# Patient Record
Sex: FEMALE | Race: WHITE | NOT HISPANIC OR LATINO | Employment: FULL TIME | URBAN - NONMETROPOLITAN AREA
[De-identification: names, ages, dates, MRNs, and addresses within clinical notes are randomized per-mention and may not be internally consistent; named-entity substitution may affect disease eponyms.]

---

## 2017-03-27 ENCOUNTER — TRANSCRIBE ORDERS (OUTPATIENT)
Dept: ULTRASOUND IMAGING | Facility: HOSPITAL | Age: 47
End: 2017-03-27

## 2017-03-27 DIAGNOSIS — R10.11 RIGHT UPPER QUADRANT PAIN: Primary | ICD-10-CM

## 2017-03-29 ENCOUNTER — HOSPITAL ENCOUNTER (EMERGENCY)
Facility: HOSPITAL | Age: 47
End: 2017-03-29

## 2017-03-30 ENCOUNTER — HOSPITAL ENCOUNTER (OUTPATIENT)
Dept: ULTRASOUND IMAGING | Facility: HOSPITAL | Age: 47
Discharge: HOME | End: 2017-03-30
Admitting: NURSE PRACTITIONER

## 2017-03-30 DIAGNOSIS — R10.11 RIGHT UPPER QUADRANT PAIN: ICD-10-CM

## 2017-03-30 PROCEDURE — 76700 US EXAM ABDOM COMPLETE: CPT

## 2017-04-03 ENCOUNTER — TRANSCRIBE ORDERS (OUTPATIENT)
Dept: NUCLEAR MEDICINE | Facility: HOSPITAL | Age: 47
End: 2017-04-03

## 2017-04-03 DIAGNOSIS — R10.11 RUQ PAIN: Primary | ICD-10-CM

## 2017-04-04 ENCOUNTER — APPOINTMENT (OUTPATIENT)
Dept: ULTRASOUND IMAGING | Facility: HOSPITAL | Age: 47
End: 2017-04-04

## 2017-04-06 ENCOUNTER — HOSPITAL ENCOUNTER (OUTPATIENT)
Dept: NUCLEAR MEDICINE | Facility: HOSPITAL | Age: 47
Discharge: HOME | End: 2017-04-06

## 2017-04-06 DIAGNOSIS — R10.11 RUQ PAIN: ICD-10-CM

## 2017-04-06 PROCEDURE — 0 TECHNETIUM TC 99M MEBROFENIN KIT: Performed by: NURSE PRACTITIONER

## 2017-04-06 PROCEDURE — A9537 TC99M MEBROFENIN: HCPCS | Performed by: NURSE PRACTITIONER

## 2017-04-06 PROCEDURE — 78227 HEPATOBIL SYST IMAGE W/DRUG: CPT

## 2017-04-06 RX ORDER — KIT FOR THE PREPARATION OF TECHNETIUM TC 99M MEBROFENIN 45 MG/10ML
1 INJECTION, POWDER, LYOPHILIZED, FOR SOLUTION INTRAVENOUS
Status: COMPLETED | OUTPATIENT
Start: 2017-04-06 | End: 2017-04-06

## 2017-04-06 RX ADMIN — MEBROFENIN 1 DOSE: 45 INJECTION, POWDER, LYOPHILIZED, FOR SOLUTION INTRAVENOUS at 08:25

## 2017-12-07 ENCOUNTER — TRANSCRIBE ORDERS (OUTPATIENT)
Dept: ADMINISTRATIVE | Facility: HOSPITAL | Age: 47
End: 2017-12-07

## 2017-12-07 DIAGNOSIS — Z12.39 SCREENING BREAST EXAMINATION: Primary | ICD-10-CM

## 2018-01-11 ENCOUNTER — HOSPITAL ENCOUNTER (OUTPATIENT)
Dept: MAMMOGRAPHY | Facility: HOSPITAL | Age: 48
Discharge: HOME | End: 2018-01-11
Admitting: NURSE PRACTITIONER

## 2018-01-11 DIAGNOSIS — Z12.39 SCREENING BREAST EXAMINATION: ICD-10-CM

## 2018-01-11 PROCEDURE — 77067 SCR MAMMO BI INCL CAD: CPT

## 2018-01-11 PROCEDURE — 77063 BREAST TOMOSYNTHESIS BI: CPT

## 2018-03-19 ENCOUNTER — TRANSCRIBE ORDERS (OUTPATIENT)
Dept: ADMINISTRATIVE | Facility: HOSPITAL | Age: 48
End: 2018-03-19

## 2018-03-19 DIAGNOSIS — J32.0 CHRONIC MAXILLARY SINUSITIS: Primary | ICD-10-CM

## 2018-03-26 ENCOUNTER — HOSPITAL ENCOUNTER (OUTPATIENT)
Dept: CT IMAGING | Facility: HOSPITAL | Age: 48
Discharge: HOME | End: 2018-03-26
Admitting: NURSE PRACTITIONER

## 2018-03-26 DIAGNOSIS — J32.0 CHRONIC MAXILLARY SINUSITIS: ICD-10-CM

## 2018-03-26 PROCEDURE — 70488 CT MAXILLOFACIAL W/O & W/DYE: CPT

## 2018-03-26 PROCEDURE — 25010000002 IOPAMIDOL 61 % SOLUTION: Performed by: NURSE PRACTITIONER

## 2018-03-26 RX ADMIN — IOPAMIDOL 85 ML: 612 INJECTION, SOLUTION INTRAVENOUS at 19:15

## 2020-06-09 ENCOUNTER — LAB (OUTPATIENT)
Dept: LAB | Facility: HOSPITAL | Age: 50
End: 2020-06-09

## 2020-06-09 ENCOUNTER — OFFICE VISIT (OUTPATIENT)
Dept: ENDOCRINOLOGY | Facility: CLINIC | Age: 50
End: 2020-06-09

## 2020-06-09 VITALS
HEART RATE: 72 BPM | BODY MASS INDEX: 28.69 KG/M2 | OXYGEN SATURATION: 98 % | SYSTOLIC BLOOD PRESSURE: 128 MMHG | WEIGHT: 178.5 LBS | HEIGHT: 66 IN | DIASTOLIC BLOOD PRESSURE: 78 MMHG

## 2020-06-09 DIAGNOSIS — E89.0 POSTABLATIVE HYPOTHYROIDISM: Primary | ICD-10-CM

## 2020-06-09 PROCEDURE — 84439 ASSAY OF FREE THYROXINE: CPT | Performed by: INTERNAL MEDICINE

## 2020-06-09 PROCEDURE — 84443 ASSAY THYROID STIM HORMONE: CPT | Performed by: INTERNAL MEDICINE

## 2020-06-09 PROCEDURE — 99244 OFF/OP CNSLTJ NEW/EST MOD 40: CPT | Performed by: INTERNAL MEDICINE

## 2020-06-09 RX ORDER — BUPROPION HYDROCHLORIDE 100 MG/1
1 TABLET, EXTENDED RELEASE ORAL DAILY
COMMUNITY
Start: 2020-05-14 | End: 2020-12-23

## 2020-06-09 RX ORDER — NALTREXONE HYDROCHLORIDE 50 MG/1
50 TABLET, FILM COATED ORAL DAILY
COMMUNITY
End: 2020-12-23

## 2020-06-09 RX ORDER — LEVOTHYROXINE SODIUM 137 UG/1
137 TABLET ORAL DAILY
COMMUNITY
End: 2020-06-10

## 2020-06-09 RX ORDER — PHENTERMINE HYDROCHLORIDE 37.5 MG/1
1 TABLET ORAL DAILY
COMMUNITY
Start: 2020-03-24 | End: 2020-06-09

## 2020-06-09 NOTE — PROGRESS NOTES
Chief Complaint   Patient presents with   • Establish Care   • Graves' Disease        New patient who is being seen in consultation regarding hypothyroidism following BARTON for graves disease at the request of Michelle Leary APRN HPI   Kelsey Ross is a 49 y.o. female who presents for evaluation of hypothyroidism following BARTON for graves disease.      Patient presents today for evaluation of hypothyroidism following BARTON for graves disease. Graves disease was diagnosed 2001 when patient presented with symptoms of tremor and rapid heart rate. Patient then underwent radioactive iodine in 2001. Patient was then started on thyroid hormone shortly after. She reports some variation in dose of thyroid hormone previously. Patient is currently taking 137 mcg daily for the past few years.     Patient reports palpitations and anxiety. Patient reports some chest discomfort with food intake. None at rest. Right sided. None current  Patient reports changes in bowel habits. Pateint reports lifelong constipation which is unchanged.  Patient reports cold intolerance over the past few years.   Patient reports changes in weight. Patient reports 40 pounds of weight gain over the past 2 years.   Patient reports hair loss, brittle nails.   Patient reports tremor.  Patient reports decreased energy level.    Patient reports history of previous head/neck radiation.  Patient reports history of recent iodine exposure.  Patient denies taking OTC supplements such as biotin.  Patient denies personal or family history of thyroid cancer.     Patient seen at [facility] in [place] by Dr. Elliott. She reports she is intermittently taking phentermine but that this has not been effective for weight loss.    Past Medical History:   Diagnosis Date   • Bilateral ovarian cysts    • Migraines    • Thyroid disease      Past Surgical History:   Procedure Laterality Date   • HYSTERECTOMY     • RETINAL DETACHMENT SURGERY     • RHINOPLASTY         Family History   Problem Relation Age of Onset   • Arthritis Mother    • Hypertension Mother    • Hyperlipidemia Mother    • Migraines Mother    • Stroke Mother    • Diabetes Father    • Cancer Maternal Aunt    • Arthritis Maternal Grandmother    • Hypertension Maternal Grandmother    • Hyperlipidemia Maternal Grandmother    • Stroke Maternal Grandmother    • Thyroid disease Maternal Grandmother    • Diabetes Paternal Grandmother       Social History     Socioeconomic History   • Marital status: Single     Spouse name: Not on file   • Number of children: Not on file   • Years of education: Not on file   • Highest education level: Not on file   Tobacco Use   • Smoking status: Former Smoker     Packs/day: 2.00     Years: 8.00     Pack years: 16.00   • Smokeless tobacco: Never Used   Substance and Sexual Activity   • Alcohol use: Not Currently   • Drug use: Never   • Sexual activity: Defer      Allergies   Allergen Reactions   • Allegra [Fexofenadine] Headache   • Penicillins Other (See Comments)     Allergist informed patient she was allergic to Penicillins      Current Outpatient Medications on File Prior to Visit   Medication Sig Dispense Refill   • levothyroxine (SYNTHROID, LEVOTHROID) 137 MCG tablet Take 137 mcg by mouth Daily.     • naltrexone (DEPADE) 50 MG tablet Take 50 mg by mouth Daily.     • Simethicone (GAS RELIEF 80 PO) Take 1 tablet by mouth As Needed.     • buPROPion SR (WELLBUTRIN SR) 100 MG 12 hr tablet Take 1 tablet by mouth Daily.     • [DISCONTINUED] phentermine (ADIPEX-P) 37.5 MG tablet Take 1 tablet by mouth Daily. Pt takes 1/2 tablet daily.       No current facility-administered medications on file prior to visit.         Review of Systems   Constitutional: Positive for fatigue and unexpected weight gain.   HENT: Negative for trouble swallowing and voice change.    Eyes: Negative for pain and visual disturbance.   Respiratory: Negative for cough and shortness of breath.    Cardiovascular:  "Positive for palpitations. Negative for chest pain (marked yes on ROS but denied during visit).   Gastrointestinal: Positive for constipation. Negative for diarrhea.   Endocrine: Positive for cold intolerance. Negative for heat intolerance.   Musculoskeletal: Negative for arthralgias and myalgias.   Skin: Negative for dry skin and rash.   Neurological: Positive for headache. Negative for tremors.   Psychiatric/Behavioral: Positive for sleep disturbance. The patient is nervous/anxious.           Vitals:    06/09/20 1422   BP: 128/78   Pulse: 72   SpO2: 98%   Weight: 81 kg (178 lb 8 oz)   Height: 167.6 cm (66\")   Body mass index is 28.81 kg/m².     Physical Exam   Constitutional: Vital signs are normal. She appears well-developed and well-nourished. She is cooperative.  Non-toxic appearance. No distress. She appears overweight.   HENT:   Head: Normocephalic and atraumatic.   Right Ear: Hearing normal.   Left Ear: Hearing normal.   Nose: Nose normal.   Eyes: Pupils are equal, round, and reactive to light. Conjunctivae and EOM are normal.   Neck: Trachea normal. No thyromegaly present.   Cardiovascular: Normal rate and regular rhythm.   No murmur heard.  Pulmonary/Chest: Effort normal and breath sounds normal. No respiratory distress.   Abdominal: Soft. Bowel sounds are normal. She exhibits no distension. There is no hepatosplenomegaly. There is no tenderness.   Lymphadenopathy:        Head (right side): No submandibular adenopathy present.        Head (left side): No submandibular adenopathy present.     She has no cervical adenopathy.   Neurological: She is alert. She has normal strength and normal reflexes.   Skin: Skin is warm, dry and intact. No rash noted.   Psychiatric: She has a normal mood and affect. Her behavior is normal.          Labs/Imaging  Labs dated 10/31/2019  TSH 1.46    Labs dated 3/16/2019  TSH1.73    Assessment and Plan    Kelsey was seen today for establish care and graves' disease.    Diagnoses " and all orders for this visit:    Postablative hypothyroidism  -Status post radioactive iodine for Graves' disease  -Currently taking levothyroxine 137 mcg daily, reviewed appropriate administration.  Patient reports symptoms of anxiety, palpitations, constipation, weight gain, hair loss-discussed that some of these symptoms are generally attributed to hyperthyroidism and others hypothyroidism, thus not all of her symptoms can be attributed to thyroid hormone dysregulation.  -Discussed that phentermine can commonly cause anxiety and palpitations.  -Patient's thyroid function testing has been normal in the past year.  Discussed that in the setting, it is difficult to attribute her symptoms to hypothyroidism and would recommend evaluation of alternative etiologies.  -Discussed options for management of hypothyroidism including synthetic T4, synthetic T3, desiccated thyroid hormone.  Discussed that I would not recommend use of any T3-containing therapy concurrently with phentermine. Patient reports she is willing to stop phentermine, if needed. She is taking this intermittently and does not feel it is working. Reviewed potential risks of T3 containing therapy including anxiety, palpitations. Patient denies any history of arrhythmia or cardiac disease.   - will reevaluate thyroid hormone today, if TSH allows, consider stopping phentermine and trial of cytomel  -     TSH  -     T4, Free     Weight gain  - discussed potential endocrine etiologies, suspicion for cortisol excess is low, will defer testing for now    Return in about 4 months (around 10/9/2020). The patient was instructed to contact the clinic with any interval questions or concerns.    Neda Mullen MD     Please note that portions of this document were completed using a voice recognition program. Efforts were made to edit the dictations, but occasionally words are mis-transcribed.

## 2020-06-10 ENCOUNTER — TELEPHONE (OUTPATIENT)
Dept: ENDOCRINOLOGY | Facility: CLINIC | Age: 50
End: 2020-06-10

## 2020-06-10 LAB
T4 FREE SERPL-MCNC: 1.63 NG/DL (ref 0.93–1.7)
TSH SERPL DL<=0.05 MIU/L-ACNC: 0.19 UIU/ML (ref 0.27–4.2)

## 2020-06-10 RX ORDER — LEVOTHYROXINE SODIUM 0.12 MG/1
137 TABLET ORAL DAILY
Qty: 30 TABLET | Refills: 5 | Status: SHIPPED | OUTPATIENT
Start: 2020-06-10 | End: 2020-10-23

## 2020-06-10 NOTE — TELEPHONE ENCOUNTER
Spoke with pt and I did see the result note in her chart so I did read that to her as well. Pt stated she was interested in the T3 supplement but we did see your note regarding that is the TSH level. Pt is also wanting to know what Dr Mullen's thought are on brand name vs generic. Pt has been on brand name since 2001 because she was told with Graves Disease she should take brand name. Pt is interested in [facility] pharmacy if you do want her on brand because she has been paying completely out of pocket for the brand name. Pt is also wanting to know if going down in the dose will cause weight gain? Please advise. Thanks!

## 2020-06-10 NOTE — TELEPHONE ENCOUNTER
Pt got a message from her pharmacy that her Synthroid dosage had changed. She normally takes 137 mcg, but the RX is for 125mcg.      She also has some questions about a t3 supplement.    Please advise.    Pt contact [phone]

## 2020-06-10 NOTE — TELEPHONE ENCOUNTER
Regarding brand name versus generic-- generally being on brand name medication does not have any significant impact. However, if she prefers to stay on brand name, it is okay to send to her requested pharmacy.    Regarding T3- I would be happy to consider this in the future once TSH has returned to normal. However, TSH already indicates too much thyroid hormone, thus adding T3 would not be a good idea at this time as it could result in worsening hyperthyroidism.    Regarding weight gain, the slight dose reduction should not cause significant change. However, hyperthyroidism does speed up metabolism, which could result in weight change once corrected.

## 2020-06-10 NOTE — TELEPHONE ENCOUNTER
LVM to get more info on the T3 supplement question.     Can you please advise on the dose change? Thanks!

## 2020-08-03 ENCOUNTER — TELEPHONE (OUTPATIENT)
Dept: ENDOCRINOLOGY | Facility: CLINIC | Age: 50
End: 2020-08-03

## 2020-08-03 DIAGNOSIS — E89.0 POSTABLATIVE HYPOTHYROIDISM: Primary | ICD-10-CM

## 2020-08-03 RX ORDER — LIOTHYRONINE SODIUM 5 UG/1
5 TABLET ORAL DAILY
Qty: 30 TABLET | Refills: 1 | Status: SHIPPED | OUTPATIENT
Start: 2020-08-03 | End: 2020-09-16 | Stop reason: SDUPTHER

## 2020-08-03 NOTE — TELEPHONE ENCOUNTER
----- Message from Neda Mullen MD sent at 8/3/2020 10:52 AM EDT -----  Please contact patient. Her TSH and free T4 are now normal. Patient was previously interested in starting T3 in addition to her current thyroid hormone. If she desires, we could do a trial of cytomel 5 mcg daily in addition to her current levothyroxine. Her TSH, however, is in the lower part of the normal range thus she would need to monitor for symptoms of hyperthyroidism (palpiltations, diarrhea, weight loss, heat intolerance) and contact us if she has any concerning symptoms. If this is added, we would need to repeat labs at follow up.    Please let me know if patient desires trial of cytomel and I can send this to the pharmacy.

## 2020-08-03 NOTE — TELEPHONE ENCOUNTER
Informed patient of lab results, pt voiced understanding.  Patient decided to do the trial and take Cytomel 5 mg.

## 2020-09-16 DIAGNOSIS — E89.0 POSTABLATIVE HYPOTHYROIDISM: ICD-10-CM

## 2020-09-16 NOTE — TELEPHONE ENCOUNTER
PT NEEDS A REFILL ON HER CYTOMEL.    TO [facility]    PT WAS A PT OF DR. HOGAN'S BUT SWITCH TO [facility]. SHE WILL SEE TREVIN FOR THE FIRST TIME 10/22, BUT WILL NEED A REFILL BEFORE THEN.    PLEASE ADVISE.

## 2020-09-17 RX ORDER — LIOTHYRONINE SODIUM 5 UG/1
5 TABLET ORAL DAILY
Qty: 30 TABLET | Refills: 1 | Status: SHIPPED | OUTPATIENT
Start: 2020-09-17 | End: 2020-10-23

## 2020-10-22 ENCOUNTER — LAB (OUTPATIENT)
Dept: LAB | Facility: HOSPITAL | Age: 50
End: 2020-10-22

## 2020-10-22 ENCOUNTER — OFFICE VISIT (OUTPATIENT)
Dept: ENDOCRINOLOGY | Facility: CLINIC | Age: 50
End: 2020-10-22

## 2020-10-22 VITALS
BODY MASS INDEX: 29.96 KG/M2 | HEART RATE: 75 BPM | DIASTOLIC BLOOD PRESSURE: 60 MMHG | SYSTOLIC BLOOD PRESSURE: 108 MMHG | HEIGHT: 66 IN | OXYGEN SATURATION: 98 % | WEIGHT: 186.4 LBS

## 2020-10-22 DIAGNOSIS — R53.82 CHRONIC FATIGUE: ICD-10-CM

## 2020-10-22 DIAGNOSIS — E03.9 ACQUIRED HYPOTHYROIDISM: Primary | ICD-10-CM

## 2020-10-22 DIAGNOSIS — R63.5 WEIGHT GAIN: ICD-10-CM

## 2020-10-22 LAB
T4 FREE SERPL-MCNC: 1.57 NG/DL (ref 0.93–1.7)
TSH SERPL DL<=0.05 MIU/L-ACNC: 0.19 UIU/ML (ref 0.27–4.2)

## 2020-10-22 PROCEDURE — 86800 THYROGLOBULIN ANTIBODY: CPT | Performed by: INTERNAL MEDICINE

## 2020-10-22 PROCEDURE — 86376 MICROSOMAL ANTIBODY EACH: CPT | Performed by: INTERNAL MEDICINE

## 2020-10-22 PROCEDURE — 84443 ASSAY THYROID STIM HORMONE: CPT | Performed by: INTERNAL MEDICINE

## 2020-10-22 PROCEDURE — 84439 ASSAY OF FREE THYROXINE: CPT | Performed by: INTERNAL MEDICINE

## 2020-10-22 PROCEDURE — 99214 OFFICE O/P EST MOD 30 MIN: CPT | Performed by: INTERNAL MEDICINE

## 2020-10-22 NOTE — ASSESSMENT & PLAN NOTE
On synthroid 125 mcg + 5 mcg cytomel  Check tft  Go to 10 mcg cytomel and adjust T4 to fit this based on tsh

## 2020-10-22 NOTE — PROGRESS NOTES
Kelsey Cody 50 y.o.  [facility]:Follow-up and Hypothyroidism      [facility]:Follow-up and Hypothyroidism    C/o fatigue  Needs more energy   She is trying to walk for exercise  Weight gain   bp is good  Is on cytomel - that has helped a little  Having some hair loss   She stopped phentermine - has discontinued this   Is on bupropion but has discontinued this as well  Am headaches and fatigue  Consider sleep study   Last tsh 0.248     Allergies   Allergen Reactions   • Allegra [Fexofenadine] Headache   • Penicillins Other (See Comments)     Allergist informed patient she was allergic to Penicillins       Current Outpatient Medications:   •  buPROPion SR (WELLBUTRIN SR) 100 MG 12 hr tablet, Take 1 tablet by mouth Daily., Disp: , Rfl:   •  levothyroxine (SYNTHROID, LEVOTHROID) 125 MCG tablet, Take 1 tablet by mouth Daily., Disp: 30 tablet, Rfl: 5  •  liothyronine (CYTOMEL) 5 MCG tablet, Take 1 tablet by mouth Daily., Disp: 30 tablet, Rfl: 1  •  naltrexone (DEPADE) 50 MG tablet, Take 50 mg by mouth Daily., Disp: , Rfl:   •  Simethicone (GAS RELIEF 80 PO), Take 1 tablet by mouth As Needed., Disp: , Rfl:   There are no active problems to display for this patient.    Review of Systems   Constitutional: Positive for activity change, appetite change, fatigue and unexpected weight change. Negative for chills, diaphoresis and fever.   HENT: Negative for congestion, dental problem, drooling, ear discharge, ear pain, facial swelling, hearing loss, mouth sores, nosebleeds, postnasal drip, rhinorrhea, sinus pressure, sneezing, sore throat, tinnitus, trouble swallowing and voice change.    Eyes: Negative for photophobia, pain, discharge, redness, itching and visual disturbance.   Respiratory: Negative for apnea, cough, choking, chest tightness, shortness of breath, wheezing and stridor.    Cardiovascular: Negative for chest pain, palpitations and leg swelling.   Gastrointestinal: Negative for abdominal distention, abdominal pain, anal  bleeding, blood in stool, constipation, diarrhea, nausea, rectal pain and vomiting.   Endocrine: Negative for cold intolerance, heat intolerance, polydipsia, polyphagia and polyuria.   Genitourinary: Negative for decreased urine volume, difficulty urinating, dysuria, enuresis, flank pain, frequency, genital sores, hematuria and urgency.   Musculoskeletal: Negative for arthralgias, back pain, gait problem, joint swelling, myalgias, neck pain and neck stiffness.   Skin: Negative for color change, pallor, rash and wound.        Hair changes   Frontal thinning    Allergic/Immunologic: Negative for environmental allergies, food allergies and immunocompromised state.   Neurological: Positive for headaches. Negative for dizziness, tremors, seizures, syncope, facial asymmetry, speech difficulty, weakness, light-headedness and numbness.   Hematological: Negative for adenopathy. Does not bruise/bleed easily.   Psychiatric/Behavioral: Negative for agitation, behavioral problems, confusion, decreased concentration, dysphoric mood, hallucinations, self-injury, sleep disturbance and suicidal ideas. The patient is not nervous/anxious and is not hyperactive.      Social History     Socioeconomic History   • Marital status: Single     Spouse name: Not on file   • Number of children: Not on file   • Years of education: Not on file   • Highest education level: Not on file   Tobacco Use   • Smoking status: Former Smoker     Packs/day: 2.00     Years: 8.00     Pack years: 16.00   • Smokeless tobacco: Never Used   Substance and Sexual Activity   • Alcohol use: Not Currently   • Drug use: Never   • Sexual activity: Defer     Family History   Problem Relation Age of Onset   • Arthritis Mother    • Hypertension Mother    • Hyperlipidemia Mother    • Migraines Mother    • Stroke Mother    • Diabetes Father    • Cancer Maternal Aunt    • Arthritis Maternal Grandmother    • Hypertension Maternal Grandmother    • Hyperlipidemia Maternal  "Grandmother    • Stroke Maternal Grandmother    • Thyroid disease Maternal Grandmother    • Diabetes Paternal Grandmother      /60   Pulse 75   Ht 167.6 cm (66\")   Wt 84.6 kg (186 lb 6.4 oz)   SpO2 98%   BMI 30.09 kg/m²   Physical Exam  Vitals signs and nursing note reviewed.   Constitutional:       Appearance: Normal appearance. She is well-developed.   HENT:      Head: Normocephalic and atraumatic.   Eyes:      General: Lids are normal.      Extraocular Movements: Extraocular movements intact.      Conjunctiva/sclera: Conjunctivae normal.      Pupils: Pupils are equal, round, and reactive to light.   Neck:      Musculoskeletal: Normal range of motion and neck supple.      Thyroid: No thyroid mass or thyromegaly.      Vascular: No carotid bruit.      Trachea: Trachea normal. No tracheal deviation.   Cardiovascular:      Rate and Rhythm: Normal rate and regular rhythm.      Heart sounds: Normal heart sounds. No murmur. No friction rub. No gallop.    Pulmonary:      Effort: Pulmonary effort is normal. No respiratory distress.      Breath sounds: Normal breath sounds. No wheezing.   Musculoskeletal: Normal range of motion.         General: No deformity.   Lymphadenopathy:      Cervical: No cervical adenopathy.   Skin:     General: Skin is warm and dry.      Findings: No erythema or rash.      Nails: There is no clubbing.     Neurological:      Mental Status: She is alert and oriented to person, place, and time.      Cranial Nerves: No cranial nerve deficit.      Deep Tendon Reflexes: Reflexes are normal and symmetric. Reflexes normal.   Psychiatric:         Speech: Speech normal.         Behavior: Behavior normal.         Thought Content: Thought content normal.         Judgment: Judgment normal.       Results for orders placed or performed in visit on 06/09/20   TSH    Specimen: Blood   Result Value Ref Range    TSH 0.187 (L) 0.270 - 4.200 uIU/mL   T4, Free    Specimen: Blood   Result Value Ref Range    " Free T4 1.63 0.93 - 1.70 ng/dL     Problems Addressed this Visit        Endocrine    Acquired hypothyroidism - Primary     On synthroid 125 mcg + 5 mcg cytomel  Check tft  Go to 10 mcg cytomel and adjust T4 to fit this based on tsh            Relevant Orders    T4, Free    TSH    Thyroid Antibodies       Other    Weight gain     With frontal hair thinning - likely hormonal assoc with menopause  Diet discussed          Chronic fatigue     Consider sleep study            Diagnoses       Codes Comments    Acquired hypothyroidism    -  Primary ICD-10-CM: E03.9  ICD-9-CM: 244.9     Chronic fatigue     ICD-10-CM: R53.82  ICD-9-CM: 780.79     Weight gain     ICD-10-CM: R63.5  ICD-9-CM: 783.1         Return in about 8 weeks (around 12/17/2020) for Recheck.  Ok to return to phentermine / bupropion once tfts normalized  Discussed restrictions on phentermine use and she has taken for years   Cristel Garces MA  Signed Taylor Holliday MD

## 2020-10-23 DIAGNOSIS — E89.0 POSTABLATIVE HYPOTHYROIDISM: ICD-10-CM

## 2020-10-23 RX ORDER — LIOTHYRONINE SODIUM 5 UG/1
10 TABLET ORAL DAILY
Qty: 60 TABLET | Refills: 5 | Status: SHIPPED | OUTPATIENT
Start: 2020-10-23 | End: 2021-04-26 | Stop reason: SDUPTHER

## 2020-10-23 RX ORDER — LEVOTHYROXINE SODIUM 0.1 MG/1
100 TABLET ORAL DAILY
Qty: 30 TABLET | Refills: 5 | Status: SHIPPED | OUTPATIENT
Start: 2020-10-23 | End: 2021-06-18 | Stop reason: SDUPTHER

## 2020-10-26 LAB
THYROGLOB AB SERPL-ACNC: <1 IU/ML (ref 0–0.9)
THYROPEROXIDASE AB SERPL-ACNC: <9 IU/ML (ref 0–34)

## 2020-11-30 ENCOUNTER — TELEPHONE (OUTPATIENT)
Dept: INTERNAL MEDICINE | Facility: CLINIC | Age: 50
End: 2020-11-30

## 2020-11-30 NOTE — TELEPHONE ENCOUNTER
PT CALLED TO SCHEDULE  ESTABLISHED CARE. WAS ABLE TO SCHEDULE IN FEBRUARY BUT WOULD LIKE TO SEE IF DR. DUNLAP COULD TRY TO GET HER IN SOONER THAN THAT.

## 2020-12-23 ENCOUNTER — OFFICE VISIT (OUTPATIENT)
Dept: ENDOCRINOLOGY | Facility: CLINIC | Age: 50
End: 2020-12-23

## 2020-12-23 ENCOUNTER — LAB (OUTPATIENT)
Dept: LAB | Facility: HOSPITAL | Age: 50
End: 2020-12-23

## 2020-12-23 VITALS
WEIGHT: 187.4 LBS | OXYGEN SATURATION: 98 % | TEMPERATURE: 97.5 F | DIASTOLIC BLOOD PRESSURE: 84 MMHG | HEART RATE: 68 BPM | SYSTOLIC BLOOD PRESSURE: 122 MMHG | BODY MASS INDEX: 30.25 KG/M2

## 2020-12-23 DIAGNOSIS — R53.83 FATIGUE, UNSPECIFIED TYPE: ICD-10-CM

## 2020-12-23 DIAGNOSIS — E03.9 ACQUIRED HYPOTHYROIDISM: Primary | ICD-10-CM

## 2020-12-23 LAB
25(OH)D3 SERPL-MCNC: 21.3 NG/ML (ref 30–100)
BASOPHILS # BLD AUTO: 0.04 10*3/MM3 (ref 0–0.2)
BASOPHILS NFR BLD AUTO: 0.9 % (ref 0–1.5)
DEPRECATED RDW RBC AUTO: 38.5 FL (ref 37–54)
EOSINOPHIL # BLD AUTO: 0.42 10*3/MM3 (ref 0–0.4)
EOSINOPHIL NFR BLD AUTO: 9.6 % (ref 0.3–6.2)
ERYTHROCYTE [DISTWIDTH] IN BLOOD BY AUTOMATED COUNT: 12.4 % (ref 12.3–15.4)
HCT VFR BLD AUTO: 40.6 % (ref 34–46.6)
HGB BLD-MCNC: 13.5 G/DL (ref 12–15.9)
IMM GRANULOCYTES # BLD AUTO: 0.01 10*3/MM3 (ref 0–0.05)
IMM GRANULOCYTES NFR BLD AUTO: 0.2 % (ref 0–0.5)
IRON 24H UR-MRATE: 71 MCG/DL (ref 37–145)
LYMPHOCYTES # BLD AUTO: 1.57 10*3/MM3 (ref 0.7–3.1)
LYMPHOCYTES NFR BLD AUTO: 35.9 % (ref 19.6–45.3)
MCH RBC QN AUTO: 28.4 PG (ref 26.6–33)
MCHC RBC AUTO-ENTMCNC: 33.3 G/DL (ref 31.5–35.7)
MCV RBC AUTO: 85.5 FL (ref 79–97)
MONOCYTES # BLD AUTO: 0.52 10*3/MM3 (ref 0.1–0.9)
MONOCYTES NFR BLD AUTO: 11.9 % (ref 5–12)
NEUTROPHILS NFR BLD AUTO: 1.81 10*3/MM3 (ref 1.7–7)
NEUTROPHILS NFR BLD AUTO: 41.5 % (ref 42.7–76)
NRBC BLD AUTO-RTO: 0 /100 WBC (ref 0–0.2)
PLATELET # BLD AUTO: 217 10*3/MM3 (ref 140–450)
PMV BLD AUTO: 10.2 FL (ref 6–12)
RBC # BLD AUTO: 4.75 10*6/MM3 (ref 3.77–5.28)
T3FREE SERPL-MCNC: 4.35 PG/ML (ref 2–4.4)
T4 FREE SERPL-MCNC: 1.07 NG/DL (ref 0.93–1.7)
TSH SERPL DL<=0.05 MIU/L-ACNC: 0.37 UIU/ML (ref 0.27–4.2)
VIT B12 BLD-MCNC: 1330 PG/ML (ref 211–946)
WBC # BLD AUTO: 4.37 10*3/MM3 (ref 3.4–10.8)

## 2020-12-23 PROCEDURE — 84443 ASSAY THYROID STIM HORMONE: CPT | Performed by: INTERNAL MEDICINE

## 2020-12-23 PROCEDURE — 82306 VITAMIN D 25 HYDROXY: CPT | Performed by: INTERNAL MEDICINE

## 2020-12-23 PROCEDURE — 86800 THYROGLOBULIN ANTIBODY: CPT | Performed by: INTERNAL MEDICINE

## 2020-12-23 PROCEDURE — 84439 ASSAY OF FREE THYROXINE: CPT | Performed by: INTERNAL MEDICINE

## 2020-12-23 PROCEDURE — 99213 OFFICE O/P EST LOW 20 MIN: CPT | Performed by: INTERNAL MEDICINE

## 2020-12-23 PROCEDURE — 85025 COMPLETE CBC W/AUTO DIFF WBC: CPT | Performed by: INTERNAL MEDICINE

## 2020-12-23 PROCEDURE — 84481 FREE ASSAY (FT-3): CPT | Performed by: INTERNAL MEDICINE

## 2020-12-23 PROCEDURE — 86376 MICROSOMAL ANTIBODY EACH: CPT | Performed by: INTERNAL MEDICINE

## 2020-12-23 PROCEDURE — 82607 VITAMIN B-12: CPT | Performed by: INTERNAL MEDICINE

## 2020-12-23 PROCEDURE — 83540 ASSAY OF IRON: CPT | Performed by: INTERNAL MEDICINE

## 2020-12-23 NOTE — PROGRESS NOTES
Kelsey Ross 50 y.o.  CC: Hypothyroidism (follow up)      [place]: Hypothyroidism (follow up)    Dry skin, draggy and hair loss  Also cough and feeling more run down   Neck itchy  bp is good  [facility]- [facility]  Neck itchy again- no rash  Problems with focus     Allergies   Allergen Reactions   • Allegra [Fexofenadine] Headache   • Penicillins Other (See Comments)     Allergist informed patient she was allergic to Penicillins       Current Outpatient Medications:   •  levothyroxine (SYNTHROID, LEVOTHROID) 100 MCG tablet, Take 1 tablet by mouth Daily., Disp: 30 tablet, Rfl: 5  •  liothyronine (CYTOMEL) 5 MCG tablet, Take 2 tablets by mouth Daily., Disp: 60 tablet, Rfl: 5  •  buPROPion SR (WELLBUTRIN SR) 100 MG 12 hr tablet, Take 1 tablet by mouth Daily., Disp: , Rfl:   •  naltrexone (DEPADE) 50 MG tablet, Take 50 mg by mouth Daily., Disp: , Rfl:   •  Simethicone (GAS RELIEF 80 PO), Take 1 tablet by mouth As Needed., Disp: , Rfl:   Patient Active Problem List    Diagnosis   • Acquired hypothyroidism [E03.9]   • Chronic fatigue [R53.82]   • Weight gain [R63.5]     Review of Systems   Constitutional: Positive for activity change and fatigue. Negative for appetite change, chills, diaphoresis, fever and unexpected weight change.   HENT: Negative for congestion, dental problem, drooling, ear discharge, ear pain, facial swelling, hearing loss, mouth sores, nosebleeds, postnasal drip, rhinorrhea, sinus pressure, sneezing, sore throat, tinnitus, trouble swallowing and voice change.    Eyes: Negative for photophobia, pain, discharge, redness, itching and visual disturbance.   Respiratory: Negative for apnea, cough, choking, chest tightness, shortness of breath, wheezing and stridor.    Cardiovascular: Negative for chest pain, palpitations and leg swelling.   Gastrointestinal: Negative for abdominal distention, abdominal pain, anal bleeding, blood in stool, constipation, diarrhea, nausea, rectal pain and  vomiting.   Endocrine: Negative for cold intolerance, heat intolerance, polydipsia, polyphagia and polyuria.   Genitourinary: Negative for decreased urine volume, difficulty urinating, dysuria, enuresis, flank pain, frequency, genital sores, hematuria and urgency.   Musculoskeletal: Negative for arthralgias, back pain, gait problem, joint swelling, myalgias, neck pain and neck stiffness.   Skin: Negative for color change, pallor, rash and wound.        Itching in neck area    Allergic/Immunologic: Negative for environmental allergies, food allergies and immunocompromised state.   Neurological: Negative for dizziness, tremors, seizures, syncope, facial asymmetry, speech difficulty, weakness, light-headedness, numbness and headaches.   Hematological: Negative for adenopathy. Does not bruise/bleed easily.   Psychiatric/Behavioral: Negative for agitation, behavioral problems, confusion, decreased concentration, dysphoric mood, hallucinations, self-injury, sleep disturbance and suicidal ideas. The patient is not nervous/anxious and is not hyperactive.      Social History     Socioeconomic History   • Marital status: Single     Spouse name: Not on file   • Number of children: Not on file   • Years of education: Not on file   • Highest education level: Not on file   Tobacco Use   • Smoking status: Former Smoker     Packs/day: 2.00     Years: 8.00     Pack years: 16.00   • Smokeless tobacco: Never Used   Substance and Sexual Activity   • Alcohol use: Not Currently   • Drug use: Never   • Sexual activity: Defer     Family History   Problem Relation Age of Onset   • Arthritis Mother    • Hypertension Mother    • Hyperlipidemia Mother    • Migraines Mother    • Stroke Mother    • Diabetes Father    • Cancer Maternal Aunt    • Arthritis Maternal Grandmother    • Hypertension Maternal Grandmother    • Hyperlipidemia Maternal Grandmother    • Stroke Maternal Grandmother    • Thyroid disease Maternal Grandmother    • Diabetes  Paternal Grandmother      /84   Pulse 68   Temp 97.5 °F (36.4 °C)   Wt 85 kg (187 lb 6.4 oz)   SpO2 98%   BMI 30.25 kg/m²   Physical Exam  Vitals signs and nursing note reviewed.   Constitutional:       Appearance: Normal appearance. She is well-developed.   HENT:      Head: Normocephalic and atraumatic.      Mouth/Throat:      Comments: Itching in neck area  Eyes:      General: Lids are normal.      Extraocular Movements: Extraocular movements intact.      Conjunctiva/sclera: Conjunctivae normal.      Pupils: Pupils are equal, round, and reactive to light.   Neck:      Musculoskeletal: Normal range of motion and neck supple.      Thyroid: No thyroid mass or thyromegaly.      Vascular: No carotid bruit.      Trachea: Trachea normal. No tracheal deviation.   Cardiovascular:      Rate and Rhythm: Normal rate and regular rhythm.      Heart sounds: Normal heart sounds. No murmur. No friction rub. No gallop.    Pulmonary:      Effort: Pulmonary effort is normal. No respiratory distress.      Breath sounds: Normal breath sounds. No wheezing.   Musculoskeletal: Normal range of motion.         General: No deformity.   Lymphadenopathy:      Cervical: No cervical adenopathy.   Skin:     General: Skin is warm and dry.      Findings: No erythema or rash.      Nails: There is no clubbing.     Neurological:      Mental Status: She is alert and oriented to person, place, and time.      Cranial Nerves: No cranial nerve deficit.      Deep Tendon Reflexes: Reflexes are normal and symmetric. Reflexes normal.   Psychiatric:         Speech: Speech normal.         Behavior: Behavior normal.         Thought Content: Thought content normal.         Judgment: Judgment normal.       Results for orders placed or performed in visit on 10/22/20   T4, Free    Specimen: Blood   Result Value Ref Range    Free T4 1.57 0.93 - 1.70 ng/dL   TSH    Specimen: Blood   Result Value Ref Range    TSH 0.194 (L) 0.270 - 4.200 uIU/mL   Thyroid  Antibodies    Specimen: Blood   Result Value Ref Range    Thyroid Peroxidase Antibody <9 0 - 34 IU/mL    Thyroglobulin Ab <1.0 0.0 - 0.9 IU/mL     Problems Addressed this Visit        Endocrine    Acquired hypothyroidism - Primary     Update tfts- on synthroid 100 mcg daily + cytomel 10 mcg daily   Some itching in neck area- trial 50 mcg synthroid (take 2 daily) due being dye free   She will notify us if itching resolves         Relevant Orders    T4, Free    TSH    T3, Free    Thyroid Antibodies       Other    Fatigue     Check cbc, D and B12   Consider sleep study if neg and tfts in good range          Relevant Orders    CBC Auto Differential    Iron    Vitamin B12    Vitamin D 25 Hydroxy      Diagnoses       Codes Comments    Acquired hypothyroidism    -  Primary ICD-10-CM: E03.9  ICD-9-CM: 244.9     Fatigue, unspecified type     ICD-10-CM: R53.83  ICD-9-CM: 780.79         Return in about 4 months (around 4/23/2021) for Recheck.    Leeanne Harrell MA  Signed Taylor Holliday MD

## 2020-12-24 NOTE — ASSESSMENT & PLAN NOTE
Update tfts- on synthroid 100 mcg daily + cytomel 10 mcg daily   Some itching in neck area- trial 50 mcg synthroid (take 2 daily) due being dye free   She will notify us if itching resolves

## 2020-12-28 LAB
THYROGLOB AB SERPL-ACNC: <1 IU/ML (ref 0–0.9)
THYROPEROXIDASE AB SERPL-ACNC: <9 IU/ML (ref 0–34)

## 2021-01-14 ENCOUNTER — OFFICE VISIT (OUTPATIENT)
Dept: INTERNAL MEDICINE | Facility: CLINIC | Age: 51
End: 2021-01-14

## 2021-01-14 VITALS
SYSTOLIC BLOOD PRESSURE: 115 MMHG | DIASTOLIC BLOOD PRESSURE: 68 MMHG | BODY MASS INDEX: 29.65 KG/M2 | HEART RATE: 87 BPM | OXYGEN SATURATION: 99 % | WEIGHT: 184.5 LBS | HEIGHT: 66 IN | TEMPERATURE: 97.8 F

## 2021-01-14 DIAGNOSIS — R05.8 COUGH PRESENT FOR GREATER THAN 3 WEEKS: Primary | ICD-10-CM

## 2021-01-14 DIAGNOSIS — R49.0 HOARSENESS, PERSISTENT: ICD-10-CM

## 2021-01-14 DIAGNOSIS — E66.3 OVERWEIGHT: ICD-10-CM

## 2021-01-14 PROCEDURE — 99214 OFFICE O/P EST MOD 30 MIN: CPT | Performed by: FAMILY MEDICINE

## 2021-01-14 RX ORDER — BENZONATATE 200 MG/1
200 CAPSULE ORAL 3 TIMES DAILY PRN
Qty: 30 CAPSULE | Refills: 0 | OUTPATIENT
Start: 2021-01-14 | End: 2021-02-20

## 2021-01-14 NOTE — PROGRESS NOTES
Kelsey Ross is a 50 y.o. female.    Chief Complaint   Patient presents with   • Cough   • Nasal Congestion   • Sinus Problem       HPI   Patient presents today to establish care.  She reports persistent cough since October.  Cough is dry and rarely has production.  She is hoarse from coughing so much.   Coughing is causing horrible headaches.   She has had 2 negative COVID tests. CXR was normal.   She was given advair for potential asthma recently at the urgent care.   She was given a round of antibiotics and steroids about a month ago with little relief.  Advair helps minimally.  She was referred to pulmonology, but cannot get in for quite some time.  Urgent care provider recommended that she have a pulmonary function test to rule out asthma.  She has no previous history of asthma, COPD, or chronic bronchitis.  She was encouraged to take Pepcid, but has yet to do so as she does not feel that she has any reflux or heartburn.    Patient reports she is trying to lose weight.  She is currently on a vegan diet.  She is considering going back to vegan diet.  Today she has only had pistachios today.  Has tried low carb. She did lose down to 139 lbs.  She was on adipex for years consistently.  Recently, she has been trying Slim fast shakes.  She reports that these shakes tend to exacerbate her coughing and she will stop them.    The following portions of the patient's history were reviewed and updated as appropriate: allergies, current medications, past family history, past medical history, past social history, past surgical history and problem list.     Past Medical History:   Diagnosis Date   • Allergic    • Bilateral ovarian cysts    • Migraines    • Thyroid disease        Past Surgical History:   Procedure Laterality Date   • CATARACT EXTRACTION, BILATERAL     • HYSTERECTOMY      complete   • RETINAL DETACHMENT SURGERY     • RHINOPLASTY         Family History   Problem Relation Age of Onset   • Arthritis Mother    •  Hypertension Mother    • Hyperlipidemia Mother    • Migraines Mother    • Stroke Mother    • Diabetes Father    • Pulmonary embolism Father    • Lung cancer Maternal Aunt    • Arthritis Maternal Grandmother    • Hypertension Maternal Grandmother    • Hyperlipidemia Maternal Grandmother    • Stroke Maternal Grandmother    • Thyroid disease Maternal Grandmother    • Diabetes Paternal Grandmother    • Heart attack Maternal Grandfather        Social History     Socioeconomic History   • Marital status: Single     Spouse name: Not on file   • Number of children: Not on file   • Years of education: Not on file   • Highest education level: Not on file   Tobacco Use   • Smoking status: Former Smoker     Packs/day: 2.00     Years: 8.00     Pack years: 16.00   • Smokeless tobacco: Never Used   Substance and Sexual Activity   • Alcohol use: Not Currently   • Drug use: Never   • Sexual activity: Not Currently       Allergies   Allergen Reactions   • Allegra [Fexofenadine] Headache     Only allegra D   • Penicillins Other (See Comments)     Allergist informed patient she was allergic to Penicillins         Current Outpatient Medications:   •  albuterol sulfate  (90 Base) MCG/ACT inhaler, Inhale 2 puffs Every 4 (Four) Hours As Needed for Wheezing., Disp: 6.7 g, Rfl: 0  •  fluticasone-salmeterol (Advair Diskus) 250-50 MCG/DOSE DISKUS, Inhale 1 puff 2 (Two) Times a Day., Disp: 60 each, Rfl: 0  •  levothyroxine (SYNTHROID, LEVOTHROID) 100 MCG tablet, Take 1 tablet by mouth Daily., Disp: 30 tablet, Rfl: 5  •  liothyronine (CYTOMEL) 5 MCG tablet, Take 2 tablets by mouth Daily., Disp: 60 tablet, Rfl: 5  •  loratadine (CLARITIN) 10 MG tablet, Take 1 tablet by mouth Daily for 30 days., Disp: 30 tablet, Rfl: 0  •  Simethicone (GAS RELIEF 80 PO), Take 1 tablet by mouth As Needed., Disp: , Rfl:   •  benzonatate (TESSALON) 200 MG capsule, Take 1 capsule by mouth 3 (Three) Times a Day As Needed for Cough., Disp: 30 capsule, Rfl: 0  •   "famotidine (PEPCID) 20 MG tablet, Take 1 tablet by mouth 2 (Two) Times a Day., Disp: 60 tablet, Rfl: 0    ROS    Review of Systems   Constitutional: Positive for fatigue and unexpected weight gain. Negative for chills and fever.   HENT: Positive for ear pain, hearing loss and voice change. Negative for congestion, postnasal drip, sore throat and trouble swallowing.    Eyes: Positive for visual disturbance.   Respiratory: Positive for cough and shortness of breath. Negative for wheezing.    Cardiovascular: Negative for chest pain and leg swelling.   Gastrointestinal: Positive for constipation. Negative for abdominal pain, diarrhea, nausea and vomiting.   Endocrine: Positive for cold intolerance, heat intolerance and polydipsia.   Genitourinary: Negative for dysuria and frequency.   Musculoskeletal: Negative for arthralgias and back pain.   Skin: Negative for color change and rash.   Allergic/Immunologic: Negative for environmental allergies.   Neurological: Positive for headache. Negative for weakness and numbness.   Hematological: Does not bruise/bleed easily.   Psychiatric/Behavioral: Negative for depressed mood. The patient is not nervous/anxious.        Vitals:    01/14/21 1501   BP: 115/68   BP Location: Left arm   Patient Position: Sitting   Cuff Size: Adult   Pulse: 87   Temp: 97.8 °F (36.6 °C)   TempSrc: Temporal   SpO2: 99%   Weight: 83.7 kg (184 lb 8 oz)   Height: 167.6 cm (66\")     Body mass index is 29.78 kg/m².    Physical Exam     Physical Exam  Constitutional:       General: She is not in acute distress.     Appearance: She is well-developed.   HENT:      Head: Normocephalic and atraumatic.      Right Ear: Tympanic membrane and external ear normal.      Left Ear: Tympanic membrane and external ear normal.   Eyes:      Extraocular Movements: Extraocular movements intact.      Conjunctiva/sclera: Conjunctivae normal.      Pupils: Pupils are equal, round, and reactive to light.   Neck:      " Musculoskeletal: Normal range of motion and neck supple.   Cardiovascular:      Rate and Rhythm: Normal rate and regular rhythm.      Heart sounds: No murmur.   Pulmonary:      Effort: Pulmonary effort is normal. No respiratory distress.      Breath sounds: Normal breath sounds. No wheezing.   Abdominal:      General: Bowel sounds are normal. There is no distension.      Palpations: Abdomen is soft.      Tenderness: There is no abdominal tenderness.   Musculoskeletal:      Right lower leg: No edema.      Left lower leg: No edema.   Lymphadenopathy:      Cervical: No cervical adenopathy.   Skin:     General: Skin is warm and dry.   Neurological:      Mental Status: She is alert and oriented to person, place, and time.      Cranial Nerves: No cranial nerve deficit.      Deep Tendon Reflexes: Reflexes normal.   Psychiatric:         Mood and Affect: Mood normal.         Behavior: Behavior normal.         Assessment/Plan    Problems Addressed this Visit     None      Visit Diagnoses     Cough present for greater than 3 weeks    -  Primary    Relevant Orders    Full Pulmonary Function Test With Bronchodilator    Ambulatory Referral to ENT (Otolaryngology) (Completed)    Hoarseness, persistent        Relevant Orders    Ambulatory Referral to ENT (Otolaryngology) (Completed)    Overweight            Discussed with patient I will check into her referral to pulmonology.  We will go ahead and obtain a pulmonary function test to be done at the hospital in the meantime.  In addition, I do feel that the patient would benefit from a referral to ENT due to persistent cough and hoarseness.  She has been encouraged to try Pepcid despite not having any additional reflux symptoms.  Advised that her cough and hoarseness can often be the result of unnoticed reflux.  She may continue Advair if it does seem to help a little bit.  She has been given Tessalon Perles to take as needed for cough as well.  In regards to weight loss, BMI is not  above 30 and should not be on Adipex long-term.  Discussed that Adipex is not meant to be a long-term fix.  We did discuss several diet programs including weight watchers, optivia, and NOOM.  She has been given the number to [facility] weight loss clinic in [place].  Advised that this is a cash pay clinic.    New Medications Ordered This Visit   Medications   • benzonatate (TESSALON) 200 MG capsule     Sig: Take 1 capsule by mouth 3 (Three) Times a Day As Needed for Cough.     Dispense:  30 capsule     Refill:  0       No orders of the defined types were placed in this encounter.      Return in about 1 month (around 2/14/2021) for persistent cough.      Shabana Ferrer, DO

## 2021-01-19 ENCOUNTER — TELEPHONE (OUTPATIENT)
Dept: INTERNAL MEDICINE | Facility: CLINIC | Age: 51
End: 2021-01-19

## 2021-01-19 NOTE — TELEPHONE ENCOUNTER
Patient called to check status of referrals.  # [phone]    Informed patient that ENT referral was being submitted via [facility] today.

## 2021-01-22 ENCOUNTER — TELEPHONE (OUTPATIENT)
Dept: ENDOCRINOLOGY | Facility: CLINIC | Age: 51
End: 2021-01-22

## 2021-01-22 DIAGNOSIS — R53.83 FATIGUE, UNSPECIFIED TYPE: Primary | ICD-10-CM

## 2021-01-25 ENCOUNTER — HOSPITAL ENCOUNTER (OUTPATIENT)
Dept: PULMONOLOGY | Facility: HOSPITAL | Age: 51
Discharge: HOME | End: 2021-01-25
Admitting: FAMILY MEDICINE

## 2021-01-25 PROCEDURE — 94726 PLETHYSMOGRAPHY LUNG VOLUMES: CPT

## 2021-01-25 PROCEDURE — 94060 EVALUATION OF WHEEZING: CPT

## 2021-01-25 PROCEDURE — 94729 DIFFUSING CAPACITY: CPT

## 2021-01-25 PROCEDURE — 94640 AIRWAY INHALATION TREATMENT: CPT

## 2021-01-25 PROCEDURE — 94729 DIFFUSING CAPACITY: CPT | Performed by: INTERNAL MEDICINE

## 2021-01-25 PROCEDURE — 94726 PLETHYSMOGRAPHY LUNG VOLUMES: CPT | Performed by: INTERNAL MEDICINE

## 2021-01-25 PROCEDURE — 94060 EVALUATION OF WHEEZING: CPT | Performed by: INTERNAL MEDICINE

## 2021-01-25 RX ORDER — ALBUTEROL SULFATE 90 UG/1
2 AEROSOL, METERED RESPIRATORY (INHALATION) ONCE
Status: COMPLETED | OUTPATIENT
Start: 2021-01-25 | End: 2021-01-25

## 2021-01-25 RX ADMIN — ALBUTEROL SULFATE 2 PUFF: 90 AEROSOL, METERED RESPIRATORY (INHALATION) at 08:25

## 2021-02-20 ENCOUNTER — HOSPITAL ENCOUNTER (EMERGENCY)
Facility: HOSPITAL | Age: 51
Discharge: HOME | End: 2021-02-20
Attending: EMERGENCY MEDICINE | Admitting: EMERGENCY MEDICINE

## 2021-02-20 ENCOUNTER — APPOINTMENT (OUTPATIENT)
Dept: GENERAL RADIOLOGY | Facility: HOSPITAL | Age: 51
End: 2021-02-20

## 2021-02-20 VITALS
RESPIRATION RATE: 18 BRPM | BODY MASS INDEX: 30.34 KG/M2 | WEIGHT: 188.8 LBS | TEMPERATURE: 98.4 F | OXYGEN SATURATION: 99 % | HEIGHT: 66 IN | HEART RATE: 70 BPM | DIASTOLIC BLOOD PRESSURE: 78 MMHG | SYSTOLIC BLOOD PRESSURE: 140 MMHG

## 2021-02-20 DIAGNOSIS — J40 BRONCHITIS: Primary | ICD-10-CM

## 2021-02-20 LAB — SARS-COV-2 RNA PNL SPEC NAA+PROBE: NOT DETECTED

## 2021-02-20 PROCEDURE — 63710000001 DEXAMETHASONE PER 0.25 MG: Performed by: PHYSICIAN ASSISTANT

## 2021-02-20 PROCEDURE — 71045 X-RAY EXAM CHEST 1 VIEW: CPT

## 2021-02-20 PROCEDURE — 87635 SARS-COV-2 COVID-19 AMP PRB: CPT | Performed by: PHYSICIAN ASSISTANT

## 2021-02-20 PROCEDURE — 99283 EMERGENCY DEPT VISIT LOW MDM: CPT

## 2021-02-20 RX ORDER — CETIRIZINE HYDROCHLORIDE 10 MG/1
10 TABLET ORAL ONCE
Status: COMPLETED | OUTPATIENT
Start: 2021-02-20 | End: 2021-02-20

## 2021-02-20 RX ORDER — PREDNISONE 20 MG/1
20 TABLET ORAL 2 TIMES DAILY
Qty: 8 TABLET | Refills: 0 | Status: SHIPPED | OUTPATIENT
Start: 2021-02-20 | End: 2021-05-13 | Stop reason: DRUGHIGH

## 2021-02-20 RX ORDER — AZITHROMYCIN 250 MG/1
TABLET, FILM COATED ORAL
Qty: 6 TABLET | Refills: 0 | Status: SHIPPED | OUTPATIENT
Start: 2021-02-20 | End: 2021-05-13

## 2021-02-20 RX ORDER — DEXAMETHASONE 4 MG/1
2 TABLET ORAL ONCE
Status: COMPLETED | OUTPATIENT
Start: 2021-02-20 | End: 2021-02-20

## 2021-02-20 RX ADMIN — DEXAMETHASONE 2 MG: 4 TABLET ORAL at 11:21

## 2021-02-20 RX ADMIN — CETIRIZINE HYDROCHLORIDE 10 MG: 10 TABLET, FILM COATED ORAL at 11:21

## 2021-02-20 NOTE — ED PROVIDER NOTES
Subjective   Patient is here with complaint of some cough congestion intermittent since October of last year patient has seen her ENT physician recently had CT scan of her sinuses follows up this coming Tuesday, currently not on any antibiotics, patient here requesting Covid test and chest x-ray, no fevers chills reported no hemoptysis reported patient describes cough mostly in the nighttime, no other systemic complaints      History provided by:  Patient      Review of Systems   Constitutional: Negative.    HENT: Positive for congestion, rhinorrhea and sinus pressure.    Eyes: Negative.    Respiratory: Positive for cough.    Cardiovascular: Negative.    Gastrointestinal: Negative.    Musculoskeletal: Negative.    Skin: Negative.    Neurological: Negative.    Psychiatric/Behavioral: Negative.    All other systems reviewed and are negative.      Past Medical History:   Diagnosis Date   • Allergic    • Bilateral ovarian cysts    • Migraines    • Thyroid disease        Allergies   Allergen Reactions   • Allegra [Fexofenadine] Headache     Only allegra D   • Penicillins Other (See Comments)     Allergist informed patient she was allergic to Penicillins       Past Surgical History:   Procedure Laterality Date   • CATARACT EXTRACTION, BILATERAL     • HYSTERECTOMY      complete   • RETINAL DETACHMENT SURGERY     • RHINOPLASTY         Family History   Problem Relation Age of Onset   • Arthritis Mother    • Hypertension Mother    • Hyperlipidemia Mother    • Migraines Mother    • Stroke Mother    • Diabetes Father    • Pulmonary embolism Father    • Lung cancer Maternal Aunt    • Arthritis Maternal Grandmother    • Hypertension Maternal Grandmother    • Hyperlipidemia Maternal Grandmother    • Stroke Maternal Grandmother    • Thyroid disease Maternal Grandmother    • Diabetes Paternal Grandmother    • Heart attack Maternal Grandfather        Social History     Socioeconomic History   • Marital status: Single     Spouse name:  Not on file   • Number of children: Not on file   • Years of education: Not on file   • Highest education level: Not on file   Tobacco Use   • Smoking status: Former Smoker     Packs/day: 2.00     Years: 8.00     Pack years: 16.00   • Smokeless tobacco: Never Used   Substance and Sexual Activity   • Alcohol use: Not Currently   • Drug use: Never   • Sexual activity: Not Currently           Objective   Physical Exam  Vitals signs and nursing note reviewed.   Constitutional:       Appearance: Normal appearance. She is normal weight.      Comments: Afebrile nontoxic no acute distress cheerful smiling ambulatory here   HENT:      Head: Normocephalic.      Nose: Congestion and rhinorrhea present.      Mouth/Throat:      Mouth: Mucous membranes are moist.      Pharynx: No oropharyngeal exudate or posterior oropharyngeal erythema.   Eyes:      Extraocular Movements: Extraocular movements intact.      Conjunctiva/sclera: Conjunctivae normal.      Pupils: Pupils are equal, round, and reactive to light.   Neck:      Musculoskeletal: Normal range of motion.   Cardiovascular:      Rate and Rhythm: Normal rate.      Pulses: Normal pulses.   Pulmonary:      Effort: Pulmonary effort is normal.      Breath sounds: Normal breath sounds. No stridor. No rhonchi.   Musculoskeletal: Normal range of motion.   Skin:     General: Skin is warm.      Capillary Refill: Capillary refill takes less than 2 seconds.   Neurological:      General: No focal deficit present.      Mental Status: She is alert and oriented to person, place, and time.   Psychiatric:         Mood and Affect: Mood normal.         Procedures           ED Course  ED Course as of Feb 20 1220   Sat Feb 20, 2021   1137 Patient case and management reviewed with Dr. Mcmullen    [SC]   1137 Patient resting comfortably no distress pending chest x-ray report and Covid test    [SC]   1218 Patient is resting comfortably no acute distress, after discussing with patient will start her on  Zithromax steroid medication and short course of Tussionex as needed, have had extensive discussion with patient regarding follow-up she does tell me she has a pulmonology appointment coming up in March have asked her to maybe check on this this week to see if she can get in anytime sooner but at least it is pending and certainly will recommend that she keep her appointment with ENT as scheduled this Tuesday will provide her work excuse through that date, to return here for any sudden changes worsening symptoms concerns patient is agreeable with plan of care    [SC]      ED Course User Index  [SC] Wesley Britton PA-C                                           MDM  Number of Diagnoses or Management Options     Amount and/or Complexity of Data Reviewed  Review and summarize past medical records: yes  Discuss the patient with other providers: yes    Risk of Complications, Morbidity, and/or Mortality  Presenting problems: moderate  Diagnostic procedures: low  Management options: low        Final diagnoses:   Bronchitis            Wesley Britton PA-C  02/20/21 7251

## 2021-03-16 ENCOUNTER — LAB (OUTPATIENT)
Dept: LAB | Facility: HOSPITAL | Age: 51
End: 2021-03-16

## 2021-03-16 ENCOUNTER — TRANSCRIBE ORDERS (OUTPATIENT)
Dept: LAB | Facility: HOSPITAL | Age: 51
End: 2021-03-16

## 2021-03-16 DIAGNOSIS — Z01.812 PRE-OPERATIVE LABORATORY EXAMINATION: Primary | ICD-10-CM

## 2021-03-16 DIAGNOSIS — Z01.812 PRE-OPERATIVE LABORATORY EXAMINATION: ICD-10-CM

## 2021-03-16 LAB
HBA1C MFR BLD: 4.96 % (ref 4.8–5.6)
HCT VFR BLD AUTO: 40.8 % (ref 34–46.6)
POTASSIUM SERPL-SCNC: 4.4 MMOL/L (ref 3.5–5.2)

## 2021-03-16 PROCEDURE — 36415 COLL VENOUS BLD VENIPUNCTURE: CPT

## 2021-03-16 PROCEDURE — 83036 HEMOGLOBIN GLYCOSYLATED A1C: CPT

## 2021-03-16 PROCEDURE — 84132 ASSAY OF SERUM POTASSIUM: CPT

## 2021-03-16 PROCEDURE — 85014 HEMATOCRIT: CPT

## 2021-03-22 ENCOUNTER — APPOINTMENT (OUTPATIENT)
Dept: PREADMISSION TESTING | Facility: HOSPITAL | Age: 51
End: 2021-03-22

## 2021-03-23 ENCOUNTER — TRANSCRIBE ORDERS (OUTPATIENT)
Dept: LAB | Facility: HOSPITAL | Age: 51
End: 2021-03-23

## 2021-03-23 ENCOUNTER — APPOINTMENT (OUTPATIENT)
Dept: LAB | Facility: HOSPITAL | Age: 51
End: 2021-03-23

## 2021-03-23 ENCOUNTER — LAB (OUTPATIENT)
Dept: LAB | Facility: HOSPITAL | Age: 51
End: 2021-03-23

## 2021-03-23 ENCOUNTER — TRANSCRIBE ORDERS (OUTPATIENT)
Dept: ADMINISTRATIVE | Facility: HOSPITAL | Age: 51
End: 2021-03-23

## 2021-03-23 DIAGNOSIS — Z01.818 PRE-OP EXAMINATION: ICD-10-CM

## 2021-03-23 DIAGNOSIS — Z01.818 PRE-OP TESTING: Primary | ICD-10-CM

## 2021-03-23 DIAGNOSIS — Z01.89 LABORATORY PROCEDURE: ICD-10-CM

## 2021-03-23 DIAGNOSIS — Z01.89 LABORATORY PROCEDURE: Primary | ICD-10-CM

## 2021-03-23 DIAGNOSIS — Z01.818 PRE-OP EXAMINATION: Primary | ICD-10-CM

## 2021-03-23 LAB — HGB BLD-MCNC: 13.8 G/DL (ref 12–15.9)

## 2021-03-23 PROCEDURE — U0004 COV-19 TEST NON-CDC HGH THRU: HCPCS

## 2021-03-23 PROCEDURE — C9803 HOPD COVID-19 SPEC COLLECT: HCPCS

## 2021-03-23 PROCEDURE — 36415 COLL VENOUS BLD VENIPUNCTURE: CPT

## 2021-03-23 PROCEDURE — 85018 HEMOGLOBIN: CPT

## 2021-03-24 LAB — SARS-COV-2 RNA NOSE QL NAA+PROBE: NOT DETECTED

## 2021-04-26 DIAGNOSIS — E89.0 POSTABLATIVE HYPOTHYROIDISM: ICD-10-CM

## 2021-04-26 RX ORDER — LIOTHYRONINE SODIUM 5 UG/1
10 TABLET ORAL DAILY
Qty: 60 TABLET | Refills: 5 | Status: SHIPPED | OUTPATIENT
Start: 2021-04-26 | End: 2021-06-18 | Stop reason: SDUPTHER

## 2021-04-27 ENCOUNTER — HOSPITAL ENCOUNTER (EMERGENCY)
Facility: HOSPITAL | Age: 51
Discharge: HOME | End: 2021-04-27
Attending: EMERGENCY MEDICINE | Admitting: EMERGENCY MEDICINE

## 2021-04-27 ENCOUNTER — APPOINTMENT (OUTPATIENT)
Dept: GENERAL RADIOLOGY | Facility: HOSPITAL | Age: 51
End: 2021-04-27

## 2021-04-27 VITALS
SYSTOLIC BLOOD PRESSURE: 145 MMHG | WEIGHT: 188.4 LBS | OXYGEN SATURATION: 97 % | HEIGHT: 66 IN | TEMPERATURE: 98.7 F | RESPIRATION RATE: 16 BRPM | HEART RATE: 88 BPM | DIASTOLIC BLOOD PRESSURE: 84 MMHG | BODY MASS INDEX: 30.28 KG/M2

## 2021-04-27 DIAGNOSIS — R05.3 PERSISTENT COUGH FOR 3 WEEKS OR LONGER: ICD-10-CM

## 2021-04-27 DIAGNOSIS — J42 CHRONIC BRONCHITIS, UNSPECIFIED CHRONIC BRONCHITIS TYPE (HCC): Primary | ICD-10-CM

## 2021-04-27 DIAGNOSIS — R21 RASH: ICD-10-CM

## 2021-04-27 LAB — SARS-COV-2 RNA PNL SPEC NAA+PROBE: NOT DETECTED

## 2021-04-27 PROCEDURE — 87635 SARS-COV-2 COVID-19 AMP PRB: CPT | Performed by: EMERGENCY MEDICINE

## 2021-04-27 PROCEDURE — 71045 X-RAY EXAM CHEST 1 VIEW: CPT

## 2021-04-27 PROCEDURE — 99283 EMERGENCY DEPT VISIT LOW MDM: CPT

## 2021-04-27 RX ORDER — TRIAMCINOLONE ACETONIDE 5 MG/G
CREAM TOPICAL 2 TIMES DAILY
Qty: 15 G | Refills: 0 | Status: SHIPPED | OUTPATIENT
Start: 2021-04-27 | End: 2021-08-10

## 2021-04-27 RX ORDER — BENZONATATE 200 MG/1
200 CAPSULE ORAL 3 TIMES DAILY PRN
Qty: 30 CAPSULE | Refills: 0 | Status: SHIPPED | OUTPATIENT
Start: 2021-04-27 | End: 2022-05-10

## 2021-04-27 RX ORDER — ALBUTEROL SULFATE 90 UG/1
2 AEROSOL, METERED RESPIRATORY (INHALATION) EVERY 4 HOURS PRN
Qty: 6.7 G | Refills: 0 | Status: SHIPPED | OUTPATIENT
Start: 2021-04-27 | End: 2021-08-10 | Stop reason: SDUPTHER

## 2021-04-30 ENCOUNTER — OFFICE VISIT (OUTPATIENT)
Dept: PULMONOLOGY | Facility: CLINIC | Age: 51
End: 2021-04-30

## 2021-04-30 VITALS
OXYGEN SATURATION: 97 % | WEIGHT: 191 LBS | RESPIRATION RATE: 16 BRPM | HEIGHT: 66 IN | DIASTOLIC BLOOD PRESSURE: 82 MMHG | HEART RATE: 93 BPM | BODY MASS INDEX: 30.7 KG/M2 | SYSTOLIC BLOOD PRESSURE: 144 MMHG

## 2021-04-30 DIAGNOSIS — R05.9 COUGH: Primary | ICD-10-CM

## 2021-04-30 DIAGNOSIS — D72.10 EOSINOPHILIA, UNSPECIFIED TYPE: ICD-10-CM

## 2021-04-30 DIAGNOSIS — J33.9 NASAL POLYPS: ICD-10-CM

## 2021-04-30 DIAGNOSIS — K21.9 GERD WITHOUT ESOPHAGITIS: ICD-10-CM

## 2021-04-30 DIAGNOSIS — J30.89 NON-SEASONAL ALLERGIC RHINITIS, UNSPECIFIED TRIGGER: ICD-10-CM

## 2021-04-30 DIAGNOSIS — R21 RASH: ICD-10-CM

## 2021-04-30 PROCEDURE — 99204 OFFICE O/P NEW MOD 45 MIN: CPT | Performed by: INTERNAL MEDICINE

## 2021-04-30 RX ORDER — PREDNISONE 10 MG/1
TABLET ORAL
Qty: 31 TABLET | Refills: 0 | Status: SHIPPED | OUTPATIENT
Start: 2021-04-30 | End: 2021-08-10

## 2021-04-30 RX ORDER — DEXLANSOPRAZOLE 30 MG/1
30 CAPSULE, DELAYED RELEASE ORAL DAILY
Qty: 30 CAPSULE | Refills: 0 | Status: SHIPPED | OUTPATIENT
Start: 2021-04-30 | End: 2021-05-30

## 2021-04-30 RX ORDER — PANTOPRAZOLE SODIUM 40 MG/1
40 TABLET, DELAYED RELEASE ORAL DAILY
COMMUNITY
End: 2021-04-30

## 2021-04-30 NOTE — PROGRESS NOTES
New Pulmonary Patient Office Visit      Patient Name: Kelsey Ross    Referring Physician: Siri Velázquez APRN    Chief Complaint:    Chief Complaint   Patient presents with   • Consult   • Cough       History of Present Illness: Kelsey Ross is a 50 y.o. female who is here today to establish care with Pulmonary.      Duration: cough started in Oct 2020, denies illness at the time of the cough starting  Severity: Moderate to severe chronic cough  Associated Symptoms: shortness of breath with cough, hoarse voice, GERD, nasal polyps s/p removal by ENT, urinary incontinence with some coughing fits  Timing: daily  Exacerbating Factors: She is unsure relieving Factors: Did not feel like inhalers helped    Saw ENT, Dr. Saldana, for cough as well and had sinus surgery in March 2021, but did not seem to help her cough.  States that she was told she had nasal polyps that required removal after the surgery.  Had recent follow-up and was put on budesonide and gentamicin nasal rinse.    She was started on Protonix April 14 and then started developing a rash on April 20 that started on her hands and has noticed significant itching.  Has also noticed swelling of lymph nodes under her jaw over the last few days, but does not feel like her hoarse voice has changed or shortness of breath has resulted because of the lymph nodes. Initially had swelling around her eyes which has all resolved on its own. She feels as her lymph nodes around upper neck/chin have mildly increased in size over time.         Subjective      Review of Systems:   Review of Systems   Constitutional: Positive for fatigue. Negative for chills, fever, unexpected weight gain and unexpected weight loss.   HENT: Positive for postnasal drip, rhinorrhea, swollen glands and voice change. Negative for sinus pressure and trouble swallowing.    Eyes: Negative for discharge and itching.   Respiratory: Positive for cough and shortness of breath. Negative for  wheezing.    Cardiovascular: Positive for chest pain. Negative for palpitations and leg swelling.   Gastrointestinal: Positive for GERD. Negative for abdominal distention, constipation and diarrhea.   Endocrine: Negative for cold intolerance and heat intolerance.   Genitourinary: Negative for dysuria and hematuria.   Musculoskeletal: Positive for arthralgias. Negative for myalgias.   Skin: Positive for rash. Negative for color change.   Allergic/Immunologic: Negative for environmental allergies and food allergies.   Neurological: Negative for dizziness, speech difficulty, light-headedness and memory problem.   Hematological: Negative for adenopathy. Does not bruise/bleed easily.   Psychiatric/Behavioral: Negative for sleep disturbance and depressed mood. The patient is not nervous/anxious.        Past Medical History:   Past Medical History:   Diagnosis Date   • Allergic    • Bilateral ovarian cysts    • Migraines    • Thyroid disease        Past Surgical History:   Past Surgical History:   Procedure Laterality Date   • CATARACT EXTRACTION, BILATERAL     • HYSTERECTOMY      complete   • NOSE SURGERY     • RETINAL DETACHMENT SURGERY     • RHINOPLASTY         Family History:   Family History   Problem Relation Age of Onset   • Arthritis Mother    • Hypertension Mother    • Hyperlipidemia Mother    • Migraines Mother    • Stroke Mother    • Diabetes Father    • Pulmonary embolism Father    • Lung cancer Maternal Aunt    • Arthritis Maternal Grandmother    • Hypertension Maternal Grandmother    • Hyperlipidemia Maternal Grandmother    • Stroke Maternal Grandmother    • Thyroid disease Maternal Grandmother    • Diabetes Paternal Grandmother    • Heart attack Maternal Grandfather        Social History:   Social History     Socioeconomic History   • Marital status: Single     Spouse name: Not on file   • Number of children: Not on file   • Years of education: Not on file   • Highest education level: Not on file   Tobacco  Use   • Smoking status: Former Smoker     Packs/day: 2.00     Years: 8.00     Pack years: 16.00   • Smokeless tobacco: Never Used   Vaping Use   • Vaping Use: Never used   Substance and Sexual Activity   • Alcohol use: Not Currently   • Drug use: Never   • Sexual activity: Not Currently       Medications:     Current Outpatient Medications:   •  albuterol sulfate  (90 Base) MCG/ACT inhaler, Inhale 2 puffs Every 4 (Four) Hours As Needed for Wheezing., Disp: 6.7 g, Rfl: 0  •  HYDROcod Polst-CPM Polst ER (Tussionex Pennkinetic ER) 10-8 MG/5ML ER suspension, Take 5 mL by mouth Every 12 (Twelve) Hours As Needed for Cough., Disp: 60 mL, Rfl: 0  •  levothyroxine (SYNTHROID, LEVOTHROID) 100 MCG tablet, Take 1 tablet by mouth Daily., Disp: 30 tablet, Rfl: 5  •  liothyronine (CYTOMEL) 5 MCG tablet, Take 2 tablets by mouth Daily., Disp: 60 tablet, Rfl: 5  •  NON FORMULARY, Compound nasal spray that includes steroids and antibiotics, Disp: , Rfl:   •  azithromycin (ZITHROMAX) 250 MG tablet, Take 2 tablets the first day, then 1 tablet daily for 4 days., Disp: 6 tablet, Rfl: 0  •  benzonatate (TESSALON) 200 MG capsule, Take 1 capsule by mouth 3 (Three) Times a Day As Needed for Cough., Disp: 30 capsule, Rfl: 0  •  dexlansoprazole (DEXILANT) 30 MG capsule, Take 1 capsule by mouth Daily for 30 days., Disp: 30 capsule, Rfl: 0  •  famotidine (PEPCID) 20 MG tablet, Take 1 tablet by mouth 2 (Two) Times a Day., Disp: 60 tablet, Rfl: 0  •  fluticasone-salmeterol (Advair Diskus) 250-50 MCG/DOSE DISKUS, Inhale 1 puff 2 (Two) Times a Day., Disp: 60 each, Rfl: 0  •  HYDROcod Polst-CPM Polst ER (Tussionex Pennkinetic ER) 10-8 MG/5ML ER suspension, Take 5 mL by mouth Every 12 (Twelve) Hours As Needed for Cough., Disp: 100 mL, Rfl: 0  •  loratadine (CLARITIN) 10 MG tablet, Take 1 tablet by mouth Daily for 30 days., Disp: 30 tablet, Rfl: 0  •  predniSONE (DELTASONE) 10 MG tablet, Take 4 tabs daily x 3 days, then take 3 tabs daily x 3  "days, then take 2 tabs daily x 3 days, then take 1 tab daily x 3 days, Disp: 31 tablet, Rfl: 0  •  predniSONE (DELTASONE) 20 MG tablet, Take 1 tablet by mouth 2 (Two) Times a Day., Disp: 8 tablet, Rfl: 0  •  Simethicone (GAS RELIEF 80 PO), Take 1 tablet by mouth As Needed., Disp: , Rfl:   •  triamcinolone (KENALOG) 0.5 % cream, Apply  topically to the appropriate area as directed 2 (Two) Times a Day., Disp: 15 g, Rfl: 0    Allergies:   Allergies   Allergen Reactions   • Allegra [Fexofenadine] Headache     Only allegra D   • Penicillins Other (See Comments)     Allergist informed patient she was allergic to Penicillins       Objective     Physical Exam:  Vital Signs:   Vitals:    04/30/21 1257   BP: 144/82   Pulse: 93   Resp: 16   SpO2: 97%   Weight: 86.6 kg (191 lb)   Height: 167.6 cm (66\")       Physical Exam  Vitals and nursing note reviewed.   Constitutional:       Appearance: She is well-developed.   HENT:      Head: Normocephalic and atraumatic.      Right Ear: External ear normal.      Left Ear: External ear normal.      Nose: Rhinorrhea present.      Mouth/Throat:      Mouth: Mucous membranes are moist.      Pharynx: Oropharynx is clear. No oropharyngeal exudate.   Eyes:      General: No scleral icterus.     Extraocular Movements: Extraocular movements intact.      Conjunctiva/sclera: Conjunctivae normal.   Neck:      Trachea: No tracheal deviation.      Comments: Palpable bilateral lymphadenopathy in submandibular region.  No stridor noted.  Cardiovascular:      Rate and Rhythm: Normal rate and regular rhythm.      Heart sounds: No murmur heard.     Pulmonary:      Effort: No respiratory distress.      Breath sounds: No wheezing.   Abdominal:      General: There is no distension.      Palpations: Abdomen is soft.   Musculoskeletal:         General: No tenderness.      Cervical back: Normal range of motion.      Right lower leg: No edema.      Left lower leg: No edema.   Skin:     General: Skin is warm and " dry.      Findings: Rash present.      Comments: Maculopapular rash noticeable on bilateral hands with palm sparing   Neurological:      Mental Status: She is alert and oriented to person, place, and time.      Motor: No weakness.      Gait: Gait normal.   Psychiatric:         Mood and Affect: Mood normal.         Thought Content: Thought content normal.         Judgment: Judgment normal.       Mallampati Score: II (hard and soft palate, upper portion of tonsils anduvula visible)    Results Review:   Labs: Reviewed.  Lab Results   Component Value Date    WBC 4.37 12/23/2020    HGB 13.8 03/23/2021    HCT 40.8 03/16/2021    MCV 85.5 12/23/2020     12/23/2020     Lab Results   Component Value Date    K 4.4 03/16/2021     WBC   Date Value Ref Range Status   12/23/2020 4.37 3.40 - 10.80 10*3/mm3 Final     RBC   Date Value Ref Range Status   12/23/2020 4.75 3.77 - 5.28 10*6/mm3 Final     Hemoglobin   Date Value Ref Range Status   03/23/2021 13.8 12.0 - 15.9 g/dL Final     Hematocrit   Date Value Ref Range Status   03/16/2021 40.8 34.0 - 46.6 % Final     MCV   Date Value Ref Range Status   12/23/2020 85.5 79.0 - 97.0 fL Final     MCH   Date Value Ref Range Status   12/23/2020 28.4 26.6 - 33.0 pg Final     MCHC   Date Value Ref Range Status   12/23/2020 33.3 31.5 - 35.7 g/dL Final     RDW   Date Value Ref Range Status   12/23/2020 12.4 12.3 - 15.4 % Final     RDW-SD   Date Value Ref Range Status   12/23/2020 38.5 37.0 - 54.0 fl Final     MPV   Date Value Ref Range Status   12/23/2020 10.2 6.0 - 12.0 fL Final     Platelets   Date Value Ref Range Status   12/23/2020 217 140 - 450 10*3/mm3 Final     Neutrophil %   Date Value Ref Range Status   12/23/2020 41.5 (L) 42.7 - 76.0 % Final     Lymphocyte %   Date Value Ref Range Status   12/23/2020 35.9 19.6 - 45.3 % Final     Monocyte %   Date Value Ref Range Status   12/23/2020 11.9 5.0 - 12.0 % Final     Eosinophil %   Date Value Ref Range Status   12/23/2020 9.6 (H) 0.3 -  6.2 % Final     Basophil %   Date Value Ref Range Status   12/23/2020 0.9 0.0 - 1.5 % Final     Immature Grans %   Date Value Ref Range Status   12/23/2020 0.2 0.0 - 0.5 % Final     Neutrophils, Absolute   Date Value Ref Range Status   12/23/2020 1.81 1.70 - 7.00 10*3/mm3 Final     Lymphocytes, Absolute   Date Value Ref Range Status   12/23/2020 1.57 0.70 - 3.10 10*3/mm3 Final     Monocytes, Absolute   Date Value Ref Range Status   12/23/2020 0.52 0.10 - 0.90 10*3/mm3 Final     Eosinophils, Absolute   Date Value Ref Range Status   12/23/2020 0.42 (H) 0.00 - 0.40 10*3/mm3 Final     Basophils, Absolute   Date Value Ref Range Status   12/23/2020 0.04 0.00 - 0.20 10*3/mm3 Final     Immature Grans, Absolute   Date Value Ref Range Status   12/23/2020 0.01 0.00 - 0.05 10*3/mm3 Final     nRBC   Date Value Ref Range Status   12/23/2020 0.0 0.0 - 0.2 /100 WBC Final         Micro: As of April 30, 2021   No results found for: RESPCX  No results found for: BLOODCX  No results found for: URINECX  No results found for: MRSACX  No results found for: MRSAPCR  No results found for: URCX  No components found for: LOWRESPCF  No results found for: THROATCX  No results found for: CULTURES  No components found for: STREPBCX  No results found for: STREPPNEUAG  No results found for: LEGIONELLA  No results found for: MYCOPLASCX  No results found for: GCCX  No results found for: WOUNDCX  No results found for: BODYFLDCX    ABG: No results found for: PHART, VFR2BDL, PO2ART, HGBBG, G2XSVZAE, CFIO2, FCOHB, CARBOXYHGB, FMETHB    Echo:     Radiology Scans:    Images reviewed personally.     XR Chest 1 View  Narrative: PROCEDURE: XR CHEST 1 VW-     HISTORY: cough     COMPARISON: 2/20/2021     FINDINGS: The lungs are hyperlucent hyperexpanded with flattening of the  hemidiaphragms, consistent with chronic obstructive pulmonary disease.  Lungs are otherwise clear.       There is no evidence of effusion or other pleural disease.  The  mediastinum has a  normal appearance.      The cardiac silhouette is unremarkable.     Impression: No acute findings.Underlying emphysema.           This report was finalized on 4/27/2021 12:30 PM by Karel Hernandez MD.      PFT IMPRESSION:    January 2021 PFT showed FEV1 97%, FEV1/FVC ratio 81.  No significant bronchodilator responsiveness.  Total lung capacity 101%.  No air trapping.  Normal diffusing capacity.    Assessment / Plan      Assessment/Plan:    1. Cough  Patient with chronic cough which appears to be multifactorial from upper airway cough syndrome and sinus disease.  I called and personally discussed the case with Dr. Arik Saldana.  Patient had negative allergy testing in 2016.  She also had normal PFTs in January while this issue was ongoing.  He states that she had several things going on from an ENT perspective including the nasal polyps and deviated septum and is still healing from her sinus surgery.  He felt as though her cough was explained by this.  I had initially planned to refer her to allergy, but he felt like that was unnecessary since they had done significant allergy testing previously.  He did not feel like we needed to add oral antibiotics given the lymph node swelling, but did want to see her more closely in follow-up.  He asked that she call the office to adjust follow-up timeline and I relayed that to patient and her  at the end of the clinic visit.  He did feel like she had significant GERD and agreed with prednisone, but wanted to switch to Dexilant so that she had PPI coverage given the significant GERD.    I will go ahead and check CT scan of the chest to ensure that there is no lung infiltrates secondary to the eosinophilia or other causes of her cough.  Will give refill of Tussionex because patient complains that she cannot sleep secondary to the cough.    If all of this is unrevealing, could consider neurogenic cough as a cause which amitriptyline often helps.  However, currently I  believe she has multiple possible etiologies of the cough other than neurogenic cough.  Does not appear to be postinfectious cough as she was not ill at the start of her cough.    - CT Chest Without Contrast; Future  - HYDROcod Polst-CPM Polst ER (Tussionex Pennkinetic ER) 10-8 MG/5ML ER suspension; Take 5 mL by mouth Every 12 (Twelve) Hours As Needed for Cough.  Dispense: 100 mL; Refill: 0    2. Rash  Unclear if this is related to Protonix.  Will stop Protonix and switch to Dexilant.  Will give a round of steroids to help calm down this rash.  We will also check autoimmune labs at patient's request.  However, if rash does not improve will need further work-up.  - predniSONE (DELTASONE) 10 MG tablet; Take 4 tabs daily x 3 days, then take 3 tabs daily x 3 days, then take 2 tabs daily x 3 days, then take 1 tab daily x 3 days  Dispense: 31 tablet; Refill: 0  - Nuclear Antigen Antibody, IFA; Future  - ANCA Panel; Future    3. Eosinophilia, unspecified type  Repeat labs and check chest CT scan  - CBC Auto Differential; Future  - CT Chest Without Contrast; Future    4. Non-seasonal allergic rhinitis, unspecified trigger  Labs were initially ordered prior to knowing that allergy testing had already been done by ENT  - Regional Allergen Zone 8 / With IgE; Future    5. Nasal polyps  Defer to ENT    6. GERD without esophagitis  Switch to Dexilant  - dexlansoprazole (DEXILANT) 30 MG capsule; Take 1 capsule by mouth Daily for 30 days.  Dispense: 30 capsule; Refill: 0    I spent greater than 15 minutes discussing the plan of care with patient and her family member after having a discussion with Dr. Saldana.  They appreciated the time.  I know she was frustrated as she was expecting to have an answer to the cause of the cough, but I explained that we are continuing to do work-up, but so far I could not explain her cough based on lung disease and her PFTs.       Follow Up:   Return in about 3 months (around 7/30/2021).    Meaghan  MD Eli  Pulmonary/Critical Care Physician   Eddie      Please note that portions of this note may have been completed with a voice recognition program. Efforts were made to edit the dictations, but occasionally words are mistranscribed.

## 2021-05-05 ENCOUNTER — LAB (OUTPATIENT)
Dept: LAB | Facility: HOSPITAL | Age: 51
End: 2021-05-05

## 2021-05-05 ENCOUNTER — HOSPITAL ENCOUNTER (OUTPATIENT)
Dept: CT IMAGING | Facility: HOSPITAL | Age: 51
Discharge: HOME | End: 2021-05-05

## 2021-05-05 ENCOUNTER — OFFICE VISIT (OUTPATIENT)
Dept: ENDOCRINOLOGY | Facility: CLINIC | Age: 51
End: 2021-05-05

## 2021-05-05 VITALS
WEIGHT: 196 LBS | BODY MASS INDEX: 31.5 KG/M2 | DIASTOLIC BLOOD PRESSURE: 118 MMHG | HEIGHT: 66 IN | TEMPERATURE: 97.1 F | HEART RATE: 64 BPM | SYSTOLIC BLOOD PRESSURE: 190 MMHG | OXYGEN SATURATION: 98 %

## 2021-05-05 DIAGNOSIS — R21 RASH: ICD-10-CM

## 2021-05-05 DIAGNOSIS — R05.9 COUGH: ICD-10-CM

## 2021-05-05 DIAGNOSIS — E03.9 ACQUIRED HYPOTHYROIDISM: Primary | ICD-10-CM

## 2021-05-05 DIAGNOSIS — D72.10 EOSINOPHILIA, UNSPECIFIED TYPE: ICD-10-CM

## 2021-05-05 DIAGNOSIS — I10 ACCELERATED HYPERTENSION: ICD-10-CM

## 2021-05-05 DIAGNOSIS — J30.89 NON-SEASONAL ALLERGIC RHINITIS, UNSPECIFIED TRIGGER: ICD-10-CM

## 2021-05-05 LAB
BASOPHILS # BLD AUTO: 0.16 10*3/MM3 (ref 0–0.2)
BASOPHILS NFR BLD AUTO: 2.2 % (ref 0–1.5)
DEPRECATED RDW RBC AUTO: 40.5 FL (ref 37–54)
EOSINOPHIL # BLD AUTO: 1.18 10*3/MM3 (ref 0–0.4)
EOSINOPHIL NFR BLD AUTO: 16.2 % (ref 0.3–6.2)
ERYTHROCYTE [DISTWIDTH] IN BLOOD BY AUTOMATED COUNT: 12.8 % (ref 12.3–15.4)
HCT VFR BLD AUTO: 39.6 % (ref 34–46.6)
HGB BLD-MCNC: 12.9 G/DL (ref 12–15.9)
IMM GRANULOCYTES # BLD AUTO: 0.18 10*3/MM3 (ref 0–0.05)
IMM GRANULOCYTES NFR BLD AUTO: 2.5 % (ref 0–0.5)
LYMPHOCYTES # BLD AUTO: 1.98 10*3/MM3 (ref 0.7–3.1)
LYMPHOCYTES NFR BLD AUTO: 27.2 % (ref 19.6–45.3)
MCH RBC QN AUTO: 28.2 PG (ref 26.6–33)
MCHC RBC AUTO-ENTMCNC: 32.6 G/DL (ref 31.5–35.7)
MCV RBC AUTO: 86.7 FL (ref 79–97)
MONOCYTES # BLD AUTO: 0.72 10*3/MM3 (ref 0.1–0.9)
MONOCYTES NFR BLD AUTO: 9.9 % (ref 5–12)
NEUTROPHILS NFR BLD AUTO: 3.06 10*3/MM3 (ref 1.7–7)
NEUTROPHILS NFR BLD AUTO: 42 % (ref 42.7–76)
NRBC BLD AUTO-RTO: 0 /100 WBC (ref 0–0.2)
PLATELET # BLD AUTO: 281 10*3/MM3 (ref 140–450)
PMV BLD AUTO: 10.8 FL (ref 6–12)
RBC # BLD AUTO: 4.57 10*6/MM3 (ref 3.77–5.28)
T4 FREE SERPL-MCNC: 0.89 NG/DL (ref 0.93–1.7)
TSH SERPL DL<=0.05 MIU/L-ACNC: 2.12 UIU/ML (ref 0.27–4.2)
WBC # BLD AUTO: 7.28 10*3/MM3 (ref 3.4–10.8)

## 2021-05-05 PROCEDURE — 86003 ALLG SPEC IGE CRUDE XTRC EA: CPT

## 2021-05-05 PROCEDURE — 80048 BASIC METABOLIC PNL TOTAL CA: CPT

## 2021-05-05 PROCEDURE — 71250 CT THORAX DX C-: CPT

## 2021-05-05 PROCEDURE — 83520 IMMUNOASSAY QUANT NOS NONAB: CPT

## 2021-05-05 PROCEDURE — 84443 ASSAY THYROID STIM HORMONE: CPT | Performed by: INTERNAL MEDICINE

## 2021-05-05 PROCEDURE — 99214 OFFICE O/P EST MOD 30 MIN: CPT | Performed by: INTERNAL MEDICINE

## 2021-05-05 PROCEDURE — 36415 COLL VENOUS BLD VENIPUNCTURE: CPT

## 2021-05-05 PROCEDURE — 86256 FLUORESCENT ANTIBODY TITER: CPT

## 2021-05-05 PROCEDURE — 82785 ASSAY OF IGE: CPT

## 2021-05-05 PROCEDURE — 84439 ASSAY OF FREE THYROXINE: CPT | Performed by: INTERNAL MEDICINE

## 2021-05-05 PROCEDURE — 86038 ANTINUCLEAR ANTIBODIES: CPT

## 2021-05-05 PROCEDURE — 85025 COMPLETE CBC W/AUTO DIFF WBC: CPT

## 2021-05-05 RX ORDER — POLYETHYLENE GLYCOL 3350 17 G/17G
17 POWDER, FOR SOLUTION ORAL 2 TIMES DAILY
Qty: 20 PACKET | Refills: 1 | Status: SHIPPED | OUTPATIENT
Start: 2021-05-05 | End: 2021-06-18 | Stop reason: SDUPTHER

## 2021-05-05 RX ORDER — AMLODIPINE BESYLATE 5 MG/1
5 TABLET ORAL DAILY
Qty: 30 TABLET | Refills: 2 | Status: SHIPPED | OUTPATIENT
Start: 2021-05-05 | End: 2022-02-11 | Stop reason: SDUPTHER

## 2021-05-05 NOTE — PROGRESS NOTES
Kelsey Ross 50 y.o.  [facility]: Follow-up and Thyroid Problem      [place]: Follow-up and Thyroid Problem    No missed doses of thyroid medication   Severe constipation x 5 days   She is using ducolax but not having any results  Recheck bp 178/104  No prior high bp   On steroids due to tongue swelling   Has anca pending from pul today   She is not sure if bp was high at pul office  Denies headache, chest pain , mild blurry vision   Feels really tired     Allergies   Allergen Reactions   • Allegra [Fexofenadine] Headache     Only allegra D   • Penicillins Other (See Comments)     Allergist informed patient she was allergic to Penicillins       Current Outpatient Medications:   •  albuterol sulfate  (90 Base) MCG/ACT inhaler, Inhale 2 puffs Every 4 (Four) Hours As Needed for Wheezing., Disp: 6.7 g, Rfl: 0  •  azithromycin (ZITHROMAX) 250 MG tablet, Take 2 tablets the first day, then 1 tablet daily for 4 days., Disp: 6 tablet, Rfl: 0  •  benzonatate (TESSALON) 200 MG capsule, Take 1 capsule by mouth 3 (Three) Times a Day As Needed for Cough., Disp: 30 capsule, Rfl: 0  •  dexlansoprazole (DEXILANT) 30 MG capsule, Take 1 capsule by mouth Daily for 30 days., Disp: 30 capsule, Rfl: 0  •  famotidine (PEPCID) 20 MG tablet, Take 1 tablet by mouth 2 (Two) Times a Day., Disp: 60 tablet, Rfl: 0  •  fluticasone-salmeterol (Advair Diskus) 250-50 MCG/DOSE DISKUS, Inhale 1 puff 2 (Two) Times a Day., Disp: 60 each, Rfl: 0  •  HYDROcod Polst-CPM Polst ER (Tussionex Pennkinetic ER) 10-8 MG/5ML ER suspension, Take 5 mL by mouth Every 12 (Twelve) Hours As Needed for Cough., Disp: 60 mL, Rfl: 0  •  HYDROcod Polst-CPM Polst ER (Tussionex Pennkinetic ER) 10-8 MG/5ML ER suspension, Take 5 mL by mouth Every 12 (Twelve) Hours As Needed for Cough., Disp: 100 mL, Rfl: 0  •  hydrOXYzine (ATARAX) 25 MG tablet, 1-2 po tid prn itch, Disp: 30 tablet, Rfl: 0  •  ibuprofen (ADVIL,MOTRIN) 600 MG tablet, Take 1 tablet by mouth Every 6 (Six) Hours As  Needed for Mild Pain ., Disp: 30 tablet, Rfl: 0  •  levothyroxine (SYNTHROID, LEVOTHROID) 100 MCG tablet, Take 1 tablet by mouth Daily., Disp: 30 tablet, Rfl: 5  •  liothyronine (CYTOMEL) 5 MCG tablet, Take 2 tablets by mouth Daily., Disp: 60 tablet, Rfl: 5  •  loratadine (CLARITIN) 10 MG tablet, Take 1 tablet by mouth Daily for 30 days., Disp: 30 tablet, Rfl: 0  •  NON FORMULARY, Compound nasal spray that includes steroids and antibiotics, Disp: , Rfl:   •  predniSONE (DELTASONE) 10 MG tablet, Take 4 tabs daily x 3 days, then take 3 tabs daily x 3 days, then take 2 tabs daily x 3 days, then take 1 tab daily x 3 days, Disp: 31 tablet, Rfl: 0  •  predniSONE (DELTASONE) 20 MG tablet, Take 1 tablet by mouth 2 (Two) Times a Day., Disp: 8 tablet, Rfl: 0  •  Simethicone (GAS RELIEF 80 PO), Take 1 tablet by mouth As Needed., Disp: , Rfl:   •  triamcinolone (KENALOG) 0.5 % cream, Apply  topically to the appropriate area as directed 2 (Two) Times a Day., Disp: 15 g, Rfl: 0  Patient Active Problem List    Diagnosis    • Acquired hypothyroidism [E03.9]    • Fatigue [R53.83]    • Weight gain [R63.5]      Review of Systems   Constitutional: Positive for activity change, fatigue and unexpected weight change.   HENT: Positive for congestion and rhinorrhea.    Eyes: Positive for visual disturbance.   Respiratory: Positive for cough and wheezing.    Cardiovascular: Positive for leg swelling.   Gastrointestinal: Positive for constipation.   Musculoskeletal: Positive for arthralgias and joint swelling.   Neurological: Positive for dizziness. Negative for light-headedness and numbness.   Psychiatric/Behavioral: Negative for dysphoric mood. The patient is not nervous/anxious.      Social History     Socioeconomic History   • Marital status: Single     Spouse name: Not on file   • Number of children: Not on file   • Years of education: Not on file   • Highest education level: Not on file   Tobacco Use   • Smoking status: Former Smoker      Packs/day: 2.00     Years: 8.00     Pack years: 16.00   • Smokeless tobacco: Never Used   Vaping Use   • Vaping Use: Never used   Substance and Sexual Activity   • Alcohol use: Not Currently   • Drug use: Never   • Sexual activity: Not Currently     Family History   Problem Relation Age of Onset   • Arthritis Mother    • Hypertension Mother    • Hyperlipidemia Mother    • Migraines Mother    • Stroke Mother    • Diabetes Father    • Pulmonary embolism Father    • Lung cancer Maternal Aunt    • Arthritis Maternal Grandmother    • Hypertension Maternal Grandmother    • Hyperlipidemia Maternal Grandmother    • Stroke Maternal Grandmother    • Thyroid disease Maternal Grandmother    • Diabetes Paternal Grandmother    • Heart attack Maternal Grandfather      There were no vitals taken for this visit.  Physical Exam  Constitutional:       Appearance: Normal appearance.   Eyes:      Extraocular Movements: Extraocular movements intact.      Pupils: Pupils are equal, round, and reactive to light.   Cardiovascular:      Rate and Rhythm: Normal rate and regular rhythm.      Pulses: Normal pulses.      Heart sounds: No murmur heard.     Pulmonary:      Effort: No respiratory distress.      Breath sounds: Wheezing and rhonchi present.   Musculoskeletal:         General: No swelling or tenderness. Normal range of motion.      Cervical back: Normal range of motion and neck supple. No rigidity.   Skin:     General: Skin is warm and dry.   Neurological:      General: No focal deficit present.      Mental Status: She is alert and oriented to person, place, and time.   Psychiatric:         Mood and Affect: Mood normal.         Behavior: Behavior normal.       Results for orders placed or performed during the hospital encounter of 04/30/21   POCT Rapid Strep A    Specimen: Swab   Result Value Ref Range    Rapid Strep A Screen Negative Negative, VALID, INVALID, Not Performed    Internal Control Passed Passed    Lot Number [id number]      Expiration Date 3/31/22      Problems Addressed this Visit        Other    Accelerated hypertension     Assoc with weight gain and steroids  Add amlodipine   Check blood pressure daily            Relevant Medications    amLODIPine (NORVASC) 5 MG tablet    Acquired hypothyroidism - Primary     Update tfts   On supplement - no missed doses or problems with dosing          Relevant Orders    T4, Free    TSH      Diagnoses       Codes Comments    Acquired hypothyroidism    -  Primary ICD-10-CM: E03.9  ICD-9-CM: 244.9     Accelerated hypertension     ICD-10-CM: I10  ICD-9-CM: 401.0         Return in about 6 months (around 11/5/2021).    Micaela Felix MA  Signed Taylor Holliday MD

## 2021-05-07 ENCOUNTER — TELEPHONE (OUTPATIENT)
Dept: PULMONOLOGY | Facility: CLINIC | Age: 51
End: 2021-05-07

## 2021-05-07 ENCOUNTER — LAB (OUTPATIENT)
Dept: LAB | Facility: HOSPITAL | Age: 51
End: 2021-05-07

## 2021-05-07 DIAGNOSIS — D72.10 EOSINOPHILIA, UNSPECIFIED TYPE: ICD-10-CM

## 2021-05-07 DIAGNOSIS — R76.8 ANA POSITIVE: Primary | ICD-10-CM

## 2021-05-07 DIAGNOSIS — D72.110 IDIOPATHIC HYPEREOSINOPHILIC SYNDROME: Primary | ICD-10-CM

## 2021-05-07 DIAGNOSIS — D72.110 IDIOPATHIC HYPEREOSINOPHILIC SYNDROME: ICD-10-CM

## 2021-05-07 LAB
ANA HOMOGEN TITR SER: ABNORMAL {TITER}
ANA TITR SER IF: POSITIVE {TITER}
BASOPHILS # BLD MANUAL: 0.14 10*3/MM3 (ref 0–0.2)
BASOPHILS NFR BLD AUTO: 2 % (ref 0–1.5)
C-ANCA TITR SER IF: ABNORMAL TITER
DEPRECATED RDW RBC AUTO: 40.2 FL (ref 37–54)
EOSINOPHIL # BLD MANUAL: 0.27 10*3/MM3 (ref 0–0.4)
EOSINOPHIL NFR BLD MANUAL: 4 % (ref 0.3–6.2)
ERYTHROCYTE [DISTWIDTH] IN BLOOD BY AUTOMATED COUNT: 12.9 % (ref 12.3–15.4)
HCT VFR BLD AUTO: 40.9 % (ref 34–46.6)
HGB BLD-MCNC: 13.4 G/DL (ref 12–15.9)
LYMPHOCYTES # BLD MANUAL: 1.57 10*3/MM3 (ref 0.7–3.1)
LYMPHOCYTES NFR BLD MANUAL: 23.2 % (ref 19.6–45.3)
LYMPHOCYTES NFR BLD MANUAL: 7.1 % (ref 5–12)
Lab: ABNORMAL
MCH RBC QN AUTO: 28.3 PG (ref 26.6–33)
MCHC RBC AUTO-ENTMCNC: 32.8 G/DL (ref 31.5–35.7)
MCV RBC AUTO: 86.3 FL (ref 79–97)
MONOCYTES # BLD AUTO: 0.48 10*3/MM3 (ref 0.1–0.9)
MYELOPEROXIDASE AB SER IA-ACNC: <9 U/ML (ref 0–9)
NEUTROPHILS # BLD AUTO: 4.3 10*3/MM3 (ref 1.7–7)
NEUTROPHILS NFR BLD MANUAL: 63.6 % (ref 42.7–76)
NRBC BLD AUTO-RTO: 0 /100 WBC (ref 0–0.2)
P-ANCA ATYPICAL TITR SER IF: ABNORMAL TITER
P-ANCA TITR SER IF: ABNORMAL TITER
PLAT MORPH BLD: NORMAL
PLATELET # BLD AUTO: 354 10*3/MM3 (ref 140–450)
PMV BLD AUTO: 10.5 FL (ref 6–12)
PROTEINASE3 AB SER IA-ACNC: <3.5 U/ML (ref 0–3.5)
RBC # BLD AUTO: 4.74 10*6/MM3 (ref 3.77–5.28)
RBC MORPH BLD: NORMAL
WBC # BLD AUTO: 6.76 10*3/MM3 (ref 3.4–10.8)
WBC MORPH BLD: NORMAL

## 2021-05-07 PROCEDURE — 86682 HELMINTH ANTIBODY: CPT

## 2021-05-07 PROCEDURE — 85007 BL SMEAR W/DIFF WBC COUNT: CPT

## 2021-05-07 PROCEDURE — 85025 COMPLETE CBC W/AUTO DIFF WBC: CPT

## 2021-05-07 PROCEDURE — 36415 COLL VENOUS BLD VENIPUNCTURE: CPT

## 2021-05-07 NOTE — TELEPHONE ENCOUNTER
DIMAS + and homogenous pattern which is concerning for SLE or drug induced SLE.     No family history of autoimmune disease that she is aware of.  Does have an aunt with severe arthritis, but she is not sure if that is actually rheumatoid versus osteoarthritis.    Now that she is feeling better, she recalls more information and that she actually did have a sinus infection/bronchitis when she was first sent back in October.  I have asked her to go ahead and restart Advair twice daily as this should help if it was a postinfectious cough.  However, given all the other information I am more leaning towards an autoimmune versus hypereosinophilic syndrome as the cause.    Will refer to rheumatology for further work-up and/or opinion about the DIMAS positivity and chronic symptoms.    She was appreciative of the call.

## 2021-05-07 NOTE — TELEPHONE ENCOUNTER
I called and discussed Ct scan with patient which was unremarkable, but eosinophils elevated at 16%.  She started steroids on Saturday, but had labs done on Wednesday and was still 16%.  She has noticed significant improvement in rash and lymph node swelling as well as cough with the steroids.      At this point, given the significant eosinophilia without any other manifestations other than rash I am worried about hypereosinophilic syndrome.  Discussed this diagnosis with her and will refer to hematology for further work-up.    At this point, I will repeat CBC to ensure her eosinophilia is decreasing with steroids.  I let patient know that if her symptoms continued or worsened after she completed her steroid taper to let me know and we would call in more steroids until we have a definitive diagnosis.

## 2021-05-10 ENCOUNTER — TELEPHONE (OUTPATIENT)
Dept: PULMONOLOGY | Facility: CLINIC | Age: 51
End: 2021-05-10

## 2021-05-10 DIAGNOSIS — R21 RASH: ICD-10-CM

## 2021-05-10 DIAGNOSIS — R05.9 COUGH: Primary | ICD-10-CM

## 2021-05-10 LAB
A ALTERNATA IGE QN: <0.1 KU/L
A FUMIGATUS IGE QN: <0.1 KU/L
AMER ROACH IGE QN: <0.1 KU/L
BAHIA GRASS IGE QN: <0.1 KU/L
BERMUDA GRASS IGE QN: <0.1 KU/L
BOXELDER IGE QN: <0.1 KU/L
C HERBARUM IGE QN: <0.1 KU/L
CAT DANDER IGE QN: <0.1 KU/L
CMN PIGWEED IGE QN: <0.1 KU/L
COMMON RAGWEED IGE QN: <0.1 KU/L
CONV CLASS DESCRIPTION: ABNORMAL
D FARINAE IGE QN: <0.1 KU/L
D PTERONYSS IGE QN: <0.1 KU/L
DOG DANDER IGE QN: <0.1 KU/L
ENGL PLANTAIN IGE QN: <0.1 KU/L
HAZELNUT POLN IGE QN: <0.1 KU/L
IGE SERPL-ACNC: 1023 IU/ML (ref 6–495)
JOHNSON GRASS IGE QN: <0.1 KU/L
KENT BLUE GRASS IGE QN: <0.1 KU/L
LONDON PLANE IGE QN: <0.1 KU/L
M RACEMOSUS IGE QN: <0.1 KU/L
MT JUNIPER IGE QN: <0.1 KU/L
MUGWORT IGE QN: <0.1 KU/L
NETTLE IGE QN: <0.1 KU/L
P NOTATUM IGE QN: <0.1 KU/L
S BOTRYOSUM IGE QN: <0.1 KU/L
SHEEP SORREL IGE QN: <0.1 KU/L
SPECIMEN STATUS: NORMAL
SWEET GUM IGE QN: <0.1 KU/L
WHITE ELM IGE QN: <0.1 KU/L
WHITE HICKORY IGE QN: <0.1 KU/L
WHITE MULBERRY IGE QN: <0.1 KU/L
WHITE OAK IGE QN: <0.1 KU/L

## 2021-05-10 NOTE — TELEPHONE ENCOUNTER
I called and left message concerning labs.  Her p-ANCA is now positive which is concerning for possible vasculitis or Wegener's.  I called lab and had them add on basic metabolic panel as I do not see that her renal function has been evaluated recently.    I called and discussed patient with Dr. Lane in rheumatology.  Patient now with + DIMAS and + P-ANCA as well as elevated IgE level and eosinophils.  Discussed Churg-David vs Wegener's vs other autoimmune disease.  He states that he will get her worked into the clinic asa.     I was able to get a hold of patient and get her up-to-date on most recent lab results.  She has appointment with hematology with Dr. Justice on Thursday.  She is to be called by rheumatology to schedule appointment.  She was appreciative of the information.    She finishes prednisone in a few days, but noted to have elevated blood pressures at endocrinology appointment last week.  At this point, we will hold off on reordering prednisone and she is going to continue to monitor blood pressure.  However, if symptoms start to recur she will let me know.  If so, would then restart steroids at a low steady dose and not another taper.

## 2021-05-11 ENCOUNTER — TELEPHONE (OUTPATIENT)
Dept: PULMONOLOGY | Facility: CLINIC | Age: 51
End: 2021-05-11

## 2021-05-11 LAB
BUN SERPL-MCNC: 10 MG/DL (ref 6–24)
BUN/CREAT SERPL: 13 (ref 9–23)
CHLORIDE SERPL-SCNC: 101 MMOL/L (ref 96–106)
CO2 SERPL-SCNC: 23 MMOL/L (ref 20–29)
CREAT SERPL-MCNC: 0.77 MG/DL (ref 0.57–1)
GLUCOSE SERPL-MCNC: 82 MG/DL (ref 65–99)
POTASSIUM SERPL-SCNC: 3.9 MMOL/L (ref 3.5–5.2)
SODIUM SERPL-SCNC: 139 MMOL/L (ref 134–144)
STRONGYLOIDES IGG SER QL IA: NEGATIVE

## 2021-05-11 NOTE — TELEPHONE ENCOUNTER
----- Message from Soraya Chamberlain sent at 5/10/2021  4:44 PM EDT -----  Regarding: PATIENT RETURNED CALL  Patient returned your call.  She is available if you need to call her back.    I told her to expect call from Dr. Lane office for appointment and if she did not hear from them by 2:00 pm tomorrow to call me and I would check for her.    Thank you

## 2021-05-13 ENCOUNTER — LAB (OUTPATIENT)
Dept: LAB | Facility: HOSPITAL | Age: 51
End: 2021-05-13

## 2021-05-13 ENCOUNTER — CONSULT (OUTPATIENT)
Dept: ONCOLOGY | Facility: CLINIC | Age: 51
End: 2021-05-13

## 2021-05-13 VITALS
SYSTOLIC BLOOD PRESSURE: 155 MMHG | OXYGEN SATURATION: 100 % | HEIGHT: 66 IN | BODY MASS INDEX: 30.05 KG/M2 | DIASTOLIC BLOOD PRESSURE: 80 MMHG | WEIGHT: 187 LBS | RESPIRATION RATE: 12 BRPM | TEMPERATURE: 97.1 F | HEART RATE: 67 BPM

## 2021-05-13 DIAGNOSIS — D72.19 OTHER EOSINOPHILIA: Primary | ICD-10-CM

## 2021-05-13 DIAGNOSIS — D72.824 BASOPHILIA: ICD-10-CM

## 2021-05-13 DIAGNOSIS — R21 RASH: ICD-10-CM

## 2021-05-13 DIAGNOSIS — R05.9 COUGH: ICD-10-CM

## 2021-05-13 DIAGNOSIS — D72.19 OTHER EOSINOPHILIA: ICD-10-CM

## 2021-05-13 PROBLEM — D72.10 EOSINOPHILIA: Status: ACTIVE | Noted: 2021-05-13

## 2021-05-13 LAB
ALBUMIN SERPL-MCNC: 3.8 G/DL (ref 3.5–5.2)
ALBUMIN/GLOB SERPL: 1.3 G/DL
ALP SERPL-CCNC: 119 U/L (ref 39–117)
ALT SERPL W P-5'-P-CCNC: 7 U/L (ref 1–33)
ANION GAP SERPL CALCULATED.3IONS-SCNC: 8.3 MMOL/L (ref 5–15)
AST SERPL-CCNC: 10 U/L (ref 1–32)
BASOPHILS # BLD MANUAL: 0.22 10*3/MM3 (ref 0–0.2)
BASOPHILS NFR BLD AUTO: 3 % (ref 0–1.5)
BILIRUB SERPL-MCNC: 0.2 MG/DL (ref 0–1.2)
BUN SERPL-MCNC: 11 MG/DL (ref 6–20)
BUN/CREAT SERPL: 14.7 (ref 7–25)
CALCIUM SPEC-SCNC: 8.7 MG/DL (ref 8.6–10.5)
CHLORIDE SERPL-SCNC: 102 MMOL/L (ref 98–107)
CO2 SERPL-SCNC: 26.7 MMOL/L (ref 22–29)
CREAT SERPL-MCNC: 0.75 MG/DL (ref 0.57–1)
DEPRECATED RDW RBC AUTO: 41.2 FL (ref 37–54)
EOSINOPHIL # BLD MANUAL: 0.44 10*3/MM3 (ref 0–0.4)
EOSINOPHIL NFR BLD MANUAL: 6 % (ref 0.3–6.2)
ERYTHROCYTE [DISTWIDTH] IN BLOOD BY AUTOMATED COUNT: 13.2 % (ref 12.3–15.4)
GFR SERPL CREATININE-BSD FRML MDRD: 81 ML/MIN/1.73
GLOBULIN UR ELPH-MCNC: 3 GM/DL
GLUCOSE SERPL-MCNC: 82 MG/DL (ref 65–99)
HCT VFR BLD AUTO: 40.8 % (ref 34–46.6)
HGB BLD-MCNC: 13.3 G/DL (ref 12–15.9)
LYMPHOCYTES # BLD MANUAL: 2.86 10*3/MM3 (ref 0.7–3.1)
LYMPHOCYTES NFR BLD MANUAL: 39 % (ref 19.6–45.3)
LYMPHOCYTES NFR BLD MANUAL: 4 % (ref 5–12)
MCH RBC QN AUTO: 27.8 PG (ref 26.6–33)
MCHC RBC AUTO-ENTMCNC: 32.6 G/DL (ref 31.5–35.7)
MCV RBC AUTO: 85.4 FL (ref 79–97)
MONOCYTES # BLD AUTO: 0.29 10*3/MM3 (ref 0.1–0.9)
NEUTROPHILS # BLD AUTO: 3.52 10*3/MM3 (ref 1.7–7)
NEUTROPHILS NFR BLD MANUAL: 48 % (ref 42.7–76)
NRBC BLD AUTO-RTO: 0 /100 WBC (ref 0–0.2)
PATHOLOGY REVIEW: YES
PLAT MORPH BLD: NORMAL
PLATELET # BLD AUTO: 351 10*3/MM3 (ref 140–450)
PMV BLD AUTO: 9.9 FL (ref 6–12)
POTASSIUM SERPL-SCNC: 4.4 MMOL/L (ref 3.5–5.2)
PROT SERPL-MCNC: 6.8 G/DL (ref 6–8.5)
RBC # BLD AUTO: 4.78 10*6/MM3 (ref 3.77–5.28)
RBC MORPH BLD: NORMAL
SODIUM SERPL-SCNC: 137 MMOL/L (ref 136–145)
VIT B12 BLD-MCNC: 778 PG/ML (ref 211–946)
WBC # BLD AUTO: 7.34 10*3/MM3 (ref 3.4–10.8)
WBC MORPH BLD: NORMAL

## 2021-05-13 PROCEDURE — 82607 VITAMIN B-12: CPT

## 2021-05-13 PROCEDURE — 88185 FLOWCYTOMETRY/TC ADD-ON: CPT

## 2021-05-13 PROCEDURE — 81338 MPL GENE COMMON VARIANTS: CPT

## 2021-05-13 PROCEDURE — 81207 BCR/ABL1 GENE MINOR BP: CPT

## 2021-05-13 PROCEDURE — 88184 FLOWCYTOMETRY/ TC 1 MARKER: CPT

## 2021-05-13 PROCEDURE — 81219 CALR GENE COM VARIANTS: CPT

## 2021-05-13 PROCEDURE — 99204 OFFICE O/P NEW MOD 45 MIN: CPT | Performed by: INTERNAL MEDICINE

## 2021-05-13 PROCEDURE — 85025 COMPLETE CBC W/AUTO DIFF WBC: CPT

## 2021-05-13 PROCEDURE — 83520 IMMUNOASSAY QUANT NOS NONAB: CPT

## 2021-05-13 PROCEDURE — 81270 JAK2 GENE: CPT

## 2021-05-13 PROCEDURE — 81206 BCR/ABL1 GENE MAJOR BP: CPT

## 2021-05-13 PROCEDURE — 82785 ASSAY OF IGE: CPT

## 2021-05-13 PROCEDURE — 81279 JAK2 GENE TRGT SEQUENCE ALYS: CPT

## 2021-05-13 PROCEDURE — 80053 COMPREHEN METABOLIC PANEL: CPT

## 2021-05-13 PROCEDURE — 82784 ASSAY IGA/IGD/IGG/IGM EACH: CPT

## 2021-05-13 PROCEDURE — 85007 BL SMEAR W/DIFF WBC COUNT: CPT

## 2021-05-13 PROCEDURE — 36415 COLL VENOUS BLD VENIPUNCTURE: CPT

## 2021-05-13 NOTE — PROGRESS NOTES
New Patient Office Visit      Date: 2021     Patient Name: Kelsey Ross  MRN: [id number]  : 1970  Referring Physician: Meaghan Benitez    Chief Complaint: Establish care for eosinophilia    History of Present Illness: Kelsey Ross is a pleasant 50 y.o. female with a past medical history of Graves' disease status post iodine ablation, and hypertension who presents today for evaluation of eosinophilia. The patient states that her symptoms started this past October when she presented with a dry irritating cough.  This caused her significant fatigue as it affected her sleep.  She was seen by multiple urgent care and ED physicians with a diagnosis of bronchitis and pharyngitis.  Had tried H1/H2 blockers as well as inhalers and cough syrup without any significant improvement of her symptoms.  She eventually started to develop hives and rashes on her skin as well as constipation.  Was eventually started on steroids with improvement of her symptoms however when she was tapered off the symptoms immediately came back.  She is currently on 10 mg of prednisone.  She was seen by pulmonary who performed work-up which was notable for eosinophilia with an elevated IgE to 1000.  CT chest was within normal limits.  Of note DIMAS was positive as well as a p-ANCA.  States she was seen by rheumatology yesterday who is ordered more labs to rule in/out other autoimmune diagnoses.  Today she feels well and has no major complaints.    Oncology History:    Oncology/Hematology History    No history exists.       Subjective      Review of Systems:     Constitutional: Positive for fatigue.  Negative for fevers, chills, or weight loss  Eyes: Negative for blurred vision or discharge         Ear/Nose/Throat: Negative for difficulty swallowing, sore throat, LAD                                                       Respiratory: Positive for cough.  Negative for SOA, wheezing                                                                                         Cardiovascular: Negative for chest pain or palpitations                                                                  Gastrointestinal: Negative for nausea, vomiting or diarrhea                                                                     Genitourinary: Negative for dysuria or hematuria                                                                                           Musculoskeletal: Negative for any joint pains or muscle aches                                                                        Neurologic: Negative for any weakness, headaches, dizziness                                                                         Hematologic: Negative for any easy bleeding or bruising                                                                                   Psychiatric: Negative for anxiety or depression                             Past Medical History:   Past Medical History:   Diagnosis Date   • Allergic    • Bilateral ovarian cysts    • Migraines    • Thyroid disease        Past Surgical History:   Past Surgical History:   Procedure Laterality Date   • CATARACT EXTRACTION, BILATERAL     • HYSTERECTOMY      complete   • NOSE SURGERY     • RETINAL DETACHMENT SURGERY     • RHINOPLASTY     • SINUS SURGERY  03/26/2021       Family History:   Family History   Problem Relation Age of Onset   • Arthritis Mother    • Hypertension Mother    • Hyperlipidemia Mother    • Migraines Mother    • Stroke Mother    • Diabetes Father    • Pulmonary embolism Father    • Lung cancer Maternal Aunt    • Arthritis Maternal Grandmother    • Hypertension Maternal Grandmother    • Hyperlipidemia Maternal Grandmother    • Stroke Maternal Grandmother    • Thyroid disease Maternal Grandmother    • Diabetes Paternal Grandmother    • Heart attack Maternal Grandfather        Social History:   Social History     Socioeconomic History   • Marital status: Single     Spouse name: Not on file   •  Number of children: Not on file   • Years of education: Not on file   • Highest education level: Not on file   Tobacco Use   • Smoking status: Former Smoker     Packs/day: 2.00     Years: 8.00     Pack years: 16.00     Types: Cigarettes     Quit date: 1995     Years since quittin.0   • Smokeless tobacco: Never Used   Vaping Use   • Vaping Use: Never used   Substance and Sexual Activity   • Alcohol use: Not Currently   • Drug use: Never   • Sexual activity: Not Currently       Medications:     Current Outpatient Medications:   •  albuterol sulfate  (90 Base) MCG/ACT inhaler, Inhale 2 puffs Every 4 (Four) Hours As Needed for Wheezing., Disp: 6.7 g, Rfl: 0  •  benzonatate (TESSALON) 200 MG capsule, Take 1 capsule by mouth 3 (Three) Times a Day As Needed for Cough., Disp: 30 capsule, Rfl: 0  •  dexlansoprazole (DEXILANT) 30 MG capsule, Take 1 capsule by mouth Daily for 30 days., Disp: 30 capsule, Rfl: 0  •  fluticasone-salmeterol (Advair Diskus) 250-50 MCG/DOSE DISKUS, Inhale 1 puff 2 (Two) Times a Day., Disp: 60 each, Rfl: 0  •  HYDROcod Polst-CPM Polst ER (Tussionex Pennkinetic ER) 10-8 MG/5ML ER suspension, Take 5 mL by mouth Every 12 (Twelve) Hours As Needed for Cough., Disp: 60 mL, Rfl: 0  •  ibuprofen (ADVIL,MOTRIN) 600 MG tablet, Take 1 tablet by mouth Every 6 (Six) Hours As Needed for Mild Pain ., Disp: 30 tablet, Rfl: 0  •  levothyroxine (SYNTHROID, LEVOTHROID) 100 MCG tablet, Take 1 tablet by mouth Daily., Disp: 30 tablet, Rfl: 5  •  liothyronine (CYTOMEL) 5 MCG tablet, Take 2 tablets by mouth Daily., Disp: 60 tablet, Rfl: 5  •  NON FORMULARY, Compound nasal spray that includes steroids and antibiotics, Disp: , Rfl:   •  polyethylene glycol (MIRALAX) 17 g packet, Take 17 g by mouth 2 (Two) Times a Day., Disp: 20 packet, Rfl: 1  •  predniSONE (DELTASONE) 10 MG tablet, Take 4 tabs daily x 3 days, then take 3 tabs daily x 3 days, then take 2 tabs daily x 3 days, then take 1 tab daily x 3 days,  "Disp: 31 tablet, Rfl: 0  •  Simethicone (GAS RELIEF 80 PO), Take 1 tablet by mouth As Needed., Disp: , Rfl:   •  triamcinolone (KENALOG) 0.5 % cream, Apply  topically to the appropriate area as directed 2 (Two) Times a Day., Disp: 15 g, Rfl: 0  •  amLODIPine (NORVASC) 5 MG tablet, Take 1 tablet by mouth Daily., Disp: 30 tablet, Rfl: 2    Allergies:   Allergies   Allergen Reactions   • Penicillins Unknown - High Severity     Allergist informed patient she was allergic to Penicillins   • Allegra [Fexofenadine] Headache     Only allegra D       Objective     Physical Exam:  Vital Signs:   Vitals:    05/13/21 0814   BP: 155/80   Pulse: 67   Resp: 12   Temp: 97.1 °F (36.2 °C)   TempSrc: Temporal   SpO2: 100%   Weight: 84.8 kg (187 lb)   Height: 167.6 cm (66\")   PainSc: 0-No pain     Pain Score    05/13/21 0814   PainSc: 0-No pain     ECOG Performance Status: 1 - Symptomatic but completely ambulatory    Constitutional: NAD, ECOG 1  Eyes: PERRLA, scleral anicteric  ENT: No LAD, no thyromegaly  Respiratory: CTAB, no wheezing, rales, rhonchi  Cardiovascular: RRR, no murmurs, pulses 2+ bilaterally  Abdomen: soft, NT/ND, no HSM  Musculoskeletal: strength 5/5 bilaterally, no c/c/e  Neurologic: A&O x 3, CN II-XII intact grossly  Psych: mood and affect congruent, no SI or HI    Results Review:   Lab on 05/07/2021   Component Date Value Ref Range Status   • Strongyloides Ab IgG 05/07/2021 Negative  Negative Final   • WBC 05/07/2021 6.76  3.40 - 10.80 10*3/mm3 Final   • RBC 05/07/2021 4.74  3.77 - 5.28 10*6/mm3 Final   • Hemoglobin 05/07/2021 13.4  12.0 - 15.9 g/dL Final   • Hematocrit 05/07/2021 40.9  34.0 - 46.6 % Final   • MCV 05/07/2021 86.3  79.0 - 97.0 fL Final   • MCH 05/07/2021 28.3  26.6 - 33.0 pg Final   • MCHC 05/07/2021 32.8  31.5 - 35.7 g/dL Final   • RDW 05/07/2021 12.9  12.3 - 15.4 % Final   • RDW-SD 05/07/2021 40.2  37.0 - 54.0 fl Final   • MPV 05/07/2021 10.5  6.0 - 12.0 fL Final   • Platelets 05/07/2021 354  140 - " 450 10*3/mm3 Final   • nRBC 05/07/2021 0.0  0.0 - 0.2 /100 WBC Final   • Neutrophil % 05/07/2021 63.6  42.7 - 76.0 % Final   • Lymphocyte % 05/07/2021 23.2  19.6 - 45.3 % Final   • Monocyte % 05/07/2021 7.1  5.0 - 12.0 % Final   • Eosinophil % 05/07/2021 4.0  0.3 - 6.2 % Final   • Basophil % 05/07/2021 2.0* 0.0 - 1.5 % Final   • Neutrophils Absolute 05/07/2021 4.30  1.70 - 7.00 10*3/mm3 Final   • Lymphocytes Absolute 05/07/2021 1.57  0.70 - 3.10 10*3/mm3 Final   • Monocytes Absolute 05/07/2021 0.48  0.10 - 0.90 10*3/mm3 Final   • Eosinophils Absolute 05/07/2021 0.27  0.00 - 0.40 10*3/mm3 Final   • Basophils Absolute 05/07/2021 0.14  0.00 - 0.20 10*3/mm3 Final   • RBC Morphology 05/07/2021 Normal  Normal Final   • WBC Morphology 05/07/2021 Normal  Normal Final   • Platelet Morphology 05/07/2021 Normal  Normal Final   Office Visit on 05/05/2021   Component Date Value Ref Range Status   • Free T4 05/05/2021 0.89* 0.93 - 1.70 ng/dL Final   • TSH 05/05/2021 2.120  0.270 - 4.200 uIU/mL Final   Lab on 05/05/2021   Component Date Value Ref Range Status   • WBC 05/05/2021 7.28  3.40 - 10.80 10*3/mm3 Final   • RBC 05/05/2021 4.57  3.77 - 5.28 10*6/mm3 Final   • Hemoglobin 05/05/2021 12.9  12.0 - 15.9 g/dL Final   • Hematocrit 05/05/2021 39.6  34.0 - 46.6 % Final   • MCV 05/05/2021 86.7  79.0 - 97.0 fL Final   • MCH 05/05/2021 28.2  26.6 - 33.0 pg Final   • MCHC 05/05/2021 32.6  31.5 - 35.7 g/dL Final   • RDW 05/05/2021 12.8  12.3 - 15.4 % Final   • RDW-SD 05/05/2021 40.5  37.0 - 54.0 fl Final   • MPV 05/05/2021 10.8  6.0 - 12.0 fL Final   • Platelets 05/05/2021 281  140 - 450 10*3/mm3 Final   • Neutrophil % 05/05/2021 42.0* 42.7 - 76.0 % Final   • Lymphocyte % 05/05/2021 27.2  19.6 - 45.3 % Final   • Monocyte % 05/05/2021 9.9  5.0 - 12.0 % Final   • Eosinophil % 05/05/2021 16.2* 0.3 - 6.2 % Final   • Basophil % 05/05/2021 2.2* 0.0 - 1.5 % Final   • Immature Grans % 05/05/2021 2.5* 0.0 - 0.5 % Final   • Neutrophils, Absolute  05/05/2021 3.06  1.70 - 7.00 10*3/mm3 Final   • Lymphocytes, Absolute 05/05/2021 1.98  0.70 - 3.10 10*3/mm3 Final   • Monocytes, Absolute 05/05/2021 0.72  0.10 - 0.90 10*3/mm3 Final   • Eosinophils, Absolute 05/05/2021 1.18* 0.00 - 0.40 10*3/mm3 Final   • Basophils, Absolute 05/05/2021 0.16  0.00 - 0.20 10*3/mm3 Final   • Immature Grans, Absolute 05/05/2021 0.18* 0.00 - 0.05 10*3/mm3 Final   • nRBC 05/05/2021 0.0  0.0 - 0.2 /100 WBC Final   • Class Description 05/05/2021 Comment   Final        Levels of Specific IgE       Class  Description of Class      ---------------------------  -----  --------------------                     < 0.10         0         Negative             0.10 -    0.31         0/I       Equivocal/Low             0.32 -    0.55         I         Low             0.56 -    1.40         II        Moderate             1.41 -    3.90         III       High             3.91 -   19.00         IV        Very High            19.01 -  100.00         V         Very High                    >100.00         VI        Very High   • IgE 05/05/2021 1023* 6 - 495 IU/mL Final   • D. pteronyssinus (dust mite) 05/05/2021 <0.10  Class 0 kU/L Final   • D. farinae (dust mite) 05/05/2021 <0.10  Class 0 kU/L Final   • Cat Dander 05/05/2021 <0.10  Class 0 kU/L Final   • Dog Dander, IgE 05/05/2021 <0.10  Class 0 kU/L Final   • [place] Grass 05/05/2021 <0.10  Class 0 kU/L Final   • [place] IgE 05/05/2021 <0.10  Class 0 kU/L Final   • Frank Grass 05/05/2021 <0.10  Class 0 kU/L Final   • [place] Grass 05/05/2021 <0.10  Class 0 kU/L Final   • Cockroach, American 05/05/2021 <0.10  Class 0 kU/L Final   • Penicillium chrysogen 05/05/2021 <0.10  Class 0 kU/L Final   • Cladosporium herbarum 05/05/2021 <0.10  Class 0 kU/L Final   • Aspergillus fumigatus 05/05/2021 <0.10  Class 0 kU/L Final   • Mucor racemosus 05/05/2021 <0.10  Class 0 kU/L Final   • Alternaria alternata 05/05/2021 <0.10  Class 0 kU/L Final   • Stemphylium  herbarum 05/05/2021 <0.10  Class 0 kU/L Final   • [place], White 05/05/2021 <0.10  Class 0 kU/L Final   • Elm, American 05/05/2021 <0.10  Class 0 kU/L Final   • Maple/Box Elder 05/05/2021 <0.10  Class 0 kU/L Final   • Hazelnut Tree 05/05/2021 <0.10  Class 0 kU/L Final   • [place], White 05/05/2021 <0.10  Class 0 kU/L Final   • Maple [place] 05/05/2021 <0.10  Class 0 kU/L Final   • White [place] 05/05/2021 <0.10  Class 0 kU/L Final   • [place], Mountain 05/05/2021 <0.10  Class 0 kU/L Final   • Sweet Gum 05/05/2021 <0.10  Class 0 kU/L Final   • Ragweed, Common/Short 05/05/2021 <0.10  Class 0 kU/L Final   • Mugwort 05/05/2021 <0.10  Class 0 kU/L Final   • English Plantain 05/05/2021 <0.10  Class 0 kU/L Final   • Pigweed, Rough/Common 05/05/2021 <0.10  Class 0 kU/L Final   • Sheep Sorrel 05/05/2021 <0.10  Class 0 kU/L Final   • Nettle 05/05/2021 <0.10  Class 0 kU/L Final   • DIMAS 05/05/2021 Positive*  Final                                         Negative   <1:80                                       Borderline  1:80                                       Positive   >1:80   • Myeloperoxidase Ab 05/05/2021 <9.0  0.0 - 9.0 U/mL Final   • Antiproteinase 3 (MA-3) 05/05/2021 <3.5  0.0 - 3.5 U/mL Final   • C-ANCA 05/05/2021 <1:20  Neg:<1:20 titer Final   • P-ANCA 05/05/2021 1:160* Neg:<1:20 titer Final    The presence of positive fluorescence exhibiting P-ANCA or C-ANCA  patterns alone is not specific for the diagnosis of Wegener's  Granulomatosis (WG) or microscopic polyangiitis. Decisions about  treatment should not be based solely on ANCA IFA results.  The  International ANCA Group Consensus recommends follow up testing of  positive sera with both MA-3 and MPO-ANCA enzyme immunoassays. As  many as 5% serum samples are positive only by EIA.  Ref. AM J Clin Pathol 1999;111:507-513.   • Atypical pANCA 05/05/2021 <1:20  Neg:<1:20 titer Final    The atypical pANCA pattern has been observed in a significant  percentage of  patients with ulcerative colitis, primary sclerosing  cholangitis and autoimmune hepatitis.   • Homogeneous Pattern 05/05/2021 1:640*  Final   • Note: (Reference) 05/05/2021 Comment   Final    Comment: A positive DIMAS result may occur in healthy individuals (low  titer) or be associated with a variety of diseases.  See  interpretation chart which is not all inclusive:  Pattern      Antigen Detected  Suggested Disease Association  -----------  ----------------  -----------------------------  Homogeneous  DNA(ds,ss),       SLE - High titers               Nucleosomes,               Histones          Drug-induced SLE  -----------  ----------------  -----------------------------  Speckled     Sm, RNP, SCL-70,  SLE,MCTD,PSS (diffuse form),               SS-A/SS-B         Sjogrens  -----------  ----------------  -----------------------------  Nucleolar    SCL-70, PM-1/SCL  High titers Scleroderma,                                 PM/DM  -----------  ----------------  -----------------------------  Centromere   Centromere        PSS (limited form) w/Crest                                 syndrome variable  -----------  ----------------  -----------------------------  Nuclear Dot                             [id number]  Primary Biliary Cirrhosis  -----------  ----------------  -----------------------------  Nuclear      ,            Primary Biliary Cirrhosis  Membrane     maddy A,B,C  -----------  ----------------  -----------------------------   • Specimen Status 05/05/2021 Comment   Final    Written Authorization  Written Authorization  Written Authorization Received.  Authorization received from JUAN RICHARDS 05-  Logged by Kristine Mack   • Glucose 05/05/2021 82  65 - 99 mg/dL Final   • BUN 05/05/2021 10  6 - 24 mg/dL Final   • Creatinine 05/05/2021 0.77  0.57 - 1.00 mg/dL Final   • eGFR Non African Am 05/05/2021 90  >59 mL/min/1.73 Final   • eGFR African Am 05/05/2021 103  >59 mL/min/1.73 Final     "**[facility] currently reports eGFR in compliance with the current**    recommendations of the National Kidney Foundation. [facility] will    update reporting as new guidelines are published from the NKF-ASN    Task force.   • BUN/Creatinine Ratio 05/05/2021 13  9 - 23 Final   • Sodium 05/05/2021 139  134 - 144 mmol/L Final   • Potassium 05/05/2021 3.9  3.5 - 5.2 mmol/L Final   • Chloride 05/05/2021 101  96 - 106 mmol/L Final   • Total CO2 05/05/2021 23  20 - 29 mmol/L Final   Admission on 04/30/2021, Discharged on 04/30/2021   Component Date Value Ref Range Status   • Rapid Strep A Screen 04/30/2021 Negative  Negative, VALID, INVALID, Not Performed Final   • Internal Control 04/30/2021 Passed  Passed Final   • Lot Number 04/30/2021 [id number]   Final   • Expiration Date 04/30/2021 3/31/22   Final       XR Chest 2 View    Result Date: 5/1/2021  Narrative: FINAL REPORT CLINICAL HISTORY: heard a \"pop\" on the right side of rib now with pain COMPARISON: 1/7/21 FINDINGS: Two views of the chest were obtained.  Heart and mediastinum are within normal limits.  Lungs are clear.  There is no pneumothorax.  Osseous structures are unremarkable.     Impression: No active disease.  No significant change. Authenticated by Iesha Cohen MD on 05/01/2021 02:18:02 PM    CT Chest Without Contrast Diagnostic    Result Date: 5/5/2021  Narrative: FINAL REPORT TECHNIQUE: Axial CT images were performed through the chest without contrast.  This study was performed with techniques to keep radiation doses as low as reasonably achievable (ALARA). Individualized dose reduction techniques using automated exposure control or adjustment of mA and/or kV according to the patient's size were employed. CLINICAL HISTORY: cough, persitent,  eosinophilia, unspecified type FINDINGS: CHEST:  Lungs/pleura: No consolidation or suspicious nodule identified.  No pneumothorax.  There is a trace right pleural effusion.  There is mild lower lobe atelectasis.  Heart, " vessels and mediastinum: The heart is normal in size.  No pericardial effusion.  The aorta and pulmonary arteries are normal in caliber.  No significant coronary artery calcifications.  Lymph nodes: No pathologically enlarged thoracic lymph nodes within the limits of a noncontrast enhanced examination.  Chest wall: No acute findings.  Bones: No acute fracture.  Upper abdomen: No acute findings in the upper abdomen.     Impression: No acute findings in the chest to account for patient's symptoms. Authenticated by Stan Gonzalez MD on 05/05/2021 07:03:59 PM    XR Chest 1 View    Result Date: 4/27/2021  Narrative: PROCEDURE: XR CHEST 1 VW-  HISTORY: cough  COMPARISON: 2/20/2021  FINDINGS: The lungs are hyperlucent hyperexpanded with flattening of the hemidiaphragms, consistent with chronic obstructive pulmonary disease. Lungs are otherwise clear.   There is no evidence of effusion or other pleural disease.  The mediastinum has a normal appearance.  The cardiac silhouette is unremarkable.      Impression: No acute findings.Underlying emphysema.    This report was finalized on 4/27/2021 12:30 PM by Karel Hernandez MD.      Assessment / Plan      Assessment/Plan:   1. Other eosinophilia (Primary)  -Unclear etiology at this time.  Differential including autoimmune disease versus possible HES, systemic mastocytosis, myeloproliferative disorder  -Patient following with rheumatology for positive DIMAS and p-ANCA  -Currently on 10 mg prednisone with improvement of her symptoms  -Currently taking H1/H2 blocker along with Advair  -We will check labs as below to rule in/out other secondary causes  -     CBC & Differential; Future  -     Comprehensive Metabolic Panel; Future  -     Vitamin B12; Future  -     Tryptase; Future  -     JAK2 V617F, Rfx CALR/E12-15/MPL; Future  -     BCR / ABL Qual By RT-PCR; Future  -     Peripheral Blood Smear; Future  -     Flow Cytometry, Blood; Future  -     Immunoglobulins A/E/G/M Serum;  Future      Follow Up:   Follow-up in 3-4 weeks     Quang Justice MD  Hematology and Oncology     Please note that portions of this note may have been completed with a voice recognition program. Efforts were made to edit the dictations, but occasionally words are mistranscribed.

## 2021-05-15 LAB — TRYPTASE SERPL-MCNC: 4.4 UG/L (ref 2.2–13.2)

## 2021-05-17 ENCOUNTER — TELEPHONE (OUTPATIENT)
Dept: ONCOLOGY | Facility: CLINIC | Age: 51
End: 2021-05-17

## 2021-05-17 LAB
LAB AP CASE REPORT: NORMAL
LAB AP CLINICAL INFORMATION: NORMAL
PATH REPORT.FINAL DX SPEC: NORMAL
PATH REPORT.GROSS SPEC: NORMAL

## 2021-05-17 NOTE — TELEPHONE ENCOUNTER
Advised patient we have not received all lab results as of today. Patient verbalized understanding.

## 2021-05-17 NOTE — TELEPHONE ENCOUNTER
Caller: PT    Relationship: SELF    Best call back number: [phone]    What is the best time to reach you: ANY    Who are you requesting to speak with (clinical staff, provider,  specific staff member): CLINICAL    Do you know the name of the person who called:     What was the call regarding: WANTS TO TALK ABOUT BLOODWORK RESULTS     Do you require a callback: YES AND ITS OK TO LEAVE A DETAILED MSG.

## 2021-05-18 LAB
INTERPRETATION: NEGATIVE
LAB DIRECTOR NAME PROVIDER: NORMAL
LABORATORY COMMENT REPORT: NORMAL
REF LAB TEST METHOD: NORMAL
T(ABL1,BCR)B2A2/CONTROL BLD/T: NORMAL %
T(ABL1,BCR)B3A2/CONTROL BLD/T: NORMAL %
T(ABL1,BCR)E1A2/CONTROL BLD/T: NORMAL %

## 2021-05-19 LAB
IGA SERPL-MCNC: 283 MG/DL (ref 87–352)
IGE SERPL-ACNC: 135 IU/ML (ref 6–495)
IGG SERPL-MCNC: 1552 MG/DL (ref 586–1602)
IGM SERPL-MCNC: 104 MG/DL (ref 26–217)

## 2021-05-21 LAB
ANNOTATION COMMENT IMP: NORMAL
ASSESSMENT OF LEUKOCYTES: NORMAL
CLINICAL INFO: NORMAL
GATING STRATEGY: NORMAL
IMMUNOPHENOTYPING STUDY: NORMAL
LABORATORY COMMENT REPORT: NORMAL
PATH INTERP SPEC-IMP: NORMAL
PATHOLOGIST NAME: NORMAL
SPECIMEN SOURCE: NORMAL
VIABLE CELLS NFR SPEC: NORMAL %

## 2021-05-25 LAB
CALR EXON 9 MUT ANL BLD/T: NORMAL
CITATION REF LAB TEST: NORMAL
JAK2 GENE MUT ANL BLD/T: NORMAL
JAK2 P.V617F BLD/T QL: NORMAL
LAB DIRECTOR NAME PROVIDER: NORMAL
LABORATORY COMMENT REPORT: NORMAL
LABORATORY COMMENT REPORT: NORMAL
Lab: NORMAL
MPL GENE MUT TESTED BLD/T: NORMAL
MPL P.W515L+W515K+S505N BLD/T QL: NORMAL
REF LAB TEST METHOD: NORMAL
REF LAB TEST METHOD: NORMAL
REFLEX: NORMAL
SERVICE CMNT-IMP: NORMAL
SPECIMEN PREPARATION: NORMAL
SPECIMEN PREPARATION: NORMAL

## 2021-06-10 ENCOUNTER — OFFICE VISIT (OUTPATIENT)
Dept: ONCOLOGY | Facility: CLINIC | Age: 51
End: 2021-06-10

## 2021-06-10 VITALS
BODY MASS INDEX: 31.18 KG/M2 | RESPIRATION RATE: 16 BRPM | OXYGEN SATURATION: 99 % | TEMPERATURE: 97.3 F | DIASTOLIC BLOOD PRESSURE: 82 MMHG | WEIGHT: 194 LBS | SYSTOLIC BLOOD PRESSURE: 174 MMHG | HEIGHT: 66 IN | HEART RATE: 71 BPM

## 2021-06-10 DIAGNOSIS — D72.824 BASOPHILIA: ICD-10-CM

## 2021-06-10 DIAGNOSIS — D72.19 OTHER EOSINOPHILIA: Primary | ICD-10-CM

## 2021-06-10 PROCEDURE — 99214 OFFICE O/P EST MOD 30 MIN: CPT | Performed by: INTERNAL MEDICINE

## 2021-06-10 NOTE — PROGRESS NOTES
Follow Up Office Visit      Date: 06/10/2021     Patient Name: Kelsey Ross  MRN: [id number]  : 1970  Referring Physician: Meaghan Benitez     Chief Complaint:  Follow-up for eosinophilia and basophilia     History of Present Illness: Kelsey Ross is a pleasant 50 y.o. female with a past medical history of Graves' disease status post iodine ablation, and hypertension who presents today for evaluation of eosinophilia. The patient states that her symptoms started this past October when she presented with a dry irritating cough.  This caused her significant fatigue as it affected her sleep.  She was seen by multiple urgent care and ED physicians with a diagnosis of bronchitis and pharyngitis.  Had tried H1/H2 blockers as well as inhalers and cough syrup without any significant improvement of her symptoms.  She eventually started to develop hives and rashes on her skin as well as constipation.  Was eventually started on steroids with improvement of her symptoms however when she was tapered off the symptoms immediately came back.  She is currently on 10 mg of prednisone.  She was seen by pulmonary who performed work-up which was notable for eosinophilia with an elevated IgE to 1000.  CT chest was within normal limits.  Of note DIMAS was positive as well as a p-ANCA.  States she was seen by rheumatology yesterday who is ordered more labs to rule in/out other autoimmune diagnoses.  Today she feels well and has no major complaints.    Interval History:  Presents clinic for follow-up.  Has been placed on 10 mg prednisone daily.  This has improved her shortness of breath and wheezing.  Only having intermittent episodes.  States that her hives and rashes have significantly improved as well.  Continues to have some fatigue as well as cough.  States that she is scheduled to see rheumatology again until July    Oncology History:    Oncology/Hematology History    No history exists.       Subjective      Review of  Systems:   Constitutional: Negative for fevers, chills, or weight loss  Eyes: Negative for blurred vision or discharge         Ear/Nose/Throat: Negative for difficulty swallowing, sore throat, LAD                                                       Respiratory: Negative for cough, SOA, wheezing                                                                                        Cardiovascular: Negative for chest pain or palpitations                                                                  Gastrointestinal: Negative for nausea, vomiting or diarrhea                                                                     Genitourinary: Negative for dysuria or hematuria                                                                                           Musculoskeletal: Negative for any joint pains or muscle aches                                                                        Neurologic: Negative for any weakness, headaches, dizziness                                                                         Hematologic: Negative for any easy bleeding or bruising                                                                                   Psychiatric: Negative for anxiety or depression                          Past Medical History/Past Surgical History/ Family History/ Social History: Reviewed by me and unchanged from my previous documentation done on May 2021.     Medications:     Current Outpatient Medications:   •  albuterol sulfate  (90 Base) MCG/ACT inhaler, Inhale 2 puffs Every 4 (Four) Hours As Needed for Wheezing., Disp: 6.7 g, Rfl: 0  •  amLODIPine (NORVASC) 5 MG tablet, Take 1 tablet by mouth Daily., Disp: 30 tablet, Rfl: 2  •  benzonatate (TESSALON) 200 MG capsule, Take 1 capsule by mouth 3 (Three) Times a Day As Needed for Cough., Disp: 30 capsule, Rfl: 0  •  Dexilant 30 MG capsule, , Disp: , Rfl:   •  fluticasone-salmeterol (Advair Diskus) 250-50 MCG/DOSE DISKUS, Inhale 1 puff  "2 (Two) Times a Day., Disp: 60 each, Rfl: 0  •  HYDROcod Polst-CPM Polst ER (Tussionex Pennkinetic ER) 10-8 MG/5ML ER suspension, Take 5 mL by mouth Every 12 (Twelve) Hours As Needed for Cough., Disp: 60 mL, Rfl: 0  •  ibuprofen (ADVIL,MOTRIN) 600 MG tablet, Take 1 tablet by mouth Every 6 (Six) Hours As Needed for Mild Pain ., Disp: 30 tablet, Rfl: 0  •  levothyroxine (SYNTHROID, LEVOTHROID) 100 MCG tablet, Take 1 tablet by mouth Daily., Disp: 30 tablet, Rfl: 5  •  liothyronine (CYTOMEL) 5 MCG tablet, Take 2 tablets by mouth Daily., Disp: 60 tablet, Rfl: 5  •  NON FORMULARY, Compound nasal spray that includes steroids and antibiotics, Disp: , Rfl:   •  polyethylene glycol (MIRALAX) 17 g packet, Take 17 g by mouth 2 (Two) Times a Day., Disp: 20 packet, Rfl: 1  •  predniSONE (DELTASONE) 10 MG tablet, Take 4 tabs daily x 3 days, then take 3 tabs daily x 3 days, then take 2 tabs daily x 3 days, then take 1 tab daily x 3 days, Disp: 31 tablet, Rfl: 0  •  Simethicone (GAS RELIEF 80 PO), Take 1 tablet by mouth As Needed., Disp: , Rfl:   •  triamcinolone (KENALOG) 0.5 % cream, Apply  topically to the appropriate area as directed 2 (Two) Times a Day., Disp: 15 g, Rfl: 0    Allergies:   Allergies   Allergen Reactions   • Penicillins Unknown - High Severity     Allergist informed patient she was allergic to Penicillins   • Allegra [Fexofenadine] Headache     Only allegra D       Objective     Physical Exam:  Vital Signs:   Vitals:    06/10/21 1527   BP: 174/82   Pulse: 71   Resp: 16   Temp: 97.3 °F (36.3 °C)   TempSrc: Temporal   SpO2: 99%   Weight: 88 kg (194 lb)   Height: 167.6 cm (66\")   PainSc: 0-No pain     Pain Score    06/10/21 1527   PainSc: 0-No pain     ECOG Performance Status: 1 - Symptomatic but completely ambulatory    Constitutional: NAD, ECOG 1  Eyes: PERRLA, scleral anicteric  ENT: No LAD, no thyromegaly  Respiratory: CTAB, no wheezing, rales, rhonchi  Cardiovascular: RRR, no murmurs, pulses 2+ " bilaterally  Abdomen: soft, NT/ND, no HSM  Musculoskeletal: strength 5/5 bilaterally, no c/c/e  Neurologic: A&O x 3, CN II-XII intact grossly    Results Review:   No visits with results within 2 Week(s) from this visit.   Latest known visit with results is:   Lab on 05/13/2021   Component Date Value Ref Range Status   • IgG 05/13/2021 1552  586 - 1602 mg/dL Final   • IgA 05/13/2021 283  87 - 352 mg/dL Final   • IgM 05/13/2021 104  26 - 217 mg/dL Final   • IgE 05/13/2021 135  6 - 495 IU/mL Final   • Flow Interpretation 05/13/2021 Comment   Final    1)  Granulocytes show slightly left-shifted maturation  2)  Absolute basophilia  (See Comment).   • Flow Interpretation Comment 05/13/2021 Comment   Final    While not diagnostic, these findings may be seen in association with an  underlying myeloid neoplastic process. Recommend clinical correlation and  follow up as appropriate.   • Clinical Information 05/13/2021 Comment   Final    Eosinophilia  A recent CBC was not available for review at the time this report was  prepared.   • Specimen Type 05/13/2021 Comment   Final    Peripheral blood   • Assessment of Leukocytes 05/13/2021 Comment   Final    No monoclonal B cell population is detected. kappa:lambda ratio 1.4  There is no loss of, or aberrant expression of, the pan T cell antigens to  suggest a neoplastic T cell process.  CD4:CD8 ratio 3.7  No circulating blasts are detected.  Granulocytes show slightly left-shifted maturation.  Basophils are absolutely increased and account for approximately 7% of  leukocytes.  Analysis of the lymphocyte population shows: B cells 7%, T cells 90%, NK  cells 3%.   • Viability 05/13/2021 Comment   Final    83%   • Gating Strategy 05/13/2021 Comment   Final    8 color analysis with CD45/SSC gating   • Phenotype Chart 05/13/2021 Comment   Final    CD2       Normal         CD3       Normal  CD4       Normal         CD5       Normal  CD7       Normal         CD8       Normal  CD10       Normal         CD11b     Normal  CD13      Normal         CD14      Normal  CD16      Normal         CD19      Normal  CD20      Normal         CD33      Normal  CD34      Normal         CD38      Normal  CD45      Normal         CD56      Normal  CD57      Normal              Normal  HLA-DR    Normal         KAPPA     Normal  LAMBDA    Normal         CD64      Normal   • Resulting Path Name 05/13/2021 Comment   Final    Bisi Arango M.D.   • Comment 05/13/2021 Comment   Final    Each antibody in this assay was utilized to assess for potential  abnormalities of studied cell populations or to characterize  identified abnormalities.  This test was developed and its performance characteristics determined  by [facility].  It has not been cleared or approved by the U.S. Food and  Drug Administration.  The FDA has determined that such clearance or approval is not  necessary. This test is used for clinical purposes.  It should not be  regarded as investigational or for research.   • Glucose 05/13/2021 82  65 - 99 mg/dL Final   • BUN 05/13/2021 11  6 - 20 mg/dL Final   • Creatinine 05/13/2021 0.75  0.57 - 1.00 mg/dL Final   • Sodium 05/13/2021 137  136 - 145 mmol/L Final   • Potassium 05/13/2021 4.4  3.5 - 5.2 mmol/L Final   • Chloride 05/13/2021 102  98 - 107 mmol/L Final   • CO2 05/13/2021 26.7  22.0 - 29.0 mmol/L Final   • Calcium 05/13/2021 8.7  8.6 - 10.5 mg/dL Final   • Total Protein 05/13/2021 6.8  6.0 - 8.5 g/dL Final   • Albumin 05/13/2021 3.80  3.50 - 5.20 g/dL Final   • ALT (SGPT) 05/13/2021 7  1 - 33 U/L Final   • AST (SGOT) 05/13/2021 10  1 - 32 U/L Final   • Alkaline Phosphatase 05/13/2021 119* 39 - 117 U/L Final   • Total Bilirubin 05/13/2021 0.2  0.0 - 1.2 mg/dL Final   • eGFR Non African Amer 05/13/2021 81  >60 mL/min/1.73 Final   • Globulin 05/13/2021 3.0  gm/dL Final   • A/G Ratio 05/13/2021 1.3  g/dL Final   • BUN/Creatinine Ratio 05/13/2021 14.7  7.0 - 25.0 Final   • Anion Gap 05/13/2021 8.3  5.0 -  15.0 mmol/L Final   • Vitamin B-12 05/13/2021 778  211 - 946 pg/mL Final   • Tryptase 05/13/2021 4.4  2.2 - 13.2 ug/L Final   • JAK2 V617F Mutation 05/13/2021 Comment   Final    Result: NEGATIVE for the JAK2 V617F mutation.  Interpretation:  The G to T nucleotide change encoding the V617F  mutation was not detected.  This result does not rule out the presence  of the JAK2 mutation at a level below the sensitivity of detection of  this assay, or the presence of other mutations within JAK2 not  detected by this assay.  This result does not rule out a diagnosis of  polycythemia vera, essential thrombocythemia or idiopathic  myelofibrosis as the V617F mutation is not detected in all patients  with these disorders.   • Background: 05/13/2021 Comment   Final    Comment: JAK2 is a cytoplasmic tyrosine kinase with a key role in signal  transduction from multiple hematopoietic growth factor receptors. A  point mutation within exon 14 of the JAK2 gene (T9630S) encoding a  valine to phenylalanine substitution at position 617 of the JAK2  protein (V617F) has been identified in most patients with polycythemia  vera, and in about half of those with either essential thrombocythemia  or idiopathic myelofibrosis. The V617F has also been detected,  although infrequently, in other myeloid disorders such as chronic  myelomonocytic leukemia and chronic neutrophilic luekemia. V617F is  an acquired mutation that alters a highly conserved valine present in  the negative regulatory JH2 domain of the JAK2 protein and is  predicted to dysregulate kinase activity.  Methodology:  Total genomic DNA was extracted and subjected to TaqMan real-time PCR  amplification/detection. Two amplification products per sample were  monitored by real-time PCR using primers/probes specific                            to JAK2 wild  type (WT) and JAK2 mutant V617F. The [employer] Absolute Quantitation  software will compare the patient specimen valuse to the  standard  curves and generate percent values for wild type and mutant type.  In vitro studies have indicated that this assay has an analytical  sensitivity of 1%.  References:  Lasha EJ, Wesley LM, See PJ, et al. Acquired mutation of the  tyrosine kinase JAK2 in human myeloproliferative disorders. Lancet.  2005 Mar 19-25; 365(3076):9104-0106.  Michele LARSEN, Maikel V, Charmaine Garcia HOLLIE. A unique clonal JAK2 mutation leading  to constitutive signaling causes polycythaemia vera. Nature. 2005 Apr  28; 717(0148):0359-0272.  Cecelia R, Chris F, Caitlin AS, et al. A gain-of-function  mutation of JAK2 in myeloproliferative disorders. N Engl J Med. 2005  Apr 28; 352(03):6544-0064.   • Director Review 05/13/2021 Comment   Final    Sharon Lubin, PhD, [facility]                 Director, Molecular Genetics                 [facility]                 [place], [place]                 [phone]  This test was developed and its performance characteristics  determined by [facility]. It has not been cleared or approved  by the Food and Drug Administration.   • Reflex 05/13/2021 Comment   Final    Reflex to CALR Mutation Analysis, JAK2 Exon 12-15 Mutation Analysis,  and MPL Mutation Analysis is indicated.   • Extraction 05/13/2021 Completed   Final   • Pathology Review 05/13/2021 Yes   Final   • WBC 05/13/2021 7.34  3.40 - 10.80 10*3/mm3 Final   • RBC 05/13/2021 4.78  3.77 - 5.28 10*6/mm3 Final   • Hemoglobin 05/13/2021 13.3  12.0 - 15.9 g/dL Final   • Hematocrit 05/13/2021 40.8  34.0 - 46.6 % Final   • MCV 05/13/2021 85.4  79.0 - 97.0 fL Final   • MCH 05/13/2021 27.8  26.6 - 33.0 pg Final   • MCHC 05/13/2021 32.6  31.5 - 35.7 g/dL Final   • RDW 05/13/2021 13.2  12.3 - 15.4 % Final   • RDW-SD 05/13/2021 41.2  37.0 - 54.0 fl Final   • MPV 05/13/2021 9.9  6.0 - 12.0 fL Final   • Platelets 05/13/2021 351  140 - 450 10*3/mm3 Final   • nRBC 05/13/2021 0.0  0.0 - 0.2 /100 WBC Final   •  e13a2 (b2a2) Transcript 05/13/2021 Comment  % Final               <0.0032 %   • e14a2 (b3a2) Transcript 05/13/2021 Comment  % Final               <0.0032 %   • E1A2 transcript 05/13/2021 Comment  % Final               <0.0032 %   • Interpretation 05/13/2021 Negative   Final    NEGATIVE for the BCR-ABL1 e1a2 (p190), e13a2 (b2a2, p210) and e14a2  (b3a2, p210) fusion transcripts. These results do not rule out the  presence of rare BCR-ABL1 transcripts not detected by this assay.   • Director Review 05/13/2021 Comment   Final    Sharon Lubin, PhD, [facility]                 Director, Molecular Genetics                 [facility]                 Biology and Pathology                 [place], [place]                 [phone]   • Background: 05/13/2021 Comment   Final    This assay can detect three different types of BCR-ABL1 fusion  transcripts associated with CML, ALL, and AML: e13a2 (previously  b2a2) and e14a2 (previously b3a2) (major breakpoint, p210), as  well as e1a2 (minor breakpoint, p190). The e13a2 and e14a2 transcript  values are titrated to the current International Scale (IS). The  standardized baseline is 100% BCR-ABL1 (IS) and major molecular  response (MMR) is equivalent to 0.1% BCR-ABL1 (IS) corresponding  to a 3-log reduction. Results should be correlated with appropriate  clinical and laboratory information as indicated.   • Methodology: 05/13/2021 Comment   Final    Total RNA is isolated from the sample and subject to a real-time,  reverse transcriptase polymerase chain reaction (RT-PCR). The PCR  primers and probes are specific for BCR-ABL1 e13a2, e14a2 and e1a2  fusion transcripts. The ABL1 transcript is amplified as the control  for cDNA quantity and quality. Serial dilutions of a validated  positive control RNA with known t(9;22) BCR-ABL1 are used as  reference for quantification of BCR-ABL1 relative to ABL1. The  numeric BCR-ABL1 level is reported as % BCR-ABL1/ABL1 and  the  detection sensitivity is 4.5 log below the standard baseline  (<0.0032%).  This test was developed and its performance characteristics determined  by [facility]. It has not been cleared or approved by the Food and Drug  Administration.   • Final Diagnosis 05/13/2021    Final                    Value:This result contains rich text formatting which cannot be displayed here.   • Clinical Information 05/13/2021    Final                    Value:This result contains rich text formatting which cannot be displayed here.   • Gross Description 05/13/2021    Final                    Value:This result contains rich text formatting which cannot be displayed here.   • Case Report 05/13/2021    Final                    Value:Surgical Pathology Report                         Case: [id number]                                  Authorizing Provider:  Quang Justice MD      Collected:           05/13/2021 09:00 AM          Ordering Location:     [facility]    Received:            05/13/2021 06:52 PM                                 OUTPAT LAB                                                                   Pathologist:           Елена Pizarro MD                                                          Specimen:                                                                                              • Neutrophil % 05/13/2021 48.0  42.7 - 76.0 % Final   • Lymphocyte % 05/13/2021 39.0  19.6 - 45.3 % Final   • Monocyte % 05/13/2021 4.0* 5.0 - 12.0 % Final   • Eosinophil % 05/13/2021 6.0  0.3 - 6.2 % Final   • Basophil % 05/13/2021 3.0* 0.0 - 1.5 % Final   • Neutrophils Absolute 05/13/2021 3.52  1.70 - 7.00 10*3/mm3 Final   • Lymphocytes Absolute 05/13/2021 2.86  0.70 - 3.10 10*3/mm3 Final   • Monocytes Absolute 05/13/2021 0.29  0.10 - 0.90 10*3/mm3 Final   • Eosinophils Absolute 05/13/2021 0.44* 0.00 - 0.40 10*3/mm3 Final   • Basophils Absolute 05/13/2021 0.22* 0.00 - 0.20 10*3/mm3 Final   • RBC Morphology  05/13/2021 Normal  Normal Final   • WBC Morphology 05/13/2021 Normal  Normal Final   • Platelet Morphology 05/13/2021 Normal  Normal Final   • CALR Mutation Detection Result 05/13/2021 Comment   Final    NEGATIVE  No insertions or deletions were detected within the analyzed region  of the calreticulin (CALR) gene.  A negative result does not entirely exclude the possibility of a  clonal population carrying CALR gene mutations that are not covered  by this assay. Results should be interpreted in conjunction with  clinical and laboratory findings for the most accurate interpretation.   • Background: 05/13/2021 Comment   Final    Comment: The calcium-binding endoplasmic reticulin chaperone protein,  calreticulin (CALR), is somatically mutated in approximately 70% of  patients with JAK2-negative essential thrombocythemia (ET) and 60-88%  of patients with JAK2-negative primary myelofibrosis(PMF). Only a  minority of patients (approximately 8%) with myelodysplasia have  mutations in  CALR gene. CALR mutations are rarely detected in  patients with de darien acute myeloid leukemia, chronic myelogenous  leukemia, lymphoid leukemia, or solid tumors. CALR mutations are not  detected in polycythemia and generally appear to be mutually exclusive  with JAK2 mutations and MPL mutations.  The majority of mutational changes involve a variety of insertion or  deletion mutations in exon 9 of the calreticulin gene: approximately  53% of all CALR mutations are a 52 bp deletion (type-1) while the  second most prevalent mutation (approximately 32%) contains a 5 bp  insertion (type-2). Other mutations (non-type 1 or type 2) are seen  in a                            small minority of cases. CALR mutations in PMF tend to be  associated with a favorable prognosis compared to JAK2 V617F  mutations, whereas primary myelofibrosis negative for CALR, JAK2  V617F and MPL mutations (so-called triple negative) is associated  with a poor prognosis and  shorter survival.  The detection of a CALR gene mutation aids in the specific diagnosis  of a myeloproliferative neoplasm, and help distinguish this clonal  disease from a benign reactive process.   • Methodology 05/13/2021 Comment   Final    Genomic DNA was isolated from the provided specimen. Polymerase chain  reaction (PCR) of exon 9 of the CALR gene was performed with specific  fluorescent-labeled primers, and the PCR product was analyzed by  capillary gel electrophoresis to determine the size of the PCR  products. This PCR assay is capable of detecting a mutant cell  population with a sensitivity of 5 mutant cells per 100 normal cells.  A negative result does not exclude the presence of a  myeloproliferative disorder or other neoplastic process.  This test was developed and its performance characteristics determined  by [facility]. It has not been cleared or approved by the Food and Drug  Administration. The FDA has determined that such clearance or approval  is not necessary.   • Reference: 05/13/2021 Comment   Final    1. TUSHAR Day et al. (2013) Somatic mutations of calreticulin in     myeloproliferative neoplasms. [place] Engl. J. Med. 369:4255-4112.  2. JOSH Dorado et al. (2013) Somatic CALR mutations in     myeloproliferative neoplasms with nonmutated JAK2. [place] Engl. J.     Med. 369:1165-2456.   • Director Review 05/13/2021 Comment   Final    Fernando Gonzales, PhD, [facility]      Director, Molecular Oncology      [facility] [place] [address]      [phone]   • JAK2 Exons 12-15 Mut Det PCR 05/13/2021 Comment   Final    NEGATIVE  JAK2 mutations were not detected in exons 12, 13, 14 and 15. This  result does not rule out the presence of JAK2 mutation at a level  below the detection sensitivity of this assay, the presence of  other mutations outside the analyzed region of the JAK2 gene, or  the presence of a myeloproliferative or other neoplasm. Result must  be  correlated with other clinical data for the most accurate  diagnosis.   • Background: 05/13/2021 Comment   Final    JAK2 V617F mutation is detected in patients with polycythemia vera  (95%), essential thrombocythemia (50%) and primary myelofibrosis  (50%). A small percentage of JAK2 mutation positive patients (3.3%)  contain other non-V617F mutations within exons 12 to 15. The detection  of a JAK2 gene mutation aids in the specific diagnosis of a  myeloproliferative neoplasm, and help distinguish this clonal disease  from a benign reactive process.   • Method 05/13/2021 Comment   Final    Total RNA was purified from the provided specimen. The JAK2 gene  region covering exons 12 to 15 was subjected to reverse-transcription  coupled PCR amplification, and bi-directional sequencing to identify  sequence variations. This assay has a sensitivity to detect  approximately 15% population of cells containing the JAK2 mutations  in a background of non-mutant cells.  This test was developed and its performance characteristics determined  by [facility]. It has not been cleared or approved by the Food and Drug  Administration.   • References: 05/13/2021 Comment   Final    SHEKHAR Grimes. et al. Detection of mutations in JAK2 exons 12-15 by  Negro sequencing. Int J Lab Hemato. 2015, 38:34-41.  Barney OGDEN. et al. Mutation profile of JAK2 transcripts in patients with  chronic myeloproliferative neoplasias. J Mol Diagn. 2009, 11:49-53.   • Director Review 05/13/2021 Comment   Final    Verito Burleson, PhD, [facility]     Genomics Associate , Clinical Cytogenetics     [facility] / Pathology     Laboratory [facility]) [facility]     [address] [address]     [phone]   • MPL Mutation Analysis Result: 05/13/2021 Comment   Final    No MPL mutation was identified in the provided specimen of this  individual. Results should be interpreted in  conjunction with  clinical and other laboratory findings for the most accurate  interpretation.   • Background 05/13/2021 Comment   Final    MPL (myeloproliferative leukemia virus oncogene homology) belongs to  the hematopoietin superfamily and enables its ligand thrombopoietin  to facilitate both global hematopoiesis and megakaryocyte growth and  differentiation. MPL W515 mutations are present in patients with  primary myelofibrosis (PMF) and essential thrombocythemia (ET) at a  frequency of approximately 5% and 1% respectively. The S505 mutation  is detected in patients with hereditary thrombocythemia.   • Method: 05/13/2021 Comment   Final    Genomic DNA was purified from the provided specimen. MPL gene region  covering the S505N and W515L/K mutations were subjected to PCR  amplification and bi-directional sequencing in duplicate to identify  sequence variations. This assay has a sensitivity to detect  approximately 20-25% population of cells containing the MPL mutations  in a background of non-mutant cells. This assay will not detect the  mutation below the sensitivity of this assay. Molecular-based testing  is highly accurate, but as in any laboratory test, rare diagnostic  errors may occur.   • Reference 05/13/2021 Comment   Final    1. Gloria MAYNARD et al. (2006). WHV180 mutations in     myeloproliferative and other myeloid disorders: a study     of 1182 patients. Blood 108:0920-1871.  2. Domingo O and Nati RL. (2008). JAK2 and MPL     mutations in myeloproliferative neoplasms: discovery and     science. Leukemia 22:9704-3714.  3. Edouard PATHAK et al. (2009). Evidence for a  effect     of the MPL-S505N mutation in eight [place] pedigrees with     hereditary thrombocythemia. Haematologica 94(10):1368-     1374.   • Director Review: 05/13/2021 Comment   Final    Sharon Lubin, PhD, [facility]                 Director, Molecular Genetics                 [facility]                 Biology and  Pathology                 [place], [place]                 [phone]  This test was developed and its performance characteristics  determined by [employer]. It has not been cleared or approved  by the Food and Drug Administration.   • Extraction 05/13/2021 Comment   Final    This sample has been received and DNA extraction has been performed.       No results found.    Assessment / Plan      Assessment/Plan:   1. Other eosinophilia (Primary)/2 basophilia  -Unclear etiology at this time.  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 Efforts were made to edit the dictations, but occasionally words are mistranscribed.

## 2021-06-11 ENCOUNTER — TELEPHONE (OUTPATIENT)
Dept: PULMONOLOGY | Facility: CLINIC | Age: 51
End: 2021-06-11

## 2021-06-11 DIAGNOSIS — J40 BRONCHITIS: ICD-10-CM

## 2021-06-18 DIAGNOSIS — E89.0 POSTABLATIVE HYPOTHYROIDISM: ICD-10-CM

## 2021-06-18 RX ORDER — LEVOTHYROXINE SODIUM 0.1 MG/1
100 TABLET ORAL DAILY
Qty: 30 TABLET | Refills: 5 | Status: SHIPPED | OUTPATIENT
Start: 2021-06-18 | End: 2021-09-19 | Stop reason: SDUPTHER

## 2021-06-18 RX ORDER — LIOTHYRONINE SODIUM 5 UG/1
10 TABLET ORAL DAILY
Qty: 60 TABLET | Refills: 5 | Status: SHIPPED | OUTPATIENT
Start: 2021-06-18 | End: 2021-10-28 | Stop reason: SDUPTHER

## 2021-06-18 RX ORDER — POLYETHYLENE GLYCOL 3350 17 G/17G
17 POWDER, FOR SOLUTION ORAL 2 TIMES DAILY
Qty: 20 PACKET | Refills: 1 | Status: SHIPPED | OUTPATIENT
Start: 2021-06-18 | End: 2021-09-07 | Stop reason: SDUPTHER

## 2021-06-18 NOTE — TELEPHONE ENCOUNTER
Mg pt     Pt requesting refill. Last seen 05/05/2021. Next appt sched 09/07/2021. Med pended for review.

## 2021-06-24 DIAGNOSIS — D72.824 BASOPHILIA: Primary | ICD-10-CM

## 2021-07-12 DIAGNOSIS — J40 BRONCHITIS: ICD-10-CM

## 2021-07-12 DIAGNOSIS — R05.9 COUGH: Primary | ICD-10-CM

## 2021-07-13 ENCOUNTER — TELEPHONE (OUTPATIENT)
Dept: ONCOLOGY | Facility: CLINIC | Age: 51
End: 2021-07-13

## 2021-07-13 NOTE — TELEPHONE ENCOUNTER
Caller: SHRUTI    Relationship: PATIENT    Best call back number:[phone]     What is the best time to reach you: ANYTIME    Who are you requesting to speak with (clinical staff, provider,  specific staff member): REFERRAL COORDINATOR    What was the call regarding: A REFERRAL WAS PUT IN TO RHEUMATOLOGIST, DR TRUJILLO, FROM DR GRIFFITH. SHRUTI STATES SHE HAS NOT HEARD FROM THEIR OFFICE YET ABOUT SCHEDULING, AND SHE IS UNABLE TO GET IN CONTACT WITH THEIR OFFICE. SHE'S ASKING IF SOMEONE CAN GET AN UPDATE FROM THEM FOR HER.    Do you require a callback: YES

## 2021-07-19 ENCOUNTER — TELEPHONE (OUTPATIENT)
Dept: PULMONOLOGY | Facility: CLINIC | Age: 51
End: 2021-07-19

## 2021-07-19 DIAGNOSIS — R05.9 COUGH: Primary | ICD-10-CM

## 2021-07-19 DIAGNOSIS — R05.3 PERSISTENT COUGH FOR 3 WEEKS OR LONGER: ICD-10-CM

## 2021-07-19 DIAGNOSIS — D72.10 EOSINOPHILIA, UNSPECIFIED TYPE: ICD-10-CM

## 2021-07-19 DIAGNOSIS — R76.8 ANA POSITIVE: ICD-10-CM

## 2021-07-19 RX ORDER — ALBUTEROL SULFATE 90 UG/1
2 AEROSOL, METERED RESPIRATORY (INHALATION) EVERY 4 HOURS PRN
Qty: 6.7 G | Refills: 5 | Status: SHIPPED | OUTPATIENT
Start: 2021-07-19 | End: 2021-11-20 | Stop reason: SDUPTHER

## 2021-07-19 NOTE — TELEPHONE ENCOUNTER
----- Message from Olamide Gil MA sent at 7/13/2021  3:25 PM EDT -----  Regarding: FW: Prescription Question  Contact: [phone]    ----- Message -----  From: Kelsey Ross  Sent: 7/13/2021   2:00 PM EDT  To: Timmy Sigala [facility] Clinical Pool  Subject: RE: Prescription Question                        Thank you.   Could you maybe let him know because last visit I had with Dr Lane, he said I need your help with this cough.  This cough is very aggravating at this point.  Thank you for any help you can give me.    I went ahead and called the patient and discussed her issues.  Dr. Lane had called me late last week to discuss her case.  He was concerned because repeat testing was not conclusive for any kind of autoimmune disease.  However, he felt that she most likely has an autoimmune disease and/or vasculitis, but has not been helped with methotrexate.  He needs some kind of lab work consistent with a specific diagnosis to get approval for other medications.  He noticed in clinic that her submandibular lymph nodes were swollen and got her a same-day appointment with ENT.  She states she went to ENT and they believe that she has Sjogren's syndrome and she had lab work drawn there.  Has follow-up this week with them on that lab work.    I explained that the cough I cannot relate back to any pulmonary disease given her normal PFTs and normal CT scan of the chest.  I let her know that I could not do any further refills of Tussionex due to the concerns of chronic use of narcotic cough syrup.  I explained that I am more than happy to give her albuterol as long-term use of this is not problematic like Tussionex.  Refill given.    I also explained to her that if no diagnosis is able to be reached by ENT, I think the next best step would be to consider multidisciplinary cough clinic such as at [facility].  I explained that I will be happy to place the referral, but she wants to hold off for now.  She will  contact me via [employer] if she wants to go ahead and start that referral process prior to next clinic visit in August

## 2021-08-04 ENCOUNTER — TELEPHONE (OUTPATIENT)
Dept: INTERNAL MEDICINE | Facility: CLINIC | Age: 51
End: 2021-08-04

## 2021-08-04 NOTE — TELEPHONE ENCOUNTER
Patient called and states she dropped off fmla paperwork last week and wants to know if this is ready to be picked up. She is at work so states its ok to leave message with update.

## 2021-08-06 ENCOUNTER — TELEPHONE (OUTPATIENT)
Dept: INTERNAL MEDICINE | Facility: CLINIC | Age: 51
End: 2021-08-06

## 2021-08-06 NOTE — TELEPHONE ENCOUNTER
PATIENT CALLED AND STATED THAT SHE HAD DROPPED OFF PAPERWORK ON 7/28/21 PT STATED THAT THE PAPERWORK IS DUE Monday AND ITS NOT IN ANY FOLDER/ MEDIA TAB/ AREA UP FRONT PT ASKED FOR A CALL BACK AS SOON AS POSSIBLE TO GET HER PAPERWORK

## 2021-08-09 NOTE — TELEPHONE ENCOUNTER
I found the paperwork in my basket, but it would have been placed there at the end of last week, certainly not on 7/28 as indicated in message.  What is this FMLA for?  I saw her for a persistent cough once in January.  She cancelled her follow up.  She has seen several specialists since then.  I'm not sure if this is just for doctor's appointments, continuous leave, intermittent leave.  There is no information on the paperwork.  If her condition has worsened since I saw her this may be better suited for once of her specialists to fill out.

## 2021-08-09 NOTE — TELEPHONE ENCOUNTER
EXPLAINED TO PATIENT THAT SHE HAD ONLY BEEN SEEN HERE ONCE AND THAT WE HAD ONLY REFERRED HER TO ENT. THAT WE DID NOT HAVE ENOUGH INFORMATION TO COMPLETE FMLA ACCURATELY AND ADEQUATELY. I ADVISE PATIENT SHE NEEDED TO BE SEEN FOR THIS OR, WOULD NEED TO SEEK ASSISTANCE FROM ANOTHER PROVIDER SHE SEES.    PATIENT WAS UPSET AT THE FACT THAT SHE CALLED THREE TIMES (TWICE DOCUMENTED) AND THAT NO ONE HAD NOTIFIED HER OF THIS UNTIL AFTER THE PAPERWORK WAS DUE.    PATIENT IS SCHEDULED TO COME IN AND HAVE PAPERWORK COMPLETED IF SHE CAN GET EXTENSION FROM EMPLOYER.

## 2021-08-10 ENCOUNTER — OFFICE VISIT (OUTPATIENT)
Dept: INTERNAL MEDICINE | Facility: CLINIC | Age: 51
End: 2021-08-10

## 2021-08-10 VITALS
HEART RATE: 91 BPM | HEIGHT: 66 IN | RESPIRATION RATE: 16 BRPM | TEMPERATURE: 98.4 F | SYSTOLIC BLOOD PRESSURE: 126 MMHG | DIASTOLIC BLOOD PRESSURE: 82 MMHG | WEIGHT: 196.2 LBS | BODY MASS INDEX: 31.53 KG/M2 | OXYGEN SATURATION: 98 %

## 2021-08-10 DIAGNOSIS — R07.81 PAINFUL RIB: ICD-10-CM

## 2021-08-10 DIAGNOSIS — D72.19 OTHER EOSINOPHILIA: ICD-10-CM

## 2021-08-10 DIAGNOSIS — R05.9 COUGH: Primary | ICD-10-CM

## 2021-08-10 PROCEDURE — 99213 OFFICE O/P EST LOW 20 MIN: CPT | Performed by: FAMILY MEDICINE

## 2021-08-10 RX ORDER — FOLIC ACID 1 MG/1
1 TABLET ORAL DAILY
COMMUNITY
Start: 2021-06-23 | End: 2022-02-23

## 2021-08-10 RX ORDER — POLYETHYLENE GLYCOL 3350 17 G/17G
1 POWDER, FOR SOLUTION ORAL DAILY PRN
COMMUNITY
Start: 2021-06-18 | End: 2021-08-10

## 2021-08-10 RX ORDER — METHOTREXATE 2.5 MG/1
6 TABLET ORAL WEEKLY
COMMUNITY
Start: 2021-06-23 | End: 2022-02-23

## 2021-08-10 RX ORDER — IBUPROFEN 600 MG/1
600 TABLET ORAL EVERY 6 HOURS PRN
Qty: 90 TABLET | Refills: 1 | Status: SHIPPED | OUTPATIENT
Start: 2021-08-10 | End: 2022-05-10 | Stop reason: SINTOL

## 2021-08-10 RX ORDER — DEXTROMETHORPHAN HYDROBROMIDE AND PROMETHAZINE HYDROCHLORIDE 15; 6.25 MG/5ML; MG/5ML
5 SYRUP ORAL 4 TIMES DAILY PRN
Qty: 473 ML | Refills: 2 | Status: SHIPPED | OUTPATIENT
Start: 2021-08-10 | End: 2021-11-20

## 2021-08-10 NOTE — PROGRESS NOTES
Kelsey Ross is a 50 y.o. female.    Chief Complaint   Patient presents with   • FMLA     pt is needing FMLA forms filled out for her employer, she has had a cough since Oct and also has been seeing multiple specialists and has had to take off a lot of work to have these visits.        HPI   Patient presents today with persistent cough.  She has had this cough for nearly 10 months.  She has seen multiple specialist.  She has had to miss work due to flares as well as doctors appointments.  She has seen pulmonology, ENT, hematology/oncology, rheumatology.  In addition, she does see endocrinology regularly.  She does require intermittent FMLA for her workplace due to missing so many days over the last several months.  She does report improvement when taking steroids, but states after steroids have been completed, the cough returns back shortly thereafter.  She does have wheezing.  She also admits to pain in her ribs due to coughing.    The following portions of the patient's history were reviewed and updated as appropriate: allergies, current medications, past family history, past medical history, past social history, past surgical history and problem list.     Allergies   Allergen Reactions   • Penicillins Unknown - High Severity     Allergist informed patient she was allergic to Penicillins   • Allegra [Fexofenadine] Headache     Only allegra D         Current Outpatient Medications:   •  albuterol sulfate  (90 Base) MCG/ACT inhaler, Inhale 2 puffs Every 4 (Four) Hours As Needed for Wheezing., Disp: 6.7 g, Rfl: 5  •  benzonatate (TESSALON) 200 MG capsule, Take 1 capsule by mouth 3 (Three) Times a Day As Needed for Cough., Disp: 30 capsule, Rfl: 0  •  Dexilant 30 MG capsule, Take 30 mg by mouth Daily., Disp: , Rfl:   •  folic acid (FOLVITE) 1 MG tablet, Take 1 tablet by mouth Daily., Disp: , Rfl:   •  HYDROcod Polst-CPM Polst ER (Tussionex Pennkinetic ER) 10-8 MG/5ML ER suspension, Take 5 mL by mouth Every 12  "(Twelve) Hours As Needed for Cough., Disp: 100 mL, Rfl: 0  •  ibuprofen (ADVIL,MOTRIN) 600 MG tablet, Take 1 tablet by mouth Every 6 (Six) Hours As Needed for Mild Pain ., Disp: 90 tablet, Rfl: 1  •  levothyroxine (SYNTHROID, LEVOTHROID) 100 MCG tablet, Take 1 tablet by mouth Daily., Disp: 30 tablet, Rfl: 5  •  liothyronine (CYTOMEL) 5 MCG tablet, Take 2 tablets by mouth Daily., Disp: 60 tablet, Rfl: 5  •  methotrexate 2.5 MG tablet, Take 6 tablets by mouth 1 (One) Time Per Week., Disp: , Rfl:   •  NON FORMULARY, Compound nasal spray that includes steroids and antibiotics, Disp: , Rfl:   •  polyethylene glycol (MIRALAX) 17 g packet, Take 17 g by mouth 2 (Two) Times a Day., Disp: 20 packet, Rfl: 1  •  amLODIPine (NORVASC) 5 MG tablet, Take 1 tablet by mouth Daily., Disp: 30 tablet, Rfl: 2  •  fluticasone-salmeterol (Advair Diskus) 250-50 MCG/DOSE DISKUS, Inhale 1 puff 2 (Two) Times a Day., Disp: 60 each, Rfl: 0  •  promethazine-dextromethorphan (PROMETHAZINE-DM) 6.25-15 MG/5ML syrup, Take 5 mL by mouth 4 (Four) Times a Day As Needed for Cough., Disp: 473 mL, Rfl: 2    ROS    Review of Systems   Constitutional: Positive for fatigue. Negative for chills and fever.   Respiratory: Positive for cough, shortness of breath and wheezing.    Cardiovascular: Positive for chest pain (rib).   Neurological: Positive for weakness.       Vitals:    08/10/21 1635   BP: 126/82   BP Location: Left arm   Patient Position: Sitting   Cuff Size: Adult   Pulse: 91   Resp: 16   Temp: 98.4 °F (36.9 °C)   TempSrc: Temporal   SpO2: 98%   Weight: 89 kg (196 lb 3.2 oz)   Height: 167.6 cm (66\")     Body mass index is 31.67 kg/m².    Physical Exam     Physical Exam  Constitutional:       General: She is not in acute distress.     Appearance: Normal appearance. She is well-developed.   HENT:      Head: Normocephalic and atraumatic.      Right Ear: External ear normal.      Left Ear: External ear normal.      Mouth/Throat:      Pharynx: Posterior " oropharyngeal erythema (trace) present.   Eyes:      Extraocular Movements: Extraocular movements intact.      Conjunctiva/sclera: Conjunctivae normal.   Cardiovascular:      Rate and Rhythm: Normal rate and regular rhythm.      Heart sounds: No murmur heard.     Pulmonary:      Effort: Pulmonary effort is normal. No respiratory distress.      Breath sounds: Normal breath sounds. No wheezing.   Abdominal:      General: Bowel sounds are normal. There is no distension.      Palpations: Abdomen is soft.      Tenderness: There is no abdominal tenderness.   Neurological:      Mental Status: She is alert and oriented to person, place, and time.      Cranial Nerves: No cranial nerve deficit.   Psychiatric:         Mood and Affect: Mood normal.         Behavior: Behavior normal.         Assessment/Plan    Problems Addressed this Visit        Hematology and Neoplasia    Eosinophilia      Other Visit Diagnoses     Cough    -  Primary    Painful rib        Relevant Medications    ibuprofen (ADVIL,MOTRIN) 600 MG tablet     Physical exam findings are unremarkable today.  However, patient does report just coming off of steroids.  [facility] paperwork has been filled out today to account for flares as well as doctors appointments.  She is seeing at least 5 different specialties.  She is also being referred to [facility] by another specialty office.  Patient will have to miss several days of work for appointments alone.  Unable to determine how many days she will require for flares at this time.    New Medications Ordered This Visit   Medications   • ibuprofen (ADVIL,MOTRIN) 600 MG tablet     Sig: Take 1 tablet by mouth Every 6 (Six) Hours As Needed for Mild Pain .     Dispense:  90 tablet     Refill:  1   • promethazine-dextromethorphan (PROMETHAZINE-DM) 6.25-15 MG/5ML syrup     Sig: Take 5 mL by mouth 4 (Four) Times a Day As Needed for Cough.     Dispense:  473 mL     Refill:  2       No orders of the defined types were placed in  this encounter.      Return in about 6 months (around 2/10/2022) for Annual.    Shabana Ferrer, DO

## 2021-08-11 ENCOUNTER — TELEPHONE (OUTPATIENT)
Dept: INTERNAL MEDICINE | Facility: CLINIC | Age: 51
End: 2021-08-11

## 2021-08-11 NOTE — TELEPHONE ENCOUNTER
Updated, initialed by Dr Ferrer, and faxed successfully to [phone]. I called and notified pt, did inform her that I would hold on to this for now and if they didn't receive the fax to call me back or send a Logan msg and I will resend it.

## 2021-08-11 NOTE — TELEPHONE ENCOUNTER
Caller: Kelsey Ross    Relationship: Self    Best call back number: [phone]    What form or medical record are you requesting: FMLA    Who is requesting this form or medical record from you: HER JOB    How would you like to receive the form or medical records (pick-up, mail, fax):   If fax, what is the fax number: [phone]    Timeframe paperwork needed: TODAY    Additional notes: PATIENT CALLED IN TO SAY THE DATE ON THE SECOND PAGE WHERE IT SAYS THE ONSET OF THE MEDICAL CONDITION FABY PUT 10/30/2021 BUT IT SHOULD BE 10/30/2020.    SHE NEEDS THIS FAXED OVER TO THEM TODAY! IT IS URGENT OR SHE COULD LOSE HER JOB! PLEASE CALL TO CONFIRM WHEN THIS IS FAXED OVER PLEASE. YOU CAN LEAVE HER A VOICEMAIL IF YOU CAN'T REACH HER.

## 2021-09-02 DIAGNOSIS — R76.8 ANA POSITIVE: Primary | ICD-10-CM

## 2021-09-07 ENCOUNTER — OFFICE VISIT (OUTPATIENT)
Dept: ENDOCRINOLOGY | Facility: CLINIC | Age: 51
End: 2021-09-07

## 2021-09-07 ENCOUNTER — TELEPHONE (OUTPATIENT)
Dept: ONCOLOGY | Facility: CLINIC | Age: 51
End: 2021-09-07

## 2021-09-07 ENCOUNTER — LAB (OUTPATIENT)
Dept: LAB | Facility: HOSPITAL | Age: 51
End: 2021-09-07

## 2021-09-07 VITALS
SYSTOLIC BLOOD PRESSURE: 132 MMHG | HEIGHT: 65 IN | DIASTOLIC BLOOD PRESSURE: 70 MMHG | WEIGHT: 196.2 LBS | OXYGEN SATURATION: 96 % | HEART RATE: 68 BPM | BODY MASS INDEX: 32.69 KG/M2

## 2021-09-07 DIAGNOSIS — E03.9 ACQUIRED HYPOTHYROIDISM: Primary | ICD-10-CM

## 2021-09-07 DIAGNOSIS — R63.5 WEIGHT GAIN: ICD-10-CM

## 2021-09-07 DIAGNOSIS — I10 ESSENTIAL HYPERTENSION: ICD-10-CM

## 2021-09-07 PROBLEM — D72.18 EOSINOPHILIC GRANULOMATOSIS WITH POLYANGIITIS (EGPA): Status: ACTIVE | Noted: 2021-09-07

## 2021-09-07 PROBLEM — M30.1 EOSINOPHILIC GRANULOMATOSIS WITH POLYANGIITIS (EGPA): Status: ACTIVE | Noted: 2021-09-07

## 2021-09-07 PROBLEM — R79.89 ABNORMAL C-REACTIVE PROTEIN: Status: ACTIVE | Noted: 2021-09-07

## 2021-09-07 LAB — HBA1C MFR BLD: 5.07 % (ref 4.8–5.6)

## 2021-09-07 PROCEDURE — 83036 HEMOGLOBIN GLYCOSYLATED A1C: CPT | Performed by: INTERNAL MEDICINE

## 2021-09-07 PROCEDURE — 84443 ASSAY THYROID STIM HORMONE: CPT | Performed by: INTERNAL MEDICINE

## 2021-09-07 PROCEDURE — 80053 COMPREHEN METABOLIC PANEL: CPT | Performed by: INTERNAL MEDICINE

## 2021-09-07 PROCEDURE — 99214 OFFICE O/P EST MOD 30 MIN: CPT | Performed by: INTERNAL MEDICINE

## 2021-09-07 PROCEDURE — 80061 LIPID PANEL: CPT | Performed by: INTERNAL MEDICINE

## 2021-09-07 RX ORDER — POLYETHYLENE GLYCOL 3350 17 G/17G
17 POWDER, FOR SOLUTION ORAL 2 TIMES DAILY
Qty: 60 PACKET | Refills: 5 | Status: SHIPPED | OUTPATIENT
Start: 2021-09-07 | End: 2022-05-10 | Stop reason: SDUPTHER

## 2021-09-07 RX ORDER — CYCLOBENZAPRINE HCL 10 MG
10 TABLET ORAL EVERY 8 HOURS PRN
Qty: 30 TABLET | Refills: 1 | Status: SHIPPED | OUTPATIENT
Start: 2021-09-07 | End: 2022-09-07

## 2021-09-07 NOTE — PROGRESS NOTES
Kelsey Ross 50 y.o.  CC:Follow-up, Hypothyroidism, and Hypertension      [place]: Follow-up, Hypothyroidism, and Hypertension    Is on synthroid 100 mcg and cytomel 10 mcg daily   Also takes methotrexate   bp is good  Still a lot of fatigue  Having awful headaches- saw ENT this morning   1-2 months ago - radiates down neck   Can hardly turn head  Weight gain, hair loss   She takes excedrin with headache- at times helps and at times it doesn't   All day headaches  Has prodrome with headaches  Has seen pcp, rheum and heme, pulm in past for ongoing medical issues  pulm recommends [facility] for etiology of cough   Using prednisone for flairs- weight gain   Recent fmla paperwork     Allergies   Allergen Reactions   • Penicillins Unknown - High Severity     Allergist informed patient she was allergic to Penicillins   • Allegra [Fexofenadine] Headache     Only allegra D       Current Outpatient Medications:   •  albuterol sulfate  (90 Base) MCG/ACT inhaler, Inhale 2 puffs Every 4 (Four) Hours As Needed for Wheezing., Disp: 6.7 g, Rfl: 5  •  amLODIPine (NORVASC) 5 MG tablet, Take 1 tablet by mouth Daily., Disp: 30 tablet, Rfl: 2  •  benzonatate (TESSALON) 200 MG capsule, Take 1 capsule by mouth 3 (Three) Times a Day As Needed for Cough., Disp: 30 capsule, Rfl: 0  •  cyclobenzaprine (FLEXERIL) 10 MG tablet, Take 1 tablet by mouth Every 8 (Eight) Hours As Needed for Muscle Spasms., Disp: 30 tablet, Rfl: 1  •  Dexilant 30 MG capsule, Take 30 mg by mouth Daily., Disp: , Rfl:   •  fluticasone-salmeterol (Advair Diskus) 250-50 MCG/DOSE DISKUS, Inhale 1 puff 2 (Two) Times a Day., Disp: 60 each, Rfl: 0  •  folic acid (FOLVITE) 1 MG tablet, Take 1 tablet by mouth Daily., Disp: , Rfl:   •  HYDROcod Polst-CPM Polst ER (Tussionex Pennkinetic ER) 10-8 MG/5ML ER suspension, Take 5 mL by mouth Every 12 (Twelve) Hours As Needed for Cough., Disp: 100 mL, Rfl: 0  •  ibuprofen (ADVIL,MOTRIN) 600 MG tablet, Take 1 tablet by mouth  Every 6 (Six) Hours As Needed for Mild Pain ., Disp: 90 tablet, Rfl: 1  •  levothyroxine (SYNTHROID, LEVOTHROID) 100 MCG tablet, Take 1 tablet by mouth Daily., Disp: 30 tablet, Rfl: 5  •  liothyronine (CYTOMEL) 5 MCG tablet, Take 2 tablets by mouth Daily., Disp: 60 tablet, Rfl: 5  •  methotrexate 2.5 MG tablet, Take 6 tablets by mouth 1 (One) Time Per Week., Disp: , Rfl:   •  NON FORMULARY, Compound nasal spray that includes steroids and antibiotics, Disp: , Rfl:   •  polyethylene glycol (MIRALAX) 17 g packet, Take 17 g by mouth 2 (Two) Times a Day., Disp: 60 packet, Rfl: 5  •  promethazine-dextromethorphan (PROMETHAZINE-DM) 6.25-15 MG/5ML syrup, Take 5 mL by mouth 4 (Four) Times a Day As Needed for Cough., Disp: 473 mL, Rfl: 2  Patient Active Problem List    Diagnosis    • Abnormal C-reactive protein [R79.89]    • Eosinophilic granulomatosis with polyangiitis (EGPA) (CMS/[facility]) [M30.1, D72.18]    • Eosinophilia [D72.10]    • Basophilia [D72.824]    • Essential hypertension [I10]    • Acquired hypothyroidism [E03.9]    • Fatigue [R53.83]    • Weight gain [R63.5]    • Vasomotor rhinitis [J30.0]    • Allergic rhinitis [J30.9]    • Deviated nasal septum [J34.2]    • Hypertrophy of nasal turbinates [J34.3]      Review of Systems   Constitutional: Positive for activity change, fatigue and unexpected weight change. Negative for appetite change.   HENT: Negative for congestion and rhinorrhea.    Eyes: Negative for visual disturbance.   Respiratory: Negative for cough and shortness of breath.    Cardiovascular: Negative for palpitations and leg swelling.   Gastrointestinal: Negative for constipation, diarrhea and nausea.   Genitourinary: Negative for hematuria.   Musculoskeletal: Positive for arthralgias, joint swelling, myalgias and neck pain. Negative for back pain and gait problem.   Skin: Negative for color change, rash and wound.   Allergic/Immunologic: Negative for environmental allergies, food allergies and  "immunocompromised state.   Neurological: Negative for dizziness, weakness and light-headedness.   Psychiatric/Behavioral: Negative for confusion, decreased concentration, dysphoric mood and sleep disturbance. The patient is not nervous/anxious.      Social History     Socioeconomic History   • Marital status: Single     Spouse name: Not on file   • Number of children: Not on file   • Years of education: Not on file   • Highest education level: Not on file   Tobacco Use   • Smoking status: Former Smoker     Packs/day: 2.00     Years: 8.00     Pack years: 16.00     Types: Cigarettes     Quit date: 1995     Years since quittin.3   • Smokeless tobacco: Never Used   Vaping Use   • Vaping Use: Never used   Substance and Sexual Activity   • Alcohol use: Not Currently   • Drug use: Never   • Sexual activity: Not Currently     Family History   Problem Relation Age of Onset   • Arthritis Mother    • Hypertension Mother    • Hyperlipidemia Mother    • Migraines Mother    • Stroke Mother    • Diabetes Father    • Pulmonary embolism Father    • Lung cancer Maternal Aunt    • Arthritis Maternal Grandmother    • Hypertension Maternal Grandmother    • Hyperlipidemia Maternal Grandmother    • Stroke Maternal Grandmother    • Thyroid disease Maternal Grandmother    • Diabetes Paternal Grandmother    • Heart attack Maternal Grandfather      /70   Pulse 68   Ht 165.1 cm (65\")   Wt 89 kg (196 lb 3.2 oz)   SpO2 96%   BMI 32.65 kg/m²   Physical Exam  Vitals and nursing note reviewed.   Constitutional:       Appearance: Normal appearance. She is well-developed.   HENT:      Head: Normocephalic and atraumatic.   Eyes:      General: Lids are normal.      Extraocular Movements: Extraocular movements intact.      Conjunctiva/sclera: Conjunctivae normal.      Pupils: Pupils are equal, round, and reactive to light.   Neck:      Thyroid: No thyroid mass or thyromegaly.      Vascular: No carotid bruit.      Trachea: Trachea " normal. No tracheal deviation.   Cardiovascular:      Rate and Rhythm: Normal rate and regular rhythm.      Pulses: Normal pulses.      Heart sounds: Normal heart sounds. No murmur heard.   No friction rub. No gallop.    Pulmonary:      Effort: Pulmonary effort is normal. No respiratory distress.      Breath sounds: Normal breath sounds. No wheezing.   Musculoskeletal:         General: Tenderness present. No deformity. Normal range of motion.      Cervical back: Normal range of motion and neck supple.   Lymphadenopathy:      Cervical: No cervical adenopathy.   Skin:     General: Skin is warm and dry.      Findings: No erythema or rash.      Nails: There is no clubbing.   Neurological:      General: No focal deficit present.      Mental Status: She is alert and oriented to person, place, and time.      Cranial Nerves: No cranial nerve deficit.      Deep Tendon Reflexes: Reflexes are normal and symmetric. Reflexes normal.   Psychiatric:         Mood and Affect: Mood normal.         Speech: Speech normal.         Behavior: Behavior normal.         Thought Content: Thought content normal.         Judgment: Judgment normal.       Results for orders placed or performed in visit on 05/13/21   Immunoglobulins A/E/G/M Serum    Specimen: Blood   Result Value Ref Range    IgG 1552 586 - 1602 mg/dL    IgA 283 87 - 352 mg/dL    IgM 104 26 - 217 mg/dL    IgE 135 6 - 495 IU/mL   Leukemia / Lymphoma Immunophenotyping Profile    Specimen: Blood   Result Value Ref Range    Flow Interpretation Comment     Flow Interpretation Comment Comment     Clinical Information Comment     Specimen Type Comment     Assessment of Leukocytes Comment     Viability Comment     Gating Strategy Comment     Phenotype Chart Comment     Resulting Path Name Comment     Comment Comment    Comprehensive Metabolic Panel    Specimen: Blood   Result Value Ref Range    Glucose 82 65 - 99 mg/dL    BUN 11 6 - 20 mg/dL    Creatinine 0.75 0.57 - 1.00 mg/dL    Sodium  137 136 - 145 mmol/L    Potassium 4.4 3.5 - 5.2 mmol/L    Chloride 102 98 - 107 mmol/L    CO2 26.7 22.0 - 29.0 mmol/L    Calcium 8.7 8.6 - 10.5 mg/dL    Total Protein 6.8 6.0 - 8.5 g/dL    Albumin 3.80 3.50 - 5.20 g/dL    ALT (SGPT) 7 1 - 33 U/L    AST (SGOT) 10 1 - 32 U/L    Alkaline Phosphatase 119 (H) 39 - 117 U/L    Total Bilirubin 0.2 0.0 - 1.2 mg/dL    eGFR Non African Amer 81 >60 mL/min/1.73    Globulin 3.0 gm/dL    A/G Ratio 1.3 g/dL    BUN/Creatinine Ratio 14.7 7.0 - 25.0    Anion Gap 8.3 5.0 - 15.0 mmol/L   Vitamin B12    Specimen: Blood   Result Value Ref Range    Vitamin B-12 778 211 - 946 pg/mL   Tryptase    Specimen: Blood   Result Value Ref Range    Tryptase 4.4 2.2 - 13.2 ug/L   JAK2 V617F, Rfx CALR/E12-15/MPL    Specimen: Blood   Result Value Ref Range    JAK2 V617F Mutation Comment     Background: Comment     Director Review Comment     Reflex Comment     Extraction Completed    CBC Auto Differential    Specimen: Blood   Result Value Ref Range    WBC 7.34 3.40 - 10.80 10*3/mm3    RBC 4.78 3.77 - 5.28 10*6/mm3    Hemoglobin 13.3 12.0 - 15.9 g/dL    Hematocrit 40.8 34.0 - 46.6 %    MCV 85.4 79.0 - 97.0 fL    MCH 27.8 26.6 - 33.0 pg    MCHC 32.6 31.5 - 35.7 g/dL    RDW 13.2 12.3 - 15.4 %    RDW-SD 41.2 37.0 - 54.0 fl    MPV 9.9 6.0 - 12.0 fL    Platelets 351 140 - 450 10*3/mm3    nRBC 0.0 0.0 - 0.2 /100 WBC   BCR-ABL1, CML / ALL, PCR, Quant    Specimen: Blood   Result Value Ref Range    e13a2 (b2a2) Transcript Comment %    e14a2 (b3a2) Transcript Comment %    E1A2 transcript Comment %    Interpretation Negative     Director Review Comment     Background: Comment     Methodology: Comment    Manual Differential    Specimen: Blood   Result Value Ref Range    Neutrophil % 48.0 42.7 - 76.0 %    Lymphocyte % 39.0 19.6 - 45.3 %    Monocyte % 4.0 (L) 5.0 - 12.0 %    Eosinophil % 6.0 0.3 - 6.2 %    Basophil % 3.0 (H) 0.0 - 1.5 %    Neutrophils Absolute 3.52 1.70 - 7.00 10*3/mm3    Lymphocytes Absolute 2.86  0.70 - 3.10 10*3/mm3    Monocytes Absolute 0.29 0.10 - 0.90 10*3/mm3    Eosinophils Absolute 0.44 (H) 0.00 - 0.40 10*3/mm3    Basophils Absolute 0.22 (H) 0.00 - 0.20 10*3/mm3    RBC Morphology Normal Normal    WBC Morphology Normal Normal    Platelet Morphology Normal Normal   CALR+JAK2 E12-15+MPL    Specimen: Blood   Result Value Ref Range    CALR Mutation Detection Result Comment     Background: Comment     Methodology Comment     Reference: Comment     Director Review Comment     JAK2 Exons 12-15 Mut Det PCR Comment     Background: Comment     Method Comment     References: Comment     Director Review Comment     MPL Mutation Analysis Result: Comment     Background Comment     Method: Comment     Reference Comment     Director Review: Comment     Extraction Comment    Peripheral Blood Smear    Specimen: Blood   Result Value Ref Range    Pathology Review Yes    Pathology Consultation    Specimen: Blood   Result Value Ref Range    Final Diagnosis       1. Peripheral Blood, Smear (2 Preparations):     A. Basophilia.   B. Erythrocyte morphology within normal limits.   C. Platelet morphology within normal limits.   D. No definitive blasts are seen.     COMMENT:      The peripheral blood smear is largely unremarkable with scattered  basophils seen.  There are rare smudge cells, however, this is largely attributed to preparation artifact.  These histologic findings are not entirely specific but the differential diagnosis includes, but is not limited to allergies, hypersensitivity reactions, and inflammatory reactions.  Basophilia can also be seen in the setting of lymphoproliferative processes and correlation with pending flow cytometric analysis, JAK2, and BCR-ABL studies is recommended.      b/[facility]      Clinical Information       The patient is a 50-year-old female with the following CBC data results:  WBC=7.34, RBC=4.78, HGB=13.3, HCT=40.8, MCV=85.4, MCHC=32.6, RDW=13.2, MPV=9.9, Platelets=351.       b/kds       "Gross Description       1. Received are two glass slides labeled \"[id number]\".   Jab/kds      Case Report       Surgical Pathology Report                         Case: [id number]                                  Authorizing Provider:  Quang Justice MD      Collected:           05/13/2021 09:00 AM          Ordering Location:     [facility]    Received:            05/13/2021 06:52 PM                                 OUTPAT LAB                                                                   Pathologist:           Елена Pizarro MD                                                          Specimen:                                                                                                Diagnoses and all orders for this visit:    1. Acquired hypothyroidism (Primary)  Assessment & Plan:  On synthroid + cytomel  Is not feeling any better on combination therapy   Check tfts  No missed doses or problems with dosing     Orders:  -     Comprehensive Metabolic Panel  -     Lipid Panel  -     TSH    2. 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 Essential hypertension  Assessment & Plan:  bp is well controlled   Continue monitoring and norvasc       Other orders  -     polyethylene glycol (MIRALAX) 17 g packet; Take 17 g by mouth 2 (Two) Times a Day.  Dispense: 60 packet; Refill: 5  -     cyclobenzaprine (FLEXERIL) 10 MG tablet; Take 1 tablet by mouth Every 8 (Eight) Hours As Needed for Muscle Spasms.  Dispense: 30 tablet; Refill: 1  Return in about 6 months (around 3/7/2022) for Recheck.    Taylor Holliday MD  Signed Taylor Holliday MD      "

## 2021-09-07 NOTE — ASSESSMENT & PLAN NOTE
On synthroid + cytomel  Is not feeling any better on combination therapy   Check tfts  No missed doses or problems with dosing

## 2021-09-07 NOTE — TELEPHONE ENCOUNTER
THIS IS AN FYI.   JUST LETTING YOU KNOW THAT I INDEXED THE 09-07-21 OFFICE NOTE BY ANA MARIA WARD (ENT) INTO PATIENTS CHART.   THANK YOU

## 2021-09-07 NOTE — ASSESSMENT & PLAN NOTE
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

## 2021-09-08 ENCOUNTER — TELEPHONE (OUTPATIENT)
Dept: ONCOLOGY | Facility: CLINIC | Age: 51
End: 2021-09-08

## 2021-09-08 LAB
ALBUMIN SERPL-MCNC: 4.4 G/DL (ref 3.5–5.2)
ALBUMIN/GLOB SERPL: 1.6 G/DL
ALP SERPL-CCNC: 105 U/L (ref 39–117)
ALT SERPL W P-5'-P-CCNC: 12 U/L (ref 1–33)
ANION GAP SERPL CALCULATED.3IONS-SCNC: 9.6 MMOL/L (ref 5–15)
AST SERPL-CCNC: 18 U/L (ref 1–32)
BILIRUB SERPL-MCNC: 0.4 MG/DL (ref 0–1.2)
BUN SERPL-MCNC: 8 MG/DL (ref 6–20)
BUN/CREAT SERPL: 10.1 (ref 7–25)
CALCIUM SPEC-SCNC: 9.3 MG/DL (ref 8.6–10.5)
CHLORIDE SERPL-SCNC: 104 MMOL/L (ref 98–107)
CHOLEST SERPL-MCNC: 211 MG/DL (ref 0–200)
CO2 SERPL-SCNC: 27.4 MMOL/L (ref 22–29)
CREAT SERPL-MCNC: 0.79 MG/DL (ref 0.57–1)
GFR SERPL CREATININE-BSD FRML MDRD: 77 ML/MIN/1.73
GLOBULIN UR ELPH-MCNC: 2.8 GM/DL
GLUCOSE SERPL-MCNC: 74 MG/DL (ref 65–99)
HDLC SERPL-MCNC: 52 MG/DL (ref 40–60)
LDLC SERPL CALC-MCNC: 139 MG/DL (ref 0–100)
LDLC/HDLC SERPL: 2.63 {RATIO}
POTASSIUM SERPL-SCNC: 4.1 MMOL/L (ref 3.5–5.2)
PROT SERPL-MCNC: 7.2 G/DL (ref 6–8.5)
SODIUM SERPL-SCNC: 141 MMOL/L (ref 136–145)
TRIGL SERPL-MCNC: 111 MG/DL (ref 0–150)
TSH SERPL DL<=0.05 MIU/L-ACNC: 0.2 UIU/ML (ref 0.27–4.2)
VLDLC SERPL-MCNC: 20 MG/DL (ref 5–40)

## 2021-09-08 NOTE — TELEPHONE ENCOUNTER
Caller: DAKOTA    Relationship: DR DESTINY KOLB OFFICE    Best call back number: [phone]        Do you know the name of the person who called: DAKOTA    What was the call regarding:     NEEDING A POSITIVE DIMAS  LAB AND VISIT OFFICE NOTES FAXED OVER FOR REFERRAL TO FAX : [phone]      REF NUMBER :[id number]    ALSO NOTED IN REFERRAL NOTES     Do you require a callback: NO

## 2021-09-19 DIAGNOSIS — E89.0 POSTABLATIVE HYPOTHYROIDISM: ICD-10-CM

## 2021-09-19 RX ORDER — LEVOTHYROXINE SODIUM 88 UG/1
88 TABLET ORAL DAILY
Qty: 90 TABLET | Refills: 1 | Status: SHIPPED | OUTPATIENT
Start: 2021-09-19 | End: 2021-10-01 | Stop reason: SDUPTHER

## 2021-10-01 DIAGNOSIS — E89.0 POSTABLATIVE HYPOTHYROIDISM: ICD-10-CM

## 2021-10-01 RX ORDER — LEVOTHYROXINE SODIUM 88 MCG
88 TABLET ORAL DAILY
Qty: 30 TABLET | Refills: 5 | Status: SHIPPED | OUTPATIENT
Start: 2021-10-01 | End: 2022-01-31 | Stop reason: SDUPTHER

## 2021-10-01 NOTE — TELEPHONE ENCOUNTER
Pt requests rx for new dose of synthroid be sent to a different pharmacy  [facility] pharmacy in [place]

## 2021-10-06 ENCOUNTER — TELEPHONE (OUTPATIENT)
Dept: INTERNAL MEDICINE | Facility: CLINIC | Age: 51
End: 2021-10-06

## 2021-10-06 RX ORDER — METHYLPREDNISOLONE 4 MG/1
TABLET ORAL
Qty: 21 EACH | Refills: 0 | Status: SHIPPED | OUTPATIENT
Start: 2021-10-06 | End: 2021-11-20

## 2021-10-06 NOTE — TELEPHONE ENCOUNTER
anuradha     Caller: Kelsey Ross    Relationship to patient: Self    Best call back number: [phone]    Date of exposure: UNKNOWN    Date of positive COVID19 test: October 2, 2021 - WAS INFORMED ON Monday 10/4/2021 OF POSITIVE FOR COVID    Date if possible COVID19 exposure: UNKNOWN    COVID19 symptoms:   RASH, HIVES, VERY ITCHY - ALL OVER PATIENT'S BODY    SWOLLEN LIPS - OFF AND ON; ALTERNATE BETWEEN TOP LIP AND BOTTOM LIPS SWELLING;    HEADACHES--PAIN IN FRONT OF HEAD, LEFT SIDE OF HEAD OR RIGHT SIDE OF HEAD DOWN TOWARD NECK;     COUGH -     LOSS OF TASTE/SMELL - ALSO BEEN HAVING THOSE SINCE OCT 2020    PATIENT SAYS SHE HAS BEEN HAVING HEALTH ISSUES AND BEING EVALUATED FOR POSSIBLE AUTO-IMMUNE SINCE OCT 2020; PATIENT UNSURE OF WHAT IS COVID SYMPTOMS OR NOT BECAUSE THEY ARE RELATED; PATIENT HAS HAD 7 COVID TEST SINCE NOV 2020 AND ALL HAD BEEN NEGATIVE UNTIL NOW.       Date of initial quarantine: 10/02/2020    Additional information or concerns: PATIENT WAS TOLD QUARANTINE UP ON 10/13-TO GO BACK TO WORK. PLEASE ADVISE PATIENT OF ANY ADDITIONAL INFORMATION.     What is the patients preferred pharmacy:   [place], [place] - [address] [place] [address] - [phone] [facility] [phone]

## 2021-10-06 NOTE — TELEPHONE ENCOUNTER
I'm not sure if the rash is secondary to COVID or not.  I know she has been on several rounds of steroids.  I will send in a course of steroids to see if that will help with the rash.  However, supportive care only for other symptoms associated with COVID.

## 2021-10-28 DIAGNOSIS — E89.0 POSTABLATIVE HYPOTHYROIDISM: ICD-10-CM

## 2021-10-28 RX ORDER — LIOTHYRONINE SODIUM 5 UG/1
10 TABLET ORAL DAILY
Qty: 60 TABLET | Refills: 5 | Status: SHIPPED | OUTPATIENT
Start: 2021-10-28 | End: 2022-05-10 | Stop reason: SDUPTHER

## 2021-11-20 PROCEDURE — U0004 COV-19 TEST NON-CDC HGH THRU: HCPCS | Performed by: PERSONAL EMERGENCY RESPONSE ATTENDANT

## 2021-11-22 ENCOUNTER — HOSPITAL ENCOUNTER (OUTPATIENT)
Dept: GENERAL RADIOLOGY | Facility: HOSPITAL | Age: 51
Discharge: HOME | End: 2021-11-22
Admitting: PERSONAL EMERGENCY RESPONSE ATTENDANT

## 2021-11-22 DIAGNOSIS — R05.3 PERSISTENT COUGH FOR 3 WEEKS OR LONGER: ICD-10-CM

## 2021-11-22 PROCEDURE — 71046 X-RAY EXAM CHEST 2 VIEWS: CPT

## 2021-11-23 ENCOUNTER — TELEPHONE (OUTPATIENT)
Dept: ONCOLOGY | Facility: CLINIC | Age: 51
End: 2021-11-23

## 2021-11-23 NOTE — TELEPHONE ENCOUNTER
Caller: CARTER    Relationship: DR. KOLB'S OFFICE     Best call back number: [phone]    What is the best time to reach you: 8:00-5:00    Who are you requesting to speak with (clinical staff, provider,  specific staff member): NURSE    What was the call regarding: DR. GRIFFITH REFERRED PATIENT TO DR. KOLB.  PT. HAS APPT. IN January.  ALL RECORDS HAVE BEEN REC'D EXCEPT FOR A POSITIVE A&A AND CARTER IS REQUESTING THAT THIS BE FAXED TO HER @ [phone]    Do you require a callback: ONLY FOR QUESTIONS OR CONCERNS.

## 2021-11-30 ENCOUNTER — TELEPHONE (OUTPATIENT)
Dept: ONCOLOGY | Facility: CLINIC | Age: 51
End: 2021-11-30

## 2021-11-30 NOTE — TELEPHONE ENCOUNTER
Provider: DR PETERSON    Caller: SHRUTI    Relationship to Patient: SELF    Reason for Call: CARTER FROM RHEUMATOLOGY, DR KOLB, NEEDS TO GET A POSITIVE DIMAS FAXED OVER TO HER.    FAX # [phone]

## 2021-11-30 NOTE — TELEPHONE ENCOUNTER
Spoke with Dr. Morales's office, advised the fax number given prior was incorrect. Resent to [phone].

## 2021-11-30 NOTE — TELEPHONE ENCOUNTER
Caller: CARTER    Relationship: RHEUMATOLOGY DR. KOLB    Best call back number: [phone]    What was the call regarding: CARTER SAYS SHE WAS RETURNING A CALL TO SOMEONE. SHE IS NOT SURE WHO IT WAS.    Do you require a callback: YES

## 2021-12-02 ENCOUNTER — TRANSCRIBE ORDERS (OUTPATIENT)
Dept: ADMINISTRATIVE | Facility: HOSPITAL | Age: 51
End: 2021-12-02

## 2021-12-02 DIAGNOSIS — R06.02 SHORTNESS OF BREATH: Primary | ICD-10-CM

## 2021-12-03 ENCOUNTER — TRANSCRIBE ORDERS (OUTPATIENT)
Dept: ADMINISTRATIVE | Facility: HOSPITAL | Age: 51
End: 2021-12-03

## 2021-12-03 DIAGNOSIS — R06.02 SHORTNESS OF BREATH: Primary | ICD-10-CM

## 2021-12-09 ENCOUNTER — HOSPITAL ENCOUNTER (OUTPATIENT)
Dept: CT IMAGING | Facility: HOSPITAL | Age: 51
Discharge: HOME | End: 2021-12-09
Admitting: INTERNAL MEDICINE

## 2021-12-09 DIAGNOSIS — R06.02 SHORTNESS OF BREATH: ICD-10-CM

## 2021-12-09 PROCEDURE — 71250 CT THORAX DX C-: CPT

## 2021-12-21 ENCOUNTER — OFFICE VISIT (OUTPATIENT)
Dept: PULMONOLOGY | Facility: CLINIC | Age: 51
End: 2021-12-21

## 2021-12-21 VITALS
RESPIRATION RATE: 18 BRPM | HEART RATE: 87 BPM | DIASTOLIC BLOOD PRESSURE: 90 MMHG | SYSTOLIC BLOOD PRESSURE: 140 MMHG | WEIGHT: 193 LBS | BODY MASS INDEX: 32.15 KG/M2 | HEIGHT: 65 IN | OXYGEN SATURATION: 99 %

## 2021-12-21 DIAGNOSIS — R05.9 COUGH: ICD-10-CM

## 2021-12-21 DIAGNOSIS — J30.89 NON-SEASONAL ALLERGIC RHINITIS DUE TO OTHER ALLERGIC TRIGGER: ICD-10-CM

## 2021-12-21 DIAGNOSIS — R05.3 PERSISTENT COUGH FOR 3 WEEKS OR LONGER: ICD-10-CM

## 2021-12-21 DIAGNOSIS — J45.30 MILD PERSISTENT ASTHMA WITHOUT COMPLICATION: Primary | ICD-10-CM

## 2021-12-21 PROCEDURE — 99214 OFFICE O/P EST MOD 30 MIN: CPT | Performed by: INTERNAL MEDICINE

## 2021-12-21 RX ORDER — ALBUTEROL SULFATE 90 UG/1
2 AEROSOL, METERED RESPIRATORY (INHALATION) EVERY 4 HOURS PRN
Qty: 6.7 G | Refills: 0 | Status: SHIPPED | OUTPATIENT
Start: 2021-12-21 | End: 2022-01-20

## 2021-12-21 RX ORDER — BUDESONIDE 0.5 MG/2ML
INHALANT ORAL
COMMUNITY
Start: 2021-12-06 | End: 2021-12-21

## 2021-12-21 RX ORDER — LIOTHYRONINE SODIUM 5 UG/1
1 TABLET ORAL EVERY 12 HOURS
COMMUNITY
End: 2022-05-10 | Stop reason: SDUPTHER

## 2021-12-21 RX ORDER — MAGNESIUM HYDROXIDE 1200 MG/15ML
LIQUID ORAL
COMMUNITY
Start: 2021-12-06 | End: 2022-05-10

## 2021-12-21 RX ORDER — PREDNISONE 10 MG/1
TABLET ORAL
COMMUNITY
End: 2021-12-21

## 2021-12-21 RX ORDER — LEVOTHYROXINE SODIUM 0.1 MG/1
1 TABLET ORAL DAILY
COMMUNITY
End: 2022-01-31

## 2021-12-21 RX ORDER — PREDNISONE 10 MG/1
TABLET ORAL
COMMUNITY
Start: 2021-11-22 | End: 2022-02-23

## 2021-12-21 NOTE — PROGRESS NOTES
Pulmonary follow-up office Visit      Chief Complaint:    Chief Complaint   Patient presents with   • Follow-up   • Cough       Subjective: Kelsey Ross is a 51 y.o. female who is here today for follow-up.    Patient had chronic cough that started in October 2020 initially was seen in my clinic in April 2021.  At that point, her CT scan and PFTs were found to be normal other than some small airway disease and she was started on Advair and as needed albuterol.  However, her eosinophils were 16%.  She had a very complicated set of symptoms that include rash, chronic cough and lymphadenopathy.  In lab work-up her DIMAS and p-ANCA were both found to be positive.  She was referred to hematology and rheumatology.  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  Dr. Lane had started her on methotrexate for presumed GPA without tissue biopsy, but she had side effects from this and had to stop.  She has also been on chronic p.o. steroids of prednisone 10 mg daily and serum allergy testing was negative.  Due to her complicated course she was referred to [facility] and she saw them in November 2021.  [facility] repeated PFTs which showed FEV1 102%, FEV1/FVC ratio 98.  No significant bronchial hyperresponsiveness except for FEF 50% with 49% improvement.  Her [facility] pulmonologist, Dr. Ca, felt she does have mild persistent asthma and encouraged her to take it twice daily as prescribed as she had admitted to some noncompliance with this.  She felt like patient was an atopic individual and needed skin testing.  This has been scheduled by ENT.  She did feel like the symptoms and laboratory testing was suspicious for eosinophilic granulomatosis with polyangiitis but needed a tissue biopsy of an involved organ.  She had recommended to the patient to establish care with rheumatologist that specializes in vasculitis.  Patient has had  difficulty finding a rheumatologist locally.  She did not want to go back to see Dr. Lane.  [facility], but felt he was rude and did not help her.  She then scheduled an appointment with Dr. Morales in [place] but the office canceled the appointment as the clinician was leaving the area.  She currently has an appointment in early January with Dr. Mejia [facility].  She also has an appointment as a backup with rheumatology at [facility].    She continues to have chronic cough which is mostly dry and worsens with wet and cold weather.  She does feel like the albuterol helps when she does this.  She denies any fevers, chills, hemoptysis or night sweats.    Continues to see ENT, Dr. Saldana, for cough as well and had sinus surgery in March 2021, but did not seem to help her cough.  States that she was told she had nasal polyps that required removal after the surgery.  Pathology on this did not show significant eosinophilia according to [facility] notes.      Subjective      Review of Systems:   Review of Systems   Constitutional: Positive for fatigue. Negative for chills, fever, unexpected weight gain and unexpected weight loss.   HENT: Negative for postnasal drip, rhinorrhea, sinus pressure, swollen glands, trouble swallowing and voice change.    Eyes: Negative for discharge and itching.   Respiratory: Positive for cough. Negative for shortness of breath and wheezing.    Cardiovascular: Negative for chest pain, palpitations and leg swelling.   Gastrointestinal: Negative for abdominal distention, constipation, diarrhea and GERD.   Endocrine: Negative for cold intolerance and heat intolerance.   Genitourinary: Negative for dysuria and hematuria.   Musculoskeletal: Negative for arthralgias and myalgias.   Skin: Positive for rash. Negative for color change.   Allergic/Immunologic: Negative for environmental allergies and food allergies.   Neurological: Negative  for dizziness, speech difficulty, light-headedness and memory problem.   Hematological: Negative for adenopathy. Does not bruise/bleed easily.   Psychiatric/Behavioral: Negative for sleep disturbance and depressed mood. The patient is not nervous/anxious.        Past Medical History:   Past Medical History:   Diagnosis Date   • Allergic    • Asthma, extrinsic    • Bilateral ovarian cysts    • Migraines    • Thyroid disease        Past Surgical History:   Past Surgical History:   Procedure Laterality Date   • CATARACT EXTRACTION, BILATERAL     • HYSTERECTOMY      complete   • NOSE SURGERY     • RETINAL DETACHMENT SURGERY     • RHINOPLASTY     • SINUS SURGERY  2021       Family History:   Family History   Problem Relation Age of Onset   • Arthritis Mother    • Hypertension Mother    • Hyperlipidemia Mother    • Migraines Mother    • Stroke Mother    • Diabetes Father    • Pulmonary embolism Father    • Lung cancer Maternal Aunt    • Arthritis Maternal Grandmother    • Hypertension Maternal Grandmother    • Hyperlipidemia Maternal Grandmother    • Stroke Maternal Grandmother    • Thyroid disease Maternal Grandmother    • Diabetes Paternal Grandmother    • Heart attack Maternal Grandfather        Social History:   Social History     Socioeconomic History   • Marital status: Single   Tobacco Use   • Smoking status: Former Smoker     Packs/day: 2.00     Years: 8.00     Pack years: 16.00     Types: Cigarettes     Quit date: 1995     Years since quittin.6   • Smokeless tobacco: Never Used   Vaping Use   • Vaping Use: Never used   Substance and Sexual Activity   • Alcohol use: Not Currently   • Drug use: Never   • Sexual activity: Not Currently       Medications:     Current Outpatient Medications:   •  amLODIPine (NORVASC) 5 MG tablet, Take 1 tablet by mouth Daily., Disp: 30 tablet, Rfl: 2  •  budesonide (PULMICORT) 0.5 MG/2ML nebulizer solution, USE ONE VIAL VIA NEBULIZER TWICE DAILY AS NEEDED FOR WHEEZING,  Disp: , Rfl:   •  fluticasone-salmeterol (Advair Diskus) 250-50 MCG/DOSE DISKUS, Inhale 1 puff 2 (Two) Times a Day., Disp: 60 each, Rfl: 0  •  fluticasone-salmeterol (Advair Diskus) 250-50 MCG/DOSE DISKUS, Every 12 (Twelve) Hours., Disp: , Rfl:   •  folic acid (FOLVITE) 1 MG tablet, Take 1 tablet by mouth Daily., Disp: , Rfl:   •  ibuprofen (ADVIL,MOTRIN) 600 MG tablet, Take 1 tablet by mouth Every 6 (Six) Hours As Needed for Mild Pain ., Disp: 90 tablet, Rfl: 1  •  levothyroxine (Synthroid) 100 MCG tablet, Take 1 tablet by mouth Daily., Disp: , Rfl:   •  liothyronine (CYTOMEL) 5 MCG tablet, Take 2 tablets by mouth Daily for 180 days., Disp: 60 tablet, Rfl: 5  •  liothyronine (CYTOMEL) 5 MCG tablet, Take 1 tablet by mouth Every 12 (Twelve) Hours., Disp: , Rfl:   •  NON FORMULARY, Compound nasal spray that includes steroids and antibiotics, Disp: , Rfl:   •  polyethylene glycol (MIRALAX) 17 g packet, Take 17 g by mouth 2 (Two) Times a Day., Disp: 60 packet, Rfl: 5  •  sodium chloride (NS) 0.9 % irrigation, , Disp: , Rfl:   •  Synthroid 88 MCG tablet, Take 1 tablet by mouth Daily., Disp: 30 tablet, Rfl: 5  •  benzonatate (TESSALON) 200 MG capsule, Take 1 capsule by mouth 3 (Three) Times a Day As Needed for Cough., Disp: 30 capsule, Rfl: 0  •  cyclobenzaprine (FLEXERIL) 10 MG tablet, Take 1 tablet by mouth Every 8 (Eight) Hours As Needed for Muscle Spasms., Disp: 30 tablet, Rfl: 1  •  Dexilant 30 MG capsule, Take 30 mg by mouth Daily., Disp: , Rfl:   •  HYDROcod Polst-CPM Polst ER (Tussionex Pennkinetic ER) 10-8 MG/5ML ER suspension, Take 5 mL by mouth Every 12 (Twelve) Hours As Needed for Cough., Disp: 100 mL, Rfl: 0  •  methotrexate 2.5 MG tablet, Take 6 tablets by mouth 1 (One) Time Per Week., Disp: , Rfl:   •  predniSONE (DELTASONE) 10 MG (21) dose pack, Daily taper- 6/5/4/3/2/1, Disp: 21 tablet, Rfl: 0  •  predniSONE (DELTASONE) 10 MG tablet, , Disp: , Rfl:   •  predniSONE (DELTASONE) 10 MG tablet, prednisone 10  "mg tablet  Take by oral route., Disp: , Rfl:     Allergies:   Allergies   Allergen Reactions   • Penicillins Unknown - High Severity     Allergist informed patient she was allergic to Penicillins   • Methotrexate GI Intolerance   • Penicillin G Unknown - High Severity   • Pseudoephedrine Hcl Headache   • Fexofenadine Headache     Only allegra D       Objective     Physical Exam:  Vital Signs:   Vitals:    12/21/21 1335   BP: 140/90   Pulse: 87   Resp: 18   SpO2: 99%   Weight: 87.5 kg (193 lb)   Height: 165.1 cm (65\")       Physical Exam  Vitals and nursing note reviewed.   Constitutional:       General: She is not in acute distress.     Appearance: She is well-developed. She is not toxic-appearing.   HENT:      Head: Normocephalic and atraumatic.      Right Ear: Tympanic membrane and external ear normal.      Left Ear: Tympanic membrane and external ear normal.      Nose: Congestion present. No rhinorrhea.      Mouth/Throat:      Mouth: Mucous membranes are moist.      Pharynx: Oropharynx is clear. No oropharyngeal exudate or posterior oropharyngeal erythema.   Eyes:      General: No scleral icterus.     Extraocular Movements: Extraocular movements intact.      Conjunctiva/sclera: Conjunctivae normal.   Neck:      Trachea: No tracheal deviation.   Cardiovascular:      Rate and Rhythm: Normal rate and regular rhythm.      Heart sounds: No murmur heard.      Pulmonary:      Effort: No respiratory distress.      Breath sounds: No wheezing.   Abdominal:      General: There is no distension.      Palpations: Abdomen is soft.   Musculoskeletal:         General: No tenderness.      Cervical back: Normal range of motion.      Right lower leg: No edema.      Left lower leg: No edema.   Skin:     General: Skin is warm and dry.      Findings: No rash.   Neurological:      Mental Status: She is alert and oriented to person, place, and time.      Motor: No weakness.      Gait: Gait normal.   Psychiatric:         Mood and Affect: " Mood normal.         Thought Content: Thought content normal.         Judgment: Judgment normal.       Mallampati Score: II (hard and soft palate, upper portion of tonsils anduvula visible)    Results Review:   Labs: Reviewed.  Lab Results   Component Value Date    WBC 7.34 05/13/2021    HGB 13.3 05/13/2021    HCT 40.8 05/13/2021    MCV 85.4 05/13/2021     05/13/2021     Eosinophils Absolute   0.00 - 0.40 10*3/mm3 0.44 High   0.27  1.18 High   0.42 High      Eosinophil %   0.3 - 6.2 % 6.0  4.0  16.2 High   9.6 High      Myeloperoxidase Ab   0.0 - 9.0 U/mL <9.0    Antiproteinase 3 (IL-3)   0.0 - 3.5 U/mL <3.5    C-ANCA   Neg:<1:20 titer <1:20    P-ANCA   Neg:<1:20 titer 1:160 High       DIMAS Positive Abnormal     Comment:                                      Negative   <1:80                                        Borderline  1:80                                        Positive   >1:80           Micro: As of December 21, 2021   No results found for: RESPCX  No results found for: BLOODCX  No results found for: URINECX  No results found for: MRSACX  No results found for: MRSAPCR  No results found for: URCX  No components found for: LOWRESPCF  No results found for: THROATCX  No results found for: CULTURES  No components found for: STREPBCX  No results found for: STREPPNEUAG  No results found for: LEGIONELLA  No results found for: MYCOPLASCX  No results found for: GCCX  No results found for: WOUNDCX  No results found for: BODYFLDCX    ABG: No results found for: PHART, ILE3VBF, PO2ART, HGBBG, P3OBNPTT, CFIO2, FCOHB, CARBOXYHGB, FMETHB    Echo:     Radiology Scans:    Images reviewed personally.     CT Chest Without Contrast Diagnostic  Narrative: PROCEDURE: CT CHEST WO CONTRAST DIAGNOSTIC-     HISTORY: SHORTNESS OF BREATH; R06.02-Shortness of breath     COMPARISON: 05/05/2021.     PROCEDURE:  Multiple axial CT images were obtained from the thoracic  inlet through the upper abdomen without the use of contrast.       FINDINGS:   Soft tissue windows reveal no axillary, hilar or mediastinal adenopathy.  Heart size is normal. The aorta is normal in caliber. There are no  pleural or pericardial effusions. There are three small nodules in the  right upper lobe. A nodule in image #27 measures 2 mm. Two nodules on  image #30 measures 4 mm and 2 mm. These lesions are stable from the  prior exam. There is no new pulmonary lesion. The visualized upper  abdomen is unremarkable.      Impression: 1. Stable tiny pulmonary nodules are likely granulomas.  2. No acute lung disease.     621.34 mGy.cm        This study was performed with techniques to keep radiation doses as low  as reasonably achievable (ALARA). Individualized dose reduction  techniques using automated exposure control or adjustment of mA and/or  kV according to the patient size were employed.               Images were reviewed, interpreted, and dictated by Dr. Karel Hernandez MD  Transcribed by Kristine Palm PA-C.     This report was finalized on 12/9/2021 10:24 AM by Karel Hernandez MD.      PFT IMPRESSION:    January 2021 PFT showed FEV1 97%, FEV1/FVC ratio 81.  No significant bronchodilator responsiveness.  Total lung capacity 101%.  No air trapping.  Normal diffusing capacity.    November 2021 PFT showed FEV1 102%, FEV1/FVC ratio 98.    Assessment / Plan      Assessment/Plan:    1. Mild persistent asthma without complication  PFTs unremarkable and have only ever shown some possible bronchial hyperresponsiveness.  We'll continue with Advair and as needed albuterol for now.  However, suspect once her underlying autoimmune disease is truly identified and treated that her Advair will be able to be stopped.    Reviewed PFTs from [facility] with patient    2. Cough  Chronic and most likely related to her underlying and yet to be diagnosed autoimmune disease.  Extensively reviewed [facility] records with patient.  Encouraged her to follow-up with rheumatology and she  has a current appointment.  Also discussed if she continues to have issues she may want to consider rheumatology at [facility] as it is an academic institution and may be more familiar with her confusion work-up.    See above for extensive work-up.    3. Non-seasonal allergic rhinitis due to other allergic trigger  Advised her to continue with antihistamines.  ENT has scheduled skin testing          Follow Up:   Return in about 6 months (around 6/21/2022).    Meaghan Benitez MD  Pulmonary/Critical Care Physician   [place]      Please note that portions of this note may have been completed with a voice recognition program. Efforts were made to edit the dictations, but occasionally words are mistranscribed.

## 2022-01-17 ENCOUNTER — TRANSCRIBE ORDERS (OUTPATIENT)
Dept: LAB | Facility: HOSPITAL | Age: 52
End: 2022-01-17

## 2022-01-17 ENCOUNTER — LAB (OUTPATIENT)
Dept: LAB | Facility: HOSPITAL | Age: 52
End: 2022-01-17

## 2022-01-17 DIAGNOSIS — N28.9 KIDNEY DISEASE: ICD-10-CM

## 2022-01-17 DIAGNOSIS — N28.9 KIDNEY DISEASE: Primary | ICD-10-CM

## 2022-01-17 LAB
ALBUMIN SERPL-MCNC: 4 G/DL (ref 3.5–5.2)
ANION GAP SERPL CALCULATED.3IONS-SCNC: 8.3 MMOL/L (ref 5–15)
BACTERIA UR QL AUTO: ABNORMAL /HPF
BASOPHILS # BLD AUTO: 0.09 10*3/MM3 (ref 0–0.2)
BASOPHILS NFR BLD AUTO: 1.7 % (ref 0–1.5)
BILIRUB UR QL STRIP: NEGATIVE
BUN SERPL-MCNC: 9 MG/DL (ref 6–20)
BUN/CREAT SERPL: 11.4 (ref 7–25)
CALCIUM SPEC-SCNC: 9.6 MG/DL (ref 8.6–10.5)
CHLORIDE SERPL-SCNC: 103 MMOL/L (ref 98–107)
CLARITY UR: CLEAR
CO2 SERPL-SCNC: 25.7 MMOL/L (ref 22–29)
COLOR UR: YELLOW
CREAT SERPL-MCNC: 0.79 MG/DL (ref 0.57–1)
CREAT UR-MCNC: 150.8 MG/DL
DEPRECATED RDW RBC AUTO: 39.4 FL (ref 37–54)
EOSINOPHIL # BLD AUTO: 0.29 10*3/MM3 (ref 0–0.4)
EOSINOPHIL NFR BLD AUTO: 5.4 % (ref 0.3–6.2)
ERYTHROCYTE [DISTWIDTH] IN BLOOD BY AUTOMATED COUNT: 12.9 % (ref 12.3–15.4)
GFR SERPL CREATININE-BSD FRML MDRD: 76 ML/MIN/1.73
GLUCOSE SERPL-MCNC: 88 MG/DL (ref 65–99)
GLUCOSE UR STRIP-MCNC: NEGATIVE MG/DL
HCT VFR BLD AUTO: 45.7 % (ref 34–46.6)
HGB BLD-MCNC: 15.1 G/DL (ref 12–15.9)
HGB UR QL STRIP.AUTO: ABNORMAL
HYALINE CASTS UR QL AUTO: ABNORMAL /LPF
IMM GRANULOCYTES # BLD AUTO: 0.01 10*3/MM3 (ref 0–0.05)
IMM GRANULOCYTES NFR BLD AUTO: 0.2 % (ref 0–0.5)
KETONES UR QL STRIP: ABNORMAL
LEUKOCYTE ESTERASE UR QL STRIP.AUTO: ABNORMAL
LYMPHOCYTES # BLD AUTO: 1.7 10*3/MM3 (ref 0.7–3.1)
LYMPHOCYTES NFR BLD AUTO: 31.4 % (ref 19.6–45.3)
MCH RBC QN AUTO: 28 PG (ref 26.6–33)
MCHC RBC AUTO-ENTMCNC: 33 G/DL (ref 31.5–35.7)
MCV RBC AUTO: 84.6 FL (ref 79–97)
MONOCYTES # BLD AUTO: 0.47 10*3/MM3 (ref 0.1–0.9)
MONOCYTES NFR BLD AUTO: 8.7 % (ref 5–12)
NEUTROPHILS NFR BLD AUTO: 2.86 10*3/MM3 (ref 1.7–7)
NEUTROPHILS NFR BLD AUTO: 52.6 % (ref 42.7–76)
NITRITE UR QL STRIP: NEGATIVE
NRBC BLD AUTO-RTO: 0 /100 WBC (ref 0–0.2)
PH UR STRIP.AUTO: 5.5 [PH] (ref 5–8)
PHOSPHATE SERPL-MCNC: 3.3 MG/DL (ref 2.5–4.5)
PLATELET # BLD AUTO: 207 10*3/MM3 (ref 140–450)
PMV BLD AUTO: 11.5 FL (ref 6–12)
POTASSIUM SERPL-SCNC: 4 MMOL/L (ref 3.5–5.2)
PROT UR QL STRIP: ABNORMAL
RBC # BLD AUTO: 5.4 10*6/MM3 (ref 3.77–5.28)
RBC # UR STRIP: ABNORMAL /HPF
REF LAB TEST METHOD: ABNORMAL
SODIUM SERPL-SCNC: 137 MMOL/L (ref 136–145)
SP GR UR STRIP: 1.02 (ref 1–1.03)
SQUAMOUS #/AREA URNS HPF: ABNORMAL /HPF
UROBILINOGEN UR QL STRIP: ABNORMAL
WBC # UR STRIP: ABNORMAL /HPF
WBC NRBC COR # BLD: 5.42 10*3/MM3 (ref 3.4–10.8)

## 2022-01-17 PROCEDURE — 81001 URINALYSIS AUTO W/SCOPE: CPT

## 2022-01-17 PROCEDURE — 82570 ASSAY OF URINE CREATININE: CPT

## 2022-01-17 PROCEDURE — 85025 COMPLETE CBC W/AUTO DIFF WBC: CPT

## 2022-01-17 PROCEDURE — 80069 RENAL FUNCTION PANEL: CPT

## 2022-01-17 PROCEDURE — 36415 COLL VENOUS BLD VENIPUNCTURE: CPT

## 2022-01-17 PROCEDURE — 84156 ASSAY OF PROTEIN URINE: CPT

## 2022-01-18 LAB — PROT ?TM UR-MCNC: 960 MG/DL

## 2022-01-24 ENCOUNTER — TRANSCRIBE ORDERS (OUTPATIENT)
Dept: ADMINISTRATIVE | Facility: HOSPITAL | Age: 52
End: 2022-01-24

## 2022-01-24 DIAGNOSIS — R80.9 PROTEINURIA, UNSPECIFIED TYPE: Primary | ICD-10-CM

## 2022-01-31 RX ORDER — LEVOTHYROXINE SODIUM 100 MCG
TABLET ORAL
Qty: 30 TABLET | Refills: 5 | OUTPATIENT
Start: 2022-01-31

## 2022-01-31 RX ORDER — LEVOTHYROXINE SODIUM 88 MCG
88 TABLET ORAL DAILY
Qty: 30 TABLET | Refills: 5 | Status: SHIPPED | OUTPATIENT
Start: 2022-01-31 | End: 2022-03-17

## 2022-02-02 ENCOUNTER — HOSPITAL ENCOUNTER (OUTPATIENT)
Dept: ULTRASOUND IMAGING | Facility: HOSPITAL | Age: 52
Discharge: HOME | End: 2022-02-02

## 2022-02-11 RX ORDER — AMLODIPINE BESYLATE 5 MG/1
5 TABLET ORAL DAILY
Qty: 30 TABLET | Refills: 5 | Status: SHIPPED | OUTPATIENT
Start: 2022-02-11 | End: 2022-05-10

## 2022-02-22 ENCOUNTER — TRANSCRIBE ORDERS (OUTPATIENT)
Dept: LAB | Facility: HOSPITAL | Age: 52
End: 2022-02-22

## 2022-02-22 ENCOUNTER — LAB (OUTPATIENT)
Dept: LAB | Facility: HOSPITAL | Age: 52
End: 2022-02-22

## 2022-02-22 DIAGNOSIS — N28.9 KIDNEY DISEASE: ICD-10-CM

## 2022-02-22 DIAGNOSIS — R80.9 PROTEINURIA, UNSPECIFIED TYPE: ICD-10-CM

## 2022-02-22 DIAGNOSIS — R80.9 PROTEINURIA, UNSPECIFIED TYPE: Primary | ICD-10-CM

## 2022-02-22 LAB
BACTERIA UR QL AUTO: ABNORMAL /HPF
BILIRUB UR QL STRIP: NEGATIVE
CLARITY UR: CLEAR
COLOR UR: YELLOW
CREAT UR-MCNC: 40.8 MG/DL
GLUCOSE UR STRIP-MCNC: NEGATIVE MG/DL
HGB UR QL STRIP.AUTO: ABNORMAL
HYALINE CASTS UR QL AUTO: ABNORMAL /LPF
KETONES UR QL STRIP: NEGATIVE
LEUKOCYTE ESTERASE UR QL STRIP.AUTO: NEGATIVE
NITRITE UR QL STRIP: NEGATIVE
PH UR STRIP.AUTO: 8.5 [PH] (ref 5–8)
PROT ?TM UR-MCNC: 261.4 MG/DL
PROT UR QL STRIP: ABNORMAL
RBC # UR STRIP: ABNORMAL /HPF
REF LAB TEST METHOD: ABNORMAL
SP GR UR STRIP: 1.01 (ref 1–1.03)
SQUAMOUS #/AREA URNS HPF: ABNORMAL /HPF
UROBILINOGEN UR QL STRIP: ABNORMAL
WBC # UR STRIP: ABNORMAL /HPF

## 2022-02-22 PROCEDURE — 36415 COLL VENOUS BLD VENIPUNCTURE: CPT

## 2022-02-22 PROCEDURE — 80069 RENAL FUNCTION PANEL: CPT

## 2022-02-22 PROCEDURE — 81001 URINALYSIS AUTO W/SCOPE: CPT

## 2022-02-22 PROCEDURE — 82570 ASSAY OF URINE CREATININE: CPT

## 2022-02-22 PROCEDURE — 84156 ASSAY OF PROTEIN URINE: CPT

## 2022-02-23 ENCOUNTER — HOSPITAL ENCOUNTER (OUTPATIENT)
Dept: CT IMAGING | Facility: HOSPITAL | Age: 52
Discharge: HOME | End: 2022-02-23
Admitting: RADIOLOGY

## 2022-02-23 VITALS
TEMPERATURE: 96.7 F | RESPIRATION RATE: 16 BRPM | HEART RATE: 81 BPM | BODY MASS INDEX: 32.42 KG/M2 | HEIGHT: 65 IN | DIASTOLIC BLOOD PRESSURE: 82 MMHG | OXYGEN SATURATION: 96 % | SYSTOLIC BLOOD PRESSURE: 113 MMHG | WEIGHT: 194.6 LBS

## 2022-02-23 DIAGNOSIS — R80.9 PROTEINURIA, UNSPECIFIED TYPE: ICD-10-CM

## 2022-02-23 LAB
ALBUMIN SERPL-MCNC: 3.8 G/DL (ref 3.5–5.2)
ANION GAP SERPL CALCULATED.3IONS-SCNC: 9 MMOL/L (ref 5–15)
ANION GAP SERPL CALCULATED.3IONS-SCNC: 9 MMOL/L (ref 5–15)
BASOPHILS # BLD AUTO: 0.09 10*3/MM3 (ref 0–0.2)
BASOPHILS NFR BLD AUTO: 1.8 % (ref 0–1.5)
BUN SERPL-MCNC: 6 MG/DL (ref 6–20)
BUN SERPL-MCNC: 7 MG/DL (ref 6–20)
BUN/CREAT SERPL: 10.2 (ref 7–25)
BUN/CREAT SERPL: 9.9 (ref 7–25)
CALCIUM SPEC-SCNC: 9 MG/DL (ref 8.6–10.5)
CALCIUM SPEC-SCNC: 9.1 MG/DL (ref 8.6–10.5)
CHLORIDE SERPL-SCNC: 104 MMOL/L (ref 98–107)
CHLORIDE SERPL-SCNC: 106 MMOL/L (ref 98–107)
CO2 SERPL-SCNC: 25 MMOL/L (ref 22–29)
CO2 SERPL-SCNC: 27 MMOL/L (ref 22–29)
CREAT SERPL-MCNC: 0.59 MG/DL (ref 0.57–1)
CREAT SERPL-MCNC: 0.71 MG/DL (ref 0.57–1)
DEPRECATED RDW RBC AUTO: 37.9 FL (ref 37–54)
EOSINOPHIL # BLD AUTO: 0.25 10*3/MM3 (ref 0–0.4)
EOSINOPHIL NFR BLD AUTO: 5 % (ref 0.3–6.2)
ERYTHROCYTE [DISTWIDTH] IN BLOOD BY AUTOMATED COUNT: 12.9 % (ref 12.3–15.4)
GFR SERPL CREATININE-BSD FRML MDRD: 107 ML/MIN/1.73
GFR SERPL CREATININE-BSD FRML MDRD: 86 ML/MIN/1.73
GLUCOSE SERPL-MCNC: 93 MG/DL (ref 65–99)
GLUCOSE SERPL-MCNC: 99 MG/DL (ref 65–99)
HCT VFR BLD AUTO: 40.8 % (ref 34–46.6)
HGB BLD-MCNC: 14 G/DL (ref 12–15.9)
IMM GRANULOCYTES # BLD AUTO: 0.01 10*3/MM3 (ref 0–0.05)
IMM GRANULOCYTES NFR BLD AUTO: 0.2 % (ref 0–0.5)
INR PPP: 0.92 (ref 0.85–1.16)
LYMPHOCYTES # BLD AUTO: 1.57 10*3/MM3 (ref 0.7–3.1)
LYMPHOCYTES NFR BLD AUTO: 31.2 % (ref 19.6–45.3)
MCH RBC QN AUTO: 28.3 PG (ref 26.6–33)
MCHC RBC AUTO-ENTMCNC: 34.3 G/DL (ref 31.5–35.7)
MCV RBC AUTO: 82.4 FL (ref 79–97)
MONOCYTES # BLD AUTO: 0.4 10*3/MM3 (ref 0.1–0.9)
MONOCYTES NFR BLD AUTO: 7.9 % (ref 5–12)
NEUTROPHILS NFR BLD AUTO: 2.72 10*3/MM3 (ref 1.7–7)
NEUTROPHILS NFR BLD AUTO: 53.9 % (ref 42.7–76)
NRBC BLD AUTO-RTO: 0 /100 WBC (ref 0–0.2)
PHOSPHATE SERPL-MCNC: 3.7 MG/DL (ref 2.5–4.5)
PLATELET # BLD AUTO: 219 10*3/MM3 (ref 140–450)
PMV BLD AUTO: 10.4 FL (ref 6–12)
POTASSIUM SERPL-SCNC: 4.1 MMOL/L (ref 3.5–5.2)
POTASSIUM SERPL-SCNC: 4.5 MMOL/L (ref 3.5–5.2)
PROTHROMBIN TIME: 12.1 SECONDS (ref 11.4–14.4)
RBC # BLD AUTO: 4.95 10*6/MM3 (ref 3.77–5.28)
SODIUM SERPL-SCNC: 140 MMOL/L (ref 136–145)
SODIUM SERPL-SCNC: 140 MMOL/L (ref 136–145)
WBC NRBC COR # BLD: 5.04 10*3/MM3 (ref 3.4–10.8)

## 2022-02-23 PROCEDURE — 85025 COMPLETE CBC W/AUTO DIFF WBC: CPT | Performed by: RADIOLOGY

## 2022-02-23 PROCEDURE — 99152 MOD SED SAME PHYS/QHP 5/>YRS: CPT

## 2022-02-23 PROCEDURE — 77012 CT SCAN FOR NEEDLE BIOPSY: CPT

## 2022-02-23 PROCEDURE — 88348 ELECTRON MICROSCOPY DX: CPT | Performed by: NURSE PRACTITIONER

## 2022-02-23 PROCEDURE — 25010000002 FENTANYL CITRATE (PF) 50 MCG/ML SOLUTION: Performed by: RADIOLOGY

## 2022-02-23 PROCEDURE — 0 LIDOCAINE 1 % SOLUTION: Performed by: RADIOLOGY

## 2022-02-23 PROCEDURE — 88305 TISSUE EXAM BY PATHOLOGIST: CPT | Performed by: NURSE PRACTITIONER

## 2022-02-23 PROCEDURE — 88346 IMFLUOR 1ST 1ANTB STAIN PX: CPT | Performed by: NURSE PRACTITIONER

## 2022-02-23 PROCEDURE — 88313 SPECIAL STAINS GROUP 2: CPT | Performed by: NURSE PRACTITIONER

## 2022-02-23 PROCEDURE — 88350 IMFLUOR EA ADDL 1ANTB STN PX: CPT | Performed by: NURSE PRACTITIONER

## 2022-02-23 PROCEDURE — 85610 PROTHROMBIN TIME: CPT | Performed by: RADIOLOGY

## 2022-02-23 PROCEDURE — 25010000002 MIDAZOLAM PER 1 MG: Performed by: RADIOLOGY

## 2022-02-23 PROCEDURE — 99153 MOD SED SAME PHYS/QHP EA: CPT

## 2022-02-23 PROCEDURE — 80048 BASIC METABOLIC PNL TOTAL CA: CPT | Performed by: RADIOLOGY

## 2022-02-23 RX ORDER — MIDAZOLAM HYDROCHLORIDE 1 MG/ML
INJECTION INTRAMUSCULAR; INTRAVENOUS
Status: COMPLETED | OUTPATIENT
Start: 2022-02-23 | End: 2022-02-23

## 2022-02-23 RX ORDER — FENTANYL CITRATE 50 UG/ML
INJECTION, SOLUTION INTRAMUSCULAR; INTRAVENOUS
Status: DISPENSED
Start: 2022-02-23 | End: 2022-02-23

## 2022-02-23 RX ORDER — SODIUM CHLORIDE 0.9 % (FLUSH) 0.9 %
10 SYRINGE (ML) INJECTION AS NEEDED
Status: DISCONTINUED | OUTPATIENT
Start: 2022-02-23 | End: 2022-02-24 | Stop reason: HOSPADM

## 2022-02-23 RX ORDER — HYDROCODONE BITARTRATE AND ACETAMINOPHEN 5; 325 MG/1; MG/1
1 TABLET ORAL EVERY 4 HOURS PRN
Status: DISCONTINUED | OUTPATIENT
Start: 2022-02-23 | End: 2022-02-23 | Stop reason: SDUPTHER

## 2022-02-23 RX ORDER — SODIUM CHLORIDE 0.9 % (FLUSH) 0.9 %
3 SYRINGE (ML) INJECTION EVERY 12 HOURS SCHEDULED
Status: DISCONTINUED | OUTPATIENT
Start: 2022-02-23 | End: 2022-02-24 | Stop reason: HOSPADM

## 2022-02-23 RX ORDER — LIDOCAINE HYDROCHLORIDE 10 MG/ML
8 INJECTION, SOLUTION EPIDURAL; INFILTRATION; INTRACAUDAL; PERINEURAL ONCE
Status: DISCONTINUED | OUTPATIENT
Start: 2022-02-23 | End: 2022-02-24 | Stop reason: HOSPADM

## 2022-02-23 RX ORDER — HYDROCODONE BITARTRATE AND ACETAMINOPHEN 5; 325 MG/1; MG/1
1 TABLET ORAL EVERY 4 HOURS PRN
Status: DISCONTINUED | OUTPATIENT
Start: 2022-02-23 | End: 2022-02-24 | Stop reason: HOSPADM

## 2022-02-23 RX ORDER — SODIUM BICARBONATE 42 MG/ML
2.5 INJECTION, SOLUTION INTRAVENOUS ONCE
Status: COMPLETED | OUTPATIENT
Start: 2022-02-23 | End: 2022-02-23

## 2022-02-23 RX ORDER — SODIUM BICARBONATE 42 MG/ML
INJECTION, SOLUTION INTRAVENOUS
Status: DISCONTINUED
Start: 2022-02-23 | End: 2022-02-24 | Stop reason: HOSPADM

## 2022-02-23 RX ORDER — MIDAZOLAM HYDROCHLORIDE 1 MG/ML
INJECTION INTRAMUSCULAR; INTRAVENOUS
Status: DISPENSED
Start: 2022-02-23 | End: 2022-02-23

## 2022-02-23 RX ORDER — ALBUTEROL SULFATE 90 UG/1
2 AEROSOL, METERED RESPIRATORY (INHALATION) EVERY 4 HOURS PRN
COMMUNITY
End: 2022-06-24 | Stop reason: SDUPTHER

## 2022-02-23 RX ORDER — FENTANYL CITRATE 50 UG/ML
INJECTION, SOLUTION INTRAMUSCULAR; INTRAVENOUS
Status: COMPLETED | OUTPATIENT
Start: 2022-02-23 | End: 2022-02-23

## 2022-02-23 RX ORDER — LIDOCAINE HYDROCHLORIDE 10 MG/ML
8 INJECTION, SOLUTION INFILTRATION; PERINEURAL ONCE
Status: COMPLETED | OUTPATIENT
Start: 2022-02-23 | End: 2022-02-23

## 2022-02-23 RX ADMIN — FENTANYL CITRATE 50 MCG: 50 INJECTION, SOLUTION INTRAMUSCULAR; INTRAVENOUS at 09:33

## 2022-02-23 RX ADMIN — MIDAZOLAM HYDROCHLORIDE 1 MG: 1 INJECTION, SOLUTION INTRAMUSCULAR; INTRAVENOUS at 09:33

## 2022-02-23 RX ADMIN — SODIUM BICARBONATE 2.5 MEQ: 42 SOLUTION INTRAVENOUS at 09:35

## 2022-02-23 RX ADMIN — HYDROCODONE BITARTRATE AND ACETAMINOPHEN 1 TABLET: 5; 325 TABLET ORAL at 13:43

## 2022-02-23 RX ADMIN — FENTANYL CITRATE 50 MCG: 50 INJECTION, SOLUTION INTRAMUSCULAR; INTRAVENOUS at 09:15

## 2022-02-23 RX ADMIN — MIDAZOLAM HYDROCHLORIDE 1 MG: 1 INJECTION, SOLUTION INTRAMUSCULAR; INTRAVENOUS at 09:16

## 2022-02-23 RX ADMIN — LIDOCAINE HYDROCHLORIDE 8 ML: 10 INJECTION, SOLUTION INFILTRATION; PERINEURAL at 09:39

## 2022-02-23 NOTE — NURSING NOTE
CT guided left renal biopsy performed by Dr Dexter. Three core samples obtained and sent to the lab for testing. Gel Foam embo placed.  Patient was sedated for 30 minutes, and given 2 mg Versed and 100 mcg Fentanyl. Patient tolerated well. Patient positioned on left side and placed on bedrest for 6 hours. Report called to nurse.

## 2022-02-23 NOTE — H&P
[facility]   Vascular Interventional Radiology  History & Physicial    Patient Name:Kelsey Ross    : 1970    MRN: [id number]    Primary Care Physician: To aHllman MD    Referring Physician: EFRA Ruiz     Date of admission: 2022    Subjective     Reason for Consult: Kidney biopsy    History of Present Illness     Kelsey Ross is a 51 y.o. female referred to IR as noted above.      Active Hospital Problems:  There are no active hospital problems to display for this patient.      Personal History     Past Medical History:   Diagnosis Date    Allergic     Allergic rhinitis     Asthma, extrinsic     Bilateral ovarian cysts     Chronic kidney disease     proteinuria and trying to diagnose egpa    Constipation     COVID      and    has received vaccines    Deaf, left     high fever as a child    Migraines     Pain     chronic pain all over body - on no pain meds    Thyroid disease     Vasculitis (HCC)     Visual acuity reduced     right eye r/t detached retina       Past Surgical History:   Procedure Laterality Date    CATARACT EXTRACTION, BILATERAL      rught eye only     SECTION      HYSTERECTOMY      complete    NOSE SURGERY      RETINAL DETACHMENT SURGERY      RHINOPLASTY      SINUS SURGERY  2021       Family History: Her family history includes Arthritis in her maternal grandmother and mother; Diabetes in her father and paternal grandmother; Heart attack in her maternal grandfather; Hyperlipidemia in her maternal grandmother and mother; Hypertension in her maternal grandmother and mother; Lung cancer in her maternal aunt; Migraines in her mother; Pulmonary embolism in her father; Stroke in her maternal grandmother and mother; Thyroid disease in her maternal grandmother.     Social History: She  reports that she quit smoking about 26 years ago. Her smoking use included cigarettes. She has a 16.00 pack-year smoking history. She has never used smokeless tobacco. She  "reports previous alcohol use. She reports that she does not use drugs.    Home Medications:  HYDROcod Polst-CPM Polst ER, NON FORMULARY, albuterol sulfate HFA, amLODIPine, benzonatate, cyclobenzaprine, fluticasone-salmeterol, ibuprofen, levothyroxine, liothyronine, polyethylene glycol, and sodium chloride    Current Medications:    HYDROcodone-acetaminophen    lidocaine PF 1%    sodium chloride    sodium chloride     Allergies:  She is allergic to penicillins, methotrexate, penicillin g, pseudoephedrine hcl, and fexofenadine.    Review of Systems    Objective     Visit Vitals  /85   Pulse 67   Temp 96.7 °F (35.9 °C)   Resp 16   Ht 165.1 cm (65\")   Wt 88.3 kg (194 lb 9.6 oz)   SpO2 98%   BMI 32.38 kg/m²        Physical Exam    A&Ox3. Able to communicate  No Apparent Distress  Average physique      Result Review      I have personally reviewed the results from the time of this admission to 2/23/2022 12:50 EST and agree with these findings.  [x]  Laboratory  []  Microbiology  [x]  Radiology  []  EKG/Telemetry   []  Cardiology/Vascular   []  Pathology  []  Old records  []  Other:    Most notable findings include: As noted:    Results from last 7 days   Lab Units 02/23/22  0747   INR  0.92   HEMOGLOBIN g/dL 14.0   HEMATOCRIT % 40.8   PLATELETS 10*3/mm3 219       Estimated Creatinine Clearance: 101.7 mL/min (by C-G formula based on SCr of 0.71 mg/dL).   Creatinine   Date Value Ref Range Status   02/23/2022 0.71 0.57 - 1.00 mg/dL Final   02/22/2022 0.59 0.57 - 1.00 mg/dL Final       SARS-CoV-2 JIMMIE   Date Value Ref Range Status   11/20/2021 Not Detected Not Detected Final          Assessment / Plan     Kelsey Ross is a 51 y.o. female referred to the IR service with above problem.    Plan:   As above.    Khurram Dexter MD   Vascular Interventional Radiology  02/23/22   5:47 AM EST     "

## 2022-02-24 ENCOUNTER — TELEPHONE (OUTPATIENT)
Dept: INFUSION THERAPY | Facility: HOSPITAL | Age: 52
End: 2022-02-24

## 2022-03-15 ENCOUNTER — LAB (OUTPATIENT)
Dept: LAB | Facility: HOSPITAL | Age: 52
End: 2022-03-15

## 2022-03-15 ENCOUNTER — TRANSCRIBE ORDERS (OUTPATIENT)
Dept: LAB | Facility: HOSPITAL | Age: 52
End: 2022-03-15

## 2022-03-15 DIAGNOSIS — R80.9 PROTEINURIA, UNSPECIFIED TYPE: ICD-10-CM

## 2022-03-15 DIAGNOSIS — N05.2 MEMBRANOUS GLOMERULONEPHRITIS: Primary | ICD-10-CM

## 2022-03-15 DIAGNOSIS — N05.2 MEMBRANOUS GLOMERULONEPHRITIS: ICD-10-CM

## 2022-03-15 LAB
ALBUMIN SERPL-MCNC: 3.9 G/DL (ref 3.5–5.2)
ANION GAP SERPL CALCULATED.3IONS-SCNC: 8 MMOL/L (ref 5–15)
BACTERIA UR QL AUTO: NORMAL /HPF
BASOPHILS # BLD AUTO: 0.09 10*3/MM3 (ref 0–0.2)
BASOPHILS NFR BLD AUTO: 1.3 % (ref 0–1.5)
BILIRUB UR QL STRIP: NEGATIVE
BUN SERPL-MCNC: 7 MG/DL (ref 6–20)
BUN/CREAT SERPL: 10.3 (ref 7–25)
CALCIUM SPEC-SCNC: 9.3 MG/DL (ref 8.6–10.5)
CHLORIDE SERPL-SCNC: 99 MMOL/L (ref 98–107)
CHOLEST SERPL-MCNC: 338 MG/DL (ref 0–200)
CLARITY UR: CLEAR
CO2 SERPL-SCNC: 29 MMOL/L (ref 22–29)
COLOR UR: YELLOW
CREAT SERPL-MCNC: 0.68 MG/DL (ref 0.57–1)
DEPRECATED RDW RBC AUTO: 40.3 FL (ref 37–54)
EGFRCR SERPLBLD CKD-EPI 2021: 104.9 ML/MIN/1.73
EOSINOPHIL # BLD AUTO: 0.3 10*3/MM3 (ref 0–0.4)
EOSINOPHIL NFR BLD AUTO: 4.5 % (ref 0.3–6.2)
ERYTHROCYTE [DISTWIDTH] IN BLOOD BY AUTOMATED COUNT: 13 % (ref 12.3–15.4)
GLUCOSE SERPL-MCNC: 83 MG/DL (ref 65–99)
GLUCOSE UR STRIP-MCNC: NEGATIVE MG/DL
HCT VFR BLD AUTO: 43.1 % (ref 34–46.6)
HDLC SERPL-MCNC: 89 MG/DL (ref 40–60)
HGB BLD-MCNC: 14.4 G/DL (ref 12–15.9)
HGB UR QL STRIP.AUTO: ABNORMAL
HYALINE CASTS UR QL AUTO: NORMAL /LPF
IMM GRANULOCYTES # BLD AUTO: 0.02 10*3/MM3 (ref 0–0.05)
IMM GRANULOCYTES NFR BLD AUTO: 0.3 % (ref 0–0.5)
KETONES UR QL STRIP: NEGATIVE
LDLC SERPL CALC-MCNC: 229 MG/DL (ref 0–100)
LDLC/HDLC SERPL: 2.53 {RATIO}
LEUKOCYTE ESTERASE UR QL STRIP.AUTO: NEGATIVE
LYMPHOCYTES # BLD AUTO: 2.39 10*3/MM3 (ref 0.7–3.1)
LYMPHOCYTES NFR BLD AUTO: 35.6 % (ref 19.6–45.3)
MCH RBC QN AUTO: 28.5 PG (ref 26.6–33)
MCHC RBC AUTO-ENTMCNC: 33.4 G/DL (ref 31.5–35.7)
MCV RBC AUTO: 85.3 FL (ref 79–97)
MONOCYTES # BLD AUTO: 0.47 10*3/MM3 (ref 0.1–0.9)
MONOCYTES NFR BLD AUTO: 7 % (ref 5–12)
NEUTROPHILS NFR BLD AUTO: 3.45 10*3/MM3 (ref 1.7–7)
NEUTROPHILS NFR BLD AUTO: 51.3 % (ref 42.7–76)
NITRITE UR QL STRIP: NEGATIVE
NRBC BLD AUTO-RTO: 0 /100 WBC (ref 0–0.2)
PH UR STRIP.AUTO: 7.5 [PH] (ref 5–8)
PHOSPHATE SERPL-MCNC: 3.6 MG/DL (ref 2.5–4.5)
PLATELET # BLD AUTO: 257 10*3/MM3 (ref 140–450)
PMV BLD AUTO: 10.6 FL (ref 6–12)
POTASSIUM SERPL-SCNC: 4.3 MMOL/L (ref 3.5–5.2)
PROT UR QL STRIP: ABNORMAL
RBC # BLD AUTO: 5.05 10*6/MM3 (ref 3.77–5.28)
RBC # UR STRIP: NORMAL /HPF
REF LAB TEST METHOD: NORMAL
SODIUM SERPL-SCNC: 136 MMOL/L (ref 136–145)
SP GR UR STRIP: 1.01 (ref 1–1.03)
SQUAMOUS #/AREA URNS HPF: NORMAL /HPF
TRIGL SERPL-MCNC: 119 MG/DL (ref 0–150)
UROBILINOGEN UR QL STRIP: ABNORMAL
VLDLC SERPL-MCNC: 20 MG/DL (ref 5–40)
WBC # UR STRIP: NORMAL /HPF
WBC NRBC COR # BLD: 6.72 10*3/MM3 (ref 3.4–10.8)

## 2022-03-15 PROCEDURE — 82784 ASSAY IGA/IGD/IGG/IGM EACH: CPT

## 2022-03-15 PROCEDURE — 85025 COMPLETE CBC W/AUTO DIFF WBC: CPT

## 2022-03-15 PROCEDURE — 81001 URINALYSIS AUTO W/SCOPE: CPT

## 2022-03-15 PROCEDURE — 36415 COLL VENOUS BLD VENIPUNCTURE: CPT

## 2022-03-15 PROCEDURE — 80069 RENAL FUNCTION PANEL: CPT

## 2022-03-15 PROCEDURE — 82570 ASSAY OF URINE CREATININE: CPT

## 2022-03-15 PROCEDURE — 80061 LIPID PANEL: CPT

## 2022-03-15 PROCEDURE — 84156 ASSAY OF PROTEIN URINE: CPT

## 2022-03-15 PROCEDURE — 86334 IMMUNOFIX E-PHORESIS SERUM: CPT

## 2022-03-15 PROCEDURE — 84165 PROTEIN E-PHORESIS SERUM: CPT

## 2022-03-15 PROCEDURE — 84166 PROTEIN E-PHORESIS/URINE/CSF: CPT

## 2022-03-15 PROCEDURE — 84155 ASSAY OF PROTEIN SERUM: CPT

## 2022-03-16 LAB
CREAT UR-MCNC: 17.3 MG/DL
PROT ?TM UR-MCNC: 140.7 MG/DL

## 2022-03-17 LAB
ALBUMIN SERPL ELPH-MCNC: 3.5 G/DL (ref 2.9–4.4)
ALBUMIN/GLOB SERPL: 1.3 {RATIO} (ref 0.7–1.7)
ALPHA1 GLOB SERPL ELPH-MCNC: 0.2 G/DL (ref 0–0.4)
ALPHA2 GLOB SERPL ELPH-MCNC: 0.8 G/DL (ref 0.4–1)
B-GLOBULIN SERPL ELPH-MCNC: 1.1 G/DL (ref 0.7–1.3)
GAMMA GLOB SERPL ELPH-MCNC: 0.8 G/DL (ref 0.4–1.8)
GLOBULIN SER-MCNC: 2.9 G/DL (ref 2.2–3.9)
IGA SERPL-MCNC: 125 MG/DL (ref 87–352)
IGG SERPL-MCNC: 848 MG/DL (ref 586–1602)
IGM SERPL-MCNC: 114 MG/DL (ref 26–217)
INTERPRETATION SERPL IEP-IMP: NORMAL
LABORATORY COMMENT REPORT: NORMAL
M PROTEIN SERPL ELPH-MCNC: NORMAL G/DL
PROT SERPL-MCNC: 6.4 G/DL (ref 6–8.5)

## 2022-03-17 RX ORDER — LEVOTHYROXINE SODIUM 100 MCG
100 TABLET ORAL DAILY
Qty: 90 TABLET | Refills: 1 | Status: SHIPPED | OUTPATIENT
Start: 2022-03-17 | End: 2022-09-08 | Stop reason: SDUPTHER

## 2022-03-18 LAB
ALBUMIN MFR UR ELPH: 78.3 %
ALPHA1 GLOB MFR UR ELPH: 7.9 %
ALPHA2 GLOB MFR UR ELPH: 2.6 %
B-GLOBULIN MFR UR ELPH: 9.8 %
GAMMA GLOB MFR UR ELPH: 1.4 %
LABORATORY COMMENT REPORT: NORMAL
M PROTEIN MFR UR ELPH: NORMAL %
PROT UR-MCNC: 138 MG/DL

## 2022-03-22 ENCOUNTER — TELEPHONE (OUTPATIENT)
Dept: SURGERY | Facility: CLINIC | Age: 52
End: 2022-03-22

## 2022-03-25 ENCOUNTER — TELEPHONE (OUTPATIENT)
Dept: SURGERY | Facility: CLINIC | Age: 52
End: 2022-03-25

## 2022-03-25 NOTE — TELEPHONE ENCOUNTER
Caller: Shruti Ross    Relationship: Self    Best call back number: [phone]  What is the best time to reach you: ANYTIME    Who are you requesting to speak with (clinical staff, provider,  specific staff member): PT RETURNING CALL FOR SOLO    Do you know the name of the person who called: SHRUTI  What was the call regarding: PT RETURNING A CALL TO SOLO    Do you require a callback: YES

## 2022-03-28 ENCOUNTER — PREP FOR SURGERY (OUTPATIENT)
Dept: OTHER | Facility: HOSPITAL | Age: 52
End: 2022-03-28

## 2022-03-28 DIAGNOSIS — Z12.11 COLON CANCER SCREENING: Primary | ICD-10-CM

## 2022-03-28 RX ORDER — BISACODYL 5 MG
TABLET, DELAYED RELEASE (ENTERIC COATED) ORAL
Qty: 4 TABLET | Refills: 0 | Status: SHIPPED | OUTPATIENT
Start: 2022-03-28 | End: 2022-05-10

## 2022-03-28 RX ORDER — POLYETHYLENE GLYCOL 3350 17 G/17G
POWDER, FOR SOLUTION ORAL
Qty: 238 G | Refills: 0 | Status: SHIPPED | OUTPATIENT
Start: 2022-03-28 | End: 2022-05-11

## 2022-03-28 NOTE — TELEPHONE ENCOUNTER
PRESCREENING FOR OPEN ACCESS SCHEDULING    Kelsey Ross, 1970  [id number]    03/28/22    If, the patient answers yes to any of the following questions the provider will be informed prior to scheduling open access for approval and documented in the chart.    []  Yes  [x] No    1. Have you ever had a colonoscopy in the past?      When:        Where:       Polyps or other:     []  Yes  [x] No    2. Family history of colon cancer?      Relation:       Age of onset:       Do you currently have any of the following?    []  Yes  [x] No  Rectal bleeding, if so, how long?     []  Yes  [x] No  Abdominal pain, if so, how long?    []  Yes  [x] No  Constipation, if so, how long?    []  Yes  [x] No  Diarrhea, if so, how long?    []  Yes  [x] No  Weight loss, is so, how much?    [] Yes  [x] No  Small caliber stool, if so, how long?      Have you ever had any of the following conditions?    [] Yes  [x] No  Heart attack?      When?       Last cardiac workup?     Blood thinners?    [] Yes  [x] No   Lung problems, asthma or COPD?  [] Yes  [x] No  Oxygen required?       [] Yes  [x] No  Stroke?     [] Yes  [x] No  Have you ever had a reaction to anesthesia?

## 2022-03-28 NOTE — TELEPHONE ENCOUNTER
Pt scheduled for colon at [facility] on 3/31/22, instructions emailed and prep sent in.

## 2022-03-29 RX ORDER — DOCUSATE SODIUM 100 MG/1
100 CAPSULE, LIQUID FILLED ORAL 2 TIMES DAILY
COMMUNITY
End: 2022-05-11

## 2022-03-29 RX ORDER — LISINOPRIL 30 MG/1
30 TABLET ORAL DAILY
COMMUNITY
End: 2022-05-10

## 2022-03-31 ENCOUNTER — ANESTHESIA (OUTPATIENT)
Dept: GASTROENTEROLOGY | Facility: HOSPITAL | Age: 52
End: 2022-03-31

## 2022-03-31 ENCOUNTER — ANESTHESIA EVENT (OUTPATIENT)
Dept: GASTROENTEROLOGY | Facility: HOSPITAL | Age: 52
End: 2022-03-31

## 2022-03-31 ENCOUNTER — HOSPITAL ENCOUNTER (OUTPATIENT)
Facility: HOSPITAL | Age: 52
Setting detail: HOSPITAL OUTPATIENT SURGERY
Discharge: HOME | End: 2022-03-31
Attending: SURGERY | Admitting: SURGERY

## 2022-03-31 VITALS
OXYGEN SATURATION: 98 % | TEMPERATURE: 98.2 F | SYSTOLIC BLOOD PRESSURE: 134 MMHG | HEART RATE: 79 BPM | BODY MASS INDEX: 31.65 KG/M2 | DIASTOLIC BLOOD PRESSURE: 84 MMHG | WEIGHT: 190 LBS | RESPIRATION RATE: 18 BRPM | HEIGHT: 65 IN

## 2022-03-31 PROCEDURE — S0260 H&P FOR SURGERY: HCPCS | Performed by: SURGERY

## 2022-03-31 PROCEDURE — 45378 DIAGNOSTIC COLONOSCOPY: CPT | Performed by: SURGERY

## 2022-03-31 PROCEDURE — 25010000002 PROPOFOL 10 MG/ML EMULSION: Performed by: NURSE ANESTHETIST, CERTIFIED REGISTERED

## 2022-03-31 PROCEDURE — 94640 AIRWAY INHALATION TREATMENT: CPT

## 2022-03-31 RX ORDER — SODIUM CHLORIDE, SODIUM LACTATE, POTASSIUM CHLORIDE, CALCIUM CHLORIDE 600; 310; 30; 20 MG/100ML; MG/100ML; MG/100ML; MG/100ML
1000 INJECTION, SOLUTION INTRAVENOUS CONTINUOUS
Status: DISCONTINUED | OUTPATIENT
Start: 2022-03-31 | End: 2022-03-31 | Stop reason: HOSPADM

## 2022-03-31 RX ORDER — IPRATROPIUM BROMIDE AND ALBUTEROL SULFATE 2.5; .5 MG/3ML; MG/3ML
3 SOLUTION RESPIRATORY (INHALATION) ONCE
Status: COMPLETED | OUTPATIENT
Start: 2022-03-31 | End: 2022-03-31

## 2022-03-31 RX ORDER — LIDOCAINE HYDROCHLORIDE 20 MG/ML
INJECTION, SOLUTION INTRAVENOUS AS NEEDED
Status: DISCONTINUED | OUTPATIENT
Start: 2022-03-31 | End: 2022-03-31 | Stop reason: SURG

## 2022-03-31 RX ORDER — ONDANSETRON 2 MG/ML
4 INJECTION INTRAMUSCULAR; INTRAVENOUS ONCE AS NEEDED
Status: DISCONTINUED | OUTPATIENT
Start: 2022-03-31 | End: 2022-03-31 | Stop reason: HOSPADM

## 2022-03-31 RX ORDER — PROPOFOL 10 MG/ML
VIAL (ML) INTRAVENOUS AS NEEDED
Status: DISCONTINUED | OUTPATIENT
Start: 2022-03-31 | End: 2022-03-31 | Stop reason: SURG

## 2022-03-31 RX ADMIN — LIDOCAINE HYDROCHLORIDE 40 MG: 20 INJECTION, SOLUTION INTRAVENOUS at 12:31

## 2022-03-31 RX ADMIN — PROPOFOL 100 MG: 10 INJECTION, EMULSION INTRAVENOUS at 12:37

## 2022-03-31 RX ADMIN — SODIUM CHLORIDE, POTASSIUM CHLORIDE, SODIUM LACTATE AND CALCIUM CHLORIDE 1000 ML: 600; 310; 30; 20 INJECTION, SOLUTION INTRAVENOUS at 11:26

## 2022-03-31 RX ADMIN — IPRATROPIUM BROMIDE AND ALBUTEROL SULFATE 3 ML: .5; 3 SOLUTION RESPIRATORY (INHALATION) at 11:34

## 2022-03-31 RX ADMIN — PROPOFOL 100 MG: 10 INJECTION, EMULSION INTRAVENOUS at 12:31

## 2022-03-31 NOTE — ANESTHESIA POSTPROCEDURE EVALUATION
Patient: Kelsey Ross    Procedure Summary     Date: 03/31/22 Room / Location: [facility]    Anesthesia Start: 1227 Anesthesia Stop:     Procedure: COLONOSCOPY (N/A ) Diagnosis:       Colon cancer screening      (Colon cancer screening [Z12.11])    Surgeons: Long Vernon MD Provider: Steve Godinez CRNA    Anesthesia Type: MAC ASA Status: 3          Anesthesia Type: MAC    Vitals  Hr 81  Sat 93  Resp 22  /64  Temp 98        Post Anesthesia Care and Evaluation    Patient location during evaluation: bedside  Patient participation: complete - patient participated  Level of consciousness: awake and alert and sleepy but conscious  Pain score: 0  Pain management: adequate  Airway patency: patent  Anesthetic complications: No anesthetic complications  PONV Status: none  Cardiovascular status: acceptable  Respiratory status: acceptable and nasal cannula  Hydration status: acceptable

## 2022-03-31 NOTE — ANESTHESIA PREPROCEDURE EVALUATION
Anesthesia Evaluation     Patient summary reviewed and Nursing notes reviewed   no history of anesthetic complications:  NPO Solid Status: > 8 hours  NPO Liquid Status: > 8 hours           Airway   Mallampati: II  TM distance: <3 FB  Neck ROM: full  Possible difficult intubation  Dental - normal exam     Pulmonary - normal exam   (+) COPD, asthma,  Cardiovascular - normal exam    (+) hypertension,       Neuro/Psych  (+) headaches,    GI/Hepatic/Renal/Endo    (+)   renal disease CRI, thyroid problem hypothyroidism    Musculoskeletal (-) negative ROS    Abdominal    Substance History - negative use     OB/GYN negative ob/gyn ROS         Other - negative ROS                     Anesthesia Plan    ASA 3     MAC   (Risks and benefits discussed including risk of aspiration, recall and dental damage. All patient questions answered. Will continue with POC.)  intravenous induction     Anesthetic plan, all risks, benefits, and alternatives have been provided, discussed and informed consent has been obtained with: patient.        CODE STATUS:

## 2022-04-16 ENCOUNTER — TRANSCRIBE ORDERS (OUTPATIENT)
Dept: LAB | Facility: HOSPITAL | Age: 52
End: 2022-04-16

## 2022-04-16 ENCOUNTER — LAB (OUTPATIENT)
Dept: LAB | Facility: HOSPITAL | Age: 52
End: 2022-04-16

## 2022-04-16 DIAGNOSIS — N05.2 MEMBRANOUS GLOMERULONEPHRITIS: ICD-10-CM

## 2022-04-16 DIAGNOSIS — N05.2 MEMBRANOUS GLOMERULONEPHRITIS: Primary | ICD-10-CM

## 2022-04-16 LAB
ALBUMIN SERPL-MCNC: 3.5 G/DL (ref 3.5–5.2)
ANION GAP SERPL CALCULATED.3IONS-SCNC: 9 MMOL/L (ref 5–15)
BUN SERPL-MCNC: 9 MG/DL (ref 6–20)
BUN/CREAT SERPL: 13.2 (ref 7–25)
CALCIUM SPEC-SCNC: 8.7 MG/DL (ref 8.6–10.5)
CHLORIDE SERPL-SCNC: 106 MMOL/L (ref 98–107)
CO2 SERPL-SCNC: 24 MMOL/L (ref 22–29)
CREAT SERPL-MCNC: 0.68 MG/DL (ref 0.57–1)
CREAT UR-MCNC: 25.7 MG/DL
EGFRCR SERPLBLD CKD-EPI 2021: 104.9 ML/MIN/1.73
GLUCOSE SERPL-MCNC: 77 MG/DL (ref 65–99)
PHOSPHATE SERPL-MCNC: 3.2 MG/DL (ref 2.5–4.5)
POTASSIUM SERPL-SCNC: 4.4 MMOL/L (ref 3.5–5.2)
PROT ?TM UR-MCNC: 136.8 MG/DL
SODIUM SERPL-SCNC: 139 MMOL/L (ref 136–145)

## 2022-04-16 PROCEDURE — 80069 RENAL FUNCTION PANEL: CPT

## 2022-04-16 PROCEDURE — 36415 COLL VENOUS BLD VENIPUNCTURE: CPT

## 2022-04-16 PROCEDURE — 82570 ASSAY OF URINE CREATININE: CPT

## 2022-04-16 PROCEDURE — 84156 ASSAY OF PROTEIN URINE: CPT

## 2022-04-19 ENCOUNTER — PATIENT MESSAGE (OUTPATIENT)
Dept: PULMONOLOGY | Facility: CLINIC | Age: 52
End: 2022-04-19

## 2022-04-20 ENCOUNTER — DOCUMENTATION (OUTPATIENT)
Dept: PULMONOLOGY | Facility: CLINIC | Age: 52
End: 2022-04-20

## 2022-04-20 DIAGNOSIS — M30.1 EOSINOPHILIC GRANULOMATOSIS WITH POLYANGIITIS (EGPA): Primary | ICD-10-CM

## 2022-04-20 DIAGNOSIS — D72.18 EOSINOPHILIC GRANULOMATOSIS WITH POLYANGIITIS (EGPA): Primary | ICD-10-CM

## 2022-04-20 PROBLEM — J45.30 MILD PERSISTENT ASTHMA: Status: ACTIVE | Noted: 2021-11-19

## 2022-04-20 PROBLEM — J33.9 NASAL POLYPOSIS: Status: ACTIVE | Noted: 2021-11-19

## 2022-04-25 ENCOUNTER — SPECIALTY PHARMACY (OUTPATIENT)
Dept: PHARMACY | Facility: HOSPITAL | Age: 52
End: 2022-04-25

## 2022-05-05 LAB
LAB AP CASE REPORT: NORMAL
LAB AP OUTSIDE REPORT, ADDENDUM: NORMAL
PATH REPORT.FINAL DX SPEC: NORMAL

## 2022-05-09 ENCOUNTER — TRANSCRIBE ORDERS (OUTPATIENT)
Dept: ADMINISTRATIVE | Facility: HOSPITAL | Age: 52
End: 2022-05-09

## 2022-05-09 DIAGNOSIS — K80.20 GALL BLADDER STONES: Primary | ICD-10-CM

## 2022-05-10 ENCOUNTER — LAB (OUTPATIENT)
Dept: LAB | Facility: HOSPITAL | Age: 52
End: 2022-05-10

## 2022-05-10 ENCOUNTER — OFFICE VISIT (OUTPATIENT)
Dept: ENDOCRINOLOGY | Facility: CLINIC | Age: 52
End: 2022-05-10

## 2022-05-10 VITALS
HEART RATE: 79 BPM | SYSTOLIC BLOOD PRESSURE: 104 MMHG | BODY MASS INDEX: 31.24 KG/M2 | OXYGEN SATURATION: 98 % | WEIGHT: 194.4 LBS | HEIGHT: 66 IN | DIASTOLIC BLOOD PRESSURE: 62 MMHG

## 2022-05-10 DIAGNOSIS — E89.0 POSTABLATIVE HYPOTHYROIDISM: ICD-10-CM

## 2022-05-10 DIAGNOSIS — E55.9 VITAMIN D DEFICIENCY: ICD-10-CM

## 2022-05-10 DIAGNOSIS — I10 ESSENTIAL HYPERTENSION: Primary | ICD-10-CM

## 2022-05-10 PROCEDURE — 99214 OFFICE O/P EST MOD 30 MIN: CPT | Performed by: INTERNAL MEDICINE

## 2022-05-10 RX ORDER — LIOTHYRONINE SODIUM 5 UG/1
10 TABLET ORAL DAILY
Qty: 180 TABLET | Refills: 1 | Status: SHIPPED | OUTPATIENT
Start: 2022-05-10 | End: 2022-11-15 | Stop reason: SDUPTHER

## 2022-05-10 RX ORDER — SPIRONOLACTONE 50 MG/1
TABLET, FILM COATED ORAL
COMMUNITY
Start: 2022-04-20

## 2022-05-10 RX ORDER — LOSARTAN POTASSIUM 100 MG/1
TABLET ORAL
COMMUNITY
Start: 2022-04-20 | End: 2022-12-23

## 2022-05-10 NOTE — PROGRESS NOTES
Kelsey Ross 51 y.o.  CC:Follow-up, Hypothyroidism, and Hypertension      [place]: Follow-up, Hypothyroidism, and Hypertension    In interim has seen pulm [facility]- dx asthma  Has seen urology - gallstones  Also nephrology dx membranous nephritis  Rheumatology - EGPA and lupus   Is taking synthroid 100 mcg daily and cytomel 5 mcg twice daily   High chol with nephropathy   Starting plaquenil   Eye surgery for scar tissue planned  bp is well controlled taking cozaar 100 mg daily     Allergies   Allergen Reactions   • Penicillin G Unknown - High Severity   • Penicillins Unknown - High Severity     Allergist informed patient she was allergic to Penicillins   • Fexofenadine Headache     Only allegra D   • Methotrexate GI Intolerance and Headache   • Pseudoephedrine Hcl Headache       Current Outpatient Medications:   •  liothyronine (CYTOMEL) 5 MCG tablet, Take 2 tablets by mouth Daily for 180 days., Disp: 180 tablet, Rfl: 1  •  losartan (COZAAR) 100 MG tablet, Take one po q day, Disp: , Rfl:   •  albuterol sulfate  (90 Base) MCG/ACT inhaler, Inhale 2 puffs Every 4 (Four) Hours As Needed for Wheezing., Disp: , Rfl:   •  cyclobenzaprine (FLEXERIL) 10 MG tablet, Take 1 tablet by mouth Every 8 (Eight) Hours As Needed for Muscle Spasms., Disp: 30 tablet, Rfl: 1  •  docusate sodium (COLACE) 100 MG capsule, Take 100 mg by mouth 2 (Two) Times a Day., Disp: , Rfl:   •  fluticasone-salmeterol (Advair Diskus) 250-50 MCG/DOSE DISKUS, Inhale 1 puff Every 12 (Twelve) Hours., Disp: 60 each, Rfl: 5  •  Mepolizumab 100 MG/ML solution auto-injector, Inject 3 mL under the skin into the appropriate area as directed Every 28 (Twenty-Eight) Days., Disp: 0.84 mL, Rfl: 11  •  polyethylene glycol (MIRALAX) 17 GM/SCOOP powder, Mix 238g powder with 64 oz of clear liquid. 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   HENT: Negative for congestion and rhinorrhea.    Eyes: Positive for visual disturbance.   Respiratory: Negative for cough and shortness of breath.    Cardiovascular: Negative for palpitations and leg swelling.   Gastrointestinal: Negative for constipation, diarrhea and nausea.   Genitourinary: Negative for hematuria.   Musculoskeletal: Positive for arthralgias, back pain and joint swelling. Negative for gait problem and myalgias.   Skin: Negative for color change, rash and wound.   Allergic/Immunologic: Negative for environmental allergies, food allergies and immunocompromised state.   Neurological: Negative for dizziness, weakness and light-headedness.   Psychiatric/Behavioral: Negative for confusion, decreased concentration, dysphoric mood and sleep disturbance. The patient is not nervous/anxious.      Social History     Socioeconomic History   • Marital status: Single   Tobacco Use   • Smoking status: Former Smoker     Packs/day: 2.00     Years: 8.00     Pack years: 16.00     Types:  "Cigarettes     Quit date: 1995     Years since quittin.0   • Smokeless tobacco: Never Used   Vaping Use   • Vaping Use: Never used   Substance and Sexual Activity   • Alcohol use: Never   • Drug use: Never   • Sexual activity: Not Currently     Birth control/protection: None     Family History   Problem Relation Age of Onset   • Arthritis Mother    • Hypertension Mother    • Hyperlipidemia Mother    • Migraines Mother    • Stroke Mother    • Diabetes Father    • Pulmonary embolism Father    • Lung cancer Maternal Aunt    • Arthritis Maternal Grandmother    • Hypertension Maternal Grandmother    • Hyperlipidemia Maternal Grandmother    • Stroke Maternal Grandmother    • Thyroid disease Maternal Grandmother    • Diabetes Paternal Grandmother    • Heart attack Maternal Grandfather      /62   Pulse 79   Ht 167.6 cm (66\")   Wt 88.2 kg (194 lb 6.4 oz)   SpO2 98%   BMI 31.38 kg/m²   Physical Exam  Vitals and nursing note reviewed.   Constitutional:       Appearance: Normal appearance. She is well-developed.   HENT:      Head: Normocephalic and atraumatic.   Eyes:      General: Lids are normal.      Extraocular Movements: Extraocular movements intact.      Conjunctiva/sclera: Conjunctivae normal.      Pupils: Pupils are equal, round, and reactive to light.   Neck:      Thyroid: No thyroid mass or thyromegaly.      Vascular: No carotid bruit.      Trachea: Trachea normal. No tracheal deviation.   Cardiovascular:      Rate and Rhythm: Normal rate and regular rhythm.      Pulses: Normal pulses.      Heart sounds: Normal heart sounds. No murmur heard.    No friction rub. No gallop.   Pulmonary:      Effort: Pulmonary effort is normal. No respiratory distress.      Breath sounds: Normal breath sounds. No wheezing.   Musculoskeletal:         General: Swelling and deformity present. Normal range of motion.      Cervical back: Normal range of motion and neck supple.   Lymphadenopathy:      Cervical: No cervical " adenopathy.   Skin:     General: Skin is warm and dry.      Findings: No erythema or rash.      Nails: There is no clubbing.   Neurological:      Mental Status: She is alert and oriented to person, place, and time.      Cranial Nerves: No cranial nerve deficit.      Deep Tendon Reflexes: Reflexes are normal and symmetric. Reflexes normal.   Psychiatric:         Speech: Speech normal.         Behavior: Behavior normal.         Thought Content: Thought content normal.         Judgment: Judgment normal.       Results for orders placed or performed in visit on 04/16/22   Protein, Urine, Random - Urine, Clean Catch    Specimen: Urine, Clean Catch   Result Value Ref Range    Total Protein, Urine 136.8 mg/dL   Creatinine, Urine, Random - Urine, Clean Catch    Specimen: Urine, Clean Catch   Result Value Ref Range    Creatinine, Urine 25.7 mg/dL   Renal Function Panel    Specimen: Blood   Result Value Ref Range    Glucose 77 65 - 99 mg/dL    BUN 9 6 - 20 mg/dL    Creatinine 0.68 0.57 - 1.00 mg/dL    Sodium 139 136 - 145 mmol/L    Potassium 4.4 3.5 - 5.2 mmol/L    Chloride 106 98 - 107 mmol/L    CO2 24.0 22.0 - 29.0 mmol/L    Calcium 8.7 8.6 - 10.5 mg/dL    Albumin 3.50 3.50 - 5.20 g/dL    Phosphorus 3.2 2.5 - 4.5 mg/dL    Anion Gap 9.0 5.0 - 15.0 mmol/L    BUN/Creatinine Ratio 13.2 7.0 - 25.0    eGFR 104.9 >60.0 mL/min/1.73     Diagnoses and all orders for this visit:    1. Essential hypertension (Primary)  Assessment & Plan:  bp is good   Continue monitoring and cozaar       2. Postablative hypothyroidism  Assessment & Plan:  Update tfts - continue current supplement     Orders:  -     liothyronine (CYTOMEL) 5 MCG tablet; Take 2 tablets by mouth Daily for 180 days.  Dispense: 180 tablet; Refill: 1  -     T4, Free  -     TSH  -     Vitamin D 25 Hydroxy    3. Vitamin D deficiency  Assessment & Plan:  Continue vitamin D supplement  Update levels     Return in about 6 months (around 11/10/2022) for Recheck.    Taylor JOAQUIN  MD Mg  Signed Taylor Holliday MD

## 2022-05-11 ENCOUNTER — PRIOR AUTHORIZATION (OUTPATIENT)
Dept: PHARMACY | Facility: HOSPITAL | Age: 52
End: 2022-05-11

## 2022-05-11 RX ORDER — HYDROXYCHLOROQUINE SULFATE 200 MG/1
1 TABLET, FILM COATED ORAL 2 TIMES DAILY
COMMUNITY
Start: 2022-05-09

## 2022-05-11 RX ORDER — POLYETHYLENE GLYCOL 3350 17 G/17G
17 POWDER, FOR SOLUTION ORAL DAILY
COMMUNITY
End: 2022-11-15

## 2022-05-12 LAB
25(OH)D3+25(OH)D2 SERPL-MCNC: 35.4 NG/ML (ref 30–100)
T4 FREE SERPL-MCNC: 0.94 NG/DL (ref 0.93–1.7)
TSH SERPL DL<=0.005 MIU/L-ACNC: 3.5 UIU/ML (ref 0.27–4.2)

## 2022-05-17 ENCOUNTER — LAB (OUTPATIENT)
Dept: LAB | Facility: HOSPITAL | Age: 52
End: 2022-05-17

## 2022-05-17 ENCOUNTER — SPECIALTY PHARMACY (OUTPATIENT)
Dept: PHARMACY | Facility: HOSPITAL | Age: 52
End: 2022-05-17

## 2022-05-17 ENCOUNTER — TRANSCRIBE ORDERS (OUTPATIENT)
Dept: LAB | Facility: HOSPITAL | Age: 52
End: 2022-05-17

## 2022-05-17 DIAGNOSIS — N05.2 MEMBRANOUS GLOMERULONEPHRITIS: Primary | ICD-10-CM

## 2022-05-17 DIAGNOSIS — N05.2 MEMBRANOUS GLOMERULONEPHRITIS: ICD-10-CM

## 2022-05-17 LAB
ALBUMIN SERPL-MCNC: 4 G/DL (ref 3.5–5.2)
ANION GAP SERPL CALCULATED.3IONS-SCNC: 9.3 MMOL/L (ref 5–15)
BACTERIA UR QL AUTO: ABNORMAL /HPF
BILIRUB UR QL STRIP: NEGATIVE
BUN SERPL-MCNC: 12 MG/DL (ref 6–20)
BUN/CREAT SERPL: 14.6 (ref 7–25)
CALCIUM SPEC-SCNC: 9.6 MG/DL (ref 8.6–10.5)
CHLORIDE SERPL-SCNC: 102 MMOL/L (ref 98–107)
CLARITY UR: CLEAR
CO2 SERPL-SCNC: 26.7 MMOL/L (ref 22–29)
COLOR UR: YELLOW
CREAT SERPL-MCNC: 0.82 MG/DL (ref 0.57–1)
CREAT UR-MCNC: 68.8 MG/DL
EGFRCR SERPLBLD CKD-EPI 2021: 86.2 ML/MIN/1.73
GLUCOSE SERPL-MCNC: 87 MG/DL (ref 65–99)
GLUCOSE UR STRIP-MCNC: NEGATIVE MG/DL
HGB UR QL STRIP.AUTO: ABNORMAL
HYALINE CASTS UR QL AUTO: ABNORMAL /LPF
KETONES UR QL STRIP: NEGATIVE
LEUKOCYTE ESTERASE UR QL STRIP.AUTO: NEGATIVE
NITRITE UR QL STRIP: NEGATIVE
PH UR STRIP.AUTO: 6.5 [PH] (ref 5–8)
PHOSPHATE SERPL-MCNC: 4.7 MG/DL (ref 2.5–4.5)
POTASSIUM SERPL-SCNC: 4.4 MMOL/L (ref 3.5–5.2)
PROT ?TM UR-MCNC: 419.5 MG/DL
PROT UR QL STRIP: ABNORMAL
RBC # UR STRIP: ABNORMAL /HPF
REF LAB TEST METHOD: ABNORMAL
SODIUM SERPL-SCNC: 138 MMOL/L (ref 136–145)
SP GR UR STRIP: 1.02 (ref 1–1.03)
SQUAMOUS #/AREA URNS HPF: ABNORMAL /HPF
UROBILINOGEN UR QL STRIP: ABNORMAL
WBC # UR STRIP: ABNORMAL /HPF

## 2022-05-17 PROCEDURE — 36415 COLL VENOUS BLD VENIPUNCTURE: CPT

## 2022-05-17 PROCEDURE — 80069 RENAL FUNCTION PANEL: CPT

## 2022-05-17 PROCEDURE — 82570 ASSAY OF URINE CREATININE: CPT

## 2022-05-17 PROCEDURE — 81001 URINALYSIS AUTO W/SCOPE: CPT

## 2022-05-17 PROCEDURE — 84156 ASSAY OF PROTEIN URINE: CPT

## 2022-05-17 NOTE — PROGRESS NOTES
Specialty Pharmacy Initiation Note     Kelsey is a 51 y.o. female, who is followed by the specialty pharmacy service for Nucala injections.       Indication, effectiveness, safety and convenience of her specialty medication(s) were reviewed today.      Patient will receive medication and administration counseling in office during appointment.    Patient encouraged to receive Shingrix vaccine at least 2 weeks prior to first Nucala injection.     Afia Rasmussen [facility]  Specialty Pharmacist

## 2022-05-31 ENCOUNTER — OFFICE VISIT (OUTPATIENT)
Dept: PHARMACY | Facility: HOSPITAL | Age: 52
End: 2022-05-31

## 2022-05-31 VITALS
OXYGEN SATURATION: 97 % | SYSTOLIC BLOOD PRESSURE: 126 MMHG | HEART RATE: 84 BPM | WEIGHT: 202.2 LBS | TEMPERATURE: 97.8 F | BODY MASS INDEX: 32.64 KG/M2 | DIASTOLIC BLOOD PRESSURE: 67 MMHG

## 2022-05-31 DIAGNOSIS — D72.18 EOSINOPHILIC GRANULOMATOSIS WITH POLYANGIITIS (EGPA): Primary | ICD-10-CM

## 2022-05-31 DIAGNOSIS — M30.1 EOSINOPHILIC GRANULOMATOSIS WITH POLYANGIITIS (EGPA): Primary | ICD-10-CM

## 2022-05-31 PROCEDURE — 96372 THER/PROPH/DIAG INJ SC/IM: CPT | Performed by: INTERNAL MEDICINE

## 2022-05-31 RX ORDER — ROSUVASTATIN CALCIUM 5 MG/1
5 TABLET, COATED ORAL DAILY
COMMUNITY
Start: 2022-05-24

## 2022-05-31 RX ORDER — PREDNISONE 20 MG/1
20 TABLET ORAL 2 TIMES DAILY
COMMUNITY
Start: 2022-05-25 | End: 2022-11-15 | Stop reason: SDDI

## 2022-05-31 RX ORDER — DIFLUPREDNATE OPHTHALMIC 0.5 MG/ML
EMULSION OPHTHALMIC
COMMUNITY
Start: 2022-04-15 | End: 2022-11-15

## 2022-05-31 RX ORDER — MYCOPHENOLATE MOFETIL 500 MG/1
500 TABLET ORAL 2 TIMES DAILY
COMMUNITY
Start: 2022-05-25 | End: 2023-01-27

## 2022-05-31 RX ORDER — PANTOPRAZOLE SODIUM 40 MG/1
40 TABLET, DELAYED RELEASE ORAL DAILY
COMMUNITY
Start: 2022-05-25

## 2022-05-31 NOTE — PROGRESS NOTES
Specialty Pharmacy Initiation Note     Ms. Ross is a 51 y.o. female, who is followed by the specialty pharmacy service for Nucala treatment.       Indication, effectiveness, safety and convenience of her specialty medication(s) were reviewed today.      First injection was administered as 3 separate doses (given in right arm, left arm, and stomach) and patient was observed for adverse reactions for 15 minutes prior to leaving the clinic. Pt was counseled on how to administer future doses at home and given written instructions for use. Pt has elected to come to the clinic for future doses until she feels comfortable with self-administration.     Pt was schedule for her next dose on June 28th.            Gracy Arceo RPH  5/31/2022  15:40 EDT

## 2022-05-31 NOTE — PROGRESS NOTES
Pt here to begin Nucala treatment. Pt receiving medication through her pharmacy benefits.     Pt was given a total of 3mL today divided between both upper arms and her abdomen.     Pt was observed for adverse reactions for 15 minutes prior to leaving the clinic    Pt would like to return to the clinic for injections until she is more comfortable giving them to herself.     NDC: 2384-2275-00  LOT#:  [id number]  EXP: Sep 2024    All three injectors had same lot and expiration date    Pt was scheduled to return in 4 weeks for repeat injections.

## 2022-06-01 ENCOUNTER — HOSPITAL ENCOUNTER (OUTPATIENT)
Dept: ULTRASOUND IMAGING | Facility: HOSPITAL | Age: 52
Discharge: HOME | End: 2022-06-01
Admitting: FAMILY MEDICINE

## 2022-06-01 DIAGNOSIS — K80.20 GALL BLADDER STONES: ICD-10-CM

## 2022-06-01 PROCEDURE — 76705 ECHO EXAM OF ABDOMEN: CPT

## 2022-06-08 ENCOUNTER — LAB (OUTPATIENT)
Dept: LAB | Facility: HOSPITAL | Age: 52
End: 2022-06-08

## 2022-06-08 ENCOUNTER — TRANSCRIBE ORDERS (OUTPATIENT)
Dept: LAB | Facility: HOSPITAL | Age: 52
End: 2022-06-08

## 2022-06-08 DIAGNOSIS — N05.2 MEMBRANOUS GLOMERULONEPHRITIS: ICD-10-CM

## 2022-06-08 DIAGNOSIS — N05.2 MEMBRANOUS GLOMERULONEPHRITIS: Primary | ICD-10-CM

## 2022-06-08 PROCEDURE — 82570 ASSAY OF URINE CREATININE: CPT

## 2022-06-08 PROCEDURE — 84156 ASSAY OF PROTEIN URINE: CPT

## 2022-06-09 LAB
CREAT UR-MCNC: 35 MG/DL
PROT ?TM UR-MCNC: 195 MG/DL
PROT/CREAT UR: 5571.4 MG/G CREA (ref 0–200)

## 2022-06-17 ENCOUNTER — TRANSCRIBE ORDERS (OUTPATIENT)
Dept: LAB | Facility: HOSPITAL | Age: 52
End: 2022-06-17

## 2022-06-17 ENCOUNTER — LAB (OUTPATIENT)
Dept: LAB | Facility: HOSPITAL | Age: 52
End: 2022-06-17

## 2022-06-17 DIAGNOSIS — R80.9 PROTEINURIA, UNSPECIFIED TYPE: ICD-10-CM

## 2022-06-17 DIAGNOSIS — N05.2 MEMBRANOUS GLOMERULONEPHRITIS: Primary | ICD-10-CM

## 2022-06-17 DIAGNOSIS — N05.2 MEMBRANOUS GLOMERULONEPHRITIS: ICD-10-CM

## 2022-06-17 LAB
BACTERIA UR QL AUTO: ABNORMAL /HPF
BASOPHILS # BLD AUTO: 0.07 10*3/MM3 (ref 0–0.2)
BASOPHILS NFR BLD AUTO: 1.1 % (ref 0–1.5)
BILIRUB UR QL STRIP: NEGATIVE
CLARITY UR: CLEAR
COLOR UR: YELLOW
DEPRECATED RDW RBC AUTO: 38.2 FL (ref 37–54)
EOSINOPHIL # BLD AUTO: 0.07 10*3/MM3 (ref 0–0.4)
EOSINOPHIL NFR BLD AUTO: 1.1 % (ref 0.3–6.2)
ERYTHROCYTE [DISTWIDTH] IN BLOOD BY AUTOMATED COUNT: 12.2 % (ref 12.3–15.4)
GLUCOSE UR STRIP-MCNC: NEGATIVE MG/DL
HCT VFR BLD AUTO: 40.1 % (ref 34–46.6)
HGB BLD-MCNC: 13.8 G/DL (ref 12–15.9)
HGB UR QL STRIP.AUTO: ABNORMAL
HYALINE CASTS UR QL AUTO: ABNORMAL /LPF
IMM GRANULOCYTES # BLD AUTO: 0.01 10*3/MM3 (ref 0–0.05)
IMM GRANULOCYTES NFR BLD AUTO: 0.2 % (ref 0–0.5)
KETONES UR QL STRIP: NEGATIVE
LEUKOCYTE ESTERASE UR QL STRIP.AUTO: NEGATIVE
LYMPHOCYTES # BLD AUTO: 2.74 10*3/MM3 (ref 0.7–3.1)
LYMPHOCYTES NFR BLD AUTO: 44.2 % (ref 19.6–45.3)
MCH RBC QN AUTO: 29.9 PG (ref 26.6–33)
MCHC RBC AUTO-ENTMCNC: 34.4 G/DL (ref 31.5–35.7)
MCV RBC AUTO: 87 FL (ref 79–97)
MONOCYTES # BLD AUTO: 0.6 10*3/MM3 (ref 0.1–0.9)
MONOCYTES NFR BLD AUTO: 9.7 % (ref 5–12)
NEUTROPHILS NFR BLD AUTO: 2.71 10*3/MM3 (ref 1.7–7)
NEUTROPHILS NFR BLD AUTO: 43.7 % (ref 42.7–76)
NITRITE UR QL STRIP: NEGATIVE
NRBC BLD AUTO-RTO: 0 /100 WBC (ref 0–0.2)
PH UR STRIP.AUTO: 6.5 [PH] (ref 5–8)
PLATELET # BLD AUTO: 252 10*3/MM3 (ref 140–450)
PMV BLD AUTO: 10.2 FL (ref 6–12)
PROT ?TM UR-MCNC: 216.1 MG/DL
PROT UR QL STRIP: ABNORMAL
RBC # BLD AUTO: 4.61 10*6/MM3 (ref 3.77–5.28)
RBC # UR STRIP: ABNORMAL /HPF
REF LAB TEST METHOD: ABNORMAL
SP GR UR STRIP: 1.01 (ref 1–1.03)
SQUAMOUS #/AREA URNS HPF: ABNORMAL /HPF
UROBILINOGEN UR QL STRIP: ABNORMAL
WBC # UR STRIP: ABNORMAL /HPF
WBC NRBC COR # BLD: 6.2 10*3/MM3 (ref 3.4–10.8)

## 2022-06-17 PROCEDURE — 85025 COMPLETE CBC W/AUTO DIFF WBC: CPT

## 2022-06-17 PROCEDURE — 81001 URINALYSIS AUTO W/SCOPE: CPT

## 2022-06-17 PROCEDURE — 80069 RENAL FUNCTION PANEL: CPT

## 2022-06-17 PROCEDURE — 36415 COLL VENOUS BLD VENIPUNCTURE: CPT

## 2022-06-17 PROCEDURE — 84156 ASSAY OF PROTEIN URINE: CPT

## 2022-06-17 PROCEDURE — 82570 ASSAY OF URINE CREATININE: CPT

## 2022-06-17 NOTE — PROGRESS NOTES
Patient: Kelsey Ross    YOB: 1970    Date: 06/20/2022    Primary Care Provider: To Hallman MD    Chief Complaint   Patient presents with   • Cholelithiasis       SUBJECTIVE:    History of present illness:  I saw the patient in the office today as a consultation for evaluation and treatment of abdominal pain. Patient had an ultrasound on 06/01/22 that did show cholelithiasis. Patient complains of intermittent abdominal pain with nausea and vomiting for over a year.  Patient found that recently her symptoms have been getting worse and more severe and more frequent.  Significant fatty food intolerance and spicy food intolerance.  No significant heartburn symptoms.      The following portions of the patient's history were reviewed and updated as appropriate: allergies, current medications, past family history, past medical history, past social history, past surgical history and problem list.    Review of Systems   Constitutional: Negative for chills, fever and unexpected weight change.   HENT: Negative for hearing loss, trouble swallowing and voice change.    Eyes: Negative for visual disturbance.   Respiratory: Negative for apnea, cough, chest tightness, shortness of breath and wheezing.    Cardiovascular: Negative for chest pain, palpitations and leg swelling.   Gastrointestinal: Negative for abdominal distention, abdominal pain, anal bleeding, blood in stool, constipation, diarrhea, nausea, rectal pain and vomiting.   Endocrine: Negative for cold intolerance and heat intolerance.   Genitourinary: Negative for difficulty urinating, dysuria and flank pain.   Musculoskeletal: Negative for back pain and gait problem.   Skin: Negative for color change, rash and wound.   Neurological: Negative for dizziness, syncope, speech difficulty, weakness, light-headedness, numbness and headaches.   Hematological: Negative for adenopathy. Does not bruise/bleed easily.   Psychiatric/Behavioral: Negative for  confusion. The patient is not nervous/anxious.        History:  Past Medical History:   Diagnosis Date   • Allergic    • Allergic rhinitis    • Asthma, extrinsic    • Bilateral ovarian cysts    • Bronchitis    • Chronic kidney disease     proteinuria and trying to diagnose egpa   • Constipation    • COVID      and    has received vaccines   • Deaf, left     high fever as a child   • Delayed emergence from anesthesia 2021   • Ear piercing    • Emphysema lung (HCC)    • Graves disease    • H/O echocardiogram 2022   • Hyperlipidemia    • Hypertension    • Hyperthyroidism    • Hypothyroidism    • Migraines    • Pain     chronic pain all over body - on no pain meds   • Thyroid disease    • Vasculitis (HCC)    • Visual acuity reduced     right eye r/t detached retina   • Vitamin D deficiency        Past Surgical History:   Procedure Laterality Date   • CATARACT EXTRACTION, BILATERAL      rught eye only   •  SECTION     • COLONOSCOPY N/A 2022    Procedure: COLONOSCOPY;  Surgeon: Long Vernon MD;  Location: [facility] ENDOSCOPY;  Service: Gastroenterology;  Laterality: N/A;   • EYE SURGERY Right     cataract   • HYSTERECTOMY      complete   • NOSE SURGERY     • RETINAL DETACHMENT SURGERY     • RHINOPLASTY     • SINUS SURGERY  2021       Family History   Problem Relation Age of Onset   • Arthritis Mother    • Hypertension Mother    • Hyperlipidemia Mother    • Migraines Mother    • Stroke Mother    • Diabetes Father    • Pulmonary embolism Father    • Lung cancer Maternal Aunt    • Arthritis Maternal Grandmother    • Hypertension Maternal Grandmother    • Hyperlipidemia Maternal Grandmother    • Stroke Maternal Grandmother    • Thyroid disease Maternal Grandmother    • Diabetes Paternal Grandmother    • Heart attack Maternal Grandfather        Social History     Tobacco Use   • Smoking status: Former Smoker     Packs/day: 2.00     Years: 8.00     Pack years: 16.00      Types: Cigarettes     Quit date: 1995     Years since quittin.1   • Smokeless tobacco: Never Used   Vaping Use   • Vaping Use: Never used   Substance Use Topics   • Alcohol use: Never   • Drug use: Never       Allergies:  Allergies   Allergen Reactions   • Penicillin G Unknown - High Severity   • Penicillins Unknown - High Severity     Allergist informed patient she was allergic to Penicillins   • Fexofenadine Headache     Only allegra D   • Methotrexate GI Intolerance and Headache   • Pseudoephedrine Hcl Headache       Medications:    Current Outpatient Medications:   •  albuterol sulfate  (90 Base) MCG/ACT inhaler, Inhale 2 puffs Every 4 (Four) Hours As Needed for Wheezing., Disp: , Rfl:   •  cyclobenzaprine (FLEXERIL) 10 MG tablet, Take 1 tablet by mouth Every 8 (Eight) Hours As Needed for Muscle Spasms., Disp: 30 tablet, Rfl: 1  •  fluticasone-salmeterol (Advair Diskus) 250-50 MCG/DOSE DISKUS, Inhale 1 puff Every 12 (Twelve) Hours., Disp: 60 each, Rfl: 5  •  hydroxychloroquine (PLAQUENIL) 200 MG tablet, Take 1 tablet by mouth 2 (Two) Times a Day., Disp: , Rfl:   •  liothyronine (CYTOMEL) 5 MCG tablet, Take 2 tablets by mouth Daily for 180 days., Disp: 180 tablet, Rfl: 1  •  losartan (COZAAR) 100 MG tablet, Take one po q day, Disp: , Rfl:   •  Mepolizumab 100 MG/ML solution auto-injector, Inject 3 mL under the skin into the appropriate area as directed Every 28 (Twenty-Eight) Days., Disp: 3 mL, Rfl: 11  •  mycophenolate (CELLCEPT) 500 MG tablet, Take 500 mg by mouth 2 (Two) Times a Day. Pt has not started yet, Disp: , Rfl:   •  pantoprazole (PROTONIX) 40 MG EC tablet, Take 40 mg by mouth Daily. Pt has not started taking yet, Disp: , Rfl:   •  polyethylene glycol (MIRALAX) 17 g packet, Take 17 g by mouth Daily., Disp: , Rfl:   •  predniSONE (DELTASONE) 20 MG tablet, Take 20 mg by mouth 2 (Two) Times a Day. Pt has not started yet, Disp: , Rfl:   •  rosuvastatin (CRESTOR) 5 MG tablet, Take 5 mg by  "mouth Daily., Disp: , Rfl:   •  spironolactone (ALDACTONE) 50 MG tablet, Take one po q day, Disp: , Rfl:   •  Synthroid 100 MCG tablet, Take 1 tablet by mouth Daily., Disp: 90 tablet, Rfl: 1  •  vitamin D3 125 MCG (5000 UT) capsule capsule, Take one po q day, Disp: , Rfl:   •  Zoster Vac Recomb Adjuvanted 50 MCG/0.5ML reconstituted suspension, Inject as directed per protocol. Repeat in 2-6 months, Disp: 1 each, Rfl: 1  •  difluprednate (DUREZOL) 0.05 % ophthalmic emulsion, instill 1 drop by ophthalmic route 4 times every day into right eye, Disp: , Rfl:   •  Neomycin-Polymyxin-Dexameth 0.1 % ointment, apply 1/4 strip into lower lid of right eye twice daily., Disp: , Rfl:     OBJECTIVE:    Vital Signs:   Vitals:    06/20/22 1452   BP: 122/70   BP Location: Left arm   Patient Position: Sitting   Cuff Size: Adult   Pulse: 80   Temp: 97.2 °F (36.2 °C)   TempSrc: Temporal   SpO2: 99%   Weight: 88.9 kg (196 lb)   Height: 167.6 cm (66\")       Physical Exam:   General Appearance:    Alert, cooperative, in no acute distress   Head:    Normocephalic, without obvious abnormality, atraumatic   Eyes:            Lids and lashes normal, conjunctivae and sclerae normal, no   icterus, no pallor, corneas clear, PERRLA   Ears:    Ears appear intact with no abnormalities noted   Throat:   No oral lesions, no thrush, oral mucosa moist   Neck:   No adenopathy, supple, trachea midline, no thyromegaly, no   carotid bruit, no JVD   Lungs:     Clear to auscultation,respirations regular, even and                  unlabored    Heart:    Regular rhythm and normal rate, normal S1 and S2, no            murmur   Abdomen:     no masses, no organomegaly, soft and tender right upper quadrant with mild guarding and rebound.  No peritonitis.  No abdominal wall hernias.   Extremities:   Moves all extremities well, no edema, no cyanosis, no             redness   Pulses:   Pulses palpable and equal bilaterally   Skin:   No bleeding, bruising or rash "   Lymph nodes:   No palpable adenopathy   Neurologic:   Cranial nerves 2 - 12 grossly intact, sensation intact    Answers for HPI/ROS submitted by the patient on 6/20/2022  What is the primary reason for your visit?: Abdominal Pain  Chronicity: recurrent  Onset: more than 1 year ago  Onset quality: sudden  Frequency: daily  Progression since onset: gradually worsening  Pain location: right flank  Pain - numeric: 7/10  Pain quality: sharp  Radiates to: right shoulder, back  flatus: Yes  Aggravated by: eating  Relieved by: nothing, vomiting  Diagnostic workup: CT scan, ultrasound      Results Review:   I reviewed the patient's new clinical results.    Review of Systems was reviewed and confirmed as accurate as documented by the MA.    ASSESSMENT/PLAN:    1. Right upper quadrant abdominal pain    2. Calculus of gallbladder with chronic cholecystitis without obstruction        I had a detailed and extensive discussion with the patient in the office and they understand that they need to undergo laparoscopic cholecystectomy with intraoperative cholangiography or possible open cholecystectomy. Full risks and benefits of operative versus nonoperative intervention were discussed with the patient and these included things such as nonresolution of symptoms and possible worsening of symptoms without surgical intervention versus infection, bleeding, open cholecystectomy, common bile duct injury, postoperative biliary leakage, need for drain placement, possible inability to perform cholangiography due to inflammation, postoperative abscess, etc with surgical intervention. The patient understands, agrees, and wishes to proceed with the surgical treatment plan as mentioned above. The patient had no questions for me at the end of the discussion.  I did draw a picture of the anatomy for the patient and used this in my informed consent.  Patient placed on a low-fat diet in interim.  Take over-the-counter antacids as needed for  occasional heartburn.     I discussed the patients findings and my recommendations with patient.    Electronically signed by Long Vernon MD  06/20/22          Answers for HPI/ROS submitted by the patient on 6/20/2022  What is the primary reason for your visit?: Abdominal Pain  Chronicity: recurrent  Onset: more than 1 year ago  Onset quality: sudden  Frequency: daily  Progression since onset: gradually worsening  Pain location: right flank  Pain - numeric: 7/10  Pain quality: sharp  Radiates to: right shoulder, back  flatus: Yes  Aggravated by: eating  Relieved by: nothing, vomiting  Diagnostic workup: CT scan, ultrasound

## 2022-06-18 LAB
ALBUMIN SERPL-MCNC: 3.6 G/DL (ref 3.5–5.2)
ANION GAP SERPL CALCULATED.3IONS-SCNC: 13.6 MMOL/L (ref 5–15)
BUN SERPL-MCNC: 6 MG/DL (ref 6–20)
BUN/CREAT SERPL: 7 (ref 7–25)
CALCIUM SPEC-SCNC: 8.8 MG/DL (ref 8.6–10.5)
CHLORIDE SERPL-SCNC: 97 MMOL/L (ref 98–107)
CO2 SERPL-SCNC: 24.4 MMOL/L (ref 22–29)
CREAT SERPL-MCNC: 0.86 MG/DL (ref 0.57–1)
CREAT UR-MCNC: 62.9 MG/DL
EGFRCR SERPLBLD CKD-EPI 2021: 81.4 ML/MIN/1.73
GLUCOSE SERPL-MCNC: 85 MG/DL (ref 65–99)
PHOSPHATE SERPL-MCNC: 3.6 MG/DL (ref 2.5–4.5)
POTASSIUM SERPL-SCNC: 4.2 MMOL/L (ref 3.5–5.2)
SODIUM SERPL-SCNC: 135 MMOL/L (ref 136–145)

## 2022-06-20 ENCOUNTER — OFFICE VISIT (OUTPATIENT)
Dept: SURGERY | Facility: CLINIC | Age: 52
End: 2022-06-20

## 2022-06-20 VITALS
SYSTOLIC BLOOD PRESSURE: 122 MMHG | OXYGEN SATURATION: 99 % | BODY MASS INDEX: 31.5 KG/M2 | DIASTOLIC BLOOD PRESSURE: 70 MMHG | TEMPERATURE: 97.2 F | HEIGHT: 66 IN | WEIGHT: 196 LBS | HEART RATE: 80 BPM

## 2022-06-20 DIAGNOSIS — K80.10 CALCULUS OF GALLBLADDER WITH CHRONIC CHOLECYSTITIS WITHOUT OBSTRUCTION: ICD-10-CM

## 2022-06-20 DIAGNOSIS — R10.11 RIGHT UPPER QUADRANT ABDOMINAL PAIN: Primary | ICD-10-CM

## 2022-06-20 DIAGNOSIS — Z01.818 PREOP TESTING: Primary | ICD-10-CM

## 2022-06-20 PROCEDURE — 99213 OFFICE O/P EST LOW 20 MIN: CPT | Performed by: SURGERY

## 2022-06-24 ENCOUNTER — OFFICE VISIT (OUTPATIENT)
Dept: PULMONOLOGY | Facility: CLINIC | Age: 52
End: 2022-06-24

## 2022-06-24 VITALS
HEART RATE: 76 BPM | DIASTOLIC BLOOD PRESSURE: 82 MMHG | OXYGEN SATURATION: 99 % | WEIGHT: 196 LBS | SYSTOLIC BLOOD PRESSURE: 120 MMHG | BODY MASS INDEX: 32.65 KG/M2 | RESPIRATION RATE: 18 BRPM | HEIGHT: 65 IN

## 2022-06-24 DIAGNOSIS — M32.14 OTHER SYSTEMIC LUPUS ERYTHEMATOSUS WITH GLOMERULAR DISEASE: ICD-10-CM

## 2022-06-24 DIAGNOSIS — J82.83 EOSINOPHILIC ASTHMA: Primary | ICD-10-CM

## 2022-06-24 DIAGNOSIS — D84.9 IMMUNOCOMPROMISED: ICD-10-CM

## 2022-06-24 DIAGNOSIS — J30.89 NON-SEASONAL ALLERGIC RHINITIS, UNSPECIFIED TRIGGER: ICD-10-CM

## 2022-06-24 PROBLEM — J45.909 ASTHMA, EXTRINSIC: Status: ACTIVE | Noted: 2022-06-24

## 2022-06-24 PROCEDURE — 99214 OFFICE O/P EST MOD 30 MIN: CPT | Performed by: INTERNAL MEDICINE

## 2022-06-24 RX ORDER — LINACLOTIDE 145 UG/1
145 CAPSULE, GELATIN COATED ORAL
COMMUNITY
Start: 2022-06-15 | End: 2023-03-31

## 2022-06-24 RX ORDER — FLUTICASONE PROPIONATE AND SALMETEROL 250; 50 UG/1; UG/1
1 POWDER RESPIRATORY (INHALATION)
Qty: 3 EACH | Refills: 3 | Status: SHIPPED | OUTPATIENT
Start: 2022-06-24 | End: 2022-12-23

## 2022-06-24 RX ORDER — ALBUTEROL SULFATE 90 UG/1
2 AEROSOL, METERED RESPIRATORY (INHALATION) EVERY 4 HOURS PRN
Qty: 48 G | Refills: 3 | Status: SHIPPED | OUTPATIENT
Start: 2022-06-24

## 2022-06-24 NOTE — PROGRESS NOTES
Pulmonary follow-up office Visit      Chief Complaint:    Chief Complaint   Patient presents with   • Follow-up   • Breathing Problem       Subjective: Kelsey Ross is a 51 y.o. female who is here today for follow-up.    Past medical history:  Patient had chronic cough that started in October 2020 initially was seen in my clinic in April 2021.  At that point, her CT scan and PFTs were found to be normal other than some small airway disease and she was started on Advair and as needed albuterol.  However, her eosinophils were 16%.  She had a very complicated set of symptoms that include rash, chronic cough and lymphadenopathy.  In lab work-up her DIMAS and p-ANCA were both found to be positive.  She was referred to hematology and rheumatology.  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  Dr. Lane had started her on methotrexate for presumed GPA without tissue biopsy, but she had side effects from this and had to stop.  She has also been on chronic p.o. steroids of prednisone 10 mg daily and serum allergy testing was negative.  Due to her complicated course she was referred to [facility] and she saw them in November 2021.  [facility] repeated PFTs which showed FEV1 102%, FEV1/FVC ratio 98.  No significant bronchial hyperresponsiveness except for FEF 50% with 49% improvement.  Her [facility] pulmonologist, Dr. Ca, felt she does have mild persistent asthma and encouraged her to take it twice daily as prescribed as she had admitted to some noncompliance with this.  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 [facility] then referred her to rheumatology and has now been diagnosed with lupus with renal involvement.    Since last visit, she has been definitively diagnosed with lupus with renal involvement and is currently on Plaquenil, CellCept and prednisone.  She has been on treatment for about 2 months and has  noted significant improvement in symptoms.  States she has not noticed a lot of side effects from medications other than dry skin.    She was also diagnosed with eosinophilic asthma and we have started Nucala and noticed much improved symptoms after her first injection.  She is getting this through our disease state management clinic.  She does not have any difficulty with the injections other than some mild irritation in the 1 on her right arm, but this resolved on its own.    She has noticed significant improvement in her cough and shortness of breath.  Overall, feels like her energy level has significantly improved.  Previously she was using albuterol multiple times a day, but states she may use it 3 times a week at this point.  She feels like this will continue to get better as she is on Nucala for a longer period    Subjective      Review of Systems:   Review of Systems   Constitutional: Positive for fatigue.   Respiratory: Positive for shortness of breath.    Skin: Positive for dry skin.         Social History:   Social History     Socioeconomic History   • Marital status: Single   Tobacco Use   • Smoking status: Former Smoker     Packs/day: 2.00     Years: 8.00     Pack years: 16.00     Types: Cigarettes     Quit date: 1995     Years since quittin.1   • Smokeless tobacco: Never Used   Vaping Use   • Vaping Use: Never used   Substance and Sexual Activity   • Alcohol use: Never   • Drug use: Never   • Sexual activity: Not Currently     Birth control/protection: None       Medications:     Current Outpatient Medications:   •  albuterol sulfate  (90 Base) MCG/ACT inhaler, Inhale 2 puffs Every 4 (Four) Hours As Needed for Wheezing or Shortness of Air., Disp: 48 g, Rfl: 3  •  hydroxychloroquine (PLAQUENIL) 200 MG tablet, Take 1 tablet by mouth 2 (Two) Times a Day., Disp: , Rfl:   •  Linzess 145 MCG capsule capsule, , Disp: , Rfl:   •  liothyronine (CYTOMEL) 5 MCG tablet, Take 2 tablets by mouth  Daily for 180 days., Disp: 180 tablet, Rfl: 1  •  losartan (COZAAR) 100 MG tablet, Take one po q day, Disp: , Rfl:   •  Mepolizumab 100 MG/ML solution auto-injector, Inject 3 mL under the skin into the appropriate area as directed Every 28 (Twenty-Eight) Days., Disp: 3 mL, Rfl: 11  •  mycophenolate (CELLCEPT) 500 MG tablet, Take 500 mg by mouth 2 (Two) Times a Day. Pt has not started yet, Disp: , Rfl:   •  pantoprazole (PROTONIX) 40 MG EC tablet, Take 40 mg by mouth Daily. Pt has not started taking yet, Disp: , Rfl:   •  polyethylene glycol (MIRALAX) 17 g packet, Take 17 g by mouth Daily., Disp: , Rfl:   •  rosuvastatin (CRESTOR) 5 MG tablet, Take 5 mg by mouth Daily., Disp: , Rfl:   •  spironolactone (ALDACTONE) 50 MG tablet, Take one po q day, Disp: , Rfl:   •  Synthroid 100 MCG tablet, Take 1 tablet by mouth Daily., Disp: 90 tablet, Rfl: 1  •  vitamin D3 125 MCG (5000 UT) capsule capsule, Take one po q day, Disp: , Rfl:   •  cyclobenzaprine (FLEXERIL) 10 MG tablet, Take 1 tablet by mouth Every 8 (Eight) Hours As Needed for Muscle Spasms., Disp: 30 tablet, Rfl: 1  •  difluprednate (DUREZOL) 0.05 % ophthalmic emulsion, instill 1 drop by ophthalmic route 4 times every day into right eye, Disp: , Rfl:   •  Fluticasone-Salmeterol (ADVAIR) 250-50 MCG/ACT DISKUS, Inhale 1 puff 2 (Two) Times a Day., Disp: 3 each, Rfl: 3  •  Neomycin-Polymyxin-Dexameth 0.1 % ointment, apply 1/4 strip into lower lid of right eye twice daily., Disp: , Rfl:   •  predniSONE (DELTASONE) 20 MG tablet, Take 20 mg by mouth 2 (Two) Times a Day. Pt has not started yet, Disp: , Rfl:   •  Zoster Vac Recomb Adjuvanted 50 MCG/0.5ML reconstituted suspension, Inject as directed per protocol. Repeat in 2-6 months, Disp: 1 each, Rfl: 1    Allergies:   Allergies   Allergen Reactions   • Penicillin G Unknown - High Severity   • Penicillins Unknown - High Severity     Allergist informed patient she was allergic to Penicillins   • Fexofenadine Headache      "Only allegra D   • Methotrexate GI Intolerance and Headache   • Pseudoephedrine Hcl Headache       Objective     Physical Exam:  Vital Signs:   Vitals:    06/24/22 1311   BP: 120/82   Pulse: 76   Resp: 18   SpO2: 99%   Weight: 88.9 kg (196 lb)   Height: 165.1 cm (65\")       Physical Exam  Vitals and nursing note reviewed.   Constitutional:       General: She is not in acute distress.     Appearance: She is well-developed. She is not toxic-appearing.   HENT:      Head: Normocephalic and atraumatic.      Right Ear: Tympanic membrane and external ear normal.      Left Ear: Tympanic membrane and external ear normal.      Nose: No congestion or rhinorrhea.      Mouth/Throat:      Mouth: Mucous membranes are moist.      Pharynx: Oropharynx is clear. No oropharyngeal exudate or posterior oropharyngeal erythema.   Eyes:      General: No scleral icterus.     Extraocular Movements: Extraocular movements intact.      Conjunctiva/sclera: Conjunctivae normal.   Neck:      Trachea: No tracheal deviation.   Cardiovascular:      Rate and Rhythm: Normal rate and regular rhythm.      Heart sounds: No murmur heard.  Pulmonary:      Effort: No respiratory distress.      Breath sounds: No wheezing or rhonchi.   Abdominal:      General: There is no distension.      Palpations: Abdomen is soft.   Musculoskeletal:         General: No swelling or tenderness.      Cervical back: Normal range of motion.      Right lower leg: No edema.      Left lower leg: No edema.   Skin:     General: Skin is warm and dry.      Findings: No rash.      Comments: Diffuse dry skin   Neurological:      Mental Status: She is alert and oriented to person, place, and time.      Motor: No weakness.      Gait: Gait normal.   Psychiatric:         Mood and Affect: Mood normal.         Behavior: Behavior normal.         Thought Content: Thought content normal.         Judgment: Judgment normal.           Results Review:   Labs: Reviewed.  Lab Results   Component Value " Date    WBC 6.20 06/17/2022    HGB 13.8 06/17/2022    HCT 40.1 06/17/2022    MCV 87.0 06/17/2022     06/17/2022       Eosinophil %   0.3 - 6.2 % 1.1  4.5  5.0  5.4     Eosinophils, Absolute   0.00 - 0.40 10*3/mm3 0.07  0.30  0.25  0.29  0.27            Myeloperoxidase Ab   0.0 - 9.0 U/mL <9.0    Antiproteinase 3 (ME-3)   0.0 - 3.5 U/mL <3.5    C-ANCA   Neg:<1:20 titer <1:20    P-ANCA   Neg:<1:20 titer 1:160 High       DIMAS Positive Abnormal     Comment:                                      Negative   <1:80                                        Borderline  1:80                                        Positive   >1:80           Micro: As of June 24, 2022   No results found for: RESPCX  No results found for: BLOODCX  No results found for: URINECX  No results found for: MRSACX  No results found for: MRSAPCR  No results found for: URCX  No components found for: LOWRESPCF  No results found for: THROATCX  No results found for: CULTURES  No components found for: STREPBCX  No results found for: STREPPNEUAG  No results found for: LEGIONELLA  No results found for: MYCOPLASCX  No results found for: GCCX  No results found for: WOUNDCX  No results found for: BODYFLDCX    ABG: No results found for: PHART, CEZ9YXM, PO2ART, HGBBG, Q5FLIIMA, CFIO2, FCOHB, CARBOXYHGB, FMETHB    Echo:     Radiology Scans:    Images reviewed personally.     US Abdomen Limited  Narrative: PROCEDURE: US ABDOMEN LIMITED-     HISTORY: GALL BLADDER STONES; K80.20-Calculus of gallbladder without  cholecystitis without obstruction     PROCEDURE: Ultrasound images of the right upper quadrant were obtained.     FINDINGS: Limited images of the pancreas are unremarkable. The liver  parenchyma is normal in echogenicity. There are gallstones in the  gallbladder. There is no gallbladder wall thickening.  There is no  pericholecystic fluid.  The common duct measures 4.30 mm . Limited  images of the right kidney are unremarkable.     Impression: Cholelithiasis.            Images were reviewed, interpreted, and dictated by CAROLE Foster  Transcribed by Kristine Palm PA-C.     This report was signed and finalized on 6/1/2022 10:08 AM by CAROLE Major.      PFT IMPRESSION:    January 2021 PFT showed FEV1 97%, FEV1/FVC ratio 81.  No significant bronchodilator responsiveness.  Total lung capacity 101%.  No air trapping.  Normal diffusing capacity.    November 2021 PFT showed FEV1 102%, FEV1/FVC ratio 98.    Assessment / Plan      Assessment/Plan:    1. Eosinophilic asthma  Much better symptom control since starting Nucala.  We will continue Nucala injections and discussed the possibility of being able to transition to giving injections at home.  Patient knows that if she decides to start doing at home injections then she can discuss this with disease state management clinic.    If still well controlled at next clinic visit, may be able to switch to Advair 100 to decrease steroid dose.    - albuterol sulfate  (90 Base) MCG/ACT inhaler; Inhale 2 puffs Every 4 (Four) Hours As Needed for Wheezing or Shortness of Air.  Dispense: 48 g; Refill: 3  - Fluticasone-Salmeterol (ADVAIR) 250-50 MCG/ACT DISKUS; Inhale 1 puff 2 (Two) Times a Day.  Dispense: 3 each; Refill: 3    2. 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.  Currently on Plaquenil, CellCept and prednisone.  Symptoms seem to be much improved since starting treatment.  She was very appreciative of being referred to [facility] and states that they have helped her more than anything else.    3. Immunocompromised (HCC)  Secondary to medications    4. Non-seasonal allergic rhinitis, unspecified trigger  Continue current regimen      Follow Up:   Return in about 6 months (around 12/24/2022).    Meaghan Benitez MD  Pulmonary/Critical Care Physician   [facility]      Please note that portions of this note may have been completed  with a voice recognition program. Efforts were made to edit the dictations, but occasionally words are mistranscribed.

## 2022-06-28 ENCOUNTER — OFFICE VISIT (OUTPATIENT)
Dept: PHARMACY | Facility: HOSPITAL | Age: 52
End: 2022-06-28

## 2022-06-28 DIAGNOSIS — M30.1 EOSINOPHILIC GRANULOMATOSIS WITH POLYANGIITIS (EGPA): ICD-10-CM

## 2022-06-28 DIAGNOSIS — D72.18 EOSINOPHILIC GRANULOMATOSIS WITH POLYANGIITIS (EGPA): ICD-10-CM

## 2022-06-28 PROCEDURE — G0463 HOSPITAL OUTPT CLINIC VISIT: HCPCS

## 2022-07-01 ENCOUNTER — SPECIALTY PHARMACY (OUTPATIENT)
Dept: PHARMACY | Facility: HOSPITAL | Age: 52
End: 2022-07-01

## 2022-07-07 ENCOUNTER — TELEPHONE (OUTPATIENT)
Dept: SURGERY | Facility: CLINIC | Age: 52
End: 2022-07-07

## 2022-07-09 ENCOUNTER — LAB (OUTPATIENT)
Dept: LAB | Facility: HOSPITAL | Age: 52
End: 2022-07-09

## 2022-07-09 DIAGNOSIS — Z01.818 PREOP TESTING: ICD-10-CM

## 2022-07-09 PROCEDURE — U0004 COV-19 TEST NON-CDC HGH THRU: HCPCS

## 2022-07-09 PROCEDURE — C9803 HOPD COVID-19 SPEC COLLECT: HCPCS

## 2022-07-10 LAB — SARS-COV-2 RNA NOSE QL NAA+PROBE: NOT DETECTED

## 2022-07-12 ENCOUNTER — OUTSIDE FACILITY SERVICE (OUTPATIENT)
Dept: SURGERY | Facility: CLINIC | Age: 52
End: 2022-07-12

## 2022-07-12 DIAGNOSIS — K81.9 CHOLECYSTITIS: Primary | ICD-10-CM

## 2022-07-12 PROCEDURE — 47563 LAPARO CHOLECYSTECTOMY/GRAPH: CPT | Performed by: SURGERY

## 2022-07-12 RX ORDER — HYDROCODONE BITARTRATE AND ACETAMINOPHEN 7.5; 325 MG/1; MG/1
1 TABLET ORAL EVERY 6 HOURS PRN
Qty: 14 TABLET | Refills: 0 | Status: SHIPPED | OUTPATIENT
Start: 2022-07-12 | End: 2022-11-15

## 2022-07-19 ENCOUNTER — TRANSCRIBE ORDERS (OUTPATIENT)
Dept: LAB | Facility: HOSPITAL | Age: 52
End: 2022-07-19

## 2022-07-19 ENCOUNTER — LAB (OUTPATIENT)
Dept: LAB | Facility: HOSPITAL | Age: 52
End: 2022-07-19

## 2022-07-19 DIAGNOSIS — N02.2 MEMBRANOUS NEPHROPATHY DETERMINED BY BIOPSY: ICD-10-CM

## 2022-07-19 DIAGNOSIS — N02.2 MEMBRANOUS NEPHROPATHY DETERMINED BY BIOPSY: Primary | ICD-10-CM

## 2022-07-19 DIAGNOSIS — N05.2 MEMBRANOUS GLOMERULONEPHRITIS: ICD-10-CM

## 2022-07-19 DIAGNOSIS — R76.8 ANA POSITIVE: ICD-10-CM

## 2022-07-19 LAB
ALBUMIN SERPL-MCNC: 3.5 G/DL (ref 3.5–5.2)
ANION GAP SERPL CALCULATED.3IONS-SCNC: 10.8 MMOL/L (ref 5–15)
BUN SERPL-MCNC: 4 MG/DL (ref 6–20)
BUN/CREAT SERPL: 5.6 (ref 7–25)
CALCIUM SPEC-SCNC: 8.8 MG/DL (ref 8.6–10.5)
CHLORIDE SERPL-SCNC: 105 MMOL/L (ref 98–107)
CO2 SERPL-SCNC: 23.2 MMOL/L (ref 22–29)
CREAT SERPL-MCNC: 0.71 MG/DL (ref 0.57–1)
EGFRCR SERPLBLD CKD-EPI 2021: 102.5 ML/MIN/1.73
GLUCOSE SERPL-MCNC: 88 MG/DL (ref 65–99)
HAV IGM SERPL QL IA: NORMAL
HBV CORE IGM SERPL QL IA: NORMAL
HBV SURFACE AG SERPL QL IA: NORMAL
HCV AB SER DONR QL: NORMAL
HIV1 P24 AG SER QL: NORMAL
HIV1+2 AB SER QL: NORMAL
POTASSIUM SERPL-SCNC: 4.3 MMOL/L (ref 3.5–5.2)
SODIUM SERPL-SCNC: 139 MMOL/L (ref 136–145)

## 2022-07-19 PROCEDURE — G0475 HIV COMBINATION ASSAY: HCPCS

## 2022-07-19 PROCEDURE — 80074 ACUTE HEPATITIS PANEL: CPT

## 2022-07-19 PROCEDURE — 86225 DNA ANTIBODY NATIVE: CPT

## 2022-07-19 PROCEDURE — 86235 NUCLEAR ANTIGEN ANTIBODY: CPT

## 2022-07-19 PROCEDURE — 82040 ASSAY OF SERUM ALBUMIN: CPT

## 2022-07-19 PROCEDURE — 86038 ANTINUCLEAR ANTIBODIES: CPT

## 2022-07-19 PROCEDURE — 36415 COLL VENOUS BLD VENIPUNCTURE: CPT

## 2022-07-19 PROCEDURE — 80048 BASIC METABOLIC PNL TOTAL CA: CPT

## 2022-07-20 LAB
ANA SER QL: NEGATIVE
DSDNA AB SER-ACNC: <1 IU/ML (ref 0–9)

## 2022-07-21 LAB — HISTONE IGG SER IA-ACNC: 0.4 UNITS (ref 0–0.9)

## 2022-07-21 NOTE — PROGRESS NOTES
"Patient: Kelsey Ross    YOB: 1970    Date: 07/25/2022    Primary Care Provider: To Hallman MD    Chief Complaint   Patient presents with   • Post-op Follow-up     Lap Carley       History of present illness:  I saw the patient in the office today as a followup from their recent laparoscopic cholecystectomy.  They state that they have done well although they state they have nausea.    Vital Signs:   Vitals:    07/25/22 1327   BP: 124/76   Pulse: 76   Temp: 98.1 °F (36.7 °C)   SpO2: 99%   Weight: 88.9 kg (196 lb)   Height: 165.1 cm (65\")       Physical Exam:   General Appearance:    Alert, cooperative, in no acute distress   Abdomen:     no masses, no organomegaly, soft non-tender, non-distended, no guarding, wounds are well healed     Assessment / Plan :    1. Postoperative visit        I did discuss the situation with the patient today in the office and they have done well from their recent laparoscopic cholecystectomy.  I have released the patient back to normal activity, they understand that they need to be careful about heavy lifting.  I need to see the patient back in the office only if they are having further problems, they know to call me if they are.  Refill on Zofran, follow-up in 6-week if no improvement nausea for upper endoscopy.    Electronically signed by Long Vernon MD  07/25/22                  "

## 2022-07-25 ENCOUNTER — OFFICE VISIT (OUTPATIENT)
Dept: SURGERY | Facility: CLINIC | Age: 52
End: 2022-07-25

## 2022-07-25 ENCOUNTER — TELEPHONE (OUTPATIENT)
Dept: SURGERY | Facility: CLINIC | Age: 52
End: 2022-07-25

## 2022-07-25 VITALS
HEART RATE: 76 BPM | OXYGEN SATURATION: 99 % | BODY MASS INDEX: 32.65 KG/M2 | DIASTOLIC BLOOD PRESSURE: 76 MMHG | TEMPERATURE: 98.1 F | HEIGHT: 65 IN | WEIGHT: 196 LBS | SYSTOLIC BLOOD PRESSURE: 124 MMHG

## 2022-07-25 DIAGNOSIS — Z48.89 POSTOPERATIVE VISIT: Primary | ICD-10-CM

## 2022-07-25 PROCEDURE — 99024 POSTOP FOLLOW-UP VISIT: CPT | Performed by: SURGERY

## 2022-07-25 RX ORDER — ONDANSETRON 4 MG/1
4 TABLET, FILM COATED ORAL DAILY PRN
Qty: 30 TABLET | Refills: 1 | Status: SHIPPED | OUTPATIENT
Start: 2022-07-25 | End: 2023-02-10 | Stop reason: SDUPTHER

## 2022-07-25 NOTE — TELEPHONE ENCOUNTER
Joyce with [facility] wants to know if Dr. Vernon was aware that patient was taking Plaquenil.  He had prescribed Ondansetron and there is a contraindication.  Please call pharmacy to let them know if he wants to change the Ondansetron.    [phone]    Thanks,

## 2022-07-25 NOTE — TELEPHONE ENCOUNTER
EMERALD called [facility] back and talked to the pharmacist.  Per Dr. Vernon if the patient knows the issues with taking Ondansetron with her Plaquenil, it is ok for her to take it.

## 2022-07-26 ENCOUNTER — DISEASE STATE MANAGEMENT VISIT (OUTPATIENT)
Dept: PHARMACY | Facility: HOSPITAL | Age: 52
End: 2022-07-26

## 2022-07-26 DIAGNOSIS — D72.18 EOSINOPHILIC GRANULOMATOSIS WITH POLYANGIITIS (EGPA): Primary | ICD-10-CM

## 2022-07-26 DIAGNOSIS — M30.1 EOSINOPHILIC GRANULOMATOSIS WITH POLYANGIITIS (EGPA): Primary | ICD-10-CM

## 2022-07-26 PROCEDURE — G0463 HOSPITAL OUTPT CLINIC VISIT: HCPCS

## 2022-07-26 NOTE — PROGRESS NOTES
Pt here to receive third dose of Nucala. Pt receiving medication through her pharmacy benefits.      Pt was given a total of 3mL divided between both upper arms ( 1 in Right arm and 2 in Left arm two inches apart). Pt's daughter here with her to observe so that she may give at home. Daughter was instructed on how to administer and then observed 2 injections. She then administered the last injection into the patient's upper left arm. She states she feels comfortable administering at home. They were advised that if they had any questions to call the office or the pharmacy. If they don't feel comfortable giving at home when the next dose is due they were advised to call the office and we would schedule to come in.      Pt was observed for adverse reactions prior to leaving the clinic.     Pt is going to administer next dose at home with the assistance of her daughter.      -1ST INJECTOR   NDC: 7670-8504-14  LOT#: [id number]  EXP: NOV 2024    2ND AND 3RD INJECTORS  NDC: 8959-0344-68  LOT#: [id number]  EXP: SEP 2024        Pt will call with any questions or concerns.

## 2022-07-27 ENCOUNTER — SPECIALTY PHARMACY (OUTPATIENT)
Dept: PHARMACY | Facility: HOSPITAL | Age: 52
End: 2022-07-27

## 2022-07-27 DIAGNOSIS — D72.18 EOSINOPHILIC GRANULOMATOSIS WITH POLYANGIITIS (EGPA): Primary | ICD-10-CM

## 2022-07-27 DIAGNOSIS — M30.1 EOSINOPHILIC GRANULOMATOSIS WITH POLYANGIITIS (EGPA): Primary | ICD-10-CM

## 2022-08-03 ENCOUNTER — TRANSCRIBE ORDERS (OUTPATIENT)
Dept: LAB | Facility: HOSPITAL | Age: 52
End: 2022-08-03

## 2022-08-03 ENCOUNTER — LAB (OUTPATIENT)
Dept: LAB | Facility: HOSPITAL | Age: 52
End: 2022-08-03

## 2022-08-03 DIAGNOSIS — N05.2 MEMBRANOUS GLOMERULONEPHRITIS: ICD-10-CM

## 2022-08-03 DIAGNOSIS — N05.2 MEMBRANOUS GLOMERULONEPHRITIS: Primary | ICD-10-CM

## 2022-08-03 LAB
ALBUMIN UR-MCNC: 240.3 MG/DL
BACTERIA UR QL AUTO: ABNORMAL /HPF
BILIRUB UR QL STRIP: NEGATIVE
CLARITY UR: CLEAR
COLOR UR: YELLOW
CREAT UR-MCNC: 71.8 MG/DL
CREAT UR-MCNC: 71.8 MG/DL
GLUCOSE UR STRIP-MCNC: NEGATIVE MG/DL
HGB UR QL STRIP.AUTO: ABNORMAL
HYALINE CASTS UR QL AUTO: ABNORMAL /LPF
KETONES UR QL STRIP: NEGATIVE
LEUKOCYTE ESTERASE UR QL STRIP.AUTO: NEGATIVE
MICROALBUMIN/CREAT UR: 3346.8 MG/G
NITRITE UR QL STRIP: NEGATIVE
PH UR STRIP.AUTO: 6 [PH] (ref 5–8)
PROT ?TM UR-MCNC: 328.5 MG/DL
PROT UR QL STRIP: ABNORMAL
PROT/CREAT UR: 4575.2 MG/G CREA (ref 0–200)
RBC # UR STRIP: ABNORMAL /HPF
REF LAB TEST METHOD: ABNORMAL
SP GR UR STRIP: 1.02 (ref 1–1.03)
SQUAMOUS #/AREA URNS HPF: ABNORMAL /HPF
UROBILINOGEN UR QL STRIP: ABNORMAL
WBC # UR STRIP: ABNORMAL /HPF

## 2022-08-03 PROCEDURE — 81001 URINALYSIS AUTO W/SCOPE: CPT

## 2022-08-03 PROCEDURE — 82570 ASSAY OF URINE CREATININE: CPT

## 2022-08-03 PROCEDURE — 84156 ASSAY OF PROTEIN URINE: CPT

## 2022-08-03 PROCEDURE — 82043 UR ALBUMIN QUANTITATIVE: CPT

## 2022-08-08 LAB
ALBUMIN UR-MCNC: 176 MG/DL
COLLECT DURATION TIME UR: 24 HRS
COLLECT DURATION TIME UR: 24 HRS
MICROALBUMIN 24H UR-MRATE: 1672 MG/24 HRS (ref 0–25)
PROT 24H UR-MRATE: 2326.6 MG/24HOURS (ref 0–150)
SPECIMEN VOL 24H UR: 950 ML
SPECIMEN VOL 24H UR: 950 ML

## 2022-08-08 PROCEDURE — 81050 URINALYSIS VOLUME MEASURE: CPT

## 2022-08-08 PROCEDURE — 82043 UR ALBUMIN QUANTITATIVE: CPT

## 2022-08-08 PROCEDURE — 84156 ASSAY OF PROTEIN URINE: CPT

## 2022-08-18 ENCOUNTER — SPECIALTY PHARMACY (OUTPATIENT)
Dept: PHARMACY | Facility: HOSPITAL | Age: 52
End: 2022-08-18

## 2022-08-18 DIAGNOSIS — M30.1 EOSINOPHILIC GRANULOMATOSIS WITH POLYANGIITIS (EGPA): Primary | ICD-10-CM

## 2022-08-18 DIAGNOSIS — D72.18 EOSINOPHILIC GRANULOMATOSIS WITH POLYANGIITIS (EGPA): Primary | ICD-10-CM

## 2022-08-23 ENCOUNTER — TRANSCRIBE ORDERS (OUTPATIENT)
Dept: LAB | Facility: HOSPITAL | Age: 52
End: 2022-08-23

## 2022-08-23 ENCOUNTER — LAB (OUTPATIENT)
Dept: LAB | Facility: HOSPITAL | Age: 52
End: 2022-08-23

## 2022-08-23 DIAGNOSIS — N02.2 MEMBRANOUS NEPHROPATHY DETERMINED BY BIOPSY: Primary | ICD-10-CM

## 2022-08-23 DIAGNOSIS — N02.2 MEMBRANOUS NEPHROPATHY DETERMINED BY BIOPSY: ICD-10-CM

## 2022-08-23 PROCEDURE — 36415 COLL VENOUS BLD VENIPUNCTURE: CPT

## 2022-08-23 PROCEDURE — 84156 ASSAY OF PROTEIN URINE: CPT

## 2022-08-23 PROCEDURE — 80053 COMPREHEN METABOLIC PANEL: CPT

## 2022-08-23 PROCEDURE — 82570 ASSAY OF URINE CREATININE: CPT

## 2022-08-24 LAB
ALBUMIN SERPL-MCNC: 4 G/DL (ref 3.5–5.2)
ALBUMIN/GLOB SERPL: 2.7 G/DL
ALP SERPL-CCNC: 77 U/L (ref 39–117)
ALT SERPL W P-5'-P-CCNC: 9 U/L (ref 1–33)
ANION GAP SERPL CALCULATED.3IONS-SCNC: 9 MMOL/L (ref 5–15)
AST SERPL-CCNC: 13 U/L (ref 1–32)
BILIRUB SERPL-MCNC: <0.2 MG/DL (ref 0–1.2)
BUN SERPL-MCNC: 7 MG/DL (ref 6–20)
BUN/CREAT SERPL: 9.7 (ref 7–25)
CALCIUM SPEC-SCNC: 8.9 MG/DL (ref 8.6–10.5)
CHLORIDE SERPL-SCNC: 101 MMOL/L (ref 98–107)
CO2 SERPL-SCNC: 26 MMOL/L (ref 22–29)
CREAT SERPL-MCNC: 0.72 MG/DL (ref 0.57–1)
CREAT UR-MCNC: 30.8 MG/DL
EGFRCR SERPLBLD CKD-EPI 2021: 100.7 ML/MIN/1.73
GLOBULIN UR ELPH-MCNC: 1.5 GM/DL
GLUCOSE SERPL-MCNC: 89 MG/DL (ref 65–99)
POTASSIUM SERPL-SCNC: 4.4 MMOL/L (ref 3.5–5.2)
PROT ?TM UR-MCNC: 119.3 MG/DL
PROT SERPL-MCNC: 5.5 G/DL (ref 6–8.5)
PROT/CREAT UR: 3873.4 MG/G CREA (ref 0–200)
SODIUM SERPL-SCNC: 136 MMOL/L (ref 136–145)

## 2022-08-28 PROCEDURE — 82570 ASSAY OF URINE CREATININE: CPT

## 2022-08-28 PROCEDURE — 82043 UR ALBUMIN QUANTITATIVE: CPT

## 2022-08-28 PROCEDURE — 81050 URINALYSIS VOLUME MEASURE: CPT

## 2022-08-28 PROCEDURE — 84156 ASSAY OF PROTEIN URINE: CPT

## 2022-08-29 LAB
ALBUMIN UR-MCNC: 27.8 MG/DL
COLLECT DURATION TIME UR: 24 HRS
CREAT UR-MCNC: 30.1 MG/DL
CREATINE 24H UR-MRATE: 0.76 G/24 HR (ref 0.7–1.6)
MICROALBUMIN 24H UR-MRATE: 706.1 MG/24 HRS (ref 0–25)
PROT 24H UR-MRATE: 889 MG/24HOURS (ref 0–150)
SPECIMEN VOL 24H UR: 2540 ML

## 2022-09-02 ENCOUNTER — LAB (OUTPATIENT)
Dept: LAB | Facility: HOSPITAL | Age: 52
End: 2022-09-02

## 2022-09-02 ENCOUNTER — TRANSCRIBE ORDERS (OUTPATIENT)
Dept: LAB | Facility: HOSPITAL | Age: 52
End: 2022-09-02

## 2022-09-02 DIAGNOSIS — E78.49 OTHER HYPERLIPIDEMIA: ICD-10-CM

## 2022-09-02 DIAGNOSIS — R80.1 PERSISTENT PROTEINURIA: ICD-10-CM

## 2022-09-02 DIAGNOSIS — R60.0 LOWER EXTREMITY EDEMA: Primary | ICD-10-CM

## 2022-09-02 DIAGNOSIS — N02.2 MEMBRANOUS NEPHROPATHY DETERMINED BY BIOPSY: ICD-10-CM

## 2022-09-02 DIAGNOSIS — R60.0 LOWER EXTREMITY EDEMA: ICD-10-CM

## 2022-09-02 LAB
25(OH)D3 SERPL-MCNC: 33.6 NG/ML (ref 30–100)
ALBUMIN SERPL-MCNC: 3.6 G/DL (ref 3.5–5.2)
ALBUMIN/GLOB SERPL: 2.1 G/DL
ALP SERPL-CCNC: 66 U/L (ref 39–117)
ALT SERPL W P-5'-P-CCNC: 7 U/L (ref 1–33)
ANION GAP SERPL CALCULATED.3IONS-SCNC: 9 MMOL/L (ref 5–15)
AST SERPL-CCNC: 16 U/L (ref 1–32)
BACTERIA UR QL AUTO: ABNORMAL /HPF
BASOPHILS # BLD AUTO: 0.07 10*3/MM3 (ref 0–0.2)
BASOPHILS NFR BLD AUTO: 1.6 % (ref 0–1.5)
BILIRUB SERPL-MCNC: 0.2 MG/DL (ref 0–1.2)
BILIRUB UR QL STRIP: NEGATIVE
BUN SERPL-MCNC: 11 MG/DL (ref 6–20)
BUN/CREAT SERPL: 14.3 (ref 7–25)
CALCIUM SPEC-SCNC: 8.9 MG/DL (ref 8.6–10.5)
CHLORIDE SERPL-SCNC: 102 MMOL/L (ref 98–107)
CHOLEST SERPL-MCNC: 230 MG/DL (ref 0–200)
CLARITY UR: CLEAR
CO2 SERPL-SCNC: 27 MMOL/L (ref 22–29)
COLOR UR: YELLOW
CREAT SERPL-MCNC: 0.77 MG/DL (ref 0.57–1)
CREAT UR-MCNC: 68.2 MG/DL
DEPRECATED RDW RBC AUTO: 38.6 FL (ref 37–54)
EGFRCR SERPLBLD CKD-EPI 2021: 92.9 ML/MIN/1.73
EOSINOPHIL # BLD AUTO: 0.03 10*3/MM3 (ref 0–0.4)
EOSINOPHIL NFR BLD AUTO: 0.7 % (ref 0.3–6.2)
ERYTHROCYTE [DISTWIDTH] IN BLOOD BY AUTOMATED COUNT: 12 % (ref 12.3–15.4)
GLOBULIN UR ELPH-MCNC: 1.7 GM/DL
GLUCOSE SERPL-MCNC: 86 MG/DL (ref 65–99)
GLUCOSE UR STRIP-MCNC: NEGATIVE MG/DL
HCT VFR BLD AUTO: 37.5 % (ref 34–46.6)
HDLC SERPL-MCNC: 84 MG/DL (ref 40–60)
HGB BLD-MCNC: 12.5 G/DL (ref 12–15.9)
HGB UR QL STRIP.AUTO: ABNORMAL
HYALINE CASTS UR QL AUTO: ABNORMAL /LPF
IMM GRANULOCYTES # BLD AUTO: 0.01 10*3/MM3 (ref 0–0.05)
IMM GRANULOCYTES NFR BLD AUTO: 0.2 % (ref 0–0.5)
KETONES UR QL STRIP: NEGATIVE
LDLC SERPL CALC-MCNC: 129 MG/DL (ref 0–100)
LDLC/HDLC SERPL: 1.51 {RATIO}
LEUKOCYTE ESTERASE UR QL STRIP.AUTO: NEGATIVE
LYMPHOCYTES # BLD AUTO: 2.23 10*3/MM3 (ref 0.7–3.1)
LYMPHOCYTES NFR BLD AUTO: 49.6 % (ref 19.6–45.3)
MAGNESIUM SERPL-MCNC: 2 MG/DL (ref 1.6–2.6)
MCH RBC QN AUTO: 29.3 PG (ref 26.6–33)
MCHC RBC AUTO-ENTMCNC: 33.3 G/DL (ref 31.5–35.7)
MCV RBC AUTO: 87.8 FL (ref 79–97)
MONOCYTES # BLD AUTO: 0.33 10*3/MM3 (ref 0.1–0.9)
MONOCYTES NFR BLD AUTO: 7.3 % (ref 5–12)
NEUTROPHILS NFR BLD AUTO: 1.83 10*3/MM3 (ref 1.7–7)
NEUTROPHILS NFR BLD AUTO: 40.6 % (ref 42.7–76)
NITRITE UR QL STRIP: NEGATIVE
NRBC BLD AUTO-RTO: 0 /100 WBC (ref 0–0.2)
PH UR STRIP.AUTO: 7 [PH] (ref 5–8)
PLATELET # BLD AUTO: 222 10*3/MM3 (ref 140–450)
PMV BLD AUTO: 10.5 FL (ref 6–12)
POTASSIUM SERPL-SCNC: 4.3 MMOL/L (ref 3.5–5.2)
PROT ?TM UR-MCNC: 208 MG/DL
PROT SERPL-MCNC: 5.3 G/DL (ref 6–8.5)
PROT UR QL STRIP: ABNORMAL
PROT/CREAT UR: 3049.9 MG/G CREA (ref 0–200)
RBC # BLD AUTO: 4.27 10*6/MM3 (ref 3.77–5.28)
RBC # UR STRIP: ABNORMAL /HPF
REF LAB TEST METHOD: ABNORMAL
SODIUM SERPL-SCNC: 138 MMOL/L (ref 136–145)
SP GR UR STRIP: 1.01 (ref 1–1.03)
SQUAMOUS #/AREA URNS HPF: ABNORMAL /HPF
TRIGL SERPL-MCNC: 96 MG/DL (ref 0–150)
UROBILINOGEN UR QL STRIP: ABNORMAL
VLDLC SERPL-MCNC: 17 MG/DL (ref 5–40)
WBC # UR STRIP: ABNORMAL /HPF
WBC NRBC COR # BLD: 4.5 10*3/MM3 (ref 3.4–10.8)

## 2022-09-02 PROCEDURE — 36415 COLL VENOUS BLD VENIPUNCTURE: CPT

## 2022-09-02 PROCEDURE — 83735 ASSAY OF MAGNESIUM: CPT

## 2022-09-02 PROCEDURE — 85025 COMPLETE CBC W/AUTO DIFF WBC: CPT

## 2022-09-02 PROCEDURE — 84156 ASSAY OF PROTEIN URINE: CPT

## 2022-09-02 PROCEDURE — 80053 COMPREHEN METABOLIC PANEL: CPT

## 2022-09-02 PROCEDURE — 82306 VITAMIN D 25 HYDROXY: CPT

## 2022-09-02 PROCEDURE — 80061 LIPID PANEL: CPT

## 2022-09-02 PROCEDURE — 82570 ASSAY OF URINE CREATININE: CPT

## 2022-09-02 PROCEDURE — 81001 URINALYSIS AUTO W/SCOPE: CPT

## 2022-09-05 PROCEDURE — 84156 ASSAY OF PROTEIN URINE: CPT

## 2022-09-05 PROCEDURE — 82043 UR ALBUMIN QUANTITATIVE: CPT

## 2022-09-05 PROCEDURE — 81050 URINALYSIS VOLUME MEASURE: CPT

## 2022-09-05 PROCEDURE — 82570 ASSAY OF URINE CREATININE: CPT

## 2022-09-06 LAB
ALBUMIN UR-MCNC: 91.1 MG/DL
COLLECT DURATION TIME UR: 24 HRS
CREAT UR-MCNC: 46.3 MG/DL
CREATINE 24H UR-MRATE: 0.93 G/24 HR (ref 0.7–1.6)
MICROALBUMIN 24H UR-MRATE: 1822 MG/24 HRS (ref 0–25)
PROT 24H UR-MRATE: 2454 MG/24HOURS (ref 0–150)
SPECIMEN VOL 24H UR: 2000 ML

## 2022-09-08 RX ORDER — LEVOTHYROXINE SODIUM 100 MCG
100 TABLET ORAL DAILY
Qty: 90 TABLET | Refills: 1 | Status: SHIPPED | OUTPATIENT
Start: 2022-09-08 | End: 2022-11-15 | Stop reason: SDUPTHER

## 2022-09-21 ENCOUNTER — LAB (OUTPATIENT)
Dept: LAB | Facility: HOSPITAL | Age: 52
End: 2022-09-21

## 2022-09-21 DIAGNOSIS — N02.2 MEMBRANOUS NEPHROPATHY DETERMINED BY BIOPSY: ICD-10-CM

## 2022-09-21 LAB — MAGNESIUM SERPL-MCNC: 2.2 MG/DL (ref 1.6–2.6)

## 2022-09-21 PROCEDURE — 84156 ASSAY OF PROTEIN URINE: CPT

## 2022-09-21 PROCEDURE — 36415 COLL VENOUS BLD VENIPUNCTURE: CPT

## 2022-09-21 PROCEDURE — 82570 ASSAY OF URINE CREATININE: CPT

## 2022-09-21 PROCEDURE — 83735 ASSAY OF MAGNESIUM: CPT

## 2022-09-21 PROCEDURE — 80048 BASIC METABOLIC PNL TOTAL CA: CPT

## 2022-09-22 ENCOUNTER — SPECIALTY PHARMACY (OUTPATIENT)
Dept: PHARMACY | Facility: HOSPITAL | Age: 52
End: 2022-09-22

## 2022-09-22 LAB
ANION GAP SERPL CALCULATED.3IONS-SCNC: 11 MMOL/L (ref 5–15)
BUN SERPL-MCNC: 11 MG/DL (ref 6–20)
BUN/CREAT SERPL: 14.9 (ref 7–25)
CALCIUM SPEC-SCNC: 9.2 MG/DL (ref 8.6–10.5)
CHLORIDE SERPL-SCNC: 99 MMOL/L (ref 98–107)
CO2 SERPL-SCNC: 26 MMOL/L (ref 22–29)
CREAT SERPL-MCNC: 0.74 MG/DL (ref 0.57–1)
CREAT UR-MCNC: 33.8 MG/DL
EGFRCR SERPLBLD CKD-EPI 2021: 97.5 ML/MIN/1.73
GLUCOSE SERPL-MCNC: 95 MG/DL (ref 65–99)
POTASSIUM SERPL-SCNC: 4.3 MMOL/L (ref 3.5–5.2)
PROT ?TM UR-MCNC: 117 MG/DL
PROT/CREAT UR: 3461.5 MG/G CREA (ref 0–200)
SODIUM SERPL-SCNC: 136 MMOL/L (ref 136–145)

## 2022-10-07 ENCOUNTER — TELEPHONE (OUTPATIENT)
Dept: SURGERY | Facility: CLINIC | Age: 52
End: 2022-10-07

## 2022-10-07 NOTE — TELEPHONE ENCOUNTER
Caller: SHRUTI ATKINSON    Relationship to patient: SELF    Best call back number: [phone]  Patient is needing: READY TO SCHEDULE UPPER ENDOSCOPY; STILL HAVING SYMPTOMS OF NAUSEA. PT HAS QUESTIONS ABOUT PROCEDURE BEFORE SCHEDULING.     CAN I BE SEDATED?  WILL IT HURT?   DO I NEED A ?

## 2022-10-10 LAB
ALBUMIN UR-MCNC: 49.6 MG/DL
COLLECT DURATION TIME UR: 24 HRS
CREAT UR-MCNC: 48 MG/DL
CREATINE 24H UR-MRATE: 0.92 G/24 HR (ref 0.7–1.6)
MICROALBUMIN 24H UR-MRATE: 947.4 MG/24 HRS (ref 0–25)
PROT 24H UR-MRATE: 1338.9 MG/24HOURS (ref 0–150)
SPECIMEN VOL 24H UR: 1910 ML

## 2022-10-10 PROCEDURE — 81050 URINALYSIS VOLUME MEASURE: CPT

## 2022-10-10 PROCEDURE — 82570 ASSAY OF URINE CREATININE: CPT

## 2022-10-10 PROCEDURE — 84156 ASSAY OF PROTEIN URINE: CPT

## 2022-10-10 PROCEDURE — 82043 UR ALBUMIN QUANTITATIVE: CPT

## 2022-10-12 ENCOUNTER — PREP FOR SURGERY (OUTPATIENT)
Dept: OTHER | Facility: HOSPITAL | Age: 52
End: 2022-10-12

## 2022-10-12 ENCOUNTER — HOSPITAL ENCOUNTER (OUTPATIENT)
Facility: HOSPITAL | Age: 52
Setting detail: HOSPITAL OUTPATIENT SURGERY
End: 2022-10-12
Attending: SURGERY | Admitting: SURGERY

## 2022-10-12 ENCOUNTER — LAB (OUTPATIENT)
Dept: LAB | Facility: HOSPITAL | Age: 52
End: 2022-10-12

## 2022-10-12 DIAGNOSIS — R11.0 NAUSEA: Primary | ICD-10-CM

## 2022-10-12 DIAGNOSIS — N02.2 MEMBRANOUS NEPHROPATHY DETERMINED BY BIOPSY: ICD-10-CM

## 2022-10-12 LAB
ALBUMIN SERPL-MCNC: 3.9 G/DL (ref 3.5–5.2)
ALBUMIN/GLOB SERPL: 1.6 G/DL
ALP SERPL-CCNC: 95 U/L (ref 39–117)
ALT SERPL W P-5'-P-CCNC: 8 U/L (ref 1–33)
ANION GAP SERPL CALCULATED.3IONS-SCNC: 8.7 MMOL/L (ref 5–15)
AST SERPL-CCNC: 12 U/L (ref 1–32)
BACTERIA UR QL AUTO: ABNORMAL /HPF
BILIRUB SERPL-MCNC: 0.2 MG/DL (ref 0–1.2)
BILIRUB UR QL STRIP: NEGATIVE
BUN SERPL-MCNC: 8 MG/DL (ref 6–20)
BUN/CREAT SERPL: 11 (ref 7–25)
CALCIUM SPEC-SCNC: 8.7 MG/DL (ref 8.6–10.5)
CHLORIDE SERPL-SCNC: 100 MMOL/L (ref 98–107)
CLARITY UR: CLEAR
CO2 SERPL-SCNC: 28.3 MMOL/L (ref 22–29)
COLOR UR: YELLOW
CREAT SERPL-MCNC: 0.73 MG/DL (ref 0.57–1)
EGFRCR SERPLBLD CKD-EPI 2021: 99.1 ML/MIN/1.73
GLOBULIN UR ELPH-MCNC: 2.4 GM/DL
GLUCOSE SERPL-MCNC: 84 MG/DL (ref 65–99)
GLUCOSE UR STRIP-MCNC: NEGATIVE MG/DL
HGB UR QL STRIP.AUTO: ABNORMAL
HYALINE CASTS UR QL AUTO: ABNORMAL /LPF
KETONES UR QL STRIP: NEGATIVE
LEUKOCYTE ESTERASE UR QL STRIP.AUTO: NEGATIVE
MAGNESIUM SERPL-MCNC: 1.9 MG/DL (ref 1.6–2.6)
NITRITE UR QL STRIP: NEGATIVE
PH UR STRIP.AUTO: 7 [PH] (ref 5–8)
POTASSIUM SERPL-SCNC: 4.1 MMOL/L (ref 3.5–5.2)
PROT SERPL-MCNC: 6.3 G/DL (ref 6–8.5)
PROT UR QL STRIP: ABNORMAL
RBC # UR STRIP: ABNORMAL /HPF
REF LAB TEST METHOD: ABNORMAL
SODIUM SERPL-SCNC: 137 MMOL/L (ref 136–145)
SP GR UR STRIP: 1.01 (ref 1–1.03)
SQUAMOUS #/AREA URNS HPF: ABNORMAL /HPF
UROBILINOGEN UR QL STRIP: ABNORMAL
WBC # UR STRIP: ABNORMAL /HPF

## 2022-10-12 PROCEDURE — 83735 ASSAY OF MAGNESIUM: CPT

## 2022-10-12 PROCEDURE — 80053 COMPREHEN METABOLIC PANEL: CPT

## 2022-10-12 PROCEDURE — 36415 COLL VENOUS BLD VENIPUNCTURE: CPT

## 2022-10-12 PROCEDURE — 81001 URINALYSIS AUTO W/SCOPE: CPT

## 2022-10-19 ENCOUNTER — SPECIALTY PHARMACY (OUTPATIENT)
Dept: PHARMACY | Facility: HOSPITAL | Age: 52
End: 2022-10-19

## 2022-10-21 ENCOUNTER — TELEPHONE (OUTPATIENT)
Dept: SURGERY | Facility: CLINIC | Age: 52
End: 2022-10-21

## 2022-10-21 ENCOUNTER — LAB (OUTPATIENT)
Dept: LAB | Facility: HOSPITAL | Age: 52
End: 2022-10-21

## 2022-10-21 DIAGNOSIS — N02.2 MEMBRANOUS NEPHROPATHY DETERMINED BY BIOPSY: ICD-10-CM

## 2022-10-21 PROCEDURE — 84156 ASSAY OF PROTEIN URINE: CPT

## 2022-10-21 PROCEDURE — 83735 ASSAY OF MAGNESIUM: CPT

## 2022-10-21 PROCEDURE — 80053 COMPREHEN METABOLIC PANEL: CPT

## 2022-10-21 PROCEDURE — 82570 ASSAY OF URINE CREATININE: CPT

## 2022-10-21 PROCEDURE — 36415 COLL VENOUS BLD VENIPUNCTURE: CPT

## 2022-10-21 NOTE — TELEPHONE ENCOUNTER
Pt notified that insurance won't approve her EGD.  She is going to decide whether she wants to come back in for another consultation with Dr Vernon or if she is just going to try and wait out the nausea.

## 2022-10-22 LAB
ALBUMIN SERPL-MCNC: 3.8 G/DL (ref 3.5–5.2)
ALBUMIN/GLOB SERPL: 2 G/DL
ALP SERPL-CCNC: 79 U/L (ref 39–117)
ALT SERPL W P-5'-P-CCNC: 10 U/L (ref 1–33)
ANION GAP SERPL CALCULATED.3IONS-SCNC: 11.2 MMOL/L (ref 5–15)
AST SERPL-CCNC: 13 U/L (ref 1–32)
BILIRUB SERPL-MCNC: <0.2 MG/DL (ref 0–1.2)
BUN SERPL-MCNC: 8 MG/DL (ref 6–20)
BUN/CREAT SERPL: 10.4 (ref 7–25)
CALCIUM SPEC-SCNC: 8.4 MG/DL (ref 8.6–10.5)
CHLORIDE SERPL-SCNC: 99 MMOL/L (ref 98–107)
CO2 SERPL-SCNC: 27.8 MMOL/L (ref 22–29)
CREAT SERPL-MCNC: 0.77 MG/DL (ref 0.57–1)
CREAT UR-MCNC: 120.3 MG/DL
EGFRCR SERPLBLD CKD-EPI 2021: 92.4 ML/MIN/1.73
GLOBULIN UR ELPH-MCNC: 1.9 GM/DL
GLUCOSE SERPL-MCNC: 81 MG/DL (ref 65–99)
MAGNESIUM SERPL-MCNC: 2 MG/DL (ref 1.6–2.6)
POTASSIUM SERPL-SCNC: 4 MMOL/L (ref 3.5–5.2)
PROT ?TM UR-MCNC: 282.8 MG/DL
PROT SERPL-MCNC: 5.7 G/DL (ref 6–8.5)
PROT/CREAT UR: 2350.8 MG/G CREA (ref 0–200)
SODIUM SERPL-SCNC: 138 MMOL/L (ref 136–145)

## 2022-11-09 ENCOUNTER — LAB (OUTPATIENT)
Dept: LAB | Facility: HOSPITAL | Age: 52
End: 2022-11-09

## 2022-11-09 DIAGNOSIS — N02.2 MEMBRANOUS NEPHROPATHY DETERMINED BY BIOPSY: ICD-10-CM

## 2022-11-09 LAB
BILIRUB UR QL STRIP: NEGATIVE
CLARITY UR: CLEAR
COLOR UR: YELLOW
GLUCOSE UR STRIP-MCNC: NEGATIVE MG/DL
HGB UR QL STRIP.AUTO: ABNORMAL
KETONES UR QL STRIP: NEGATIVE
LEUKOCYTE ESTERASE UR QL STRIP.AUTO: ABNORMAL
NITRITE UR QL STRIP: NEGATIVE
PH UR STRIP.AUTO: 6 [PH] (ref 5–8)
PROT UR QL STRIP: ABNORMAL
SP GR UR STRIP: 1.02 (ref 1–1.03)
UROBILINOGEN UR QL STRIP: ABNORMAL

## 2022-11-09 PROCEDURE — 81001 URINALYSIS AUTO W/SCOPE: CPT

## 2022-11-09 PROCEDURE — 36415 COLL VENOUS BLD VENIPUNCTURE: CPT

## 2022-11-09 PROCEDURE — 80053 COMPREHEN METABOLIC PANEL: CPT

## 2022-11-10 LAB
ALBUMIN SERPL-MCNC: 3.9 G/DL (ref 3.5–5.2)
ALBUMIN/GLOB SERPL: 1.8 G/DL
ALP SERPL-CCNC: 83 U/L (ref 39–117)
ALT SERPL W P-5'-P-CCNC: <5 U/L (ref 1–33)
ANION GAP SERPL CALCULATED.3IONS-SCNC: 6.8 MMOL/L (ref 5–15)
AST SERPL-CCNC: 11 U/L (ref 1–32)
BACTERIA UR QL AUTO: ABNORMAL /HPF
BILIRUB SERPL-MCNC: <0.2 MG/DL (ref 0–1.2)
BUN SERPL-MCNC: 10 MG/DL (ref 6–20)
BUN/CREAT SERPL: 13.2 (ref 7–25)
CALCIUM SPEC-SCNC: 8.8 MG/DL (ref 8.6–10.5)
CHLORIDE SERPL-SCNC: 101 MMOL/L (ref 98–107)
CO2 SERPL-SCNC: 28.2 MMOL/L (ref 22–29)
CREAT SERPL-MCNC: 0.76 MG/DL (ref 0.57–1)
EGFRCR SERPLBLD CKD-EPI 2021: 93.8 ML/MIN/1.73
GLOBULIN UR ELPH-MCNC: 2.2 GM/DL
GLUCOSE SERPL-MCNC: 89 MG/DL (ref 65–99)
HYALINE CASTS UR QL AUTO: ABNORMAL /LPF
POTASSIUM SERPL-SCNC: 4.1 MMOL/L (ref 3.5–5.2)
PROT SERPL-MCNC: 6.1 G/DL (ref 6–8.5)
RBC # UR STRIP: ABNORMAL /HPF
REF LAB TEST METHOD: ABNORMAL
SODIUM SERPL-SCNC: 136 MMOL/L (ref 136–145)
SQUAMOUS #/AREA URNS HPF: ABNORMAL /HPF
WBC # UR STRIP: ABNORMAL /HPF

## 2022-11-15 ENCOUNTER — LAB (OUTPATIENT)
Dept: LAB | Facility: HOSPITAL | Age: 52
End: 2022-11-15

## 2022-11-15 ENCOUNTER — OFFICE VISIT (OUTPATIENT)
Dept: ENDOCRINOLOGY | Facility: CLINIC | Age: 52
End: 2022-11-15

## 2022-11-15 ENCOUNTER — SPECIALTY PHARMACY (OUTPATIENT)
Dept: PHARMACY | Facility: HOSPITAL | Age: 52
End: 2022-11-15

## 2022-11-15 VITALS
SYSTOLIC BLOOD PRESSURE: 114 MMHG | BODY MASS INDEX: 31.12 KG/M2 | WEIGHT: 193.6 LBS | HEIGHT: 66 IN | HEART RATE: 86 BPM | DIASTOLIC BLOOD PRESSURE: 62 MMHG | OXYGEN SATURATION: 98 %

## 2022-11-15 DIAGNOSIS — E55.9 VITAMIN D DEFICIENCY: Primary | ICD-10-CM

## 2022-11-15 DIAGNOSIS — E89.0 POSTABLATIVE HYPOTHYROIDISM: ICD-10-CM

## 2022-11-15 DIAGNOSIS — R63.5 WEIGHT GAIN: ICD-10-CM

## 2022-11-15 DIAGNOSIS — I10 ESSENTIAL HYPERTENSION: ICD-10-CM

## 2022-11-15 DIAGNOSIS — E03.9 ACQUIRED HYPOTHYROIDISM: ICD-10-CM

## 2022-11-15 PROBLEM — R60.0 LOWER EXTREMITY EDEMA: Status: ACTIVE | Noted: 2022-08-15

## 2022-11-15 PROBLEM — R80.1 PERSISTENT PROTEINURIA: Status: ACTIVE | Noted: 2022-08-15

## 2022-11-15 PROCEDURE — 99214 OFFICE O/P EST MOD 30 MIN: CPT | Performed by: INTERNAL MEDICINE

## 2022-11-15 RX ORDER — LIOTHYRONINE SODIUM 5 UG/1
10 TABLET ORAL DAILY
Qty: 180 TABLET | Refills: 1 | Status: SHIPPED | OUTPATIENT
Start: 2022-11-15 | End: 2023-05-14

## 2022-11-15 RX ORDER — LEVOTHYROXINE SODIUM 100 MCG
100 TABLET ORAL DAILY
Qty: 90 TABLET | Refills: 1 | Status: SHIPPED | OUTPATIENT
Start: 2022-11-15 | End: 2022-11-18 | Stop reason: DRUGHIGH

## 2022-11-15 RX ORDER — FUROSEMIDE 20 MG/1
TABLET ORAL
COMMUNITY
Start: 2022-09-27

## 2022-11-15 NOTE — PROGRESS NOTES
Kelsey Ross 52 y.o.  CC:Follow-up, Hypothyroidism, and Hypertension      [place]: Follow-up, Hypothyroidism, and Hypertension    bp is good  Is taking crestor 5 mg daily   Is taking vitamin D supplement  Is taking synthroid 100 mcg daily   More dry skin  More fatigue  Hair loss     Allergies   Allergen Reactions   • Penicillin G Unknown - High Severity   • Penicillins Unknown - High Severity     Allergist informed patient she was allergic to Penicillins   • Fexofenadine Headache     Only allegra D   • Methotrexate GI Intolerance and Headache   • Pseudoephedrine Hcl Headache       Current Outpatient Medications:   •  furosemide (LASIX) 20 MG tablet, Take 40 mg in am and 20 mg early afternoon, Disp: , Rfl:   •  liothyronine (CYTOMEL) 5 MCG tablet, Take 2 tablets by mouth Daily for 180 days., Disp: 180 tablet, Rfl: 1  •  Synthroid 100 MCG tablet, Take 1 tablet by mouth Daily., Disp: 90 tablet, Rfl: 1  •  vitamin D3 (vitamin d) 125 MCG (5000 UT) capsule capsule, Take 5,000 Units by mouth Daily., Disp: , Rfl:   •  albuterol sulfate  (90 Base) MCG/ACT inhaler, Inhale 2 puffs Every 4 (Four) Hours As Needed for Wheezing or Shortness of Air., Disp: 48 g, Rfl: 3  •  Fluticasone-Salmeterol (ADVAIR) 250-50 MCG/ACT DISKUS, Inhale 1 puff 2 (Two) Times a Day., Disp: 3 each, Rfl: 3  •  hydroxychloroquine (PLAQUENIL) 200 MG tablet, Take 1 tablet by mouth 2 (Two) Times a Day., Disp: , Rfl:   •  Linzess 145 MCG capsule capsule, 145 mcg Every Morning Before Breakfast., Disp: , Rfl:   •  losartan (COZAAR) 100 MG tablet, Take one po q day, Disp: , Rfl:   •  Mepolizumab 100 MG/ML solution auto-injector, Inject 3 mL under the skin into the appropriate area as directed Every 28 (Twenty-Eight) Days., Disp: 3 mL, Rfl: 11  •  mycophenolate (CELLCEPT) 500 MG tablet, Take 500 mg by mouth 2 (Two) Times a Day., Disp: , Rfl:   •  ondansetron (Zofran) 4 MG tablet, Take 1 tablet by mouth Daily As Needed for Nausea or Vomiting., Disp: 30 tablet,  Rfl: 1  •  pantoprazole (PROTONIX) 40 MG EC tablet, Take 40 mg by mouth Daily. Pt has not started taking yet, Disp: , Rfl:   •  rosuvastatin (CRESTOR) 5 MG tablet, Take 5 mg by mouth Daily., Disp: , Rfl:   •  spironolactone (ALDACTONE) 50 MG tablet, Take one po q day, Disp: , Rfl:   •  vitamin D3 125 MCG (5000 UT) capsule capsule, Take one po q day, Disp: , Rfl:   No current facility-administered medications for this visit.  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 Negative for activity change, appetite change and unexpected weight change.   HENT: Negative for congestion and rhinorrhea.    Eyes: Negative for visual disturbance.   Respiratory: Negative for cough and shortness of breath.    Cardiovascular: Negative for palpitations and leg swelling.   Gastrointestinal: Negative for constipation, diarrhea and nausea.   Genitourinary: Negative for hematuria.   Musculoskeletal: Positive for arthralgias. Negative for back pain, gait problem, joint swelling and myalgias.   Skin: Negative for color change, rash and wound.        Dry skin and hair loss     "  Allergic/Immunologic: Negative for environmental allergies, food allergies and immunocompromised state.   Neurological: Negative for dizziness, weakness and light-headedness.   Psychiatric/Behavioral: Negative for confusion, decreased concentration, dysphoric mood and sleep disturbance. The patient is not nervous/anxious.      Social History     Socioeconomic History   • Marital status: Single   Tobacco Use   • Smoking status: Former     Packs/day: 2.00     Years: 8.00     Pack years: 16.00     Types: Cigarettes     Quit date: 1995     Years since quittin.5   • Smokeless tobacco: Never   Vaping Use   • Vaping Use: Never used   Substance and Sexual Activity   • Alcohol use: Never   • Drug use: Never   • Sexual activity: Not Currently     Birth control/protection: None     Family History   Problem Relation Age of Onset   • Arthritis Mother    • Hypertension Mother    • Hyperlipidemia Mother    • Migraines Mother    • Stroke Mother    • Diabetes Father    • Pulmonary embolism Father    • Lung cancer Maternal Aunt    • Arthritis Maternal Grandmother    • Hypertension Maternal Grandmother    • Hyperlipidemia Maternal Grandmother    • Stroke Maternal Grandmother    • Thyroid disease Maternal Grandmother    • Diabetes Paternal Grandmother    • Heart attack Maternal Grandfather      /62   Pulse 86   Ht 167.6 cm (66\")   Wt 87.8 kg (193 lb 9.6 oz)   SpO2 98%   BMI 31.25 kg/m²   Physical Exam  Vitals and nursing note reviewed.   Constitutional:       Appearance: Normal appearance. She is well-developed.   HENT:      Head: Normocephalic and atraumatic.   Eyes:      General: Lids are normal.      Extraocular Movements: Extraocular movements intact.      Conjunctiva/sclera: Conjunctivae normal.      Pupils: Pupils are equal, round, and reactive to light.   Neck:      Thyroid: No thyroid mass or thyromegaly.      Vascular: No carotid bruit.      Trachea: Trachea normal. No tracheal deviation. "   Cardiovascular:      Rate and Rhythm: Normal rate and regular rhythm.      Heart sounds: Normal heart sounds. No murmur heard.    No friction rub. No gallop.   Pulmonary:      Effort: Pulmonary effort is normal. No respiratory distress.      Breath sounds: Normal breath sounds. No wheezing.   Musculoskeletal:         General: No deformity. Normal range of motion.      Cervical back: Normal range of motion and neck supple.   Lymphadenopathy:      Cervical: No cervical adenopathy.   Skin:     General: Skin is warm and dry.      Findings: No erythema or rash.      Nails: There is no clubbing.   Neurological:      General: No focal deficit present.      Mental Status: She is alert and oriented to person, place, and time.      Cranial Nerves: No cranial nerve deficit.      Deep Tendon Reflexes: Reflexes are normal and symmetric. Reflexes normal.   Psychiatric:         Mood and Affect: Mood normal.         Speech: Speech normal.         Behavior: Behavior normal.         Thought Content: Thought content normal.         Judgment: Judgment normal.       Results for orders placed or performed in visit on 11/09/22   Comprehensive Metabolic Panel    Specimen: Blood   Result Value Ref Range    Glucose 89 65 - 99 mg/dL    BUN 10 6 - 20 mg/dL    Creatinine 0.76 0.57 - 1.00 mg/dL    Sodium 136 136 - 145 mmol/L    Potassium 4.1 3.5 - 5.2 mmol/L    Chloride 101 98 - 107 mmol/L    CO2 28.2 22.0 - 29.0 mmol/L    Calcium 8.8 8.6 - 10.5 mg/dL    Total Protein 6.1 6.0 - 8.5 g/dL    Albumin 3.90 3.50 - 5.20 g/dL    ALT (SGPT) <5 1 - 33 U/L    AST (SGOT) 11 1 - 32 U/L    Alkaline Phosphatase 83 39 - 117 U/L    Total Bilirubin <0.2 0.0 - 1.2 mg/dL    Globulin 2.2 gm/dL    A/G Ratio 1.8 g/dL    BUN/Creatinine Ratio 13.2 7.0 - 25.0    Anion Gap 6.8 5.0 - 15.0 mmol/L    eGFR 93.8 >60.0 mL/min/1.73   Urinalysis without microscopic (no culture) - Urine, Clean Catch    Specimen: Urine, Clean Catch   Result Value Ref Range    Color, UA Yellow  Yellow, Straw    Appearance, UA Clear Clear    pH, UA 6.0 5.0 - 8.0    Specific Gravity, UA 1.020 1.005 - 1.030    Glucose, UA Negative Negative    Ketones, UA Negative Negative    Bilirubin, UA Negative Negative    Blood, UA Small (1+) (A) Negative    Protein, UA >=300 mg/dL (3+) (A) Negative    Leuk Esterase, UA Trace (A) Negative    Nitrite, UA Negative Negative    Urobilinogen, UA 0.2 E.U./dL 0.2 - 1.0 E.U./dL   Urinalysis, Microscopic Only - Urine, Clean Catch    Specimen: Urine, Clean Catch   Result Value Ref Range    RBC, UA 6-12 (A) None Seen, 0-2 /HPF    WBC, UA 6-12 (A) None Seen, 0-2 /HPF    Bacteria, UA None Seen None Seen /HPF    Squamous Epithelial Cells, UA 0-2 None Seen, 0-2 /HPF    Hyaline Casts, UA 0-2 None Seen /LPF    Methodology Automated Microscopy      Diagnoses and all orders for this visit:    1. Vitamin D deficiency (Primary)  Assessment & Plan:  Taking supplement- update levels   Some fatigue, weakness     Orders:  -     Vitamin D,25-Hydroxy  -     Vitamin B12    2. Postablative hypothyroidism  Assessment & Plan:  See above  Update tfts     Orders:  -     liothyronine (CYTOMEL) 5 MCG tablet; Take 2 tablets by mouth Daily for 180 days.  Dispense: 180 tablet; Refill: 1    3. Essential hypertension  Assessment & Plan:  BP is well controlled  Continue monitoring and medications     Orders:  -     Comprehensive Metabolic Panel  -     CBC Auto Differential    4. Acquired hypothyroidism  Assessment & Plan:  Continue t3 and t4 supplement   Update tfts   Dry skin and hair loss     Orders:  -     Lipid Panel  -     TSH  -     T4, Free    5. Weight gain  Assessment & Plan:  Update tfts   Weight actually down 3 lbs compared to [place]     Orders:  -     Hemoglobin A1c    Other orders  -     Synthroid 100 MCG tablet; Take 1 tablet by mouth Daily.  Dispense: 90 tablet; Refill: 1  Return in about 6 months (around 5/15/2023).    Taylor Holliday MD  Signed Taylor Holliday MD

## 2022-11-16 LAB
25(OH)D3+25(OH)D2 SERPL-MCNC: 49.3 NG/ML (ref 30–100)
ALBUMIN SERPL-MCNC: 4.6 G/DL (ref 3.5–5.2)
ALBUMIN/GLOB SERPL: 2.9 G/DL
ALP SERPL-CCNC: 88 U/L (ref 39–117)
ALT SERPL-CCNC: 9 U/L (ref 1–33)
AST SERPL-CCNC: 16 U/L (ref 1–32)
BASOPHILS # BLD AUTO: 0.05 10*3/MM3 (ref 0–0.2)
BASOPHILS NFR BLD AUTO: 0.8 % (ref 0–1.5)
BILIRUB SERPL-MCNC: 0.2 MG/DL (ref 0–1.2)
BUN SERPL-MCNC: 6 MG/DL (ref 6–20)
BUN/CREAT SERPL: 6.4 (ref 7–25)
CALCIUM SERPL-MCNC: 9.6 MG/DL (ref 8.6–10.5)
CHLORIDE SERPL-SCNC: 102 MMOL/L (ref 98–107)
CHOLEST SERPL-MCNC: 213 MG/DL (ref 0–200)
CO2 SERPL-SCNC: 25.8 MMOL/L (ref 22–29)
CREAT SERPL-MCNC: 0.94 MG/DL (ref 0.57–1)
EGFRCR SERPLBLD CKD-EPI 2021: 72.7 ML/MIN/1.73
EOSINOPHIL # BLD AUTO: 0.03 10*3/MM3 (ref 0–0.4)
EOSINOPHIL NFR BLD AUTO: 0.5 % (ref 0.3–6.2)
ERYTHROCYTE [DISTWIDTH] IN BLOOD BY AUTOMATED COUNT: 12 % (ref 12.3–15.4)
GLOBULIN SER CALC-MCNC: 1.6 GM/DL
GLUCOSE SERPL-MCNC: 86 MG/DL (ref 65–99)
HBA1C MFR BLD: 5 % (ref 4.8–5.6)
HCT VFR BLD AUTO: 38.9 % (ref 34–46.6)
HDLC SERPL-MCNC: 98 MG/DL (ref 40–60)
HGB BLD-MCNC: 12.9 G/DL (ref 12–15.9)
IMM GRANULOCYTES # BLD AUTO: 0.02 10*3/MM3 (ref 0–0.05)
IMM GRANULOCYTES NFR BLD AUTO: 0.3 % (ref 0–0.5)
LDLC SERPL CALC-MCNC: 100 MG/DL (ref 0–100)
LYMPHOCYTES # BLD AUTO: 2.24 10*3/MM3 (ref 0.7–3.1)
LYMPHOCYTES NFR BLD AUTO: 37.9 % (ref 19.6–45.3)
MCH RBC QN AUTO: 28.9 PG (ref 26.6–33)
MCHC RBC AUTO-ENTMCNC: 33.2 G/DL (ref 31.5–35.7)
MCV RBC AUTO: 87.2 FL (ref 79–97)
MONOCYTES # BLD AUTO: 0.42 10*3/MM3 (ref 0.1–0.9)
MONOCYTES NFR BLD AUTO: 7.1 % (ref 5–12)
NEUTROPHILS # BLD AUTO: 3.15 10*3/MM3 (ref 1.7–7)
NEUTROPHILS NFR BLD AUTO: 53.4 % (ref 42.7–76)
NRBC BLD AUTO-RTO: 0 /100 WBC (ref 0–0.2)
PLATELET # BLD AUTO: 236 10*3/MM3 (ref 140–450)
POTASSIUM SERPL-SCNC: 5.6 MMOL/L (ref 3.5–5.2)
PROT SERPL-MCNC: 6.2 G/DL (ref 6–8.5)
RBC # BLD AUTO: 4.46 10*6/MM3 (ref 3.77–5.28)
SODIUM SERPL-SCNC: 140 MMOL/L (ref 136–145)
T4 FREE SERPL-MCNC: 1 NG/DL (ref 0.93–1.7)
TRIGL SERPL-MCNC: 89 MG/DL (ref 0–150)
TSH SERPL DL<=0.005 MIU/L-ACNC: 9.21 UIU/ML (ref 0.27–4.2)
VIT B12 SERPL-MCNC: 231 PG/ML (ref 211–946)
VLDLC SERPL CALC-MCNC: 15 MG/DL (ref 5–40)
WBC # BLD AUTO: 5.91 10*3/MM3 (ref 3.4–10.8)

## 2022-11-18 RX ORDER — LEVOTHYROXINE SODIUM 112 MCG
112 TABLET ORAL DAILY
Qty: 90 TABLET | Refills: 1 | Status: SHIPPED | OUTPATIENT
Start: 2022-11-18 | End: 2023-11-18

## 2022-11-18 NOTE — TELEPHONE ENCOUNTER
Mirna (pt's daughter) is very concerned that the TSH is 9.  She states it has varied in the past but never this much elevation at once and she and her mother are very concerned and do not want to wait until Monday when Dr Holliday is here for recommendations about the elevated TSH  She states she does take the synthroid 100mcg every day and never misses and also the cytomel 10mcg daily    Please advise

## 2022-11-18 NOTE — TELEPHONE ENCOUNTER
Patient's daughter called regarding her mother's lab work-very concerned about the TSH at 9.?  Please call to discuss, do not want to worry ALL weekend about it please.  Thank you.

## 2022-11-18 NOTE — TELEPHONE ENCOUNTER
In actually a tsh of 9 is generally considered a minimal elevation and most guidelines don't recommend increasing the dose until it's over 10. Never the less, we can increase her dose of synthroid to 112 mcg per day and her thyroid level would need to be rechecked at her next visit   If they are ok with that plan, let me know and I will send the rx

## 2022-11-18 NOTE — TELEPHONE ENCOUNTER
Patient was advised with Dr Rider response and recommendations and she would like to increase to 112mcg because of her current symptoms of hair loss, dry skin and fatigue  She was advised to call back in 8 weeks for a lab order    Please send to [facility] pharmacy

## 2022-11-22 ENCOUNTER — TELEPHONE (OUTPATIENT)
Dept: ENDOCRINOLOGY | Facility: CLINIC | Age: 52
End: 2022-11-22

## 2022-11-22 DIAGNOSIS — E03.9 ACQUIRED HYPOTHYROIDISM: Primary | ICD-10-CM

## 2022-11-22 DIAGNOSIS — D51.0 PERNICIOUS ANEMIA: ICD-10-CM

## 2022-11-22 RX ORDER — CYANOCOBALAMIN 1000 UG/ML
INJECTION, SOLUTION INTRAMUSCULAR; SUBCUTANEOUS
Qty: 10 ML | Refills: 1 | Status: SHIPPED | OUTPATIENT
Start: 2022-11-22

## 2022-11-26 LAB
ALBUMIN UR-MCNC: 32 MG/DL
COLLECT DURATION TIME UR: 24 HRS
CREAT UR-MCNC: 40.1 MG/DL
CREATINE 24H UR-MRATE: 0.79 G/24 HR (ref 0.7–1.6)
MICROALBUMIN 24H UR-MRATE: 633.6 MG/24 HRS (ref 0–25)
PROT 24H UR-MRATE: 934.6 MG/24HOURS (ref 0–150)
SPECIMEN VOL 24H UR: 1980 ML

## 2022-11-26 PROCEDURE — 82043 UR ALBUMIN QUANTITATIVE: CPT

## 2022-11-26 PROCEDURE — 82570 ASSAY OF URINE CREATININE: CPT

## 2022-11-26 PROCEDURE — 81050 URINALYSIS VOLUME MEASURE: CPT

## 2022-11-26 PROCEDURE — 84156 ASSAY OF PROTEIN URINE: CPT

## 2022-11-29 ENCOUNTER — SPECIALTY PHARMACY (OUTPATIENT)
Dept: PHARMACY | Facility: HOSPITAL | Age: 52
End: 2022-11-29

## 2022-11-29 NOTE — PROGRESS NOTES
[facility]ulatory [facility] Clinic  Clinical Reassessment     Kelsey Ross is a 52 y.o. female diagnosed with eosinophilic asthma and enrolled in the [facility]ulatory [facility] Clinic. A follow-up outreach was conducted, including assessment of continued therapy appropriateness, medication adherence, and side effect incidence and management for Nucala. Patient rates asthmas control as good and reports needing to use rescue inhaler 2 to 3 times per week.    Changes to Insurance Coverage or Financial Support  none    Relevant Past Medical History and Comorbidities  Relevant medical history and concomitant health conditions were discussed with the patient. The patient's chart has been reviewed for relevant past medical history and comorbid health conditions and updated as necessary.   Past Medical History:   Diagnosis Date   • Allergic rhinitis    • Bilateral ovarian cysts    • Chronic kidney disease     proteinuria and trying to diagnose egpa   • Constipation    • COVID      and    has received vaccines   • Deaf, left     high fever as a child   • Delayed emergence from anesthesia 2021   • Ear piercing    • Eosinophilic Asthma    • Goiter    • Graves disease    • H/O echocardiogram 2022   • Hyperlipidemia    • Hypertension    • Hyperthyroidism    • Hypothyroidism    • Migraines    • Pain     chronic pain all over body - on no pain meds   • Thyroid disease    • Visual acuity reduced     right eye r/t detached retina   • Vitamin D deficiency      Social History     Socioeconomic History   • Marital status: Single   Tobacco Use   • Smoking status: Former     Packs/day: 2.00     Years: 8.00     Pack years: 16.00     Types: Cigarettes     Quit date: 1995     Years since quittin.5   • Smokeless tobacco: Never   Vaping Use   • Vaping Use: Never used   Substance and Sexual Activity   • Alcohol use: Never   • Drug use: Never   • Sexual activity: Not Currently     Birth control/protection: None        Allergies  Known allergies and reactions were discussed with the patient. The patient's chart has been reviewed for allergy information and updated as necessary.   Penicillin g, Penicillins, Fexofenadine, Methotrexate, and Pseudoephedrine hcl    Relevant Laboratory Values  No results for input(s): CMP, BMP, CBC in the last 72 hours.    Current Medication List  This medication list has been reviewed with the patient and evaluated for any interactions or necessary modifications/recommendations, and updated to include all prescription medications, OTC medications, and supplements the patient is currently taking.  This list reflects what is contained in the patient's profile, which has also been marked as reviewed to communicate to other providers it is the most up to date version of the patient's current medication therapy.     Current Outpatient Medications:   •  albuterol sulfate  (90 Base) MCG/ACT inhaler, Inhale 2 puffs Every 4 (Four) Hours As Needed for Wheezing or Shortness of Air., Disp: 48 g, Rfl: 3  •  cyanocobalamin 1000 MCG/ML injection, Take 1 ml weekly for 4 weeks, then monthly, Disp: 10 mL, Rfl: 1  •  Fluticasone-Salmeterol (ADVAIR) 250-50 MCG/ACT DISKUS, Inhale 1 puff 2 (Two) Times a Day., Disp: 3 each, Rfl: 3  •  furosemide (LASIX) 20 MG tablet, Take 40 mg in am and 20 mg early afternoon, Disp: , Rfl:   •  hydroxychloroquine (PLAQUENIL) 200 MG tablet, Take 1 tablet by mouth 2 (Two) Times a Day., Disp: , Rfl:   •  Linzess 145 MCG capsule capsule, 145 mcg Every Morning Before Breakfast., Disp: , Rfl:   •  liothyronine (CYTOMEL) 5 MCG tablet, Take 2 tablets by mouth Daily for 180 days., Disp: 180 tablet, Rfl: 1  •  losartan (COZAAR) 100 MG tablet, Take one po q day, Disp: , Rfl:   •  Mepolizumab 100 MG/ML solution auto-injector, Inject 3 mL under the skin into the appropriate area as directed Every 28 (Twenty-Eight) Days., Disp: 3 mL, Rfl: 11  •  mycophenolate (CELLCEPT) 500 MG tablet, Take  "500 mg by mouth 2 (Two) Times a Day., Disp: , Rfl:   •  ondansetron (Zofran) 4 MG tablet, Take 1 tablet by mouth Daily As Needed for Nausea or Vomiting., Disp: 30 tablet, Rfl: 1  •  pantoprazole (PROTONIX) 40 MG EC tablet, Take 40 mg by mouth Daily. Pt has not started taking yet, Disp: , Rfl:   •  rosuvastatin (CRESTOR) 5 MG tablet, Take 5 mg by mouth Daily., Disp: , Rfl:   •  spironolactone (ALDACTONE) 50 MG tablet, Take one po q day, Disp: , Rfl:   •  Synthroid 112 MCG tablet, Take 1 tablet by mouth Daily., Disp: 90 tablet, Rfl: 1  •  Tuberculin-Allergy Syringes 28G X 1/2\" 1 ML misc, 1 Units Every 30 (Thirty) Days., Disp: 20 each, Rfl: 1  •  vitamin D3 (vitamin d) 125 MCG (5000 UT) capsule capsule, Take 5,000 Units by mouth Daily., Disp: , Rfl:   •  vitamin D3 125 MCG (5000 UT) capsule capsule, Take one po q day, Disp: , Rfl:   •  Zoster Vac Recomb Adjuvanted 50 MCG/0.5ML reconstituted suspension, Inject as directed per protocol. Repeat in 2-6 months, Disp: 1 each, Rfl: 1  No current facility-administered medications for this visit.    Drug Interactions  none     Adverse Drug Reactions  • Adverse Reactions Experienced: none  Plan for ADR Management: N/A     Hospitalizations and Urgent Care Since Last Assessment  • Hospitalizations or Admissions: none  • ED Visits: none  • Urgent Office Visits: none     Adherence and Self-Administration  • Approximate Number of Doses Missed Since Last Assessment: none  • Ongoing or New Barriers to Patient Adherence and/or Self-Administration: none   • Methods for Supporting Patient Adherence and/or Self-Administration: N/A     Goals of Therapy  Progress Toward Meeting Patient-Identified Goals of Therapy: On Track  o New Patient-Identified Goals, If Applicable:     • Progress Toward Meeting Clinical Goals or Therapeutic Targets: On Track  o New Clinical Goals or Therapeutic Targets, If Applicable:        Reassessment Plan & Follow-Up  1. Medication Therapy Changes: " none  2. Additional Plans, Therapy Recommendations, or Therapy Problems to Be Addressed: none   3. Pharmacist to perform regular reassessments no more than (6) months from the previous assessment.  4.   Will set up future refill outreaches, coordinate prescription delivery, and follow up        medication adherence, and side effect incidence and management.    Attestation  I attest that the specialty medication(s) addressed above are appropriate for the patient based on my reassessment.  If the prescribed therapy is at any point deemed not appropriate based on the current or future assessments, a consultation will be initiated with the patient's specialty care provider to determine the best course of action. The revised plan of therapy will be documented along with any additional patient education provided.     Electronically signed by Carmenza Manzanares RPH, 11/29/22, 11:01 AM EST.

## 2022-12-05 ENCOUNTER — TRANSCRIBE ORDERS (OUTPATIENT)
Dept: LAB | Facility: HOSPITAL | Age: 52
End: 2022-12-05

## 2022-12-05 ENCOUNTER — LAB (OUTPATIENT)
Dept: LAB | Facility: HOSPITAL | Age: 52
End: 2022-12-05

## 2022-12-05 DIAGNOSIS — N02.2 MEMBRANOUS NEPHROPATHY DETERMINED BY BIOPSY: ICD-10-CM

## 2022-12-05 DIAGNOSIS — N02.2 MEMBRANOUS NEPHROPATHY DETERMINED BY BIOPSY: Primary | ICD-10-CM

## 2022-12-05 PROCEDURE — 84156 ASSAY OF PROTEIN URINE: CPT

## 2022-12-05 PROCEDURE — 82306 VITAMIN D 25 HYDROXY: CPT

## 2022-12-05 PROCEDURE — 82570 ASSAY OF URINE CREATININE: CPT

## 2022-12-05 PROCEDURE — 36415 COLL VENOUS BLD VENIPUNCTURE: CPT

## 2022-12-05 PROCEDURE — 82550 ASSAY OF CK (CPK): CPT

## 2022-12-05 PROCEDURE — 81001 URINALYSIS AUTO W/SCOPE: CPT

## 2022-12-05 PROCEDURE — 80048 BASIC METABOLIC PNL TOTAL CA: CPT

## 2022-12-06 LAB
25(OH)D3 SERPL-MCNC: 45.4 NG/ML (ref 30–100)
ANION GAP SERPL CALCULATED.3IONS-SCNC: 8 MMOL/L (ref 5–15)
BACTERIA UR QL AUTO: ABNORMAL /HPF
BILIRUB UR QL STRIP: NEGATIVE
BUN SERPL-MCNC: 11 MG/DL (ref 6–20)
BUN/CREAT SERPL: 11.8 (ref 7–25)
CALCIUM SPEC-SCNC: 9 MG/DL (ref 8.6–10.5)
CHLORIDE SERPL-SCNC: 102 MMOL/L (ref 98–107)
CK SERPL-CCNC: 81 U/L (ref 20–180)
CLARITY UR: CLEAR
CO2 SERPL-SCNC: 31 MMOL/L (ref 22–29)
COLOR UR: YELLOW
CREAT SERPL-MCNC: 0.93 MG/DL (ref 0.57–1)
CREAT UR-MCNC: 204 MG/DL
EGFRCR SERPLBLD CKD-EPI 2021: 73.6 ML/MIN/1.73
GLUCOSE SERPL-MCNC: 88 MG/DL (ref 65–99)
GLUCOSE UR STRIP-MCNC: NEGATIVE MG/DL
HGB UR QL STRIP.AUTO: ABNORMAL
HYALINE CASTS UR QL AUTO: ABNORMAL /LPF
KETONES UR QL STRIP: ABNORMAL
LEUKOCYTE ESTERASE UR QL STRIP.AUTO: NEGATIVE
NITRITE UR QL STRIP: NEGATIVE
PH UR STRIP.AUTO: 5.5 [PH] (ref 5–8)
POTASSIUM SERPL-SCNC: 3.8 MMOL/L (ref 3.5–5.2)
PROT ?TM UR-MCNC: 189.4 MG/DL
PROT UR QL STRIP: ABNORMAL
PROT/CREAT UR: 928.4 MG/G CREA (ref 0–200)
RBC # UR STRIP: ABNORMAL /HPF
REF LAB TEST METHOD: ABNORMAL
SODIUM SERPL-SCNC: 141 MMOL/L (ref 136–145)
SP GR UR STRIP: 1.02 (ref 1–1.03)
SQUAMOUS #/AREA URNS HPF: ABNORMAL /HPF
UROBILINOGEN UR QL STRIP: ABNORMAL
WBC # UR STRIP: ABNORMAL /HPF

## 2022-12-13 ENCOUNTER — LAB (OUTPATIENT)
Dept: LAB | Facility: HOSPITAL | Age: 52
End: 2022-12-13

## 2022-12-13 DIAGNOSIS — E03.9 ACQUIRED HYPOTHYROIDISM: ICD-10-CM

## 2022-12-13 DIAGNOSIS — N02.2 MEMBRANOUS NEPHROPATHY DETERMINED BY BIOPSY: ICD-10-CM

## 2022-12-13 LAB
25(OH)D3 SERPL-MCNC: 47.1 NG/ML (ref 30–100)
ANION GAP SERPL CALCULATED.3IONS-SCNC: 9.1 MMOL/L (ref 5–15)
BACTERIA UR QL AUTO: ABNORMAL /HPF
BILIRUB UR QL STRIP: NEGATIVE
BUN SERPL-MCNC: 15 MG/DL (ref 6–20)
BUN/CREAT SERPL: 19 (ref 7–25)
CALCIUM SPEC-SCNC: 8.9 MG/DL (ref 8.6–10.5)
CHLORIDE SERPL-SCNC: 99 MMOL/L (ref 98–107)
CK SERPL-CCNC: 66 U/L (ref 20–180)
CLARITY UR: CLEAR
CO2 SERPL-SCNC: 29.9 MMOL/L (ref 22–29)
COLOR UR: YELLOW
CREAT SERPL-MCNC: 0.79 MG/DL (ref 0.57–1)
EGFRCR SERPLBLD CKD-EPI 2021: 89.6 ML/MIN/1.73
GLUCOSE SERPL-MCNC: 89 MG/DL (ref 65–99)
GLUCOSE UR STRIP-MCNC: NEGATIVE MG/DL
HGB UR QL STRIP.AUTO: ABNORMAL
HYALINE CASTS UR QL AUTO: ABNORMAL /LPF
KETONES UR QL STRIP: NEGATIVE
LEUKOCYTE ESTERASE UR QL STRIP.AUTO: ABNORMAL
NITRITE UR QL STRIP: NEGATIVE
PH UR STRIP.AUTO: 6 [PH] (ref 5–8)
POTASSIUM SERPL-SCNC: 4 MMOL/L (ref 3.5–5.2)
PROT UR QL STRIP: ABNORMAL
RBC # UR STRIP: ABNORMAL /HPF
REF LAB TEST METHOD: ABNORMAL
SODIUM SERPL-SCNC: 138 MMOL/L (ref 136–145)
SP GR UR STRIP: 1.02 (ref 1–1.03)
SQUAMOUS #/AREA URNS HPF: ABNORMAL /HPF
T4 FREE SERPL-MCNC: 1.08 NG/DL (ref 0.93–1.7)
TSH SERPL DL<=0.05 MIU/L-ACNC: 3.81 UIU/ML (ref 0.27–4.2)
UROBILINOGEN UR QL STRIP: ABNORMAL
WBC # UR STRIP: ABNORMAL /HPF

## 2022-12-13 PROCEDURE — 84439 ASSAY OF FREE THYROXINE: CPT

## 2022-12-13 PROCEDURE — 82306 VITAMIN D 25 HYDROXY: CPT

## 2022-12-13 PROCEDURE — 80048 BASIC METABOLIC PNL TOTAL CA: CPT

## 2022-12-13 PROCEDURE — 82550 ASSAY OF CK (CPK): CPT

## 2022-12-13 PROCEDURE — 36415 COLL VENOUS BLD VENIPUNCTURE: CPT

## 2022-12-13 PROCEDURE — 84443 ASSAY THYROID STIM HORMONE: CPT

## 2022-12-13 PROCEDURE — 81001 URINALYSIS AUTO W/SCOPE: CPT

## 2022-12-19 ENCOUNTER — SPECIALTY PHARMACY (OUTPATIENT)
Dept: PHARMACY | Facility: HOSPITAL | Age: 52
End: 2022-12-19

## 2022-12-19 DIAGNOSIS — M30.1 EOSINOPHILIC GRANULOMATOSIS WITH POLYANGIITIS (EGPA): Primary | ICD-10-CM

## 2022-12-19 DIAGNOSIS — D72.18 EOSINOPHILIC GRANULOMATOSIS WITH POLYANGIITIS (EGPA): Primary | ICD-10-CM

## 2022-12-23 ENCOUNTER — TELEMEDICINE (OUTPATIENT)
Dept: PULMONOLOGY | Facility: CLINIC | Age: 52
End: 2022-12-23

## 2022-12-23 DIAGNOSIS — M32.14 OTHER SYSTEMIC LUPUS ERYTHEMATOSUS WITH GLOMERULAR DISEASE: ICD-10-CM

## 2022-12-23 DIAGNOSIS — J82.83 EOSINOPHILIC ASTHMA: Primary | ICD-10-CM

## 2022-12-23 DIAGNOSIS — R13.10 DYSPHAGIA, UNSPECIFIED TYPE: ICD-10-CM

## 2022-12-23 DIAGNOSIS — J30.89 NON-SEASONAL ALLERGIC RHINITIS, UNSPECIFIED TRIGGER: ICD-10-CM

## 2022-12-23 DIAGNOSIS — D84.9 IMMUNOCOMPROMISED: ICD-10-CM

## 2022-12-23 DIAGNOSIS — R05.2 SUBACUTE COUGH: ICD-10-CM

## 2022-12-23 PROCEDURE — 99214 OFFICE O/P EST MOD 30 MIN: CPT | Performed by: INTERNAL MEDICINE

## 2022-12-23 RX ORDER — POLYETHYLENE GLYCOL 3350 17 G/17G
POWDER, FOR SOLUTION ORAL
COMMUNITY

## 2022-12-23 RX ORDER — CETIRIZINE HYDROCHLORIDE 10 MG/1
10 TABLET ORAL DAILY
Qty: 30 TABLET | Refills: 5 | Status: SHIPPED | OUTPATIENT
Start: 2022-12-23

## 2022-12-23 RX ORDER — UREA 10 %
500 LOTION (ML) TOPICAL DAILY
COMMUNITY
Start: 2022-11-28 | End: 2022-12-23

## 2022-12-23 RX ORDER — MYCOPHENOLATE MOFETIL 500 MG/1
2 TABLET ORAL 2 TIMES DAILY
COMMUNITY
Start: 2022-11-22 | End: 2023-02-20

## 2022-12-23 RX ORDER — CALCIUM CARBONATE 500 MG/1
1 TABLET, CHEWABLE ORAL DAILY
COMMUNITY
Start: 2022-11-28 | End: 2023-02-10 | Stop reason: SDUPTHER

## 2022-12-23 RX ORDER — FLUTICASONE FUROATE AND VILANTEROL 200; 25 UG/1; UG/1
1 POWDER RESPIRATORY (INHALATION)
Qty: 60 EACH | Refills: 0 | Status: SHIPPED | OUTPATIENT
Start: 2022-12-23 | End: 2023-01-27

## 2022-12-23 RX ORDER — LOSARTAN POTASSIUM 100 MG/1
100 TABLET ORAL DAILY
COMMUNITY
Start: 2022-11-28 | End: 2023-05-27

## 2022-12-23 NOTE — PROGRESS NOTES
Pulmonary follow-up office Visit      Chief Complaint:    No chief complaint on file.      Subjective: Kelsey Ross is a 52 y.o. female who is here today for follow-up.    Past medical history:  Patient had chronic cough that started in October 2020 initially was seen in my clinic in April 2021.  At that point, her CT scan and PFTs were found to be normal other than some small airway disease and she was started on Advair and as needed albuterol.  However, her eosinophils were 16%.  She had a very complicated set of symptoms that include rash, chronic cough and lymphadenopathy.  In lab work-up her DIMAS and p-ANCA were both found to be positive.  She was referred to hematology and rheumatology.  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  Dr. Lane had started her on methotrexate for presumed GPA without tissue biopsy, but she had side effects from this and had to stop.  She has also been on chronic p.o. steroids of prednisone 10 mg daily and serum allergy testing was negative.  Due to her complicated course she was referred to [facility] and she saw them in November 2021.  [facility] repeated PFTs which showed FEV1 102%, FEV1/FVC ratio 98.  No significant bronchial hyperresponsiveness except for FEF 50% with 49% improvement.  Her [facility] pulmonologist, Dr. 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 [facility] then referred her to rheumatology and has now been diagnosed with lupus with renal involvement.    Since last visit, she has continued to follow with [facility] for lupus with renal involvement and is on Plaquenil, CellCept and prednisone.  She now sees nephrology at [facility].  Renal function improving,but not back to baseline.  Next appt is in January.  Has in person visit with Rheum and Nephro in March.    Still on Nucala and doing well. Still uses rescue albuterol 4-5x per week. Taking Advair as  directed. No problems with injections.     Did notice increased dry cough in fall, but not taking daily anti-histamines.  Notices increased cough with eating especially dry foods.  No shortness of breath after eating.     Subjective      Review of Systems:   Review of Systems   HENT: Positive for postnasal drip.    Respiratory: Positive for cough.    Allergic/Immunologic: Positive for environmental allergies.         Social History:   Social History     Socioeconomic History   • Marital status: Single   Tobacco Use   • Smoking status: Former     Packs/day: 2.00     Years: 8.00     Pack years: 16.00     Types: Cigarettes     Quit date: 1995     Years since quittin.6   • Smokeless tobacco: Never   Vaping Use   • Vaping Use: Never used   Substance and Sexual Activity   • Alcohol use: Never   • Drug use: Never   • Sexual activity: Not Currently     Birth control/protection: None       Medications:     Current Outpatient Medications:   •  albuterol sulfate  (90 Base) MCG/ACT inhaler, Inhale 2 puffs Every 4 (Four) Hours As Needed for Wheezing or Shortness of Air., Disp: 48 g, Rfl: 3  •  Calcium Antacid 500 MG chewable tablet, , Disp: , Rfl:   •  cyanocobalamin 1000 MCG/ML injection, Take 1 ml weekly for 4 weeks, then monthly, Disp: 10 mL, Rfl: 1  •  furosemide (LASIX) 20 MG tablet, Take 40 mg in am and 20 mg early afternoon, Disp: , Rfl:   •  hydroxychloroquine (PLAQUENIL) 200 MG tablet, Take 1 tablet by mouth 2 (Two) Times a Day., Disp: , Rfl:   •  Linzess 145 MCG capsule capsule, 145 mcg Every Morning Before Breakfast., Disp: , Rfl:   •  liothyronine (CYTOMEL) 5 MCG tablet, Take 2 tablets by mouth Daily for 180 days., Disp: 180 tablet, Rfl: 1  •  losartan (COZAAR) 100 MG tablet, Take 100 mg by mouth Daily., Disp: , Rfl:   •  Mepolizumab 100 MG/ML solution auto-injector, Inject 3 mL under the skin into the appropriate area as directed Every 28 (Twenty-Eight) Days., Disp: 1 mL, Rfl: 11  •  mycophenolate  "(CELLCEPT) 500 MG tablet, Take 500 mg by mouth 2 (Two) Times a Day., Disp: , Rfl:   •  mycophenolate (CELLCEPT) 500 MG tablet, Take 2 tablets by mouth 2 (Two) Times a Day., Disp: , Rfl:   •  ondansetron (Zofran) 4 MG tablet, Take 1 tablet by mouth Daily As Needed for Nausea or Vomiting., Disp: 30 tablet, Rfl: 1  •  pantoprazole (PROTONIX) 40 MG EC tablet, Take 40 mg by mouth Daily. Pt has not started taking yet, Disp: , Rfl:   •  polyethylene glycol (MIRALAX) 17 GM/SCOOP powder, take 1 packet by oral route  every day mixed with 8 oz. water, juice, soda, coffee or tea, Disp: , Rfl:   •  rosuvastatin (CRESTOR) 5 MG tablet, Take 5 mg by mouth Daily., Disp: , Rfl:   •  spironolactone (ALDACTONE) 50 MG tablet, Take one po q day, Disp: , Rfl:   •  Synthroid 112 MCG tablet, Take 1 tablet by mouth Daily., Disp: 90 tablet, Rfl: 1  •  Tuberculin-Allergy Syringes 28G X 1/2\" 1 ML misc, 1 Units Every 30 (Thirty) Days., Disp: 20 each, Rfl: 1  •  vitamin D3 125 MCG (5000 UT) capsule capsule, Take one po q day, Disp: , Rfl:   •  Zoster Vac Recomb Adjuvanted 50 MCG/0.5ML reconstituted suspension, Inject as directed per protocol. Repeat in 2-6 months, Disp: 1 each, Rfl: 1  •  cetirizine (zyrTEC) 10 MG tablet, Take 1 tablet by mouth Daily., Disp: 30 tablet, Rfl: 5  •  fluticasone (Flonase) 50 MCG/ACT nasal spray, 2 sprays into the nostril(s) as directed by provider Daily for 7 days. 2 puffs each nostril, Disp: 9.9 mL, Rfl: 0  •  Fluticasone Furoate-Vilanterol (BREO ELLIPTA) 200-25 MCG/ACT inhaler, Inhale 1 puff Daily for 30 days., Disp: 60 each, Rfl: 0    Allergies:   Allergies   Allergen Reactions   • Penicillin G Unknown - High Severity   • Penicillins Unknown - High Severity     Allergist informed patient she was allergic to Penicillins   • Fexofenadine Headache     Only allegra D   • Methotrexate GI Intolerance and Headache   • Pseudoephedrine Hcl Headache       Objective     Physical Exam:  Vital Signs:   There were no vitals " filed for this visit.    Physical Exam  Constitutional:       General: She is not in acute distress.     Appearance: Normal appearance. She is not toxic-appearing.   HENT:      Head: Normocephalic and atraumatic.      Right Ear: External ear normal.      Left Ear: External ear normal.   Eyes:      General:         Right eye: No discharge.         Left eye: No discharge.      Extraocular Movements: Extraocular movements intact.   Pulmonary:      Effort: Pulmonary effort is normal.   Musculoskeletal:         General: No deformity.   Neurological:      Mental Status: She is alert and oriented to person, place, and time. Mental status is at baseline.   Psychiatric:         Mood and Affect: Mood normal.         Behavior: Behavior normal.           Results Review:   Labs: Reviewed.  Lab Results   Component Value Date    WBC 5.91 11/15/2022    HGB 12.9 11/15/2022    HCT 38.9 11/15/2022    MCV 87.2 11/15/2022     11/15/2022         Lab Results   Component Value Date    GLUCOSE 89 12/13/2022    BUN 15 12/13/2022    CREATININE 0.79 12/13/2022    EGFRIFNONA 86 02/23/2022    EGFRIFAFRI 103 05/05/2021    BCR 19.0 12/13/2022    K 4.0 12/13/2022    CO2 29.9 (H) 12/13/2022    CALCIUM 8.9 12/13/2022    PROTENTOTREF 6.2 11/15/2022    ALBUMIN 4.60 11/15/2022    LABIL2 2.9 11/15/2022    AST 16 11/15/2022    ALT 9 11/15/2022       Micro: As of December 23, 2022   No results found for: RESPCX  No results found for: BLOODCX  No results found for: URINECX  No results found for: MRSACX  No results found for: MRSAPCR  No results found for: URCX  No components found for: LOWRESPCF  No results found for: THROATCX  No results found for: CULTURES  No components found for: STREPBCX  No results found for: STREPPNEUAG  No results found for: LEGIONELLA  No results found for: MYCOPLASCX  No results found for: GCCX  No results found for: WOUNDCX  No results found for: BODYFLDCX    ABG: No results found for: PHART, YXX8ETX, PO2ART, HGBBG,  J9JIDYJX, CFIO2, FCOHB, CARBOXYHGB, FMETHB    Echo:     Radiology Scans:    Images reviewed personally.     US Abdomen Limited  Narrative: PROCEDURE: US ABDOMEN LIMITED-     HISTORY: GALL BLADDER STONES; K80.20-Calculus of gallbladder without  cholecystitis without obstruction     PROCEDURE: Ultrasound images of the right upper quadrant were obtained.     FINDINGS: Limited images of the pancreas are unremarkable. The liver  parenchyma is normal in echogenicity. There are gallstones in the  gallbladder. There is no gallbladder wall thickening.  There is no  pericholecystic fluid.  The common duct measures 4.30 mm . Limited  images of the right kidney are unremarkable.     Impression: Cholelithiasis.           Images were reviewed, interpreted, and dictated by CAROLE Foster  Transcribed by Kristine Palm PA-C.     This report was signed and finalized on 6/1/2022 10:08 AM by CAROLE Major.      PFT IMPRESSION:    January 2021 PFT showed FEV1 97%, FEV1/FVC ratio 81.  No significant bronchodilator responsiveness.  Total lung capacity 101%.  No air trapping.  Normal diffusing capacity.    November 2021 PFT showed FEV1 102%, FEV1/FVC ratio 98.    Assessment / Plan      Assessment/Plan:    1. Eosinophilic asthma  Better controlled with Nucala, but increased symptoms since fall.  Switch to high dose Breo to see if this improves symptoms.  If not covered by insurance, can try Advair 500.    Continue with Nucala    - Fluticasone Furoate-Vilanterol (BREO ELLIPTA) 200-25 MCG/ACT inhaler; Inhale 1 puff Daily for 30 days.  Dispense: 60 each; Refill: 0  - Mepolizumab 100 MG/ML solution auto-injector; Inject 3 mL under the skin into the appropriate area as directed Every 28 (Twenty-Eight) Days.  Dispense: 1 mL; Refill: 11    2. 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 Immunocompromised (HCC)  Secondary to meds    4.  Non-seasonal allergic rhinitis, unspecified trigger  Restart daily anti-histamines    - cetirizine (zyrTEC) 10 MG tablet; Take 1 tablet by mouth Daily.  Dispense: 30 tablet; Refill: 5    5. Subacute cough  Unclear if UACS, worsening asthma control or related to aspiration  Check MBS, restart anti-histamines and increase ICS    6. Dysphagia, unspecified type  Check swallow evaluation  - FL video swallow w speech; Future    This was a virtual visit and I spent 25 minutes on patient care    Follow Up:   Return in about 6 months (around 6/23/2023).    Meaghan Benitez MD  Pulmonary/Critical Care Physician   [facility]      Please note that portions of this note may have been completed with a voice recognition program. Efforts were made to edit the dictations, but occasionally words are mistranscribed.

## 2023-01-09 ENCOUNTER — SPECIALTY PHARMACY (OUTPATIENT)
Dept: PHARMACY | Facility: HOSPITAL | Age: 53
End: 2023-01-09
Payer: COMMERCIAL

## 2023-01-09 DIAGNOSIS — J82.83 EOSINOPHILIC ASTHMA: ICD-10-CM

## 2023-01-11 ENCOUNTER — SPECIALTY PHARMACY (OUTPATIENT)
Dept: PHARMACY | Facility: HOSPITAL | Age: 53
End: 2023-01-11
Payer: COMMERCIAL

## 2023-01-14 ENCOUNTER — LAB (OUTPATIENT)
Dept: LAB | Facility: HOSPITAL | Age: 53
End: 2023-01-14
Payer: COMMERCIAL

## 2023-01-14 DIAGNOSIS — N02.2 MEMBRANOUS NEPHROPATHY DETERMINED BY BIOPSY: ICD-10-CM

## 2023-01-14 LAB
25(OH)D3 SERPL-MCNC: 43.8 NG/ML (ref 30–100)
ALBUMIN SERPL-MCNC: 4.3 G/DL (ref 3.5–5.2)
ALBUMIN/GLOB SERPL: 2.5 G/DL
ALP SERPL-CCNC: 94 U/L (ref 39–117)
ALT SERPL W P-5'-P-CCNC: 7 U/L (ref 1–33)
ANION GAP SERPL CALCULATED.3IONS-SCNC: 6 MMOL/L (ref 5–15)
AST SERPL-CCNC: 14 U/L (ref 1–32)
BASOPHILS # BLD AUTO: 0.05 10*3/MM3 (ref 0–0.2)
BASOPHILS NFR BLD AUTO: 1.2 % (ref 0–1.5)
BILIRUB SERPL-MCNC: 0.2 MG/DL (ref 0–1.2)
BUN SERPL-MCNC: 11 MG/DL (ref 6–20)
BUN/CREAT SERPL: 12.9 (ref 7–25)
CALCIUM SPEC-SCNC: 9.1 MG/DL (ref 8.6–10.5)
CHLORIDE SERPL-SCNC: 101 MMOL/L (ref 98–107)
CK SERPL-CCNC: 55 U/L (ref 20–180)
CO2 SERPL-SCNC: 31 MMOL/L (ref 22–29)
CREAT SERPL-MCNC: 0.85 MG/DL (ref 0.57–1)
DEPRECATED RDW RBC AUTO: 35.5 FL (ref 37–54)
EGFRCR SERPLBLD CKD-EPI 2021: 82 ML/MIN/1.73
EOSINOPHIL # BLD AUTO: 0.03 10*3/MM3 (ref 0–0.4)
EOSINOPHIL NFR BLD AUTO: 0.7 % (ref 0.3–6.2)
ERYTHROCYTE [DISTWIDTH] IN BLOOD BY AUTOMATED COUNT: 11.6 % (ref 12.3–15.4)
GLOBULIN UR ELPH-MCNC: 1.7 GM/DL
GLUCOSE SERPL-MCNC: 88 MG/DL (ref 65–99)
HCT VFR BLD AUTO: 34.8 % (ref 34–46.6)
HGB BLD-MCNC: 11.9 G/DL (ref 12–15.9)
IMM GRANULOCYTES # BLD AUTO: 0.01 10*3/MM3 (ref 0–0.05)
IMM GRANULOCYTES NFR BLD AUTO: 0.2 % (ref 0–0.5)
LYMPHOCYTES # BLD AUTO: 1.58 10*3/MM3 (ref 0.7–3.1)
LYMPHOCYTES NFR BLD AUTO: 36.4 % (ref 19.6–45.3)
MAGNESIUM SERPL-MCNC: 2.1 MG/DL (ref 1.6–2.6)
MCH RBC QN AUTO: 28.6 PG (ref 26.6–33)
MCHC RBC AUTO-ENTMCNC: 34.2 G/DL (ref 31.5–35.7)
MCV RBC AUTO: 83.7 FL (ref 79–97)
MONOCYTES # BLD AUTO: 0.37 10*3/MM3 (ref 0.1–0.9)
MONOCYTES NFR BLD AUTO: 8.5 % (ref 5–12)
NEUTROPHILS NFR BLD AUTO: 2.3 10*3/MM3 (ref 1.7–7)
NEUTROPHILS NFR BLD AUTO: 53 % (ref 42.7–76)
NRBC BLD AUTO-RTO: 0 /100 WBC (ref 0–0.2)
PLATELET # BLD AUTO: 203 10*3/MM3 (ref 140–450)
PMV BLD AUTO: 11.4 FL (ref 6–12)
POTASSIUM SERPL-SCNC: 4.3 MMOL/L (ref 3.5–5.2)
PROT SERPL-MCNC: 6 G/DL (ref 6–8.5)
RBC # BLD AUTO: 4.16 10*6/MM3 (ref 3.77–5.28)
SODIUM SERPL-SCNC: 138 MMOL/L (ref 136–145)
WBC NRBC COR # BLD: 4.34 10*3/MM3 (ref 3.4–10.8)

## 2023-01-14 PROCEDURE — 36415 COLL VENOUS BLD VENIPUNCTURE: CPT

## 2023-01-14 PROCEDURE — 82550 ASSAY OF CK (CPK): CPT

## 2023-01-14 PROCEDURE — 85025 COMPLETE CBC W/AUTO DIFF WBC: CPT

## 2023-01-14 PROCEDURE — 83735 ASSAY OF MAGNESIUM: CPT

## 2023-01-14 PROCEDURE — 82306 VITAMIN D 25 HYDROXY: CPT

## 2023-01-14 PROCEDURE — 80053 COMPREHEN METABOLIC PANEL: CPT

## 2023-01-16 ENCOUNTER — TELEPHONE (OUTPATIENT)
Dept: ENDOCRINOLOGY | Facility: CLINIC | Age: 53
End: 2023-01-16
Payer: COMMERCIAL

## 2023-01-16 DIAGNOSIS — E53.8 VITAMIN B12 DEFICIENCY: Primary | ICD-10-CM

## 2023-01-17 LAB
ALBUMIN UR-MCNC: 6.1 MG/DL
COLLECT DURATION TIME UR: 24 HRS
CREAT UR-MCNC: 39.1 MG/DL
CREATINE 24H UR-MRATE: 0.82 G/24 HR (ref 0.7–1.6)
MICROALBUMIN 24H UR-MRATE: 128.1 MG/24 HRS (ref 0–25)
PROT 24H UR-MRATE: 262.5 MG/24HOURS (ref 0–150)
SPECIMEN VOL 24H UR: 2100 ML

## 2023-01-17 PROCEDURE — 82570 ASSAY OF URINE CREATININE: CPT

## 2023-01-17 PROCEDURE — 82043 UR ALBUMIN QUANTITATIVE: CPT

## 2023-01-17 PROCEDURE — 81050 URINALYSIS VOLUME MEASURE: CPT

## 2023-01-17 PROCEDURE — 84156 ASSAY OF PROTEIN URINE: CPT

## 2023-01-19 NOTE — PROGRESS NOTES
"Patient: Kelsey Ross    YOB: 1970    Date: 01/25/2023    Primary Care Provider: To Hallman MD    Chief Complaint   Patient presents with   • Follow-up     Nausea and bloating       SUBJECTIVE:    History of present illness:  I saw the patient in the office  today as a consultation for evaluation and treatment of abdominal pain and nausea.  She ha a normal colonoscopy last year.  She states after she eats \"food sits on top of her stomach\".  BMI elevated at 32 the patient otherwise in good health.  Takes Protonix daily with no relief of difficulty swallowing and reflux with bloating.  No previous EGD.  Negative colonoscopy a year ago.  Patient on a vegan diet, does not ingest large amounts of lactose or fatty foods.    The following portions of the patient's history were reviewed and updated as appropriate: allergies, current medications, past family history, past medical history, past social history, past surgical history and problem list.    Review of Systems   Constitutional: Negative for chills, fever and unexpected weight change.   HENT: Negative for hearing loss, trouble swallowing and voice change.    Eyes: Negative for visual disturbance.   Respiratory: Negative for apnea, cough, chest tightness, shortness of breath and wheezing.    Cardiovascular: Negative for chest pain, palpitations and leg swelling.   Gastrointestinal: Positive for abdominal pain and nausea. Negative for abdominal distention, anal bleeding, blood in stool, constipation, diarrhea, rectal pain and vomiting.   Endocrine: Negative for cold intolerance and heat intolerance.   Genitourinary: Negative for difficulty urinating, dysuria and flank pain.   Musculoskeletal: Negative for back pain and gait problem.   Skin: Negative for color change, rash and wound.   Neurological: Negative for dizziness, syncope, speech difficulty, weakness, light-headedness, numbness and headaches.   Hematological: Negative for adenopathy. Does " not bruise/bleed easily.   Psychiatric/Behavioral: Negative for confusion. The patient is not nervous/anxious.        History:  Past Medical History:   Diagnosis Date   • Allergic rhinitis    • Bilateral ovarian cysts    • Chronic kidney disease     proteinuria and trying to diagnose egpa   • Constipation    • COVID      and    has received vaccines   • Deaf, left     high fever as a child   • Delayed emergence from anesthesia 2021   • Ear piercing    • Eosinophilic Asthma    • Goiter    • Graves disease    • H/O echocardiogram 2022   • Hyperlipidemia    • Hypertension    • Hyperthyroidism    • Hypothyroidism    • Migraines    • Pain     chronic pain all over body - on no pain meds   • Thyroid disease    • Visual acuity reduced     right eye r/t detached retina   • Vitamin D deficiency        Past Surgical History:   Procedure Laterality Date   • CATARACT EXTRACTION, BILATERAL      rught eye only   •  SECTION     • COLONOSCOPY N/A 2022    Procedure: COLONOSCOPY;  Surgeon: Long Vernon MD;  Location: [facility] ENDOSCOPY;  Service: Gastroenterology;  Laterality: N/A;   • EYE SURGERY Right     cataract   • HYSTERECTOMY      complete   • LAPAROSCOPIC CHOLECYSTECTOMY     • NOSE SURGERY     • RETINAL DETACHMENT SURGERY     • RHINOPLASTY     • SINUS SURGERY  2021       Family History   Problem Relation Age of Onset   • Arthritis Mother    • Hypertension Mother    • Hyperlipidemia Mother    • Migraines Mother    • Stroke Mother    • Diabetes Father    • Pulmonary embolism Father    • Lung cancer Maternal Aunt    • Arthritis Maternal Grandmother    • Hypertension Maternal Grandmother    • Hyperlipidemia Maternal Grandmother    • Stroke Maternal Grandmother    • Thyroid disease Maternal Grandmother    • Diabetes Paternal Grandmother    • Heart attack Maternal Grandfather        Social History     Tobacco Use   • Smoking status: Former     Packs/day: 2.00     Years: 8.00      Pack years: 16.00     Types: Cigarettes     Quit date: 1995     Years since quittin.7   • Smokeless tobacco: Never   Vaping Use   • Vaping Use: Never used   Substance Use Topics   • Alcohol use: Never   • Drug use: Never       Allergies:  Allergies   Allergen Reactions   • Penicillin G Unknown - High Severity   • Penicillins Unknown - High Severity     Allergist informed patient she was allergic to Penicillins   • Fexofenadine Headache     Only allegra D   • Methotrexate GI Intolerance and Headache   • Pseudoephedrine Hcl Headache       Medications:    Current Outpatient Medications:   •  albuterol sulfate  (90 Base) MCG/ACT inhaler, Inhale 2 puffs Every 4 (Four) Hours As Needed for Wheezing or Shortness of Air., Disp: 48 g, Rfl: 3  •  Calcium Antacid 500 MG chewable tablet, , Disp: , Rfl:   •  cetirizine (zyrTEC) 10 MG tablet, Take 1 tablet by mouth Daily., Disp: 30 tablet, Rfl: 5  •  cyanocobalamin 1000 MCG/ML injection, Take 1 ml weekly for 4 weeks, then monthly, Disp: 10 mL, Rfl: 1  •  furosemide (LASIX) 20 MG tablet, Take 40 mg in am and 20 mg early afternoon, Disp: , Rfl:   •  hydroxychloroquine (PLAQUENIL) 200 MG tablet, Take 1 tablet by mouth 2 (Two) Times a Day., Disp: , Rfl:   •  Linzess 145 MCG capsule capsule, 145 mcg Every Morning Before Breakfast., Disp: , Rfl:   •  liothyronine (CYTOMEL) 5 MCG tablet, Take 2 tablets by mouth Daily for 180 days., Disp: 180 tablet, Rfl: 1  •  losartan (COZAAR) 100 MG tablet, Take 100 mg by mouth Daily., Disp: , Rfl:   •  Mepolizumab 100 MG/ML solution auto-injector, Inject 3 mL under the skin into the appropriate area as directed Every 28 (Twenty-Eight) Days., Disp: 3 mL, Rfl: 11  •  mycophenolate (CELLCEPT) 500 MG tablet, Take 500 mg by mouth 2 (Two) Times a Day., Disp: , Rfl:   •  ondansetron (Zofran) 4 MG tablet, Take 1 tablet by mouth Daily As Needed for Nausea or Vomiting., Disp: 30 tablet, Rfl: 1  •  pantoprazole (PROTONIX) 40 MG EC tablet,  "Take 40 mg by mouth Daily. Pt has not started taking yet, Disp: , Rfl:   •  rosuvastatin (CRESTOR) 5 MG tablet, Take 5 mg by mouth Daily., Disp: , Rfl:   •  spironolactone (ALDACTONE) 50 MG tablet, Take one po q day, Disp: , Rfl:   •  Synthroid 112 MCG tablet, Take 1 tablet by mouth Daily., Disp: 90 tablet, Rfl: 1  •  Tuberculin-Allergy Syringes 28G X 1/2\" 1 ML misc, 1 Units Every 30 (Thirty) Days., Disp: 20 each, Rfl: 1  •  vitamin D3 125 MCG (5000 UT) capsule capsule, Take one po q day, Disp: , Rfl:   •  fluticasone (Flonase) 50 MCG/ACT nasal spray, 2 sprays into the nostril(s) as directed by provider Daily for 7 days. 2 puffs each nostril, Disp: 9.9 mL, Rfl: 0  •  Fluticasone Furoate-Vilanterol (BREO ELLIPTA) 200-25 MCG/ACT inhaler, Inhale 1 puff Daily for 30 days., Disp: 60 each, Rfl: 0  •  mycophenolate (CELLCEPT) 500 MG tablet, Take 2 tablets by mouth 2 (Two) Times a Day., Disp: , Rfl:   •  polyethylene glycol (MIRALAX) 17 GM/SCOOP powder, take 1 packet by oral route  every day mixed with 8 oz. water, juice, soda, coffee or tea, Disp: , Rfl:   •  Zoster Vac Recomb Adjuvanted 50 MCG/0.5ML reconstituted suspension, Inject as directed per protocol. Repeat in 2-6 months, Disp: 1 each, Rfl: 1    OBJECTIVE:    Vital Signs:   Vitals:    01/25/23 0831   BP: 114/64   Pulse: 78   Resp: 18   Temp: 97.3 °F (36.3 °C)   TempSrc: Temporal   SpO2: 98%   Weight: 87.9 kg (193 lb 12.8 oz)   Height: 165.1 cm (65\")       Physical Exam:   General Appearance:    Alert, cooperative, in no acute distress   Head:    Normocephalic, without obvious abnormality, atraumatic   Eyes:            Lids and lashes normal, conjunctivae and sclerae normal, no   icterus, no pallor, corneas clear, PERRLA   Ears:    Ears appear intact with no abnormalities noted   Throat:   No oral lesions, no thrush, oral mucosa moist   Neck:   No adenopathy, supple, trachea midline, no thyromegaly, no   carotid bruit, no JVD   Lungs:     Clear to " auscultation,respirations regular, even and                  unlabored    Heart:    Regular rhythm and normal rate, normal S1 and S2, no            murmur, no gallop, no rub, no click   Chest Wall:    No abnormalities observed   Abdomen:     Normal bowel sounds, no masses, no organomegaly, soft        non-tender, non-distended, no guarding, there is evidence of epigastric  Tenderness.  Minimal epigastric tenderness.  No abdominal wall hernias or masses.   Extremities:   Moves all extremities well, no edema, no cyanosis, no             redness   Pulses:   Pulses palpable and equal bilaterally   Skin:   No bleeding, bruising or rash   Lymph nodes:   No palpable adenopathy   Neurologic:   Cranial nerves 2 - 12 grossly intact, sensation intact     Results Review:   I reviewed the patient's new clinical results.    Review of Systems was reviewed and confirmed as accurate as documented by the MA.    ASSESSMENT/PLAN:    1. Calculus of gallbladder without cholecystitis without obstruction    2. Right upper quadrant abdominal pain        I did have a detailed and extensive discussion with the patient in the office and they understand that they need to undergo upper endoscopy. Full risks and benefits of operative versus nonoperative intervention were discussed with the patient and these include bleeding and esophageal injury. The patient understands, agrees, and wishes to proceed with the surgical treatment plan as mentioned above. The patient had no questions for me at the end of the discussion.  Continue PPI medication, couriers mild weight loss.  Continue to avoid caffeine alcohol nicotine.  Continue a lactose-free diet.     I discussed the patients findings and my recommendations with patient.     Electronically signed by Long Vernon MD  01/25/23 16:05 EST

## 2023-01-20 ENCOUNTER — TRANSCRIBE ORDERS (OUTPATIENT)
Dept: LAB | Facility: HOSPITAL | Age: 53
End: 2023-01-20
Payer: COMMERCIAL

## 2023-01-20 ENCOUNTER — HOSPITAL ENCOUNTER (OUTPATIENT)
Dept: GENERAL RADIOLOGY | Facility: HOSPITAL | Age: 53
Discharge: HOME | End: 2023-01-20
Payer: COMMERCIAL

## 2023-01-20 ENCOUNTER — LAB (OUTPATIENT)
Dept: LAB | Facility: HOSPITAL | Age: 53
End: 2023-01-20
Payer: COMMERCIAL

## 2023-01-20 DIAGNOSIS — N02.2 MEMBRANOUS NEPHROPATHY DETERMINED BY BIOPSY: ICD-10-CM

## 2023-01-20 DIAGNOSIS — E53.8 VITAMIN B12 DEFICIENCY: ICD-10-CM

## 2023-01-20 DIAGNOSIS — N02.2 RECURRENT AND PERSISTENT HEMATURIA WITH DIFFUSE MEMBRANOUS GLOMERULONEPHRITIS: Primary | ICD-10-CM

## 2023-01-20 DIAGNOSIS — R13.10 DYSPHAGIA, UNSPECIFIED TYPE: ICD-10-CM

## 2023-01-20 LAB
ANION GAP SERPL CALCULATED.3IONS-SCNC: 8.2 MMOL/L (ref 5–15)
BUN SERPL-MCNC: 8 MG/DL (ref 6–20)
BUN/CREAT SERPL: 9.9 (ref 7–25)
CALCIUM SPEC-SCNC: 9.2 MG/DL (ref 8.6–10.5)
CHLORIDE SERPL-SCNC: 104 MMOL/L (ref 98–107)
CK SERPL-CCNC: 62 U/L (ref 20–180)
CO2 SERPL-SCNC: 26.8 MMOL/L (ref 22–29)
CREAT SERPL-MCNC: 0.81 MG/DL (ref 0.57–1)
EGFRCR SERPLBLD CKD-EPI 2021: 86.9 ML/MIN/1.73
GLUCOSE SERPL-MCNC: 84 MG/DL (ref 65–99)
MAGNESIUM SERPL-MCNC: 2 MG/DL (ref 1.6–2.6)
POTASSIUM SERPL-SCNC: 4.4 MMOL/L (ref 3.5–5.2)
SODIUM SERPL-SCNC: 139 MMOL/L (ref 136–145)
VIT B12 BLD-MCNC: 934 PG/ML (ref 211–946)

## 2023-01-20 PROCEDURE — 74230 X-RAY XM SWLNG FUNCJ C+: CPT

## 2023-01-20 PROCEDURE — 82728 ASSAY OF FERRITIN: CPT | Performed by: INTERNAL MEDICINE

## 2023-01-20 PROCEDURE — 82550 ASSAY OF CK (CPK): CPT

## 2023-01-20 PROCEDURE — 92611 MOTION FLUOROSCOPY/SWALLOW: CPT

## 2023-01-20 PROCEDURE — 80048 BASIC METABOLIC PNL TOTAL CA: CPT

## 2023-01-20 PROCEDURE — 83735 ASSAY OF MAGNESIUM: CPT

## 2023-01-20 PROCEDURE — 82607 VITAMIN B-12: CPT

## 2023-01-24 ENCOUNTER — LAB (OUTPATIENT)
Dept: LAB | Facility: HOSPITAL | Age: 53
End: 2023-01-24
Payer: COMMERCIAL

## 2023-01-24 DIAGNOSIS — N02.2 RECURRENT AND PERSISTENT HEMATURIA WITH DIFFUSE MEMBRANOUS GLOMERULONEPHRITIS: ICD-10-CM

## 2023-01-24 LAB
BACTERIA UR QL AUTO: ABNORMAL /HPF
BILIRUB UR QL STRIP: NEGATIVE
CLARITY UR: CLEAR
COLOR UR: YELLOW
GLUCOSE UR STRIP-MCNC: NEGATIVE MG/DL
HGB UR QL STRIP.AUTO: ABNORMAL
HYALINE CASTS UR QL AUTO: ABNORMAL /LPF
KETONES UR QL STRIP: NEGATIVE
LEUKOCYTE ESTERASE UR QL STRIP.AUTO: ABNORMAL
NITRITE UR QL STRIP: NEGATIVE
PH UR STRIP.AUTO: 5.5 [PH] (ref 5–8)
PROT UR QL STRIP: ABNORMAL
RBC # UR STRIP: ABNORMAL /HPF
REF LAB TEST METHOD: ABNORMAL
SP GR UR STRIP: 1.01 (ref 1–1.03)
SQUAMOUS #/AREA URNS HPF: ABNORMAL /HPF
UROBILINOGEN UR QL STRIP: ABNORMAL
WBC # UR STRIP: ABNORMAL /HPF

## 2023-01-24 PROCEDURE — 81001 URINALYSIS AUTO W/SCOPE: CPT

## 2023-01-25 ENCOUNTER — OFFICE VISIT (OUTPATIENT)
Dept: SURGERY | Facility: CLINIC | Age: 53
End: 2023-01-25
Payer: COMMERCIAL

## 2023-01-25 ENCOUNTER — PREP FOR SURGERY (OUTPATIENT)
Dept: OTHER | Facility: HOSPITAL | Age: 53
End: 2023-01-25
Payer: COMMERCIAL

## 2023-01-25 VITALS
HEART RATE: 78 BPM | OXYGEN SATURATION: 98 % | RESPIRATION RATE: 18 BRPM | BODY MASS INDEX: 32.29 KG/M2 | HEIGHT: 65 IN | SYSTOLIC BLOOD PRESSURE: 114 MMHG | DIASTOLIC BLOOD PRESSURE: 64 MMHG | TEMPERATURE: 97.3 F | WEIGHT: 193.8 LBS

## 2023-01-25 DIAGNOSIS — K21.9 GASTROESOPHAGEAL REFLUX DISEASE, UNSPECIFIED WHETHER ESOPHAGITIS PRESENT: ICD-10-CM

## 2023-01-25 DIAGNOSIS — K21.00 GASTROESOPHAGEAL REFLUX DISEASE WITH ESOPHAGITIS, UNSPECIFIED WHETHER HEMORRHAGE: Primary | ICD-10-CM

## 2023-01-25 DIAGNOSIS — R10.11 RIGHT UPPER QUADRANT ABDOMINAL PAIN: Primary | ICD-10-CM

## 2023-01-25 PROCEDURE — 99213 OFFICE O/P EST LOW 20 MIN: CPT | Performed by: SURGERY

## 2023-01-26 PROBLEM — K21.00 GASTROESOPHAGEAL REFLUX DISEASE WITH ESOPHAGITIS: Status: ACTIVE | Noted: 2023-01-26

## 2023-01-30 ENCOUNTER — TELEPHONE (OUTPATIENT)
Dept: INTERNAL MEDICINE | Facility: CLINIC | Age: 53
End: 2023-01-30

## 2023-01-30 NOTE — TELEPHONE ENCOUNTER
Caller: Kelsey Ross    Relationship: Self    Best call back number: [phone]    What is the best time to reach you: ANYTIME, PLEASE LEAVE A MESSAGE    Who are you requesting to speak with (clinical staff, provider,  specific staff member): FABI    Do you know the name of the person who called: KELSEY    What was the call regarding: PATIENT WOULD LIKE TO ESTABLISH CARE WITH DR DUNLAP, HER SON AND DAUGHTER, JOHANNE AND BRIDGETT, WOULD LIKE TO ALSO. THEIR MRNS ARE [phone] & [phone]    Do you require a callback: PLEASE ADVISE

## 2023-01-31 ENCOUNTER — TELEPHONE (OUTPATIENT)
Dept: SURGERY | Facility: CLINIC | Age: 53
End: 2023-01-31
Payer: COMMERCIAL

## 2023-02-02 ENCOUNTER — ANESTHESIA EVENT (OUTPATIENT)
Dept: GASTROENTEROLOGY | Facility: HOSPITAL | Age: 53
End: 2023-02-02
Payer: COMMERCIAL

## 2023-02-02 NOTE — ANESTHESIA PREPROCEDURE EVALUATION
Anesthesia Evaluation     Patient summary reviewed and Nursing notes reviewed   no history of anesthetic complications:  NPO Solid Status: > 8 hours  NPO Liquid Status: > 8 hours           Airway   Mallampati: II  TM distance: <3 FB  Neck ROM: full  Possible difficult intubation  Dental - normal exam     Pulmonary    (+) a smoker Former, COPD, asthma,shortness of breath, sleep apnea, decreased breath sounds,   Cardiovascular - normal exam    (+) hypertension, PVD, hyperlipidemia,       Neuro/Psych  (+) headaches,    GI/Hepatic/Renal/Endo    (+)   renal disease CRI, thyroid problem hypothyroidism    Musculoskeletal (-) negative ROS    Abdominal   (+) obese,    Substance History - negative use     OB/GYN negative ob/gyn ROS         Other - negative ROS                         Anesthesia Plan    ASA 3     MAC     (Risks and benefits discussed including risk of aspiration, recall and dental damage. All patient questions answered. Will continue with POC.)  intravenous induction     Anesthetic plan, risks, benefits, and alternatives have been provided, discussed and informed consent has been obtained with: patient.  Pre-procedure education provided      CODE STATUS:

## 2023-02-03 ENCOUNTER — ANESTHESIA (OUTPATIENT)
Dept: GASTROENTEROLOGY | Facility: HOSPITAL | Age: 53
End: 2023-02-03
Payer: COMMERCIAL

## 2023-02-03 ENCOUNTER — HOSPITAL ENCOUNTER (OUTPATIENT)
Facility: HOSPITAL | Age: 53
Setting detail: HOSPITAL OUTPATIENT SURGERY
Discharge: HOME | End: 2023-02-03
Attending: SURGERY | Admitting: SURGERY
Payer: COMMERCIAL

## 2023-02-03 VITALS
DIASTOLIC BLOOD PRESSURE: 70 MMHG | OXYGEN SATURATION: 100 % | WEIGHT: 193 LBS | HEART RATE: 59 BPM | TEMPERATURE: 97.7 F | BODY MASS INDEX: 32.15 KG/M2 | HEIGHT: 65 IN | SYSTOLIC BLOOD PRESSURE: 116 MMHG | RESPIRATION RATE: 18 BRPM

## 2023-02-03 DIAGNOSIS — K21.00 GASTROESOPHAGEAL REFLUX DISEASE WITH ESOPHAGITIS, UNSPECIFIED WHETHER HEMORRHAGE: ICD-10-CM

## 2023-02-03 PROCEDURE — 25010000002 PROPOFOL 1000 MG/100ML EMULSION: Performed by: NURSE ANESTHETIST, CERTIFIED REGISTERED

## 2023-02-03 RX ORDER — LIDOCAINE HYDROCHLORIDE 20 MG/ML
INJECTION, SOLUTION INTRAVENOUS AS NEEDED
Status: DISCONTINUED | OUTPATIENT
Start: 2023-02-03 | End: 2023-02-03 | Stop reason: SURG

## 2023-02-03 RX ORDER — ONDANSETRON 2 MG/ML
4 INJECTION INTRAMUSCULAR; INTRAVENOUS ONCE AS NEEDED
Status: DISCONTINUED | OUTPATIENT
Start: 2023-02-03 | End: 2023-02-03 | Stop reason: HOSPADM

## 2023-02-03 RX ORDER — PROPOFOL 10 MG/ML
INJECTION, EMULSION INTRAVENOUS AS NEEDED
Status: DISCONTINUED | OUTPATIENT
Start: 2023-02-03 | End: 2023-02-03 | Stop reason: SURG

## 2023-02-03 RX ORDER — SODIUM CHLORIDE, SODIUM LACTATE, POTASSIUM CHLORIDE, CALCIUM CHLORIDE 600; 310; 30; 20 MG/100ML; MG/100ML; MG/100ML; MG/100ML
1000 INJECTION, SOLUTION INTRAVENOUS CONTINUOUS
Status: DISCONTINUED | OUTPATIENT
Start: 2023-02-03 | End: 2023-02-03 | Stop reason: HOSPADM

## 2023-02-03 RX ORDER — MAGNESIUM HYDROXIDE 1200 MG/15ML
LIQUID ORAL AS NEEDED
Status: DISCONTINUED | OUTPATIENT
Start: 2023-02-03 | End: 2023-02-03 | Stop reason: HOSPADM

## 2023-02-03 RX ADMIN — SODIUM CHLORIDE, POTASSIUM CHLORIDE, SODIUM LACTATE AND CALCIUM CHLORIDE 1000 ML: 600; 310; 30; 20 INJECTION, SOLUTION INTRAVENOUS at 11:04

## 2023-02-03 RX ADMIN — LIDOCAINE HYDROCHLORIDE 60 MG: 20 INJECTION, SOLUTION INTRAVENOUS at 11:21

## 2023-02-03 RX ADMIN — PROPOFOL 200 MG: 10 INJECTION, EMULSION INTRAVENOUS at 11:21

## 2023-02-03 NOTE — ANESTHESIA POSTPROCEDURE EVALUATION
Patient: Kelsey Ross    Procedure Summary     Date: 02/03/23 Room / Location: [facility] ENDOSCOPY 3 / [facility] ENDOSCOPY    Anesthesia Start: 1118 Anesthesia Stop: 1127    Procedure: ESOPHAGOGASTRODUODENOSCOPY with biopsy (Esophagus) Diagnosis:       Gastroesophageal reflux disease with esophagitis, unspecified whether hemorrhage      (Gastroesophageal reflux disease with esophagitis, unspecified whether hemorrhage [K21.00])    Surgeons: Long Vernon MD Provider: Vinny Mendoza CRNA    Anesthesia Type: MAC ASA Status: 3          Anesthesia Type: MAC    Vitals  No vitals data found for the desired time range.          Post Anesthesia Care and Evaluation    Patient location during evaluation: bedside  Patient participation: complete - patient participated  Level of consciousness: awake and alert  Pain score: 0  Pain management: adequate    Airway patency: patent  Anesthetic complications: No anesthetic complications  PONV Status: none  Cardiovascular status: acceptable  Respiratory status: acceptable  Hydration status: acceptable  No anesthesia care post op

## 2023-02-06 LAB — REF LAB TEST METHOD: NORMAL

## 2023-02-07 NOTE — PROGRESS NOTES
Patient: Kelsey Ross    YOB: 1970    Date: 02/10/2023    Primary Care Provider: To Hallman MD    Reason for Consultation: Follow-up EGD    Chief Complaint   Patient presents with   • Post-op     1 wk po EGD       History of present illness:  I saw the patient in the office today as a followup from their recent EGD with biopsy, the pathology report did show gastritis. They state that they have done well.  Patient has persistent epigastric discomfort and pressure.  Also has chronic constipation which she has been dealing with since childhood.  No abdominal distention.  No nausea vomiting no significant heartburn.  No significant findings on upper endoscopy and biopsy specimens.    The following portions of the patient's history were reviewed and updated as appropriate: allergies, current medications, past family history, past medical history, past social history, past surgical history and problem list.    Review of Systems   Constitutional: Negative for chills, fever and unexpected weight change.   HENT: Negative for hearing loss, trouble swallowing and voice change.    Eyes: Negative for visual disturbance.   Respiratory: Negative for apnea, cough, chest tightness, shortness of breath and wheezing.    Cardiovascular: Negative for chest pain, palpitations and leg swelling.   Gastrointestinal: Negative for abdominal distention, abdominal pain, anal bleeding, blood in stool, constipation, diarrhea, nausea, rectal pain and vomiting.   Endocrine: Negative for cold intolerance and heat intolerance.   Genitourinary: Negative for difficulty urinating, dysuria, flank pain, frequency and hematuria.   Musculoskeletal: Positive for arthralgias and myalgias. Negative for back pain and gait problem.   Skin: Negative for color change, rash and wound.   Neurological: Negative for dizziness, syncope, speech difficulty, weakness, light-headedness, numbness and headaches.   Hematological: Negative for  "adenopathy. Does not bruise/bleed easily.   Psychiatric/Behavioral: Negative for confusion. The patient is not nervous/anxious.        Vital Signs:   Vitals:    02/10/23 1414   BP: 118/74   BP Location: Left arm   Patient Position: Sitting   Cuff Size: Adult   Pulse: 74   Resp: 16   Temp: 98.2 °F (36.8 °C)   TempSrc: Temporal   SpO2: 99%   Weight: 87.5 kg (193 lb)   Height: 165.1 cm (65\")       Allergies:  Allergies   Allergen Reactions   • Penicillin G Unknown - High Severity   • Penicillins Unknown - High Severity     Allergist informed patient she was allergic to Penicillins   • Fexofenadine Headache     Only allegra D   • Methotrexate GI Intolerance and Headache   • Pseudoephedrine Hcl Headache       Medications:    Current Outpatient Medications:   •  albuterol sulfate  (90 Base) MCG/ACT inhaler, Inhale 2 puffs Every 4 (Four) Hours As Needed for Wheezing or Shortness of Air., Disp: 48 g, Rfl: 3  •  calcium carbonate (OS-DEJAN) 1250 (500 Ca) MG chewable tablet, Chew 500 mg Every Other Day., Disp: , Rfl:   •  cetirizine (zyrTEC) 10 MG tablet, Take 1 tablet by mouth Daily., Disp: 30 tablet, Rfl: 5  •  cyanocobalamin 1000 MCG/ML injection, Take 1 ml weekly for 4 weeks, then monthly, Disp: 10 mL, Rfl: 1  •  fluticasone (FLONASE) 50 MCG/ACT nasal spray, fluticasone propionate 50 mcg/actuation nasal spray,suspension  USE 2 SPRAY(S) IN EACH NOSTRIL ONCE DAILY AS DIRECTED FOR 7 DAYS, Disp: , Rfl:   •  furosemide (LASIX) 20 MG tablet, Take 40 mg in am and 20 mg early afternoon, Disp: , Rfl:   •  hydroxychloroquine (PLAQUENIL) 200 MG tablet, Take 1 tablet by mouth 2 (Two) Times a Day., Disp: , Rfl:   •  Linzess 145 MCG capsule capsule, 145 mcg Every Morning Before Breakfast., Disp: , Rfl:   •  liothyronine (CYTOMEL) 5 MCG tablet, Take 2 tablets by mouth Daily for 180 days., Disp: 180 tablet, Rfl: 1  •  losartan (COZAAR) 100 MG tablet, Take 100 mg by mouth Daily., Disp: , Rfl:   •  Mepolizumab 100 MG/ML solution " "auto-injector, Inject 3 mL under the skin into the appropriate area as directed Every 28 (Twenty-Eight) Days., Disp: 3 mL, Rfl: 11  •  mycophenolate (CELLCEPT) 500 MG tablet, Take 2 tablets by mouth 2 (Two) Times a Day., Disp: , Rfl:   •  ondansetron ODT (ZOFRAN-ODT) 8 MG disintegrating tablet, ondansetron 8 mg disintegrating tablet, Disp: , Rfl:   •  pantoprazole (PROTONIX) 40 MG EC tablet, Take 40 mg by mouth Daily. Pt has not started taking yet, Disp: , Rfl:   •  polyethylene glycol (MIRALAX) 17 GM/SCOOP powder, take 1 packet by oral route  every day mixed with 8 oz. water, juice, soda, coffee or tea, Disp: , Rfl:   •  rosuvastatin (CRESTOR) 5 MG tablet, Take 5 mg by mouth Daily., Disp: , Rfl:   •  spironolactone (ALDACTONE) 50 MG tablet, Take one po q day, Disp: , Rfl:   •  SURE COMFORT INS SYR 1CC/28G 28G X 1/2\" 1 ML misc, , Disp: , Rfl:   •  Synthroid 112 MCG tablet, Take 1 tablet by mouth Daily., Disp: 90 tablet, Rfl: 1  •  torsemide (DEMADEX) 20 MG tablet, torsemide 20 mg tablet, Disp: , Rfl:   •  Tuberculin-Allergy Syringes 28G X 1/2\" 1 ML misc, 1 Units Every 30 (Thirty) Days., Disp: 20 each, Rfl: 1  •  vitamin D3 125 MCG (5000 UT) capsule capsule, Take one po q day, Disp: , Rfl:   •  Zoster Vac Recomb Adjuvanted 50 MCG/0.5ML reconstituted suspension, Inject as directed per protocol. Repeat in 2-6 months, Disp: 1 each, Rfl: 1  •  Fluticasone Furoate-Vilanterol (BREO ELLIPTA) 200-25 MCG/ACT inhaler, Inhale 1 puff Daily for 30 days., Disp: 60 each, Rfl: 0    Physical Exam:   General Appearance:    Alert, cooperative, in no acute distress   Abdomen:     no masses, no organomegaly, soft non-tender, non-distended, no guarding, minimal epigastric tenderness.  No rebound or guarding.   Chest:      Clear toausculation            Cor:  Regular rate and rhythm      Results Review:   I reviewed the patient's new clinical results.    Review of Systems was reviewed and confirmed as accurate as documented by the " MA.    Assessment / Plan:    1. Gastroesophageal reflux disease without esophagitis    2. Epigastric pain        I did discuss the situation with the patient today in the office and they have done well from their recent EGD with biopsy. I have told the patient to PPI medication daily and avoid caffeine alcohol and nicotine.  Start probiotic Greek yogurt alleviate constipation symptoms and increase water intake.  Follow-up in 5 years for repeat colonoscopy..    Electronically signed by Long Vernon MD  02/10/23                  Answers for HPI/ROS submitted by the patient on 2/7/2023  What is the primary reason for your visit?: Abdominal Pain  Chronicity: recurrent  Onset: more than 1 year ago  Progression since onset: unchanged  Pain location: epigastric region  Pain - numeric: 4/10  Pain quality: aching, a sensation of fullness  anorexia: No  belching: Yes  flatus: Yes  hematochezia: No  melena: No  weight loss: No  Aggravated by: eating  Diagnostic workup: lower endoscopy, upper endoscopy

## 2023-02-10 ENCOUNTER — OFFICE VISIT (OUTPATIENT)
Dept: SURGERY | Facility: CLINIC | Age: 53
End: 2023-02-10
Payer: COMMERCIAL

## 2023-02-10 VITALS
OXYGEN SATURATION: 99 % | SYSTOLIC BLOOD PRESSURE: 118 MMHG | TEMPERATURE: 98.2 F | HEART RATE: 74 BPM | WEIGHT: 193 LBS | RESPIRATION RATE: 16 BRPM | BODY MASS INDEX: 32.15 KG/M2 | HEIGHT: 65 IN | DIASTOLIC BLOOD PRESSURE: 74 MMHG

## 2023-02-10 DIAGNOSIS — K21.9 GASTROESOPHAGEAL REFLUX DISEASE WITHOUT ESOPHAGITIS: Primary | ICD-10-CM

## 2023-02-10 DIAGNOSIS — R10.13 EPIGASTRIC PAIN: ICD-10-CM

## 2023-02-10 PROCEDURE — 99212 OFFICE O/P EST SF 10 MIN: CPT | Performed by: SURGERY

## 2023-02-10 RX ORDER — UREA 10 %
500 LOTION (ML) TOPICAL
COMMUNITY
Start: 2023-01-30 | End: 2023-04-30

## 2023-02-10 RX ORDER — SYRINGE AND NEEDLE,INSULIN,1ML 28GX1/2"
SYRINGE, EMPTY DISPOSABLE MISCELLANEOUS
COMMUNITY
Start: 2023-02-06

## 2023-02-10 RX ORDER — ONDANSETRON 8 MG/1
TABLET, ORALLY DISINTEGRATING ORAL
COMMUNITY

## 2023-02-10 RX ORDER — TORSEMIDE 20 MG/1
TABLET ORAL
COMMUNITY
Start: 2022-11-28

## 2023-02-10 RX ORDER — FLUTICASONE PROPIONATE 50 MCG
SPRAY, SUSPENSION (ML) NASAL
COMMUNITY

## 2023-02-15 ENCOUNTER — SPECIALTY PHARMACY (OUTPATIENT)
Dept: PHARMACY | Facility: HOSPITAL | Age: 53
End: 2023-02-15
Payer: COMMERCIAL

## 2023-02-15 DIAGNOSIS — D72.18 EOSINOPHILIC GRANULOMATOSIS WITH POLYANGIITIS (EGPA): Primary | ICD-10-CM

## 2023-02-15 DIAGNOSIS — M30.1 EOSINOPHILIC GRANULOMATOSIS WITH POLYANGIITIS (EGPA): Primary | ICD-10-CM

## 2023-03-06 ENCOUNTER — TRANSCRIBE ORDERS (OUTPATIENT)
Dept: LAB | Facility: HOSPITAL | Age: 53
End: 2023-03-06
Payer: COMMERCIAL

## 2023-03-06 DIAGNOSIS — N02.2 RECURRENT AND PERSISTENT HEMATURIA WITH DIFFUSE MEMBRANOUS GLOMERULONEPHRITIS: ICD-10-CM

## 2023-03-06 DIAGNOSIS — D64.9 ANEMIA, UNSPECIFIED TYPE: Primary | ICD-10-CM

## 2023-03-08 ENCOUNTER — LAB (OUTPATIENT)
Dept: LAB | Facility: HOSPITAL | Age: 53
End: 2023-03-08
Payer: COMMERCIAL

## 2023-03-08 DIAGNOSIS — N02.2 MEMBRANOUS NEPHROPATHY DETERMINED BY BIOPSY: ICD-10-CM

## 2023-03-08 DIAGNOSIS — D64.9 ANEMIA, UNSPECIFIED TYPE: ICD-10-CM

## 2023-03-08 DIAGNOSIS — N02.2 RECURRENT AND PERSISTENT HEMATURIA WITH DIFFUSE MEMBRANOUS GLOMERULONEPHRITIS: ICD-10-CM

## 2023-03-08 LAB
ALBUMIN SERPL-MCNC: 4.4 G/DL (ref 3.5–5.2)
ALBUMIN UR-MCNC: 11.5 MG/DL
ANION GAP SERPL CALCULATED.3IONS-SCNC: 7.4 MMOL/L (ref 5–15)
BACTERIA UR QL AUTO: ABNORMAL /HPF
BASOPHILS # BLD AUTO: 0.04 10*3/MM3 (ref 0–0.2)
BASOPHILS NFR BLD AUTO: 0.9 % (ref 0–1.5)
BILIRUB UR QL STRIP: NEGATIVE
BUN SERPL-MCNC: 11 MG/DL (ref 6–20)
BUN/CREAT SERPL: 11.6 (ref 7–25)
C3 SERPL-MCNC: 130 MG/DL (ref 82–167)
C4 SERPL-MCNC: 26 MG/DL (ref 14–44)
CALCIUM SPEC-SCNC: 9.8 MG/DL (ref 8.6–10.5)
CHLORIDE SERPL-SCNC: 101 MMOL/L (ref 98–107)
CLARITY UR: CLEAR
CO2 SERPL-SCNC: 29.6 MMOL/L (ref 22–29)
COLOR UR: YELLOW
CREAT SERPL-MCNC: 0.95 MG/DL (ref 0.57–1)
CREAT UR-MCNC: 37 MG/DL
CREAT UR-MCNC: 37 MG/DL
DEPRECATED RDW RBC AUTO: 36.4 FL (ref 37–54)
EGFRCR SERPLBLD CKD-EPI 2021: 71.8 ML/MIN/1.73
EOSINOPHIL # BLD AUTO: 0.04 10*3/MM3 (ref 0–0.4)
EOSINOPHIL NFR BLD AUTO: 0.9 % (ref 0.3–6.2)
ERYTHROCYTE [DISTWIDTH] IN BLOOD BY AUTOMATED COUNT: 11.9 % (ref 12.3–15.4)
FOLATE SERPL-MCNC: >20 NG/ML (ref 4.78–24.2)
GLUCOSE SERPL-MCNC: 92 MG/DL (ref 65–99)
GLUCOSE UR STRIP-MCNC: NEGATIVE MG/DL
HAPTOGLOB SERPL-MCNC: 63 MG/DL (ref 30–200)
HCT VFR BLD AUTO: 33.3 % (ref 34–46.6)
HGB BLD-MCNC: 11.3 G/DL (ref 12–15.9)
HGB UR QL STRIP.AUTO: ABNORMAL
HYALINE CASTS UR QL AUTO: ABNORMAL /LPF
IMM GRANULOCYTES # BLD AUTO: 0.01 10*3/MM3 (ref 0–0.05)
IMM GRANULOCYTES NFR BLD AUTO: 0.2 % (ref 0–0.5)
KETONES UR QL STRIP: NEGATIVE
LDH SERPL-CCNC: 218 U/L (ref 135–214)
LEUKOCYTE ESTERASE UR QL STRIP.AUTO: ABNORMAL
LYMPHOCYTES # BLD AUTO: 1.63 10*3/MM3 (ref 0.7–3.1)
LYMPHOCYTES NFR BLD AUTO: 37.9 % (ref 19.6–45.3)
MAGNESIUM SERPL-MCNC: 2.1 MG/DL (ref 1.6–2.6)
MCH RBC QN AUTO: 29 PG (ref 26.6–33)
MCHC RBC AUTO-ENTMCNC: 33.9 G/DL (ref 31.5–35.7)
MCV RBC AUTO: 85.4 FL (ref 79–97)
MICROALBUMIN/CREAT UR: 310.8 MG/G
MONOCYTES # BLD AUTO: 0.42 10*3/MM3 (ref 0.1–0.9)
MONOCYTES NFR BLD AUTO: 9.8 % (ref 5–12)
NEUTROPHILS NFR BLD AUTO: 2.16 10*3/MM3 (ref 1.7–7)
NEUTROPHILS NFR BLD AUTO: 50.3 % (ref 42.7–76)
NITRITE UR QL STRIP: NEGATIVE
NRBC BLD AUTO-RTO: 0 /100 WBC (ref 0–0.2)
PH UR STRIP.AUTO: 6.5 [PH] (ref 5–8)
PHOSPHATE SERPL-MCNC: 4.2 MG/DL (ref 2.5–4.5)
PLATELET # BLD AUTO: 189 10*3/MM3 (ref 140–450)
PMV BLD AUTO: 11.2 FL (ref 6–12)
POTASSIUM SERPL-SCNC: 4.3 MMOL/L (ref 3.5–5.2)
PROT ?TM UR-MCNC: 22.2 MG/DL
PROT UR QL STRIP: ABNORMAL
PROT/CREAT UR: 600 MG/G CREA (ref 0–200)
RBC # BLD AUTO: 3.9 10*6/MM3 (ref 3.77–5.28)
RBC # UR STRIP: ABNORMAL /HPF
REF LAB TEST METHOD: ABNORMAL
RETICS # AUTO: 0.05 10*6/MM3 (ref 0.02–0.13)
RETICS/RBC NFR AUTO: 1.22 % (ref 0.7–1.9)
SODIUM SERPL-SCNC: 138 MMOL/L (ref 136–145)
SP GR UR STRIP: 1.01 (ref 1–1.03)
SQUAMOUS #/AREA URNS HPF: ABNORMAL /HPF
UROBILINOGEN UR QL STRIP: ABNORMAL
WBC # UR STRIP: ABNORMAL /HPF
WBC NRBC COR # BLD: 4.3 10*3/MM3 (ref 3.4–10.8)

## 2023-03-08 PROCEDURE — 83615 LACTATE (LD) (LDH) ENZYME: CPT

## 2023-03-08 PROCEDURE — 83010 ASSAY OF HAPTOGLOBIN QUANT: CPT

## 2023-03-08 PROCEDURE — 80069 RENAL FUNCTION PANEL: CPT

## 2023-03-08 PROCEDURE — 82570 ASSAY OF URINE CREATININE: CPT

## 2023-03-08 PROCEDURE — 84156 ASSAY OF PROTEIN URINE: CPT

## 2023-03-08 PROCEDURE — 83735 ASSAY OF MAGNESIUM: CPT

## 2023-03-08 PROCEDURE — 86225 DNA ANTIBODY NATIVE: CPT

## 2023-03-08 PROCEDURE — 85025 COMPLETE CBC W/AUTO DIFF WBC: CPT

## 2023-03-08 PROCEDURE — 81001 URINALYSIS AUTO W/SCOPE: CPT

## 2023-03-08 PROCEDURE — 86160 COMPLEMENT ANTIGEN: CPT

## 2023-03-08 PROCEDURE — 36415 COLL VENOUS BLD VENIPUNCTURE: CPT

## 2023-03-08 PROCEDURE — 85045 AUTOMATED RETICULOCYTE COUNT: CPT

## 2023-03-08 PROCEDURE — 82746 ASSAY OF FOLIC ACID SERUM: CPT

## 2023-03-08 PROCEDURE — 82043 UR ALBUMIN QUANTITATIVE: CPT

## 2023-03-09 LAB — DSDNA AB SER-ACNC: 2 IU/ML (ref 0–9)

## 2023-03-11 LAB
ALBUMIN UR-MCNC: 7.1 MG/DL
COLLECT DURATION TIME UR: 24 HRS
CREAT UR-MCNC: 49.7 MG/DL
CREATINE 24H UR-MRATE: 0.94 G/24 HR (ref 0.7–1.6)
MICROALBUMIN 24H UR-MRATE: 134.9 MG/24 HRS (ref 0–25)
PROT 24H UR-MRATE: 305.9 MG/24HOURS (ref 0–150)
SPECIMEN VOL 24H UR: 1900 ML

## 2023-03-11 PROCEDURE — 84156 ASSAY OF PROTEIN URINE: CPT

## 2023-03-11 PROCEDURE — 81050 URINALYSIS VOLUME MEASURE: CPT

## 2023-03-11 PROCEDURE — 84166 PROTEIN E-PHORESIS/URINE/CSF: CPT

## 2023-03-11 PROCEDURE — 82570 ASSAY OF URINE CREATININE: CPT

## 2023-03-11 PROCEDURE — 82043 UR ALBUMIN QUANTITATIVE: CPT

## 2023-03-14 ENCOUNTER — SPECIALTY PHARMACY (OUTPATIENT)
Dept: PHARMACY | Facility: HOSPITAL | Age: 53
End: 2023-03-14
Payer: COMMERCIAL

## 2023-03-14 DIAGNOSIS — D72.18 EOSINOPHILIC GRANULOMATOSIS WITH POLYANGIITIS (EGPA): Primary | ICD-10-CM

## 2023-03-14 DIAGNOSIS — M30.1 EOSINOPHILIC GRANULOMATOSIS WITH POLYANGIITIS (EGPA): Primary | ICD-10-CM

## 2023-03-14 LAB
ALBUMIN 24H MFR UR ELPH: 78.2 %
ALPHA1 GLOB 24H MFR UR ELPH: 3.1 %
ALPHA2 GLOB 24H MFR UR ELPH: 8.1 %
B-GLOBULIN 24H MFR UR ELPH: 8.2 %
GAMMA GLOB 24H MFR UR ELPH: 2.4 %
LABORATORY COMMENT REPORT: ABNORMAL
M PROTEIN 24H MFR UR ELPH: ABNORMAL %
PROT 24H UR-MRATE: 272 MG/24 HR (ref 30–150)
PROT UR-MCNC: 14.3 MG/DL

## 2023-03-31 ENCOUNTER — OFFICE VISIT (OUTPATIENT)
Dept: INTERNAL MEDICINE | Facility: CLINIC | Age: 53
End: 2023-03-31
Payer: COMMERCIAL

## 2023-03-31 ENCOUNTER — TELEPHONE (OUTPATIENT)
Dept: INTERNAL MEDICINE | Facility: CLINIC | Age: 53
End: 2023-03-31

## 2023-03-31 VITALS
TEMPERATURE: 98 F | HEART RATE: 80 BPM | WEIGHT: 196.8 LBS | OXYGEN SATURATION: 97 % | BODY MASS INDEX: 32.79 KG/M2 | DIASTOLIC BLOOD PRESSURE: 80 MMHG | SYSTOLIC BLOOD PRESSURE: 120 MMHG | HEIGHT: 65 IN

## 2023-03-31 DIAGNOSIS — M62.838 MUSCLE SPASM: Primary | ICD-10-CM

## 2023-03-31 DIAGNOSIS — M79.7 FIBROMYALGIA: ICD-10-CM

## 2023-03-31 DIAGNOSIS — G89.29 CHRONIC ABDOMINAL PAIN: ICD-10-CM

## 2023-03-31 DIAGNOSIS — M62.838 MUSCLE SPASM: ICD-10-CM

## 2023-03-31 DIAGNOSIS — K59.09 CHRONIC CONSTIPATION: ICD-10-CM

## 2023-03-31 DIAGNOSIS — Z00.00 ANNUAL PHYSICAL EXAM: Primary | ICD-10-CM

## 2023-03-31 DIAGNOSIS — Z12.31 ENCOUNTER FOR SCREENING MAMMOGRAM FOR BREAST CANCER: ICD-10-CM

## 2023-03-31 DIAGNOSIS — R10.9 CHRONIC ABDOMINAL PAIN: ICD-10-CM

## 2023-03-31 DIAGNOSIS — E66.09 CLASS 1 OBESITY DUE TO EXCESS CALORIES WITH SERIOUS COMORBIDITY AND BODY MASS INDEX (BMI) OF 32.0 TO 32.9 IN ADULT: ICD-10-CM

## 2023-03-31 PROBLEM — R79.89 ABNORMAL C-REACTIVE PROTEIN: Status: RESOLVED | Noted: 2021-09-07 | Resolved: 2023-03-31

## 2023-03-31 PROBLEM — R63.5 WEIGHT GAIN: Status: RESOLVED | Noted: 2020-10-22 | Resolved: 2023-03-31

## 2023-03-31 PROBLEM — Z12.11 COLON CANCER SCREENING: Status: RESOLVED | Noted: 2022-03-28 | Resolved: 2023-03-31

## 2023-03-31 PROBLEM — R11.0 NAUSEA: Status: RESOLVED | Noted: 2022-10-12 | Resolved: 2023-03-31

## 2023-03-31 PROBLEM — R53.83 FATIGUE: Status: RESOLVED | Noted: 2020-10-22 | Resolved: 2023-03-31

## 2023-03-31 PROCEDURE — 99396 PREV VISIT EST AGE 40-64: CPT | Performed by: FAMILY MEDICINE

## 2023-03-31 RX ORDER — MYCOPHENOLATE MOFETIL 500 MG/1
TABLET ORAL
COMMUNITY
Start: 2023-03-10

## 2023-03-31 RX ORDER — TIZANIDINE HYDROCHLORIDE 4 MG/1
4 CAPSULE, GELATIN COATED ORAL EVERY 8 HOURS PRN
Qty: 270 CAPSULE | Refills: 3 | Status: SHIPPED | OUTPATIENT
Start: 2023-03-31 | End: 2023-04-03 | Stop reason: ALTCHOICE

## 2023-03-31 RX ORDER — TIZANIDINE HYDROCHLORIDE 4 MG/1
4 CAPSULE, GELATIN COATED ORAL EVERY 8 HOURS PRN
COMMUNITY
End: 2023-03-31 | Stop reason: SDUPTHER

## 2023-03-31 RX ORDER — DULOXETIN HYDROCHLORIDE 20 MG/1
20 CAPSULE, DELAYED RELEASE ORAL DAILY
COMMUNITY
Start: 2023-03-13

## 2023-03-31 NOTE — PATIENT INSTRUCTIONS
Health Maintenance for Postmenopausal Women  Menopause is a normal process in which your reproductive ability comes to an end. This process happens gradually over a span of months to years, usually between the ages of 48 and 55. Menopause is complete when you have missed 12 consecutive menstrual periods.  It is important to talk with your health care provider about some of the most common conditions that affect postmenopausal women, such as heart disease, cancer, and bone loss (osteoporosis). Adopting a healthy lifestyle and getting preventive care can help to promote your health and wellness. Those actions can also lower your chances of developing some of these common conditions.  What should I know about menopause?  During menopause, you may experience a number of symptoms, such as:  Moderate-to-severe hot flashes.  Night sweats.  Decrease in sex drive.  Mood swings.  Headaches.  Tiredness.  Irritability.  Memory problems.  Insomnia.     Choosing to treat or not to treat menopausal changes is an individual decision that you make with your health care provider.  What should I know about hormone replacement therapy and supplements?  Hormone therapy products are effective for treating symptoms that are associated with menopause, such as hot flashes and night sweats. Hormone replacement carries certain risks, especially as you become older. If you are thinking about using estrogen or estrogen with progestin treatments, discuss the benefits and risks with your health care provider.  What should I know about heart disease and stroke?  Heart disease, heart attack, and stroke become more likely as you age. This may be due, in part, to the hormonal changes that your body experiences during menopause. These can affect how your body processes dietary fats, triglycerides, and cholesterol. Heart attack and stroke are both medical emergencies.    There are many things that you can do to help prevent heart disease and  stroke:  Have your blood pressure checked at least every 1-2 years. High blood pressure causes heart disease and increases the risk of stroke.  If you are 55-79 years old, ask your health care provider if you should take aspirin to prevent a heart attack or a stroke.  Do not use any tobacco products, including cigarettes, chewing tobacco, or electronic cigarettes. If you need help quitting, ask your health care provider.  It is important to eat a healthy diet and maintain a healthy weight.  Be sure to include plenty of vegetables, fruits, low-fat dairy products, and lean protein.  Avoid eating foods that are high in solid fats, added sugars, or salt (sodium).  Get regular exercise. This is one of the most important things that you can do for your health.  Try to exercise for at least 150 minutes each week. The type of exercise that you do should increase your heart rate and make you sweat. This is known as moderate-intensity exercise.  Try to do strengthening exercises at least twice each week. Do these in addition to the moderate-intensity exercise.  Know your numbers. Ask your health care provider to check your cholesterol and your blood glucose. Continue to have your blood tested as directed by your health care provider.     What should I know about cancer screening?  There are several types of cancer. Take the following steps to reduce your risk and to catch any cancer development as early as possible.  Breast Cancer  Practice breast self-awareness.  This means understanding how your breasts normally appear and feel.  It also means doing regular breast self-exams. Let your health care provider know about any changes, no matter how small.  If you are 40 or older, have a clinician do a breast exam (clinical breast exam or CBE) every year. Depending on your age, family history, and medical history, it may be recommended that you also have a yearly breast X-ray (mammogram).  If you have a family history of breast  cancer, talk with your health care provider about genetic screening.  If you are at high risk for breast cancer, talk with your health care provider about having an MRI and a mammogram every year.  Breast cancer (BRCA) gene test is recommended for women who have family members with BRCA-related cancers. Results of the assessment will determine the need for genetic counseling and BRCA1 and for BRCA2 testing. BRCA-related cancers include these types:  Breast. This occurs in males or females.  Ovarian.  Tubal. This may also be called fallopian tube cancer.  Cancer of the abdominal or pelvic lining (peritoneal cancer).  Prostate.  Pancreatic.     Cervical, Uterine, and Ovarian Cancer  Your health care provider may recommend that you be screened regularly for cancer of the pelvic organs. These include your ovaries, uterus, and vagina. This screening involves a pelvic exam, which includes checking for microscopic changes to the surface of your cervix (Pap test).  For women ages 21-65, health care providers may recommend a pelvic exam and a Pap test every three years. For women ages 30-65, they may recommend the Pap test and pelvic exam, combined with testing for human papilloma virus (HPV), every five years. Some types of HPV increase your risk of cervical cancer. Testing for HPV may also be done on women of any age who have unclear Pap test results.  Other health care providers may not recommend any screening for nonpregnant women who are considered low risk for pelvic cancer and have no symptoms. Ask your health care provider if a screening pelvic exam is right for you.  If you have had past treatment for cervical cancer or a condition that could lead to cancer, you need Pap tests and screening for cancer for at least 20 years after your treatment. If Pap tests have been discontinued for you, your risk factors (such as having a new sexual partner) need to be reassessed to determine if you should start having screenings  again. Some women have medical problems that increase the chance of getting cervical cancer. In these cases, your health care provider may recommend that you have screening and Pap tests more often.  If you have a family history of uterine cancer or ovarian cancer, talk with your health care provider about genetic screening.  If you have vaginal bleeding after reaching menopause, tell your health care provider.  There are currently no reliable tests available to screen for ovarian cancer.     Lung Cancer  Lung cancer screening is recommended for adults 55-80 years old who are at high risk for lung cancer because of a history of smoking. A yearly low-dose CT scan of the lungs is recommended if you:  Currently smoke.  Have a history of at least 30 pack-years of smoking and you currently smoke or have quit within the past 15 years. A pack-year is smoking an average of one pack of cigarettes per day for one year.     Yearly screening should:  Continue until it has been 15 years since you quit.  Stop if you develop a health problem that would prevent you from having lung cancer treatment.     Colorectal Cancer  This type of cancer can be detected and can often be prevented.  Routine colorectal cancer screening usually begins at age 50 and continues through age 75.  If you have risk factors for colon cancer, your health care provider may recommend that you be screened at an earlier age.  If you have a family history of colorectal cancer, talk with your health care provider about genetic screening.  Your health care provider may also recommend using home test kits to check for hidden blood in your stool.  A small camera at the end of a tube can be used to examine your colon directly (sigmoidoscopy or colonoscopy). This is done to check for the earliest forms of colorectal cancer.  Direct examination of the colon should be repeated every 5-10 years until age 75. However, if early forms of precancerous polyps or small  growths are found or if you have a family history or genetic risk for colorectal cancer, you may need to be screened more often.     Skin Cancer  Check your skin from head to toe regularly.  Monitor any moles. Be sure to tell your health care provider:  About any new moles or changes in moles, especially if there is a change in a mole's shape or color.  If you have a mole that is larger than the size of a pencil eraser.  If any of your family members has a history of skin cancer, especially at a young age, talk with your health care provider about genetic screening.  Always use sunscreen. Apply sunscreen liberally and repeatedly throughout the day.  Whenever you are outside, protect yourself by wearing long sleeves, pants, a wide-brimmed hat, and sunglasses.     What should I know about osteoporosis?  Osteoporosis is a condition in which bone destruction happens more quickly than new bone creation. After menopause, you may be at an increased risk for osteoporosis. To help prevent osteoporosis or the bone fractures that can happen because of osteoporosis, the following is recommended:  If you are 19-50 years old, get at least 1,000 mg of calcium and at least 600 mg of vitamin D per day.  If you are older than age 50 but younger than age 70, get at least 1,200 mg of calcium and at least 600 mg of vitamin D per day.  If you are older than age 70, get at least 1,200 mg of calcium and at least 800 mg of vitamin D per day.     Smoking and excessive alcohol intake increase the risk of osteoporosis. Eat foods that are rich in calcium and vitamin D, and do weight-bearing exercises several times each week as directed by your health care provider.  What should I know about how menopause affects my mental health?  Depression may occur at any age, but it is more common as you become older. Common symptoms of depression include:  Low or sad mood.  Changes in sleep patterns.  Changes in appetite or eating patterns.  Feeling an  overall lack of motivation or enjoyment of activities that you previously enjoyed.  Frequent crying spells.     Talk with your health care provider if you think that you are experiencing depression.  What should I know about immunizations?  It is important that you get and maintain your immunizations. These include:  Tetanus, diphtheria, and pertussis (Tdap) booster vaccine.  Influenza every year before the flu season begins.  Pneumonia vaccine.  Shingles vaccine.     Your health care provider may also recommend other immunizations.  This information is not intended to replace advice given to you by your health care provider. Make sure you discuss any questions you have with your health care provider.  Document Released: 02/09/2007 Document Revised: 07/07/2017 Document Reviewed: 09/20/2016  [employer] Patient Education © 2018 [employer]

## 2023-03-31 NOTE — PROGRESS NOTES
"Chief Complaint  Hypertension, Hyperlipidemia, Hypothyroidism, and Establish Care    Subjective        Kelsey Ross presents to [facility]  History of Present Illness  Hysterectomy approx 2003, ovarian cysts, enlarged uterus.  No history cervical cancer.     Followed by specialists at [facility] (nephrology and rheumatology).     Endocrinology follows hypothyroidism.    She has history of retinal detachment, followed by ophthalmology. Eye exam up to date.      Objective   Vital Signs:  /80 (BP Location: Right arm, Patient Position: Sitting, Cuff Size: Adult)   Pulse 80   Temp 98 °F (36.7 °C)   Ht 165.1 cm (65\")   Wt 89.3 kg (196 lb 12.8 oz)   SpO2 97%   BMI 32.75 kg/m²   Estimated body mass index is 32.75 kg/m² as calculated from the following:    Height as of this encounter: 165.1 cm (65\").    Weight as of this encounter: 89.3 kg (196 lb 12.8 oz).       BMI is >= 30 and <35. (Class 1 Obesity). The following options were offered after discussion;: weight loss educational material (shared in after visit summary)      Physical Exam  Vitals and nursing note reviewed.   Constitutional:       General: She is not in acute distress.     Appearance: Normal appearance. She is well-developed and well-groomed. She is obese. She is not ill-appearing, toxic-appearing or diaphoretic.      Interventions: Face mask in place.   HENT:      Head: Normocephalic and atraumatic. Hair is normal.      Right Ear: Hearing, tympanic membrane, ear canal and external ear normal.      Left Ear: Hearing, tympanic membrane, ear canal and external ear normal.      Nose: Nose normal.      Mouth/Throat:      Mouth: Mucous membranes are moist.   Eyes:      General: Lids are normal. Gaze aligned appropriately. No scleral icterus.        Right eye: No discharge.         Left eye: No discharge.      Extraocular Movements: Extraocular movements intact.      Conjunctiva/sclera: Conjunctivae normal.      " Pupils: Pupils are equal, round, and reactive to light.   Neck:      Thyroid: No thyromegaly.      Trachea: Trachea and phonation normal. No tracheal deviation.   Cardiovascular:      Rate and Rhythm: Normal rate and regular rhythm.      Heart sounds: Normal heart sounds. No murmur heard.    No friction rub. No gallop.   Pulmonary:      Effort: Pulmonary effort is normal.      Breath sounds: Normal breath sounds and air entry.   Abdominal:      General: Bowel sounds are normal. There is no distension.      Palpations: Abdomen is soft. Abdomen is not rigid. There is no mass.      Tenderness: There is no abdominal tenderness. There is no guarding or rebound.   Musculoskeletal:         General: No tenderness or deformity.      Cervical back: Neck supple.      Right lower leg: No edema.      Left lower leg: No edema.   Skin:     General: Skin is warm.      Capillary Refill: Capillary refill takes less than 2 seconds.      Coloration: Skin is not cyanotic, jaundiced or pale.      Findings: No erythema or rash.      Nails: There is no clubbing.   Neurological:      General: No focal deficit present.      Mental Status: She is alert and oriented to person, place, and time.      Cranial Nerves: No cranial nerve deficit.      Motor: No tremor, atrophy, abnormal muscle tone or seizure activity.      Gait: Gait is intact. Gait normal.   Psychiatric:         Attention and Perception: Attention and perception normal.         Mood and Affect: Mood and affect normal.         Speech: Speech normal.         Behavior: Behavior normal. Behavior is cooperative.         Thought Content: Thought content normal.         Cognition and Memory: Cognition and memory normal.         Judgment: Judgment normal.        Result Review :                   Assessment and Plan   Diagnoses and all orders for this visit:    1. Annual physical exam (Primary)    2. Chronic abdominal pain  -     linaclotide (Linzess) 290 MCG capsule capsule; Take 1 capsule  by mouth Every Morning Before Breakfast.  Dispense: 90 capsule; Refill: 3    3. Chronic constipation  -     linaclotide (Linzess) 290 MCG capsule capsule; Take 1 capsule by mouth Every Morning Before Breakfast.  Dispense: 90 capsule; Refill: 3    4. Muscle spasm  -     TiZANidine (ZANAFLEX) 4 MG capsule; Take 1 capsule by mouth Every 8 (Eight) Hours As Needed for Muscle Spasms.  Dispense: 270 capsule; Refill: 3    5. Fibromyalgia  -     TiZANidine (ZANAFLEX) 4 MG capsule; Take 1 capsule by mouth Every 8 (Eight) Hours As Needed for Muscle Spasms.  Dispense: 270 capsule; Refill: 3    6. Encounter for screening mammogram for breast cancer  -     Mammo Screening Digital Tomosynthesis Bilateral With CAD; Future    7. Class 1 obesity due to excess calories with serious comorbidity and body mass index (BMI) of 32.0 to 32.9 in adult  Comments:  information via avs      Information on preventive health for age on after visit summarry. Pap not indicated. Mammogram encouraged. Follow up with all specialists. Trial of Linzess 290 mcg as she's still having to take stool softeners at night with Linzess 145 mcg qam. Let us know if needs any med refills, message via Hands if ever needs in for appointment and has difficulty getting in.        Follow Up   Return in about 6 months (around 9/30/2023) for follow up.  Patient was given instructions and counseling regarding her condition or for health maintenance advice. Please see specific information pulled into the AVS if appropriate.

## 2023-03-31 NOTE — TELEPHONE ENCOUNTER
[facility] IS CALLING TO STATE THAT A PRESCRIPTION FOR TIZANIDINE 4 MG CAPSULES WAS SENT IN, BUT THEY DO NOT HAVE THE CAPSULES, BUT DO HAVE THE TABLETS. CAN DR DUNLAP CALL IN THE TABLETS?  PLEASE ADVISE.

## 2023-04-03 RX ORDER — TIZANIDINE 4 MG/1
4 TABLET ORAL EVERY 8 HOURS PRN
Qty: 30 TABLET | Refills: 0 | Status: SHIPPED | OUTPATIENT
Start: 2023-04-03

## 2023-04-07 ENCOUNTER — PATIENT ROUNDING (BHMG ONLY) (OUTPATIENT)
Dept: INTERNAL MEDICINE | Facility: CLINIC | Age: 53
End: 2023-04-07
Payer: COMMERCIAL

## 2023-04-07 ENCOUNTER — TRANSCRIBE ORDERS (OUTPATIENT)
Dept: LAB | Facility: HOSPITAL | Age: 53
End: 2023-04-07
Payer: COMMERCIAL

## 2023-04-07 DIAGNOSIS — N02.2 MEMBRANOUS NEPHROPATHY DETERMINED BY BIOPSY: Primary | ICD-10-CM

## 2023-04-07 NOTE — PROGRESS NOTES
A [employer] message has been sent to patient for PATIENT ROUNDING with [facility].

## 2023-04-10 ENCOUNTER — LAB (OUTPATIENT)
Dept: LAB | Facility: HOSPITAL | Age: 53
End: 2023-04-10
Payer: COMMERCIAL

## 2023-04-10 DIAGNOSIS — N02.2 MEMBRANOUS NEPHROPATHY DETERMINED BY BIOPSY: ICD-10-CM

## 2023-04-10 LAB
BASOPHILS # BLD AUTO: 0.07 10*3/MM3 (ref 0–0.2)
BASOPHILS NFR BLD AUTO: 1.2 % (ref 0–1.5)
CREAT UR-MCNC: 55.2 MG/DL
DEPRECATED RDW RBC AUTO: 35.9 FL (ref 37–54)
EOSINOPHIL # BLD AUTO: 0.03 10*3/MM3 (ref 0–0.4)
EOSINOPHIL NFR BLD AUTO: 0.5 % (ref 0.3–6.2)
ERYTHROCYTE [DISTWIDTH] IN BLOOD BY AUTOMATED COUNT: 11.6 % (ref 12.3–15.4)
HCT VFR BLD AUTO: 33.8 % (ref 34–46.6)
HGB BLD-MCNC: 11.3 G/DL (ref 12–15.9)
IMM GRANULOCYTES # BLD AUTO: 0.01 10*3/MM3 (ref 0–0.05)
IMM GRANULOCYTES NFR BLD AUTO: 0.2 % (ref 0–0.5)
LYMPHOCYTES # BLD AUTO: 2.34 10*3/MM3 (ref 0.7–3.1)
LYMPHOCYTES NFR BLD AUTO: 40.8 % (ref 19.6–45.3)
MAGNESIUM SERPL-MCNC: 1.9 MG/DL (ref 1.6–2.6)
MCH RBC QN AUTO: 28.3 PG (ref 26.6–33)
MCHC RBC AUTO-ENTMCNC: 33.4 G/DL (ref 31.5–35.7)
MCV RBC AUTO: 84.5 FL (ref 79–97)
MONOCYTES # BLD AUTO: 0.56 10*3/MM3 (ref 0.1–0.9)
MONOCYTES NFR BLD AUTO: 9.8 % (ref 5–12)
NEUTROPHILS NFR BLD AUTO: 2.73 10*3/MM3 (ref 1.7–7)
NEUTROPHILS NFR BLD AUTO: 47.5 % (ref 42.7–76)
NRBC BLD AUTO-RTO: 0 /100 WBC (ref 0–0.2)
PLATELET # BLD AUTO: 236 10*3/MM3 (ref 140–450)
PMV BLD AUTO: 10.8 FL (ref 6–12)
PROT ?TM UR-MCNC: 9.4 MG/DL
PROT/CREAT UR: 170.3 MG/G CREA (ref 0–200)
RBC # BLD AUTO: 4 10*6/MM3 (ref 3.77–5.28)
WBC NRBC COR # BLD: 5.74 10*3/MM3 (ref 3.4–10.8)

## 2023-04-10 PROCEDURE — 36415 COLL VENOUS BLD VENIPUNCTURE: CPT

## 2023-04-10 PROCEDURE — 82570 ASSAY OF URINE CREATININE: CPT

## 2023-04-10 PROCEDURE — 81001 URINALYSIS AUTO W/SCOPE: CPT

## 2023-04-10 PROCEDURE — 84156 ASSAY OF PROTEIN URINE: CPT

## 2023-04-10 PROCEDURE — 83735 ASSAY OF MAGNESIUM: CPT

## 2023-04-10 PROCEDURE — 85025 COMPLETE CBC W/AUTO DIFF WBC: CPT

## 2023-04-10 PROCEDURE — 80053 COMPREHEN METABOLIC PANEL: CPT

## 2023-04-11 LAB
ALBUMIN SERPL-MCNC: 4.2 G/DL (ref 3.5–5.2)
ALBUMIN/GLOB SERPL: 2 G/DL
ALP SERPL-CCNC: 107 U/L (ref 39–117)
ALT SERPL W P-5'-P-CCNC: 8 U/L (ref 1–33)
ANION GAP SERPL CALCULATED.3IONS-SCNC: 9 MMOL/L (ref 5–15)
AST SERPL-CCNC: 13 U/L (ref 1–32)
BACTERIA UR QL AUTO: NORMAL /HPF
BILIRUB SERPL-MCNC: <0.2 MG/DL (ref 0–1.2)
BILIRUB UR QL STRIP: NEGATIVE
BUN SERPL-MCNC: 16 MG/DL (ref 6–20)
BUN/CREAT SERPL: 17.6 (ref 7–25)
CALCIUM SPEC-SCNC: 9.1 MG/DL (ref 8.6–10.5)
CHLORIDE SERPL-SCNC: 99 MMOL/L (ref 98–107)
CLARITY UR: CLEAR
CO2 SERPL-SCNC: 26 MMOL/L (ref 22–29)
COLOR UR: YELLOW
CREAT SERPL-MCNC: 0.91 MG/DL (ref 0.57–1)
EGFRCR SERPLBLD CKD-EPI 2021: 75.6 ML/MIN/1.73
GLOBULIN UR ELPH-MCNC: 2.1 GM/DL
GLUCOSE SERPL-MCNC: 89 MG/DL (ref 65–99)
GLUCOSE UR STRIP-MCNC: NEGATIVE MG/DL
HGB UR QL STRIP.AUTO: ABNORMAL
HYALINE CASTS UR QL AUTO: NORMAL /LPF
KETONES UR QL STRIP: NEGATIVE
LEUKOCYTE ESTERASE UR QL STRIP.AUTO: NEGATIVE
NITRITE UR QL STRIP: NEGATIVE
PH UR STRIP.AUTO: 5.5 [PH] (ref 5–8)
POTASSIUM SERPL-SCNC: 4.1 MMOL/L (ref 3.5–5.2)
PROT SERPL-MCNC: 6.3 G/DL (ref 6–8.5)
PROT UR QL STRIP: NEGATIVE
RBC # UR STRIP: NORMAL /HPF
REF LAB TEST METHOD: NORMAL
SODIUM SERPL-SCNC: 134 MMOL/L (ref 136–145)
SP GR UR STRIP: 1.01 (ref 1–1.03)
SQUAMOUS #/AREA URNS HPF: NORMAL /HPF
UROBILINOGEN UR QL STRIP: ABNORMAL
WBC # UR STRIP: NORMAL /HPF

## 2023-05-02 ENCOUNTER — PROCEDURE VISIT (OUTPATIENT)
Dept: NEUROLOGY | Facility: CLINIC | Age: 53
End: 2023-05-02
Payer: COMMERCIAL

## 2023-05-02 DIAGNOSIS — R20.0 NUMBNESS: Primary | ICD-10-CM

## 2023-05-02 PROCEDURE — 95911 NRV CNDJ TEST 9-10 STUDIES: CPT | Performed by: PSYCHIATRY & NEUROLOGY

## 2023-05-02 PROCEDURE — 95886 MUSC TEST DONE W/N TEST COMP: CPT | Performed by: PSYCHIATRY & NEUROLOGY

## 2023-05-02 NOTE — PROGRESS NOTES
[facility]   Electrodiagnostic Laboratory    Nerve Conduction & EMG Report        Patient:  Kelsey Ross   Patient ID: [id number]   YOB: 1970  Sex:  female      Exam Physician:  Michele Youngblood MD  Refer Physician:  Verito Gallegos, *    Electromyogram and Nerve Conduction Velocity Procedure Note    Hx: 52 y.o. right handed female with complaint of numbness involving the right arm and left leg Symptoms have been present for several months and were provoked by no clear event. Significant past medical history includes nothing suggestive of neuropathy.  Family history no family history of nerve or muscle disease.    Exam: Motor power is normal. There is no atrophy. There are no fasciculations. Deep tendon reflexes are present and symmetrical. Sensory exam is normal.      Edx studies of the R UE and L LE were performed to evaluate for peripheral neuropathy.      NCS Examination   For sensory nerve conduction studies, the amplitude is measured peak-to-peak, the latency reported is the distal peak latency, and the conduction velocity, if measured, is determined from onset latencies and is over the forearm.   For motor nerve conduction studies, the amplitude is measured baseline-to-peak, the latency reported is the distal onset latency, the conduction velocity is calculated over the forearm, and the F wave latency is the minimum latency.   Unless otherwise noted, the hand temperature was monitored continuously and remained between 32°C and 36°C during the performance of the NCSs.          Nerve Conduction Studies  Anti Sensory Summary Table     Stim Site NR Norm Peak (ms) O-P Amp (µV) Norm O-P Amp Onset (ms) Site1 Site2 Delta-0 (ms) Dist (cm) Yony (m/s) Norm Yony (m/s)   Left Median Anti Sensory (2nd Digit)   Wrist    <3.6 54.1 >10 2.1 Wrist 2nd Digit 2.1 14.0 67    Left Radial Anti Sensory (Base 1st Digit)   Wrist    <3.1 19.4  1.5 Wrist Base 1st Digit 1.5 0.0     Right Sural Anti  Sensory (Lat Mall)   Calf    <4.0 9.1 >5.0 2.1 Calf Lat Mall 2.1 14.0 67    Left Ulnar Anti Sensory (5th Digit)   Wrist    <3.7 38.0 >15.0 1.9 Wrist 5th Digit 1.9 0.0       Motor Summary Table     Stim Site NR Onset (ms) Norm Onset (ms) O-P Amp (mV) Norm O-P Amp Site1 Site2 Delta-0 (ms) Dist (cm) Yony (m/s) Norm Yony (m/s)   Right Fibular Motor (Ext Dig Brev)   Ankle    4.5 <6.1 3.0 >2.5 B Fib Ankle 6.4 35.0 55 >40   B Fib    10.9  2.5  Poplt B Fib 1.8 9.0 50 >40   Poplt    12.7  4.7          Left Median Motor (Abd Poll Brev)   Wrist    2.6 <4.2 6.8 >5 Elbow Wrist 3.6 22.0 61 >50   Elbow    6.2  6.8          Right Tibial Motor (Abd Harrison Brev)   Ankle    4.6 <6.1 12.8 >3.0 Knee Ankle 8.0 40.0 50 >40   Knee    12.6  9.0          Left Ulnar Motor (Abd Dig Minimi)   Wrist    3.0 <4.2 9.3 >3 B Elbow Wrist 3.0 20.0 67 >53   B Elbow    6.0  6.6  A Elbow B Elbow 1.3 8.0 62 >53   A Elbow    7.3  7.7            F Wave Studies     NR F-Lat (ms) Lat Norm (ms) L-R F-Lat (ms) L-R Lat Norm   Right Fibular (Mrkrs) (EDB)      50.31 <60  <5.1   Right Tibial (Mrkrs) (Abd Hallucis)      50.94 <61  <5.7   Left Ulnar (Mrkrs) (Abd Dig Min)      21.56 <36  <2.5     H Reflex Studies     NR H-Lat (ms) L-R H-Lat (ms) L-R Lat Norm   Right Tibial (Mrkrs) (Gastroc)      32.03  <2.0       EMG Examination   The study was performed with a concentric needle electrode. Fibrillation and fasciculation activity is graded from none (0) to continuous (4+). The configuration and recruitment pattern of motor unit action potentials under voluntary control, if not normal, are described below            Side Muscle Nerve Root Ins Act Fibs Psw Amp Dur Poly Recrt Int Pat Comment   Left Deltoid Axillary C5-6 Nml Nml Nml Nml Nml 0 Nml Nml    Left Cervical Parasp Low Rami C7-8 Nml Nml Nml Nml Nml 0 Nml Nml    Left Triceps Radial C6-7-8 Nml Nml Nml Nml Nml 0 Nml Nml    Left Biceps Musculocut C5-6 Nml Nml Nml Nml Nml 0 Nml Nml    Left PronatorTeres Median C6-7 Nml Nml  Nml Nml Nml 0 Nml Nml    Left 1stDorInt Ulnar C8-T1 Nml Nml Nml Nml Nml 0 Nml Nml    Right AntTibialis Dp Br Fibular L4-5 Nml Nml Nml Nml Nml 0 Nml Nml    Right Gastroc Tibial S1-2 Nml Nml Nml Nml Nml 0 Nml Nml    Right BicepsFemL Sciatic L5-S2 Nml Nml Nml Nml Nml 0 Nml Nml    Right VastusMed Femoral L2-4 Nml Nml Nml Nml Nml 0 Nml Nml    Right Iliopsoas Femoral L2-3 Nml Nml Nml Nml Nml 0 Nml Nml        Nerve Conduction Studies  Anti Sensory Left/Right Comparison     Stim Site L Lat (ms) R Lat (ms) L-R Lat (ms) L Amp (µV) R Amp (µV) L-R Amp (%) Site1 Site2 L Yony (m/s) R Yony (m/s) L-R Yony (m/s)   Median Anti Sensory (2nd Digit)   Wrist 2.1   54.1   Wrist 2nd Digit 67     Radial Anti Sensory (Base 1st Digit)   Wrist 1.5   19.4   Wrist Base 1st Digit      Sural Anti Sensory (Lat Mall)   Calf  2.1   9.1  Calf Lat Mall  67    Ulnar Anti Sensory (5th Digit)   Wrist 1.9   38.0   Wrist 5th Digit        Motor Left/Right Comparison     Stim Site L Lat (ms) R Lat (ms) L-R Lat (ms) L Amp (mV) R Amp (mV) L-R Amp (%) Site1 Site2 L Yony (m/s) R Yony (m/s) L-R Yony (m/s)   Fibular Motor (Ext Dig Brev)   Ankle  4.5   3.0  B Fib Ankle  55    B Fib  10.9   2.5  Poplt B Fib  50    Poplt  12.7   4.7         Median Motor (Abd Poll Brev)   Wrist 2.6   6.8   Elbow Wrist 61     Elbow 6.2   6.8          Tibial Motor (Abd Harrison Brev)   Ankle  4.6   12.8  Knee Ankle  50    Knee  12.6   9.0         Ulnar Motor (Abd Dig Minimi)   Wrist 3.0   9.3   B Elbow Wrist 67     B Elbow 6.0   6.6   A Elbow B Elbow 62     A Elbow 7.3   7.7                Waveforms:                                        NCV FINDINGS:  • All nerve conduction studies (as indicated in the following tables) were within normal limits.  • All F Wave latencies were within normal limits.      EMG FINDINGS:  All examined muscles (as indicated in the following table) showed no evidence of electrical instability.    Conclusion: Normal NCV and EMG of the left upper extremity and right lower  extremity          Instrument used:  Teca Synergy        Performed by:          Michele Youngblood MD

## 2023-05-15 ENCOUNTER — LAB (OUTPATIENT)
Dept: LAB | Facility: HOSPITAL | Age: 53
End: 2023-05-15
Payer: COMMERCIAL

## 2023-05-15 DIAGNOSIS — N02.2 MEMBRANOUS NEPHROPATHY DETERMINED BY BIOPSY: ICD-10-CM

## 2023-05-15 LAB
ALBUMIN SERPL-MCNC: 4.5 G/DL (ref 3.5–5.2)
ALBUMIN/GLOB SERPL: 2.4 G/DL
ALP SERPL-CCNC: 105 U/L (ref 39–117)
ALT SERPL W P-5'-P-CCNC: 10 U/L (ref 1–33)
ANION GAP SERPL CALCULATED.3IONS-SCNC: 12.2 MMOL/L (ref 5–15)
AST SERPL-CCNC: 13 U/L (ref 1–32)
BILIRUB SERPL-MCNC: <0.2 MG/DL (ref 0–1.2)
BUN SERPL-MCNC: 14 MG/DL (ref 6–20)
BUN/CREAT SERPL: 14.7 (ref 7–25)
CALCIUM SPEC-SCNC: 9 MG/DL (ref 8.6–10.5)
CHLORIDE SERPL-SCNC: 98 MMOL/L (ref 98–107)
CO2 SERPL-SCNC: 26.8 MMOL/L (ref 22–29)
CREAT SERPL-MCNC: 0.95 MG/DL (ref 0.57–1)
EGFRCR SERPLBLD CKD-EPI 2021: 71.8 ML/MIN/1.73
GLOBULIN UR ELPH-MCNC: 1.9 GM/DL
GLUCOSE SERPL-MCNC: 83 MG/DL (ref 65–99)
MAGNESIUM SERPL-MCNC: 1.9 MG/DL (ref 1.6–2.6)
POTASSIUM SERPL-SCNC: 3.9 MMOL/L (ref 3.5–5.2)
PROT SERPL-MCNC: 6.4 G/DL (ref 6–8.5)
SODIUM SERPL-SCNC: 137 MMOL/L (ref 136–145)

## 2023-05-15 PROCEDURE — 83735 ASSAY OF MAGNESIUM: CPT

## 2023-05-15 PROCEDURE — 81001 URINALYSIS AUTO W/SCOPE: CPT

## 2023-05-15 PROCEDURE — 80053 COMPREHEN METABOLIC PANEL: CPT

## 2023-05-15 PROCEDURE — 84156 ASSAY OF PROTEIN URINE: CPT

## 2023-05-15 PROCEDURE — 36415 COLL VENOUS BLD VENIPUNCTURE: CPT

## 2023-05-15 PROCEDURE — 82570 ASSAY OF URINE CREATININE: CPT

## 2023-05-15 PROCEDURE — 85025 COMPLETE CBC W/AUTO DIFF WBC: CPT

## 2023-05-16 ENCOUNTER — OFFICE VISIT (OUTPATIENT)
Dept: ENDOCRINOLOGY | Facility: CLINIC | Age: 53
End: 2023-05-16
Payer: COMMERCIAL

## 2023-05-16 VITALS
DIASTOLIC BLOOD PRESSURE: 64 MMHG | OXYGEN SATURATION: 97 % | WEIGHT: 202.4 LBS | HEIGHT: 65 IN | HEART RATE: 57 BPM | SYSTOLIC BLOOD PRESSURE: 112 MMHG | BODY MASS INDEX: 33.72 KG/M2

## 2023-05-16 DIAGNOSIS — D51.0 PERNICIOUS ANEMIA: ICD-10-CM

## 2023-05-16 DIAGNOSIS — E89.0 POSTABLATIVE HYPOTHYROIDISM: Primary | ICD-10-CM

## 2023-05-16 DIAGNOSIS — I10 ESSENTIAL HYPERTENSION: ICD-10-CM

## 2023-05-16 PROBLEM — D64.9 ANEMIA: Status: ACTIVE | Noted: 2023-03-13

## 2023-05-16 PROBLEM — I95.2 HYPOTENSION DUE TO DRUGS: Status: ACTIVE | Noted: 2023-03-13

## 2023-05-16 LAB
BACTERIA UR QL AUTO: ABNORMAL /HPF
BASOPHILS # BLD AUTO: 0.05 10*3/MM3 (ref 0–0.2)
BASOPHILS NFR BLD AUTO: 0.9 % (ref 0–1.5)
BILIRUB UR QL STRIP: NEGATIVE
CLARITY UR: CLEAR
COLOR UR: YELLOW
CREAT UR-MCNC: 87.3 MG/DL
DEPRECATED RDW RBC AUTO: 36.2 FL (ref 37–54)
EOSINOPHIL # BLD AUTO: 0.03 10*3/MM3 (ref 0–0.4)
EOSINOPHIL NFR BLD AUTO: 0.5 % (ref 0.3–6.2)
ERYTHROCYTE [DISTWIDTH] IN BLOOD BY AUTOMATED COUNT: 11.8 % (ref 12.3–15.4)
GLUCOSE UR STRIP-MCNC: NEGATIVE MG/DL
HCT VFR BLD AUTO: 34.2 % (ref 34–46.6)
HGB BLD-MCNC: 11.7 G/DL (ref 12–15.9)
HGB UR QL STRIP.AUTO: ABNORMAL
HYALINE CASTS UR QL AUTO: ABNORMAL /LPF
IMM GRANULOCYTES # BLD AUTO: 0.02 10*3/MM3 (ref 0–0.05)
IMM GRANULOCYTES NFR BLD AUTO: 0.4 % (ref 0–0.5)
KETONES UR QL STRIP: NEGATIVE
LEUKOCYTE ESTERASE UR QL STRIP.AUTO: ABNORMAL
LYMPHOCYTES # BLD AUTO: 2.45 10*3/MM3 (ref 0.7–3.1)
LYMPHOCYTES NFR BLD AUTO: 42.9 % (ref 19.6–45.3)
MCH RBC QN AUTO: 29.1 PG (ref 26.6–33)
MCHC RBC AUTO-ENTMCNC: 34.2 G/DL (ref 31.5–35.7)
MCV RBC AUTO: 85.1 FL (ref 79–97)
MONOCYTES # BLD AUTO: 0.59 10*3/MM3 (ref 0.1–0.9)
MONOCYTES NFR BLD AUTO: 10.3 % (ref 5–12)
NEUTROPHILS NFR BLD AUTO: 2.57 10*3/MM3 (ref 1.7–7)
NEUTROPHILS NFR BLD AUTO: 45 % (ref 42.7–76)
NITRITE UR QL STRIP: NEGATIVE
NRBC BLD AUTO-RTO: 0.2 /100 WBC (ref 0–0.2)
PH UR STRIP.AUTO: 5.5 [PH] (ref 5–8)
PLATELET # BLD AUTO: 252 10*3/MM3 (ref 140–450)
PMV BLD AUTO: 10.3 FL (ref 6–12)
PROT ?TM UR-MCNC: 11.3 MG/DL
PROT UR QL STRIP: ABNORMAL
PROT/CREAT UR: 129.4 MG/G CREA (ref 0–200)
RBC # BLD AUTO: 4.02 10*6/MM3 (ref 3.77–5.28)
RBC # UR STRIP: ABNORMAL /HPF
REF LAB TEST METHOD: ABNORMAL
SP GR UR STRIP: 1.02 (ref 1–1.03)
SQUAMOUS #/AREA URNS HPF: ABNORMAL /HPF
UROBILINOGEN UR QL STRIP: ABNORMAL
WBC # UR STRIP: ABNORMAL /HPF
WBC NRBC COR # BLD: 5.71 10*3/MM3 (ref 3.4–10.8)

## 2023-05-16 PROCEDURE — 99214 OFFICE O/P EST MOD 30 MIN: CPT | Performed by: INTERNAL MEDICINE

## 2023-05-16 RX ORDER — DULOXETIN HYDROCHLORIDE 60 MG/1
60 CAPSULE, DELAYED RELEASE ORAL DAILY
COMMUNITY
Start: 2023-05-02

## 2023-05-16 RX ORDER — CYANOCOBALAMIN 1000 UG/ML
INJECTION, SOLUTION INTRAMUSCULAR; SUBCUTANEOUS
Qty: 10 ML | Refills: 0 | Status: SHIPPED | OUTPATIENT
Start: 2023-05-16

## 2023-05-16 RX ORDER — SYRINGE AND NEEDLE,INSULIN,1ML 28GX1/2"
SYRINGE, EMPTY DISPOSABLE MISCELLANEOUS
Qty: 12 EACH | Refills: 0 | Status: SHIPPED | OUTPATIENT
Start: 2023-05-16

## 2023-05-16 RX ORDER — LEVOTHYROXINE SODIUM 112 MCG
112 TABLET ORAL DAILY
Qty: 90 TABLET | Refills: 1 | Status: SHIPPED | OUTPATIENT
Start: 2023-05-16 | End: 2024-05-15

## 2023-05-16 RX ORDER — LIOTHYRONINE SODIUM 5 UG/1
10 TABLET ORAL DAILY
Qty: 180 TABLET | Refills: 1 | Status: SHIPPED | OUTPATIENT
Start: 2023-05-16 | End: 2023-11-12

## 2023-05-16 NOTE — PROGRESS NOTES
"Kelsey Ross 52 y.o.  [facility]:Follow-up, Hypothyroidism, Hypertension, Vitamin D Deficiency, and Vitamin B12 Def    [place]: Follow-up, Hypothyroidism, Hypertension, Vitamin D Deficiency, and Vitamin B12 Def    Doing well overall   Taking synthroid and cytomel daily   bp is good   Is taking vitamin D and B12 supplement  To [facility] in march- added fibromyalgia at that time due to body pain and joint pain  Treated with cymbalta (20 mg to 40mg now 60 mg)  Sleeping better overall  Still dry skin and hair loss  Weight gain 9 lbs    Allergies   Allergen Reactions   • Penicillin G Unknown - High Severity   • Penicillins Unknown - High Severity     Allergist informed patient she was allergic to Penicillins   • Fexofenadine Headache     Only allegra D   • Methotrexate GI Intolerance and Headache   • Pseudoephedrine Hcl Headache       Current Outpatient Medications:   •  cyanocobalamin 1000 MCG/ML injection, Take 1 ml weekly for 4 weeks, then monthly, Disp: 10 mL, Rfl: 0  •  DULoxetine (CYMBALTA) 60 MG capsule, Take 1 capsule by mouth Daily., Disp: , Rfl:   •  liothyronine (CYTOMEL) 5 MCG tablet, Take 2 tablets by mouth Daily for 180 days., Disp: 180 tablet, Rfl: 1  •  SURE COMFORT INS SYR 1CC/28G 28G X 1/2\" 1 ML misc, Use one per month for administration of vitamin B12, Disp: 12 each, Rfl: 0  •  Synthroid 112 MCG tablet, Take 1 tablet by mouth Daily., Disp: 90 tablet, Rfl: 1  •  albuterol sulfate  (90 Base) MCG/ACT inhaler, Inhale 2 puffs Every 4 (Four) Hours As Needed for Wheezing or Shortness of Air., Disp: 48 g, Rfl: 3  •  cetirizine (zyrTEC) 10 MG tablet, Take 1 tablet by mouth Daily., Disp: 30 tablet, Rfl: 5  •  fluticasone (FLONASE) 50 MCG/ACT nasal spray, fluticasone propionate 50 mcg/actuation nasal spray,suspension  USE 2 SPRAY(S) IN EACH NOSTRIL ONCE DAILY AS DIRECTED FOR 7 DAYS, Disp: , Rfl:   •  Fluticasone Furoate-Vilanterol (BREO ELLIPTA) 200-25 MCG/ACT inhaler, Inhale 1 puff Daily for 30 days., Disp: " "60 each, Rfl: 0  •  hydroxychloroquine (PLAQUENIL) 200 MG tablet, Take 1 tablet by mouth 2 (Two) Times a Day., Disp: , Rfl:   •  linaclotide (Linzess) 290 MCG capsule capsule, Take 1 capsule by mouth Every Morning Before Breakfast., Disp: 90 capsule, Rfl: 3  •  losartan (COZAAR) 100 MG tablet, Take 1 tablet by mouth Daily., Disp: , Rfl:   •  Mepolizumab 100 MG/ML solution auto-injector, Inject 3 mL under the skin into the appropriate area as directed Every 28 (Twenty-Eight) Days., Disp: 3 mL, Rfl: 11  •  mycophenolate (CELLCEPT) 500 MG tablet, , Disp: , Rfl:   •  ondansetron ODT (ZOFRAN-ODT) 8 MG disintegrating tablet, ondansetron 8 mg disintegrating tablet, Disp: , Rfl:   •  pantoprazole (PROTONIX) 40 MG EC tablet, Take 1 tablet by mouth Daily. Pt has not started taking yet, Disp: , Rfl:   •  rosuvastatin (CRESTOR) 5 MG tablet, Take 1 tablet by mouth Daily., Disp: , Rfl:   •  spironolactone (ALDACTONE) 50 MG tablet, 25 mg. Take one po q day, Disp: , Rfl:   •  tiZANidine (ZANAFLEX) 4 MG tablet, Take 1 tablet by mouth Every 8 (Eight) Hours As Needed for Muscle Spasms., Disp: 30 tablet, Rfl: 0  •  torsemide (DEMADEX) 20 MG tablet, torsemide 20 mg tablet, Disp: , Rfl:   •  Tuberculin-Allergy Syringes 28G X 1/2\" 1 ML misc, 1 Units Every 30 (Thirty) Days., Disp: 20 each, Rfl: 1  •  vitamin D3 125 MCG (5000 UT) capsule capsule, Take one po q day, Disp: , Rfl:   •  Zoster Vac Recomb Adjuvanted 50 MCG/0.5ML reconstituted suspension, Inject as directed per protocol. 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   HENT: Negative for congestion and rhinorrhea.    Eyes: Negative for visual disturbance.   Respiratory: Negative for cough and shortness of breath.    Cardiovascular: Negative for palpitations and leg swelling.   Gastrointestinal: Negative for constipation, diarrhea and nausea.   Genitourinary: Negative for hematuria.   Musculoskeletal: Positive for arthralgias, gait problem and myalgias. Negative for back pain and joint swelling.   Skin: Negative for color change, rash and wound.   Allergic/Immunologic: Negative for environmental allergies, food allergies and immunocompromised state.   Neurological: Negative for dizziness, weakness and light-headedness.   Psychiatric/Behavioral: Negative for confusion, decreased concentration, dysphoric mood and sleep disturbance. The patient is not nervous/anxious.      Social History     Socioeconomic History   • Marital status: Single   Tobacco Use   • Smoking status: Former     Packs/day: 2.00     Years: 8.00     Pack years: 16.00     Types: Cigarettes     Quit date: 1995     Years since quittin.0   • Smokeless tobacco: Never   Vaping Use   • Vaping Use: Never used   Substance and Sexual Activity   • Alcohol use: Never   • Drug use: Never   • Sexual activity: Not Currently     Birth control/protection: None     Family History   Problem Relation Age of Onset   • Arthritis Mother    • Hypertension Mother    • Hyperlipidemia Mother    • Migraines  "Mother    • Stroke Mother    • Diabetes Father    • Pulmonary embolism Father    • Lung cancer Maternal Aunt    • Arthritis Maternal Grandmother    • Hypertension Maternal Grandmother    • Hyperlipidemia Maternal Grandmother    • Stroke Maternal Grandmother    • Thyroid disease Maternal Grandmother    • Diabetes Paternal Grandmother    • Heart attack Maternal Grandfather    • Heart disease Maternal Grandfather    • Cancer Maternal Aunt    • Hearing loss Paternal Uncle      /64   Pulse 57   Ht 165.1 cm (65\")   Wt 91.8 kg (202 lb 6.4 oz)   SpO2 97%   BMI 33.68 kg/m²   Physical Exam  Vitals and nursing note reviewed.   Constitutional:       Appearance: Normal appearance. She is well-developed.   HENT:      Head: Normocephalic and atraumatic.   Eyes:      General: Lids are normal.      Extraocular Movements: Extraocular movements intact.      Conjunctiva/sclera: Conjunctivae normal.      Pupils: Pupils are equal, round, and reactive to light.   Neck:      Thyroid: No thyroid mass or thyromegaly.      Vascular: No carotid bruit.      Trachea: Trachea normal. No tracheal deviation.   Cardiovascular:      Rate and Rhythm: Normal rate and regular rhythm.      Pulses: Normal pulses.      Heart sounds: Normal heart sounds. No murmur heard.    No friction rub. No gallop.   Pulmonary:      Effort: Pulmonary effort is normal. No respiratory distress.      Breath sounds: Normal breath sounds. No wheezing.   Musculoskeletal:         General: No deformity. Normal range of motion.      Cervical back: Normal range of motion and neck supple.   Lymphadenopathy:      Cervical: No cervical adenopathy.   Skin:     General: Skin is warm and dry.      Findings: No erythema or rash.      Nails: There is no clubbing.   Neurological:      General: No focal deficit present.      Mental Status: She is alert and oriented to person, place, and time.      Cranial Nerves: No cranial nerve deficit.      Deep Tendon Reflexes: Reflexes are " normal and symmetric. Reflexes normal.   Psychiatric:         Mood and Affect: Mood normal.         Speech: Speech normal.         Behavior: Behavior normal.         Thought Content: Thought content normal.         Judgment: Judgment normal.       Results for orders placed or performed in visit on 05/15/23   Magnesium    Specimen: Blood   Result Value Ref Range    Magnesium 1.9 1.6 - 2.6 mg/dL   Comprehensive Metabolic Panel    Specimen: Blood   Result Value Ref Range    Glucose 83 65 - 99 mg/dL    BUN 14 6 - 20 mg/dL    Creatinine 0.95 0.57 - 1.00 mg/dL    Sodium 137 136 - 145 mmol/L    Potassium 3.9 3.5 - 5.2 mmol/L    Chloride 98 98 - 107 mmol/L    CO2 26.8 22.0 - 29.0 mmol/L    Calcium 9.0 8.6 - 10.5 mg/dL    Total Protein 6.4 6.0 - 8.5 g/dL    Albumin 4.5 3.5 - 5.2 g/dL    ALT (SGPT) 10 1 - 33 U/L    AST (SGOT) 13 1 - 32 U/L    Alkaline Phosphatase 105 39 - 117 U/L    Total Bilirubin <0.2 0.0 - 1.2 mg/dL    Globulin 1.9 gm/dL    A/G Ratio 2.4 g/dL    BUN/Creatinine Ratio 14.7 7.0 - 25.0    Anion Gap 12.2 5.0 - 15.0 mmol/L    eGFR 71.8 >60.0 mL/min/1.73   Protein / Creatinine Ratio, Urine - Urine, Clean Catch    Specimen: Urine, Clean Catch   Result Value Ref Range    Protein/Creatinine Ratio, Urine 129.4 0.0 - 200.0 mg/G Crea    Creatinine, Urine 87.3 mg/dL    Total Protein, Urine 11.3 mg/dL   CBC Auto Differential    Specimen: Blood   Result Value Ref Range    WBC 5.71 3.40 - 10.80 10*3/mm3    RBC 4.02 3.77 - 5.28 10*6/mm3    Hemoglobin 11.7 (L) 12.0 - 15.9 g/dL    Hematocrit 34.2 34.0 - 46.6 %    MCV 85.1 79.0 - 97.0 fL    MCH 29.1 26.6 - 33.0 pg    MCHC 34.2 31.5 - 35.7 g/dL    RDW 11.8 (L) 12.3 - 15.4 %    RDW-SD 36.2 (L) 37.0 - 54.0 fl    MPV 10.3 6.0 - 12.0 fL    Platelets 252 140 - 450 10*3/mm3    Neutrophil % 45.0 42.7 - 76.0 %    Lymphocyte % 42.9 19.6 - 45.3 %    Monocyte % 10.3 5.0 - 12.0 %    Eosinophil % 0.5 0.3 - 6.2 %    Basophil % 0.9 0.0 - 1.5 %    Immature Grans % 0.4 0.0 - 0.5 %     Neutrophils, Absolute 2.57 1.70 - 7.00 10*3/mm3    Lymphocytes, Absolute 2.45 0.70 - 3.10 10*3/mm3    Monocytes, Absolute 0.59 0.10 - 0.90 10*3/mm3    Eosinophils, Absolute 0.03 0.00 - 0.40 10*3/mm3    Basophils, Absolute 0.05 0.00 - 0.20 10*3/mm3    Immature Grans, Absolute 0.02 0.00 - 0.05 10*3/mm3    nRBC 0.2 0.0 - 0.2 /100 WBC   Urinalysis without microscopic (no culture) - Urine, Clean Catch    Specimen: Urine, Clean Catch   Result Value Ref Range    Color, UA Yellow Yellow, Straw    Appearance, UA Clear Clear    pH, UA 5.5 5.0 - 8.0    Specific Gravity, UA 1.016 1.005 - 1.030    Glucose, UA Negative Negative    Ketones, UA Negative Negative    Bilirubin, UA Negative Negative    Blood, UA Small (1+) (A) Negative    Protein, UA Trace (A) Negative    Leuk Esterase, UA Trace (A) Negative    Nitrite, UA Negative Negative    Urobilinogen, UA 0.2 E.U./dL 0.2 - 1.0 E.U./dL   Urinalysis, Microscopic Only - Urine, Clean Catch    Specimen: Urine, Clean Catch   Result Value Ref Range    RBC, UA 3-5 (A) None Seen, 0-2 /HPF    WBC, UA 0-2 None Seen, 0-2 /HPF    Bacteria, UA None Seen None Seen /HPF    Squamous Epithelial Cells, UA 0-2 None Seen, 0-2 /HPF    Hyaline Casts, UA None Seen None Seen /LPF    Methodology Automated Microscopy      Diagnoses and all orders for this visit:    1. Postablative hypothyroidism (Primary)  Assessment & Plan:  Update tfts  Continue monitoring and synthroid, cytomel   Refills sent     Orders:  -     liothyronine (CYTOMEL) 5 MCG tablet; Take 2 tablets by mouth Daily for 180 days.  Dispense: 180 tablet; Refill: 1  -     TSH  -     T4    2. Pernicious anemia  -     cyanocobalamin 1000 MCG/ML injection; Take 1 ml weekly for 4 weeks, then monthly  Dispense: 10 mL; Refill: 0    3. Essential hypertension  Assessment & Plan:  bp is good   Continue losartan and aldactone, demadex       Other orders  -     Synthroid 112 MCG tablet; Take 1 tablet by mouth Daily.  Dispense: 90 tablet; Refill: 1  -      "SURE COMFORT INS SYR 1CC/28G 28G X 1/2\" 1 ML misc; Use one per month for administration of vitamin B12  Dispense: 12 each; Refill: 0  recent lab work reviewed  Results for orders placed or performed in visit on 05/15/23   Magnesium    Specimen: Blood   Result Value Ref Range    Magnesium 1.9 1.6 - 2.6 mg/dL   Comprehensive Metabolic Panel    Specimen: Blood   Result Value Ref Range    Glucose 83 65 - 99 mg/dL    BUN 14 6 - 20 mg/dL    Creatinine 0.95 0.57 - 1.00 mg/dL    Sodium 137 136 - 145 mmol/L    Potassium 3.9 3.5 - 5.2 mmol/L    Chloride 98 98 - 107 mmol/L    CO2 26.8 22.0 - 29.0 mmol/L    Calcium 9.0 8.6 - 10.5 mg/dL    Total Protein 6.4 6.0 - 8.5 g/dL    Albumin 4.5 3.5 - 5.2 g/dL    ALT (SGPT) 10 1 - 33 U/L    AST (SGOT) 13 1 - 32 U/L    Alkaline Phosphatase 105 39 - 117 U/L    Total Bilirubin <0.2 0.0 - 1.2 mg/dL    Globulin 1.9 gm/dL    A/G Ratio 2.4 g/dL    BUN/Creatinine Ratio 14.7 7.0 - 25.0    Anion Gap 12.2 5.0 - 15.0 mmol/L    eGFR 71.8 >60.0 mL/min/1.73   Protein / Creatinine Ratio, Urine - Urine, Clean Catch    Specimen: Urine, Clean Catch   Result Value Ref Range    Protein/Creatinine Ratio, Urine 129.4 0.0 - 200.0 mg/G Crea    Creatinine, Urine 87.3 mg/dL    Total Protein, Urine 11.3 mg/dL   CBC Auto Differential    Specimen: Blood   Result Value Ref Range    WBC 5.71 3.40 - 10.80 10*3/mm3    RBC 4.02 3.77 - 5.28 10*6/mm3    Hemoglobin 11.7 (L) 12.0 - 15.9 g/dL    Hematocrit 34.2 34.0 - 46.6 %    MCV 85.1 79.0 - 97.0 fL    MCH 29.1 26.6 - 33.0 pg    MCHC 34.2 31.5 - 35.7 g/dL    RDW 11.8 (L) 12.3 - 15.4 %    RDW-SD 36.2 (L) 37.0 - 54.0 fl    MPV 10.3 6.0 - 12.0 fL    Platelets 252 140 - 450 10*3/mm3    Neutrophil % 45.0 42.7 - 76.0 %    Lymphocyte % 42.9 19.6 - 45.3 %    Monocyte % 10.3 5.0 - 12.0 %    Eosinophil % 0.5 0.3 - 6.2 %    Basophil % 0.9 0.0 - 1.5 %    Immature Grans % 0.4 0.0 - 0.5 %    Neutrophils, Absolute 2.57 1.70 - 7.00 10*3/mm3    Lymphocytes, Absolute 2.45 0.70 - 3.10 10*3/mm3 "    Monocytes, Absolute 0.59 0.10 - 0.90 10*3/mm3    Eosinophils, Absolute 0.03 0.00 - 0.40 10*3/mm3    Basophils, Absolute 0.05 0.00 - 0.20 10*3/mm3    Immature Grans, Absolute 0.02 0.00 - 0.05 10*3/mm3    nRBC 0.2 0.0 - 0.2 /100 WBC   Urinalysis without microscopic (no culture) - Urine, Clean Catch    Specimen: Urine, Clean Catch   Result Value Ref Range    Color, UA Yellow Yellow, Straw    Appearance, UA Clear Clear    pH, UA 5.5 5.0 - 8.0    Specific Gravity, UA 1.016 1.005 - 1.030    Glucose, UA Negative Negative    Ketones, UA Negative Negative    Bilirubin, UA Negative Negative    Blood, UA Small (1+) (A) Negative    Protein, UA Trace (A) Negative    Leuk Esterase, UA Trace (A) Negative    Nitrite, UA Negative Negative    Urobilinogen, UA 0.2 E.U./dL 0.2 - 1.0 E.U./dL   Urinalysis, Microscopic Only - Urine, Clean Catch    Specimen: Urine, Clean Catch   Result Value Ref Range    RBC, UA 3-5 (A) None Seen, 0-2 /HPF    WBC, UA 0-2 None Seen, 0-2 /HPF    Bacteria, UA None Seen None Seen /HPF    Squamous Epithelial Cells, UA 0-2 None Seen, 0-2 /HPF    Hyaline Casts, UA None Seen None Seen /LPF    Methodology Automated Microscopy      Return in about 6 months (around 11/16/2023).    Taylor Holliday MD  Signed Taylor Holliday MD

## 2023-05-23 DIAGNOSIS — J82.83 EOSINOPHILIC ASTHMA: ICD-10-CM

## 2023-05-24 RX ORDER — FLUTICASONE FUROATE AND VILANTEROL 200; 25 UG/1; UG/1
1 POWDER RESPIRATORY (INHALATION)
Qty: 60 EACH | Refills: 2 | Status: SHIPPED | OUTPATIENT
Start: 2023-05-24 | End: 2023-08-22

## 2023-06-09 ENCOUNTER — LAB (OUTPATIENT)
Dept: LAB | Facility: HOSPITAL | Age: 53
End: 2023-06-09
Payer: COMMERCIAL

## 2023-06-09 DIAGNOSIS — N02.2 MEMBRANOUS NEPHROPATHY DETERMINED BY BIOPSY: ICD-10-CM

## 2023-06-09 LAB
BACTERIA UR QL AUTO: ABNORMAL /HPF
BASOPHILS # BLD AUTO: 0.05 10*3/MM3 (ref 0–0.2)
BASOPHILS NFR BLD AUTO: 0.8 % (ref 0–1.5)
BILIRUB UR QL STRIP: NEGATIVE
CLARITY UR: CLEAR
COLOR UR: YELLOW
DEPRECATED RDW RBC AUTO: 35 FL (ref 37–54)
EOSINOPHIL # BLD AUTO: 0.03 10*3/MM3 (ref 0–0.4)
EOSINOPHIL NFR BLD AUTO: 0.5 % (ref 0.3–6.2)
ERYTHROCYTE [DISTWIDTH] IN BLOOD BY AUTOMATED COUNT: 11.7 % (ref 12.3–15.4)
GLUCOSE UR STRIP-MCNC: NEGATIVE MG/DL
HCT VFR BLD AUTO: 35.6 % (ref 34–46.6)
HGB BLD-MCNC: 12.2 G/DL (ref 12–15.9)
HGB UR QL STRIP.AUTO: ABNORMAL
HYALINE CASTS UR QL AUTO: ABNORMAL /LPF
IMM GRANULOCYTES # BLD AUTO: 0.02 10*3/MM3 (ref 0–0.05)
IMM GRANULOCYTES NFR BLD AUTO: 0.3 % (ref 0–0.5)
KETONES UR QL STRIP: NEGATIVE
LEUKOCYTE ESTERASE UR QL STRIP.AUTO: ABNORMAL
LYMPHOCYTES # BLD AUTO: 1.97 10*3/MM3 (ref 0.7–3.1)
LYMPHOCYTES NFR BLD AUTO: 31.5 % (ref 19.6–45.3)
MCH RBC QN AUTO: 28.8 PG (ref 26.6–33)
MCHC RBC AUTO-ENTMCNC: 34.3 G/DL (ref 31.5–35.7)
MCV RBC AUTO: 84.2 FL (ref 79–97)
MONOCYTES # BLD AUTO: 0.61 10*3/MM3 (ref 0.1–0.9)
MONOCYTES NFR BLD AUTO: 9.7 % (ref 5–12)
NEUTROPHILS NFR BLD AUTO: 3.58 10*3/MM3 (ref 1.7–7)
NEUTROPHILS NFR BLD AUTO: 57.2 % (ref 42.7–76)
NITRITE UR QL STRIP: NEGATIVE
NRBC BLD AUTO-RTO: 0 /100 WBC (ref 0–0.2)
PH UR STRIP.AUTO: 6 [PH] (ref 5–8)
PLATELET # BLD AUTO: 260 10*3/MM3 (ref 140–450)
PMV BLD AUTO: 10.6 FL (ref 6–12)
PROT UR QL STRIP: NEGATIVE
RBC # BLD AUTO: 4.23 10*6/MM3 (ref 3.77–5.28)
RBC # UR STRIP: ABNORMAL /HPF
REF LAB TEST METHOD: ABNORMAL
SP GR UR STRIP: 1.01 (ref 1–1.03)
SQUAMOUS #/AREA URNS HPF: ABNORMAL /HPF
UROBILINOGEN UR QL STRIP: ABNORMAL
WBC # UR STRIP: ABNORMAL /HPF
WBC NRBC COR # BLD: 6.26 10*3/MM3 (ref 3.4–10.8)

## 2023-06-09 PROCEDURE — 84443 ASSAY THYROID STIM HORMONE: CPT | Performed by: INTERNAL MEDICINE

## 2023-06-09 PROCEDURE — 85025 COMPLETE CBC W/AUTO DIFF WBC: CPT

## 2023-06-09 PROCEDURE — 81001 URINALYSIS AUTO W/SCOPE: CPT

## 2023-06-09 PROCEDURE — 80053 COMPREHEN METABOLIC PANEL: CPT

## 2023-06-09 PROCEDURE — 84156 ASSAY OF PROTEIN URINE: CPT

## 2023-06-09 PROCEDURE — 83735 ASSAY OF MAGNESIUM: CPT

## 2023-06-09 PROCEDURE — 84436 ASSAY OF TOTAL THYROXINE: CPT | Performed by: INTERNAL MEDICINE

## 2023-06-09 PROCEDURE — 82570 ASSAY OF URINE CREATININE: CPT

## 2023-06-09 PROCEDURE — 36415 COLL VENOUS BLD VENIPUNCTURE: CPT

## 2023-06-10 LAB
ALBUMIN SERPL-MCNC: 4.6 G/DL (ref 3.5–5.2)
ALBUMIN/GLOB SERPL: 2.4 G/DL
ALP SERPL-CCNC: 100 U/L (ref 39–117)
ALT SERPL W P-5'-P-CCNC: 12 U/L (ref 1–33)
ANION GAP SERPL CALCULATED.3IONS-SCNC: 15.9 MMOL/L (ref 5–15)
AST SERPL-CCNC: 15 U/L (ref 1–32)
BILIRUB SERPL-MCNC: 0.3 MG/DL (ref 0–1.2)
BUN SERPL-MCNC: 19 MG/DL (ref 6–20)
BUN/CREAT SERPL: 17.9 (ref 7–25)
CALCIUM SPEC-SCNC: 9.2 MG/DL (ref 8.6–10.5)
CHLORIDE SERPL-SCNC: 95 MMOL/L (ref 98–107)
CO2 SERPL-SCNC: 25.1 MMOL/L (ref 22–29)
CREAT SERPL-MCNC: 1.06 MG/DL (ref 0.57–1)
CREAT UR-MCNC: 44.5 MG/DL
EGFRCR SERPLBLD CKD-EPI 2021: 62.9 ML/MIN/1.73
GLOBULIN UR ELPH-MCNC: 1.9 GM/DL
GLUCOSE SERPL-MCNC: 98 MG/DL (ref 65–99)
MAGNESIUM SERPL-MCNC: 2 MG/DL (ref 1.6–2.6)
POTASSIUM SERPL-SCNC: 4.3 MMOL/L (ref 3.5–5.2)
PROT ?TM UR-MCNC: 10.4 MG/DL
PROT SERPL-MCNC: 6.5 G/DL (ref 6–8.5)
PROT/CREAT UR: 233.7 MG/G CREA (ref 0–200)
SODIUM SERPL-SCNC: 136 MMOL/L (ref 136–145)
T4 SERPL-MCNC: 6.67 MCG/DL (ref 4.5–11.7)
TSH SERPL DL<=0.05 MIU/L-ACNC: 1.37 UIU/ML (ref 0.27–4.2)

## 2023-06-14 ENCOUNTER — SPECIALTY PHARMACY (OUTPATIENT)
Dept: PHARMACY | Facility: HOSPITAL | Age: 53
End: 2023-06-14
Payer: COMMERCIAL

## 2023-06-19 ENCOUNTER — OFFICE VISIT (OUTPATIENT)
Dept: INTERNAL MEDICINE | Facility: CLINIC | Age: 53
End: 2023-06-19
Payer: COMMERCIAL

## 2023-06-19 VITALS
HEART RATE: 87 BPM | SYSTOLIC BLOOD PRESSURE: 120 MMHG | BODY MASS INDEX: 34.49 KG/M2 | RESPIRATION RATE: 16 BRPM | HEIGHT: 65 IN | OXYGEN SATURATION: 99 % | DIASTOLIC BLOOD PRESSURE: 80 MMHG | TEMPERATURE: 97.1 F | WEIGHT: 207 LBS

## 2023-06-19 DIAGNOSIS — R14.0 ABDOMINAL BLOATING: ICD-10-CM

## 2023-06-19 DIAGNOSIS — K59.09 CHRONIC CONSTIPATION: ICD-10-CM

## 2023-06-19 DIAGNOSIS — K64.4 EXTERNAL HEMORRHOIDS WITH COMPLICATION: Primary | ICD-10-CM

## 2023-06-19 DIAGNOSIS — R25.1 TREMOR: ICD-10-CM

## 2023-06-19 DIAGNOSIS — R80.1 PERSISTENT PROTEINURIA: Primary | ICD-10-CM

## 2023-06-19 PROCEDURE — 99214 OFFICE O/P EST MOD 30 MIN: CPT | Performed by: FAMILY MEDICINE

## 2023-06-19 RX ORDER — LIDOCAINE 5 G/100G
1 CREAM RECTAL; TOPICAL 3 TIMES DAILY PRN
Qty: 113 G | Refills: 1 | Status: SHIPPED | OUTPATIENT
Start: 2023-06-19

## 2023-06-19 RX ORDER — LIDOCAINE 50 MG/G
1 OINTMENT TOPICAL
Qty: 240 G | Refills: 1 | Status: SHIPPED | OUTPATIENT
Start: 2023-06-19

## 2023-06-19 RX ORDER — LIDOCAINE HYDROCHLORIDE 20 MG/ML
JELLY TOPICAL 4 TIMES DAILY PRN
Qty: 20 ML | Refills: 1 | Status: SHIPPED | OUTPATIENT
Start: 2023-06-19

## 2023-06-19 NOTE — PROGRESS NOTES
"Chief Complaint  Hemorrhoids (Started up about a week and a half ago)    Subjective        Kelsey Ross presents to [facility]  History of Present Illness  History of hemorrhoids x years. Battles chronic constipation. We increased Linzess to 290 as she was having to take additional stool softeners with 145 mcg dose. The 290 seems to cause diarrhea. Bowel movements are loose. Having more gas since dose increased. Vegan. S/p hysterectomy.     Also has a tremor. Sister also has a bit of a tremor but otherwise no known family history of tremor.     Objective   Vital Signs:  /80 (BP Location: Right arm, Patient Position: Sitting, Cuff Size: Adult)   Pulse 87   Temp 97.1 °F (36.2 °C) (Temporal)   Resp 16   Ht 165.1 cm (65\")   Wt 93.9 kg (207 lb)   SpO2 99%   BMI 34.45 kg/m²   Estimated body mass index is 34.45 kg/m² as calculated from the following:    Height as of this encounter: 165.1 cm (65\").    Weight as of this encounter: 93.9 kg (207 lb).               Physical Exam  Vitals and nursing note reviewed. Exam conducted with a chaperone present.   Constitutional:       General: She is not in acute distress.     Appearance: Normal appearance. She is well-developed and well-groomed. She is obese. She is not ill-appearing, toxic-appearing or diaphoretic.   HENT:      Head: Normocephalic and atraumatic.      Right Ear: Hearing normal.      Left Ear: Hearing normal.   Eyes:      General: Lids are normal. No scleral icterus.     Extraocular Movements: Extraocular movements intact.   Neck:      Trachea: Phonation normal.   Pulmonary:      Effort: Pulmonary effort is normal.   Genitourinary:     Rectum: External hemorrhoid (one large hemorrhoid but does not appear to be thrombosed) present. No anal fissure.      Comments: Louis CANT[employer] chaperone  Musculoskeletal:      Cervical back: Neck supple.   Skin:     Coloration: Skin is not jaundiced or pale.   Neurological:      General: No " focal deficit present.      Mental Status: She is alert and oriented to person, place, and time.      Motor: Tremor present.   Psychiatric:         Attention and Perception: Attention and perception normal.         Mood and Affect: Mood and affect normal.         Speech: Speech normal.         Behavior: Behavior normal. Behavior is cooperative.         Thought Content: Thought content normal.         Cognition and Memory: Cognition and memory normal.         Judgment: Judgment normal.      Result Review :  The following data was reviewed by: Isha Giron MD on 06/19/2023:  Lab Results   Component Value Date    TSH 1.370 06/09/2023     Component      Latest Ref Rng 6/9/2023   T4, Total      4.50 - 11.70 mcg/dL 6.67                        Assessment and Plan   Diagnoses and all orders for this visit:    1. External hemorrhoids with complication (Primary)  -     Lidocaine, Anorectal, (LMX 5) 5 % cream cream; Apply 1 g topically to the appropriate area as directed 3 (Three) Times a Day As Needed (hemorrhoid pain).  Dispense: 113 g; Refill: 1  -     Lidocaine HCl 4 % pads; Apply 1 application topically 4 (Four) Times a Day As Needed (hemorrhoid pain).  Dispense: 30 each; Refill: 1  -     Lidocaine HCl gel (XYLOCAINE) 2 % gel; Apply  topically to the appropriate area as directed 4 (Four) Times a Day As Needed (hemorrhoid pain).  Dispense: 20 mL; Refill: 1  -     lidocaine (XYLOCAINE) 5 % ointment; Apply 1 application topically to the appropriate area as directed Every 2 (Two) Hours As Needed for Moderate Pain.  Dispense: 240 g; Refill: 1    2. Chronic constipation  -     linaclotide (Linzess) 145 MCG capsule capsule; Take 1 capsule by mouth Every Morning Before Breakfast.  Dispense: 90 capsule; Refill: 3  -     Ambulatory Referral to Gastroenterology    3. Abdominal bloating  -     Ambulatory Referral to Gastroenterology    4. Tremor    Sending multiple lidocaines to see which is covered/affordable. Not to use all  of them. Sitz baths. Avoid straining, limit time on toilet. If pain is not better later tomorrow afternoon or next day let me know and will refer to general surgery.     Information on FODMAP diet provided to see if this helps with bloating. Has never seen gastroenterology for chronic abdominal issues, referral placed. May try going back down on Linzess to 145 mcg but wait at least a few days until this hemorrhoid has calmed down.     Regarding tremor suggest monitoring. Recent thyroid levels normal (followed by endocrinology) and she has an upcoming nerve biopsy with her rheumatologist. Be mindful of caffeine intake.          Follow Up   Return for As scheduled previously.  Patient was given instructions and counseling regarding her condition or for health maintenance advice. Please see specific information pulled into the AVS if appropriate.

## 2023-06-20 LAB
ALBUMIN UR-MCNC: 4.7 MG/DL
COLLECT DURATION TIME UR: 24 HRS
CREAT UR-MCNC: 42.8 MG/DL
CREATINE 24H UR-MRATE: 1.02 G/24 HR (ref 0.7–1.6)
MICROALBUMIN 24H UR-MRATE: 112.3 MG/24 HRS (ref 0–25)
PROT 24H UR-MRATE: 456.5 MG/24HOURS (ref 0–150)
SPECIMEN VOL 24H UR: 2390 ML

## 2023-07-06 NOTE — H&P (VIEW-ONLY)
Patient: Kelsey Ross  YOB: 1970    Date: 07/17/2023    Primary Care Provider: Isha Giron MD    Chief Complaint   Patient presents with    Follow-up       History: The patient is in the office today for a 2 week follow up of external hemorrhoids. Patient continues to complain of rectal pain with episodes of visible rectal bleeding. She states that she has had some improvement with the nifedipine cream and would like a refill. She has been doing sitz baths daily, but isn't able to increase them because of her work schedule.     The following portions of the patient's history were reviewed and updated as appropriate: allergies, current medications, past family history, past medical history, past social history, past surgical history and problem list.    Review of Systems   Constitutional:  Negative for chills, fever and unexpected weight change.   HENT:  Negative for hearing loss, trouble swallowing and voice change.    Eyes:  Negative for visual disturbance.   Respiratory:  Negative for apnea, cough, chest tightness, shortness of breath and wheezing.    Cardiovascular:  Negative for chest pain, palpitations and leg swelling.   Gastrointestinal:  Positive for anal bleeding and rectal pain. Negative for abdominal distention, abdominal pain, blood in stool, constipation, diarrhea, nausea and vomiting.   Endocrine: Negative for cold intolerance and heat intolerance.   Genitourinary:  Negative for difficulty urinating, dysuria and flank pain.   Musculoskeletal:  Negative for back pain and gait problem.   Skin:  Negative for color change, rash and wound.   Neurological:  Negative for dizziness, syncope, speech difficulty, weakness, light-headedness, numbness and headaches.   Hematological:  Negative for adenopathy. Does not bruise/bleed easily.   Psychiatric/Behavioral:  Negative for confusion. The patient is not nervous/anxious.      Vital Signs  Vitals:    07/17/23 1514   BP: 130/80   BP  "Location: Right arm   Pulse: 78   Resp: 18   Temp: 97.6 øF (36.4 øC)   TempSrc: Temporal   SpO2: 98%   Weight: 95.3 kg (210 lb)   Height: 165.1 cm (65\")       Allergies:  Allergies   Allergen Reactions    Penicillin G Unknown - High Severity    Penicillins Unknown - High Severity     Allergist informed patient she was allergic to Penicillins    Fexofenadine Headache     Only allegra D    Methotrexate GI Intolerance and Headache    Pseudoephedrine Hcl Headache       Medications:    Current Outpatient Medications:     acetaminophen-codeine (TYLENOL/CODEINE #3) 300-30 MG per tablet, Take 1 tablet by mouth Every 4 (Four) Hours As Needed for Moderate Pain., Disp: 10 tablet, Rfl: 0    albuterol sulfate  (90 Base) MCG/ACT inhaler, Inhale 2 puffs Every 4 (Four) Hours As Needed for Wheezing or Shortness of Air., Disp: 48 g, Rfl: 3    cetirizine (zyrTEC) 10 MG tablet, Take 1 tablet by mouth Daily., Disp: 30 tablet, Rfl: 5    cyanocobalamin 1000 MCG/ML injection, Take 1 ml weekly for 4 weeks, then monthly, Disp: 10 mL, Rfl: 0    DULoxetine (CYMBALTA) 60 MG capsule, Take 1 capsule by mouth Daily., Disp: , Rfl:     fluticasone (FLONASE) 50 MCG/ACT nasal spray, fluticasone propionate 50 mcg/actuation nasal spray,suspension  USE 2 SPRAY(S) IN EACH NOSTRIL ONCE DAILY AS DIRECTED FOR 7 DAYS, Disp: , Rfl:     Fluticasone Furoate-Vilanterol (BREO ELLIPTA) 200-25 MCG/ACT inhaler, Inhale 1 puff Daily for 90 days., Disp: 60 each, Rfl: 2    hydroxychloroquine (PLAQUENIL) 200 MG tablet, Take 1 tablet by mouth 2 (Two) Times a Day., Disp: , Rfl:     lidocaine (XYLOCAINE) 5 % ointment, Apply 1 application topically to the appropriate area as directed Every 2 (Two) Hours As Needed for Moderate Pain., Disp: 240 g, Rfl: 1    Lidocaine HCl 4 % pads, Apply 1 application topically 4 (Four) Times a Day As Needed (hemorrhoid pain)., Disp: 30 each, Rfl: 1    Lidocaine HCl gel (XYLOCAINE) 2 % gel, Apply  topically to the appropriate area as " "directed 4 (Four) Times a Day As Needed (hemorrhoid pain)., Disp: 20 mL, Rfl: 1    Lidocaine, Anorectal, (LMX 5) 5 % cream cream, Apply 1 g topically to the appropriate area as directed 3 (Three) Times a Day As Needed (hemorrhoid pain)., Disp: 113 g, Rfl: 1    linaclotide (Linzess) 145 MCG capsule capsule, Take 1 capsule by mouth Every Morning Before Breakfast., Disp: 90 capsule, Rfl: 3    linaclotide (Linzess) 290 MCG capsule capsule, Take 1 capsule by mouth Every Morning Before Breakfast., Disp: 90 capsule, Rfl: 3    liothyronine (CYTOMEL) 5 MCG tablet, Take 2 tablets by mouth Daily for 180 days., Disp: 180 tablet, Rfl: 1    losartan (COZAAR) 100 MG tablet, Take 1 tablet by mouth Daily., Disp: , Rfl:     Mepolizumab 100 MG/ML solution auto-injector, Inject 3 mL under the skin into the appropriate area as directed Every 28 (Twenty-Eight) Days., Disp: 3 mL, Rfl: 11    mycophenolate (CELLCEPT) 500 MG tablet, Take 2 tablets by mouth 2 (Two) Times a Day., Disp: , Rfl:     pantoprazole (PROTONIX) 40 MG EC tablet, Take 1 tablet by mouth Daily., Disp: , Rfl:     rosuvastatin (CRESTOR) 5 MG tablet, Take 1 tablet by mouth Daily., Disp: , Rfl:     spironolactone (ALDACTONE) 25 MG tablet, Take 1 tablet by mouth Daily., Disp: , Rfl:     SURE COMFORT INS SYR 1CC/28G 28G X 1/2\" 1 ML misc, Use one per month for administration of vitamin B12, Disp: 12 each, Rfl: 0    Synthroid 112 MCG tablet, Take 1 tablet by mouth Daily., Disp: 90 tablet, Rfl: 1    tiZANidine (ZANAFLEX) 4 MG tablet, Take 1 tablet by mouth Every 8 (Eight) Hours As Needed for Muscle Spasms., Disp: 30 tablet, Rfl: 0    torsemide (DEMADEX) 20 MG tablet, Take 1 tablet by mouth Daily., Disp: , Rfl:     Tuberculin-Allergy Syringes 28G X 1/2\" 1 ML misc, 1 Units Every 30 (Thirty) Days., Disp: 20 each, Rfl: 1    vitamin D3 125 MCG (5000 UT) capsule capsule, Take 1 capsule by mouth Daily., Disp: , Rfl:     Physical Exam:   General Appearance:    Alert, cooperative, in no " acute distress   Head:    Normocephalic, without obvious abnormality, atraumatic   Lungs:     Clear to auscultation,respirations regular, even and                  unlabored    Heart:    Regular rhythm and normal rate, normal S1 and S2, no            murmur, no gallop, no rub, no click   Abdomen:     Normal bowel sounds, no masses, no organomegaly, soft        non-tender, non-distended, no guarding, no rebound                tenderness   Extremities:   Moves all extremities well, no edema, no cyanosis, no             redness   Pulses:   Pulses palpable and equal bilaterally   Skin:   No bleeding, bruising or rash     Results Review:   None     Review of Systems was reviewed and confirmed as accurate as documented by the MA.    ASSESSMENT/PLAN:    1. External hemorrhoid       Patient was seen today for follow up for her external hemorrhoids after starting nifedipine cream. She has had some improvement, but is still having pretty significant pain and occasional bleeding. We had a long discussion in the office today regarding surgical treatment for her hemorrhoids. Patient is a somewhat complex surgical patient given her coexisting autoimmune diseases and fibromyalgia. She has consistently failed conservative management so I think it reasonable at this point to pursue surgical treatment as this disease is causing her significant anxiety and has been life limiting. She has one external hemorrhoid in particular that is causing her symptoms and I believe she will have improvement of comfort and quality of life with surgical hemorrhoidectomy. The procedure and risks including bleeding, infection, damage to surrounding structures, need for additional procedures, non-healing wound, and exacerbation of pain/fibromyalgia were discussed. She expresses understanding and wishes to proceed. All questions were answered today.     Electronically signed by Radha Haley DO  07/17/23

## 2023-07-17 PROBLEM — K64.4 EXTERNAL HEMORRHOID: Status: ACTIVE | Noted: 2023-07-17

## 2023-07-31 ENCOUNTER — TELEPHONE (OUTPATIENT)
Dept: PREADMISSION TESTING | Facility: HOSPITAL | Age: 53
End: 2023-07-31

## 2023-08-01 ENCOUNTER — PRE-ADMISSION TESTING (OUTPATIENT)
Dept: PREADMISSION TESTING | Facility: HOSPITAL | Age: 53
End: 2023-08-01
Payer: COMMERCIAL

## 2023-08-01 VITALS — BODY MASS INDEX: 35.11 KG/M2 | WEIGHT: 211 LBS

## 2023-08-01 DIAGNOSIS — N02.2 MEMBRANOUS NEPHROPATHY DETERMINED BY BIOPSY: ICD-10-CM

## 2023-08-01 LAB
ALBUMIN SERPL-MCNC: 4.5 G/DL (ref 3.5–5.2)
ALBUMIN/GLOB SERPL: 2.3 G/DL
ALP SERPL-CCNC: 113 U/L (ref 39–117)
ALT SERPL W P-5'-P-CCNC: 10 U/L (ref 1–33)
ANION GAP SERPL CALCULATED.3IONS-SCNC: 11.9 MMOL/L (ref 5–15)
AST SERPL-CCNC: 16 U/L (ref 1–32)
BACTERIA UR QL AUTO: NORMAL /HPF
BASOPHILS # BLD AUTO: 0.06 10*3/MM3 (ref 0–0.2)
BASOPHILS NFR BLD AUTO: 1 % (ref 0–1.5)
BILIRUB SERPL-MCNC: 0.2 MG/DL (ref 0–1.2)
BILIRUB UR QL STRIP: NEGATIVE
BUN SERPL-MCNC: 15 MG/DL (ref 6–20)
BUN/CREAT SERPL: 16.9 (ref 7–25)
CALCIUM SPEC-SCNC: 9.1 MG/DL (ref 8.6–10.5)
CHLORIDE SERPL-SCNC: 99 MMOL/L (ref 98–107)
CLARITY UR: CLEAR
CO2 SERPL-SCNC: 26.1 MMOL/L (ref 22–29)
COLOR UR: YELLOW
CREAT SERPL-MCNC: 0.89 MG/DL (ref 0.57–1)
CREAT UR-MCNC: 58.4 MG/DL
DEPRECATED RDW RBC AUTO: 38.3 FL (ref 37–54)
EGFRCR SERPLBLD CKD-EPI 2021: 77.6 ML/MIN/1.73
EOSINOPHIL # BLD AUTO: 0.03 10*3/MM3 (ref 0–0.4)
EOSINOPHIL NFR BLD AUTO: 0.5 % (ref 0.3–6.2)
ERYTHROCYTE [DISTWIDTH] IN BLOOD BY AUTOMATED COUNT: 12.4 % (ref 12.3–15.4)
GLOBULIN UR ELPH-MCNC: 2 GM/DL
GLUCOSE SERPL-MCNC: 86 MG/DL (ref 65–99)
GLUCOSE UR STRIP-MCNC: NEGATIVE MG/DL
HCT VFR BLD AUTO: 36.2 % (ref 34–46.6)
HGB BLD-MCNC: 12 G/DL (ref 12–15.9)
HGB UR QL STRIP.AUTO: ABNORMAL
HYALINE CASTS UR QL AUTO: NORMAL /LPF
IMM GRANULOCYTES # BLD AUTO: 0.02 10*3/MM3 (ref 0–0.05)
IMM GRANULOCYTES NFR BLD AUTO: 0.3 % (ref 0–0.5)
KETONES UR QL STRIP: NEGATIVE
LEUKOCYTE ESTERASE UR QL STRIP.AUTO: NEGATIVE
LYMPHOCYTES # BLD AUTO: 2.17 10*3/MM3 (ref 0.7–3.1)
LYMPHOCYTES NFR BLD AUTO: 35.3 % (ref 19.6–45.3)
MAGNESIUM SERPL-MCNC: 1.9 MG/DL (ref 1.6–2.6)
MCH RBC QN AUTO: 28.4 PG (ref 26.6–33)
MCHC RBC AUTO-ENTMCNC: 33.1 G/DL (ref 31.5–35.7)
MCV RBC AUTO: 85.6 FL (ref 79–97)
MONOCYTES # BLD AUTO: 0.61 10*3/MM3 (ref 0.1–0.9)
MONOCYTES NFR BLD AUTO: 9.9 % (ref 5–12)
NEUTROPHILS NFR BLD AUTO: 3.26 10*3/MM3 (ref 1.7–7)
NEUTROPHILS NFR BLD AUTO: 53 % (ref 42.7–76)
NITRITE UR QL STRIP: NEGATIVE
NRBC BLD AUTO-RTO: 0 /100 WBC (ref 0–0.2)
PH UR STRIP.AUTO: 5.5 [PH] (ref 5–8)
PLATELET # BLD AUTO: 273 10*3/MM3 (ref 140–450)
PMV BLD AUTO: 10.4 FL (ref 6–12)
POTASSIUM SERPL-SCNC: 4 MMOL/L (ref 3.5–5.2)
PROT ?TM UR-MCNC: 6 MG/DL
PROT SERPL-MCNC: 6.5 G/DL (ref 6–8.5)
PROT UR QL STRIP: NEGATIVE
PROT/CREAT UR: 102.7 MG/G CREA (ref 0–200)
RBC # BLD AUTO: 4.23 10*6/MM3 (ref 3.77–5.28)
RBC # UR STRIP: NORMAL /HPF
REF LAB TEST METHOD: NORMAL
SODIUM SERPL-SCNC: 137 MMOL/L (ref 136–145)
SP GR UR STRIP: 1.01 (ref 1–1.03)
SQUAMOUS #/AREA URNS HPF: NORMAL /HPF
UROBILINOGEN UR QL STRIP: ABNORMAL
WBC # UR STRIP: NORMAL /HPF
WBC NRBC COR # BLD: 6.15 10*3/MM3 (ref 3.4–10.8)

## 2023-08-01 PROCEDURE — 81001 URINALYSIS AUTO W/SCOPE: CPT

## 2023-08-01 PROCEDURE — 84156 ASSAY OF PROTEIN URINE: CPT

## 2023-08-01 PROCEDURE — 83735 ASSAY OF MAGNESIUM: CPT

## 2023-08-01 PROCEDURE — 80053 COMPREHEN METABOLIC PANEL: CPT

## 2023-08-01 PROCEDURE — 36415 COLL VENOUS BLD VENIPUNCTURE: CPT

## 2023-08-01 PROCEDURE — 85025 COMPLETE CBC W/AUTO DIFF WBC: CPT

## 2023-08-01 PROCEDURE — 82570 ASSAY OF URINE CREATININE: CPT

## 2023-08-03 ENCOUNTER — TELEPHONE (OUTPATIENT)
Dept: SURGERY | Facility: CLINIC | Age: 53
End: 2023-08-03
Payer: COMMERCIAL

## 2023-08-07 DIAGNOSIS — E89.0 POSTABLATIVE HYPOTHYROIDISM: ICD-10-CM

## 2023-08-07 RX ORDER — LIOTHYRONINE SODIUM 5 UG/1
TABLET ORAL
Qty: 180 TABLET | Refills: 1 | Status: SHIPPED | OUTPATIENT
Start: 2023-08-07

## 2023-08-07 NOTE — TELEPHONE ENCOUNTER
Rx Refill Note    Requested Prescriptions     Pending Prescriptions Disp Refills    liothyronine (CYTOMEL) 5 MCG tablet [Pharmacy Med Name: LIOTHYRONINE SODIUM 5MCG TABLET] 180 tablet 1     Sig: TAKE 2 TABLETS BY MOUTH DAILY        Last office visit with prescribing clinician: 5/16/2023        Next office visit with prescribing clinician: 11/20/2023   {

## 2023-08-08 ENCOUNTER — ANESTHESIA EVENT (OUTPATIENT)
Dept: PERIOP | Facility: HOSPITAL | Age: 53
End: 2023-08-08
Payer: COMMERCIAL

## 2023-08-08 ENCOUNTER — HOSPITAL ENCOUNTER (OUTPATIENT)
Facility: HOSPITAL | Age: 53
Setting detail: HOSPITAL OUTPATIENT SURGERY
Discharge: HOME | End: 2023-08-08
Attending: STUDENT IN AN ORGANIZED HEALTH CARE EDUCATION/TRAINING PROGRAM | Admitting: STUDENT IN AN ORGANIZED HEALTH CARE EDUCATION/TRAINING PROGRAM
Payer: COMMERCIAL

## 2023-08-08 ENCOUNTER — ANESTHESIA (OUTPATIENT)
Dept: PERIOP | Facility: HOSPITAL | Age: 53
End: 2023-08-08
Payer: COMMERCIAL

## 2023-08-08 VITALS
DIASTOLIC BLOOD PRESSURE: 80 MMHG | TEMPERATURE: 99 F | RESPIRATION RATE: 16 BRPM | OXYGEN SATURATION: 98 % | HEART RATE: 83 BPM | SYSTOLIC BLOOD PRESSURE: 136 MMHG

## 2023-08-08 DIAGNOSIS — K64.4 EXTERNAL HEMORRHOID: ICD-10-CM

## 2023-08-08 PROCEDURE — 46260 REMOVE IN/EX HEM GROUPS 2+: CPT | Performed by: STUDENT IN AN ORGANIZED HEALTH CARE EDUCATION/TRAINING PROGRAM

## 2023-08-08 PROCEDURE — 25010000002 FENTANYL CITRATE (PF) 100 MCG/2ML SOLUTION: Performed by: NURSE ANESTHETIST, CERTIFIED REGISTERED

## 2023-08-08 PROCEDURE — 25010000002 DEXAMETHASONE PER 1 MG: Performed by: NURSE ANESTHETIST, CERTIFIED REGISTERED

## 2023-08-08 PROCEDURE — 25010000002 MIDAZOLAM PER 1MG: Performed by: NURSE ANESTHETIST, CERTIFIED REGISTERED

## 2023-08-08 PROCEDURE — 25010000002 ONDANSETRON PER 1 MG: Performed by: NURSE ANESTHETIST, CERTIFIED REGISTERED

## 2023-08-08 PROCEDURE — 25010000002 PROPOFOL 200 MG/20ML EMULSION: Performed by: NURSE ANESTHETIST, CERTIFIED REGISTERED

## 2023-08-08 PROCEDURE — 25010000002 BUPIVACAINE (PF) 0.5 % SOLUTION: Performed by: STUDENT IN AN ORGANIZED HEALTH CARE EDUCATION/TRAINING PROGRAM

## 2023-08-08 PROCEDURE — 25010000002 CLINDAMYCIN 900 MG/50ML SOLUTION: Performed by: STUDENT IN AN ORGANIZED HEALTH CARE EDUCATION/TRAINING PROGRAM

## 2023-08-08 DEVICE — HEMOST ABS SURGIFOAM 8X3CM: Type: IMPLANTABLE DEVICE | Site: RECTUM | Status: FUNCTIONAL

## 2023-08-08 RX ORDER — MIDAZOLAM HYDROCHLORIDE 2 MG/2ML
INJECTION, SOLUTION INTRAMUSCULAR; INTRAVENOUS AS NEEDED
Status: DISCONTINUED | OUTPATIENT
Start: 2023-08-08 | End: 2023-08-08 | Stop reason: SURG

## 2023-08-08 RX ORDER — SODIUM CHLORIDE, SODIUM LACTATE, POTASSIUM CHLORIDE, CALCIUM CHLORIDE 600; 310; 30; 20 MG/100ML; MG/100ML; MG/100ML; MG/100ML
50 INJECTION, SOLUTION INTRAVENOUS CONTINUOUS
Status: DISCONTINUED | OUTPATIENT
Start: 2023-08-08 | End: 2023-08-08 | Stop reason: HOSPADM

## 2023-08-08 RX ORDER — ONDANSETRON 2 MG/ML
INJECTION INTRAMUSCULAR; INTRAVENOUS AS NEEDED
Status: DISCONTINUED | OUTPATIENT
Start: 2023-08-08 | End: 2023-08-08 | Stop reason: SURG

## 2023-08-08 RX ORDER — DEXAMETHASONE SODIUM PHOSPHATE 4 MG/ML
INJECTION, SOLUTION INTRA-ARTICULAR; INTRALESIONAL; INTRAMUSCULAR; INTRAVENOUS; SOFT TISSUE AS NEEDED
Status: DISCONTINUED | OUTPATIENT
Start: 2023-08-08 | End: 2023-08-08 | Stop reason: SURG

## 2023-08-08 RX ORDER — LORAZEPAM 2 MG/ML
1 INJECTION INTRAMUSCULAR
Status: DISCONTINUED | OUTPATIENT
Start: 2023-08-08 | End: 2023-08-08 | Stop reason: HOSPADM

## 2023-08-08 RX ORDER — FENTANYL CITRATE 50 UG/ML
INJECTION, SOLUTION INTRAMUSCULAR; INTRAVENOUS AS NEEDED
Status: DISCONTINUED | OUTPATIENT
Start: 2023-08-08 | End: 2023-08-08 | Stop reason: SURG

## 2023-08-08 RX ORDER — SODIUM CHLORIDE 9 MG/ML
40 INJECTION, SOLUTION INTRAVENOUS AS NEEDED
Status: DISCONTINUED | OUTPATIENT
Start: 2023-08-08 | End: 2023-08-08 | Stop reason: HOSPADM

## 2023-08-08 RX ORDER — HYDROCODONE BITARTRATE AND ACETAMINOPHEN 5; 325 MG/1; MG/1
1 TABLET ORAL EVERY 6 HOURS PRN
Qty: 10 TABLET | Refills: 0 | Status: SHIPPED | OUTPATIENT
Start: 2023-08-08 | End: 2023-08-11

## 2023-08-08 RX ORDER — MAGNESIUM HYDROXIDE 1200 MG/15ML
LIQUID ORAL AS NEEDED
Status: DISCONTINUED | OUTPATIENT
Start: 2023-08-08 | End: 2023-08-08 | Stop reason: HOSPADM

## 2023-08-08 RX ORDER — LIDOCAINE HYDROCHLORIDE 20 MG/ML
INJECTION, SOLUTION INTRAVENOUS AS NEEDED
Status: DISCONTINUED | OUTPATIENT
Start: 2023-08-08 | End: 2023-08-08 | Stop reason: SURG

## 2023-08-08 RX ORDER — IBUPROFEN 200 MG
TABLET ORAL AS NEEDED
Status: DISCONTINUED | OUTPATIENT
Start: 2023-08-08 | End: 2023-08-08 | Stop reason: HOSPADM

## 2023-08-08 RX ORDER — BUPIVACAINE HYDROCHLORIDE 5 MG/ML
INJECTION, SOLUTION EPIDURAL; INTRACAUDAL AS NEEDED
Status: DISCONTINUED | OUTPATIENT
Start: 2023-08-08 | End: 2023-08-08 | Stop reason: HOSPADM

## 2023-08-08 RX ORDER — SODIUM CHLORIDE 0.9 % (FLUSH) 0.9 %
10 SYRINGE (ML) INJECTION AS NEEDED
Status: DISCONTINUED | OUTPATIENT
Start: 2023-08-08 | End: 2023-08-08 | Stop reason: HOSPADM

## 2023-08-08 RX ORDER — KETAMINE HCL IN NACL, ISO-OSM 100MG/10ML
SYRINGE (ML) INJECTION AS NEEDED
Status: DISCONTINUED | OUTPATIENT
Start: 2023-08-08 | End: 2023-08-08 | Stop reason: SURG

## 2023-08-08 RX ORDER — PROPOFOL 10 MG/ML
INJECTION, EMULSION INTRAVENOUS AS NEEDED
Status: DISCONTINUED | OUTPATIENT
Start: 2023-08-08 | End: 2023-08-08 | Stop reason: SURG

## 2023-08-08 RX ORDER — DIAZEPAM 2 MG/1
2 TABLET ORAL
Qty: 5 TABLET | Refills: 0 | Status: SHIPPED | OUTPATIENT
Start: 2023-08-08 | End: 2023-08-13

## 2023-08-08 RX ORDER — CLINDAMYCIN PHOSPHATE 900 MG/50ML
900 INJECTION INTRAVENOUS ONCE
Status: COMPLETED | OUTPATIENT
Start: 2023-08-08 | End: 2023-08-08

## 2023-08-08 RX ORDER — ONDANSETRON 2 MG/ML
4 INJECTION INTRAMUSCULAR; INTRAVENOUS ONCE AS NEEDED
Status: DISCONTINUED | OUTPATIENT
Start: 2023-08-08 | End: 2023-08-08 | Stop reason: HOSPADM

## 2023-08-08 RX ORDER — SODIUM CHLORIDE 0.9 % (FLUSH) 0.9 %
10 SYRINGE (ML) INJECTION EVERY 12 HOURS SCHEDULED
Status: DISCONTINUED | OUTPATIENT
Start: 2023-08-08 | End: 2023-08-08 | Stop reason: HOSPADM

## 2023-08-08 RX ORDER — GABAPENTIN 300 MG/1
300 CAPSULE ORAL 3 TIMES DAILY
Qty: 10 CAPSULE | Refills: 0 | Status: SHIPPED | OUTPATIENT
Start: 2023-08-08 | End: 2023-08-12

## 2023-08-08 RX ADMIN — Medication 10 MG: at 12:47

## 2023-08-08 RX ADMIN — FENTANYL CITRATE 50 MCG: 50 INJECTION INTRAMUSCULAR; INTRAVENOUS at 12:57

## 2023-08-08 RX ADMIN — MIDAZOLAM HYDROCHLORIDE 2 MG: 1 INJECTION, SOLUTION INTRAMUSCULAR; INTRAVENOUS at 12:43

## 2023-08-08 RX ADMIN — SODIUM CHLORIDE, POTASSIUM CHLORIDE, SODIUM LACTATE AND CALCIUM CHLORIDE 50 ML/HR: 600; 310; 30; 20 INJECTION, SOLUTION INTRAVENOUS at 10:49

## 2023-08-08 RX ADMIN — PROPOFOL 50 MG: 10 INJECTION, EMULSION INTRAVENOUS at 12:58

## 2023-08-08 RX ADMIN — Medication 10 MG: at 12:57

## 2023-08-08 RX ADMIN — DEXAMETHASONE SODIUM PHOSPHATE 4 MG: 4 INJECTION, SOLUTION INTRA-ARTICULAR; INTRALESIONAL; INTRAMUSCULAR; INTRAVENOUS; SOFT TISSUE at 12:50

## 2023-08-08 RX ADMIN — ONDANSETRON 4 MG: 2 INJECTION INTRAMUSCULAR; INTRAVENOUS at 12:43

## 2023-08-08 RX ADMIN — FENTANYL CITRATE 50 MCG: 50 INJECTION INTRAMUSCULAR; INTRAVENOUS at 13:09

## 2023-08-08 RX ADMIN — LIDOCAINE HYDROCHLORIDE 60 MG: 20 INJECTION, SOLUTION INTRAVENOUS at 12:47

## 2023-08-08 RX ADMIN — CLINDAMYCIN IN 5 PERCENT DEXTROSE 900 MG: 18 INJECTION, SOLUTION INTRAVENOUS at 12:43

## 2023-08-08 RX ADMIN — PROPOFOL 150 MG: 10 INJECTION, EMULSION INTRAVENOUS at 12:47

## 2023-08-08 NOTE — OP NOTE
PATIENT:    Kelsey Ross    DATE OF SURGERY:   8/8/2023    PHYSICIAN:    Radha Haley DO    REFERRING PHYSICIAN:  Isha Giron MD    YOB: 1970    PREOPERATIVE DIAGNOSIS:  External and internal hemorrhoids    POSTOPERATIVE DIAGNOSIS: Same    PROCEDURE: Surgical hemorrhoidectomy    EBL:  Less than 50 cc    COMPLICATIONS:  None    HISTORY:  The patient presents to me for evaluation and treatment of a history significant for symptomatic hemorrhoidal disease.    ANESTHESIA:  MAC    OPERATIVE PROCEDURE:  The patient was seen and examined in the preoperative area. History and physical was reviewed, consent was signed, and all questions were answered. The patient was transferred to the OR and placed on the table in lithotomy position. Anesthesia care team was present to administer sedation and monitor vitals throughout the procedure. Once adequate sedation was attained, the patient was prepped and draped in the usual sterile fashion. Time out was performed.    External rectal examination identified external hemorrhoids. The perirectal tissues were liberally infiltrated with local anesthesia. An anal speculum was inserted into the rectum and a circumferential examination was commenced. It was noted that internal hemorrhoids were also present. Each hemorrhoid was grasped with an Allis clamp. Using a harmonic scalpel, each hemorrhoidal column was excised and sent to pathology. The right anterior and left lateral columns were removed. The sites were checked for hemostasis and reapproximated with 3-0 chromic. Bovie and direct pressure was also utilized. Once adequate hemostasis was assured, further local anesthetic was infiltrated into the subcutaneous tissue. A rolled up gel foam covered in topical lidocaine ointment was inserted into the patient's rectum.     The patient tolerated the procedure well and was transferred to the PACU in stable condition.    Radha Haley DO

## 2023-08-08 NOTE — ANESTHESIA PREPROCEDURE EVALUATION
Anesthesia Evaluation     Patient summary reviewed and Nursing notes reviewed   NPO Solid Status: > 8 hours  NPO Liquid Status: > 8 hours           Airway   Mallampati: II  TM distance: >3 FB  Neck ROM: full  Possible difficult intubation  Dental      Pulmonary    (+) asthma,  Cardiovascular     (+) hypertensionhyperlipidemia      Neuro/Psych  (+) headaches, numbness  GI/Hepatic/Renal/Endo    (+) obesity, GI bleeding , renal disease CRI, thyroid problem     Musculoskeletal     (+) myalgias  Abdominal    Substance History      OB/GYN          Other        ROS/Med Hx Other: Lupus   Graves disease  Acquired hypothyroidism  Essential hypertension  Allergic rhinitis  Deviated nasal septum  Hypertrophy of nasal turbinates  Vasomotor rhinitis  Eosinophilia  Basophilia  Eosinophilic granulomatosis with polyangiitis (EGPA)  Nasal polyposis  Mild persistent asthma  Postablative hypothyroidism  Vitamin D deficiency  Systemic lupus erythematosus with glomerular disease  Asthma, extrinsic  Lower extremity edema  Persistent proteinuria  Gastroesophageal reflux disease with esophagitis  Numbness  Anemia  Hypotension due to drugs  External hemorrhoid                Anesthesia Plan    ASA 3     general     intravenous induction     Anesthetic plan, risks, benefits, and alternatives have been provided, discussed and informed consent has been obtained with: patient.  Pre-procedure education provided  Plan discussed with CRNA.    CODE STATUS:

## 2023-08-08 NOTE — ANESTHESIA POSTPROCEDURE EVALUATION
Patient: Kelsey Ross    Procedure Summary       Date: 08/08/23 Room / Location: [facility] OR 3 /  [facility]    Anesthesia Start: 1243 Anesthesia Stop: 1344    Procedure: HEMORRHOIDECTOMY (Anus) Diagnosis:       External hemorrhoid      (External hemorrhoid [K64.4])    Surgeons: Radha Haley DO Provider: Steve Benton CRNA    Anesthesia Type: general ASA Status: 3            Anesthesia Type: general    Vitals  No vitals data found for the desired time range.          Post Anesthesia Care and Evaluation    Patient location during evaluation: PACU  Patient participation: complete - patient participated  Level of consciousness: awake and alert  Pain score: 2  Pain management: satisfactory to patient    Airway patency: patent  Anesthetic complications: No anesthetic complications  PONV Status: none  Cardiovascular status: acceptable and stable  Respiratory status: acceptable, spontaneous ventilation, nonlabored ventilation and unassisted  Hydration status: acceptable    Comments: Vitals signs as noted in nursing documentation as per protocol.

## 2023-08-08 NOTE — ANESTHESIA PROCEDURE NOTES
Airway  Urgency: elective    Date/Time: 8/8/2023 12:48 PM  End Time:8/8/2023 12:49 PM  Airway not difficult    General Information and Staff    Patient location during procedure: OR  CRNA/CAA: Steve Benton CRNA    Indications and Patient Condition    Preoxygenated: yes  Mask difficulty assessment: 1 - vent by mask    Final Airway Details  Final airway type: supraglottic airway      Successful airway: classic  Size 4     Number of attempts at approach: 1  Assessment: lips, teeth, and gum same as pre-op and atraumatic intubation    Additional Comments  LMA seats well

## 2023-08-10 LAB — REF LAB TEST METHOD: NORMAL

## 2023-08-10 RX ORDER — DULOXETIN HYDROCHLORIDE 60 MG/1
60 CAPSULE, DELAYED RELEASE ORAL DAILY
Qty: 90 CAPSULE | Refills: 1 | Status: SHIPPED | OUTPATIENT
Start: 2023-08-10

## 2023-08-10 NOTE — TELEPHONE ENCOUNTER
Rx Refill Note  Requested Prescriptions     Pending Prescriptions Disp Refills    DULoxetine (CYMBALTA) 60 MG capsule       Sig: Take 1 capsule by mouth Daily.      Last office visit with prescribing clinician: 6/19/2023   Next office visit with prescribing clinician: 10/6/2023       Louis Alvares MA  08/10/23, 07:58 EDT    Provider historical, please advise if appropriate, did not see any previous fills of this medication.

## 2023-08-11 DIAGNOSIS — K64.4 EXTERNAL HEMORRHOID: Primary | ICD-10-CM

## 2023-08-11 RX ORDER — HYDROCODONE BITARTRATE AND ACETAMINOPHEN 10; 325 MG/1; MG/1
1 TABLET ORAL EVERY 6 HOURS PRN
Qty: 10 TABLET | Refills: 0 | Status: SHIPPED | OUTPATIENT
Start: 2023-08-11

## 2023-08-16 DIAGNOSIS — Z87.19 STATUS POST HEMORRHOIDECTOMY: Primary | ICD-10-CM

## 2023-08-16 DIAGNOSIS — Z98.890 STATUS POST HEMORRHOIDECTOMY: Primary | ICD-10-CM

## 2023-08-16 RX ORDER — GABAPENTIN 300 MG/1
300 CAPSULE ORAL 3 TIMES DAILY
Qty: 15 CAPSULE | Refills: 0 | Status: SHIPPED | OUTPATIENT
Start: 2023-08-16 | End: 2023-08-21

## 2023-08-18 NOTE — PROGRESS NOTES
"Patient: Kelsey Ross    YOB: 1970    Date: 08/21/2023    Primary Care Provider: Isha Giron MD    Chief Complaint   Patient presents with    Post-op     hemorrhoidectomy       History of present illness:  I saw the patient in the office today as a followup from their recent hemorrhoidectomy 8/8/23 the pathology report did show hemorrhoids, no dysplasia or carcinoma identified. She states that she has been in lots of pain, unable to sit down fully. She has been taking gabapentin and norco sparingly for pain. She is using sitz baths, topical gel, and tucks pads. She is also drinking plenty of water and eating foods high in fiber.    Vital Signs:  Vitals:    08/21/23 1413   BP: 112/64   Pulse: 90   Resp: 18   Temp: 98.4 øF (36.9 øC)   TempSrc: Temporal   SpO2: 98%   Weight: 94.8 kg (209 lb)   Height: 165.1 cm (65\")       Physical Exam:   General Appearance:    Alert, cooperative, in no acute distress, wound clean dry without infection   Abdomen:     no masses, no organomegaly, soft non-tender, non-distended, no guarding,  Rectal: Wounds are well healing, there are a few residual small external hemorrhoids   Chest:      Clear to ausculation       Assessment / Plan:    1. Status post hemorrhoidectomy        I did discuss the situation with the patient today in the office and they have done well from their recent hemorrhoidectomy. Her level of discomfort at this point is expected. I reassured her that it will continue to get better every day. She should continue to use narcotics as sparingly as possible. She should continue keeping the area clean and dry and using Sitz baths frequently for discomfort and after bowel movements. I will see her again in another 2 weeks to evaluate her progress. She is agreeable to the plan and all questions answered.     Electronically signed by Radha Haley DO  08/21/23                    "

## 2023-08-21 ENCOUNTER — OFFICE VISIT (OUTPATIENT)
Dept: SURGERY | Facility: CLINIC | Age: 53
End: 2023-08-21
Payer: COMMERCIAL

## 2023-08-21 VITALS
HEART RATE: 90 BPM | RESPIRATION RATE: 18 BRPM | HEIGHT: 65 IN | TEMPERATURE: 98.4 F | DIASTOLIC BLOOD PRESSURE: 64 MMHG | WEIGHT: 209 LBS | BODY MASS INDEX: 34.82 KG/M2 | OXYGEN SATURATION: 98 % | SYSTOLIC BLOOD PRESSURE: 112 MMHG

## 2023-08-21 DIAGNOSIS — Z98.890 STATUS POST HEMORRHOIDECTOMY: Primary | ICD-10-CM

## 2023-08-21 DIAGNOSIS — Z87.19 STATUS POST HEMORRHOIDECTOMY: Primary | ICD-10-CM

## 2023-08-21 PROCEDURE — 99024 POSTOP FOLLOW-UP VISIT: CPT | Performed by: STUDENT IN AN ORGANIZED HEALTH CARE EDUCATION/TRAINING PROGRAM

## 2023-08-21 RX ORDER — BELIMUMAB 200 MG/ML
200 SOLUTION SUBCUTANEOUS
COMMUNITY
Start: 2023-08-11

## 2023-08-29 NOTE — PROGRESS NOTES
Patient: Kelsey Ross  YOB: 1970    Date: 09/01/2023    Primary Care Provider: Isha Giron MD    Chief Complaint   Patient presents with    Post-op    Hemorrhoids       History: I saw the patient in the office today as a followup from their recent hemorrhoidectomy 8/8/23. She states that she is still experiencing pain and discomfort, but it improves every day. She does report a little bit of rectal bleeding with bowel movements as well. She is keeping her stools soft and doing Sitz baths. She is concerned about going back to work due to sitting all day.     The following portions of the patient's history were reviewed and updated as appropriate: allergies, current medications, past family history, past medical history, past social history, past surgical history and problem list.    Review of Systems   Constitutional:  Negative for chills, fever and unexpected weight change.   HENT:  Negative for hearing loss, trouble swallowing and voice change.    Eyes:  Negative for visual disturbance.   Respiratory:  Negative for apnea, cough, chest tightness, shortness of breath and wheezing.    Cardiovascular:  Negative for chest pain, palpitations and leg swelling.   Gastrointestinal:  Positive for blood in stool and rectal pain. Negative for abdominal distention, abdominal pain, anal bleeding, constipation, diarrhea, nausea and vomiting.   Endocrine: Negative for cold intolerance and heat intolerance.   Genitourinary:  Negative for difficulty urinating, dysuria and flank pain.   Musculoskeletal:  Negative for back pain and gait problem.   Skin:  Negative for color change, rash and wound.   Neurological:  Negative for dizziness, syncope, speech difficulty, weakness, light-headedness, numbness and headaches.   Hematological:  Negative for adenopathy. Does not bruise/bleed easily.   Psychiatric/Behavioral:  Negative for confusion. The patient is not nervous/anxious.      Vital Signs  Vitals:    09/01/23  "1002   BP: 128/82   Pulse: 87   Temp: 97.7 øF (36.5 øC)   SpO2: 97%   Weight: 93.9 kg (207 lb)   Height: 165.1 cm (65\")       Allergies:  Allergies   Allergen Reactions    Penicillin G Unknown - High Severity    Penicillins Unknown - High Severity     Allergist informed patient she was allergic to Penicillins    Fexofenadine Headache     Only allegra D    Methotrexate GI Intolerance and Headache    Pseudoephedrine Hcl Headache       Medications:    Current Outpatient Medications:     acetaminophen-codeine (TYLENOL/CODEINE #3) 300-30 MG per tablet, Take 1 tablet by mouth Every 4 (Four) Hours As Needed for Moderate Pain. (Patient taking differently: Take 1 tablet by mouth Every 4 (Four) Hours As Needed for Moderate Pain. PRN), Disp: 10 tablet, Rfl: 0    albuterol sulfate  (90 Base) MCG/ACT inhaler, Inhale 2 puffs Every 4 (Four) Hours As Needed for Wheezing or Shortness of Air., Disp: 48 g, Rfl: 3    Benlysta 200 MG/ML solution prefilled syringe, Inject 200 mg under the skin into the appropriate area as directed Every 7 (Seven) Days., Disp: , Rfl:     cetirizine (zyrTEC) 10 MG tablet, Take 1 tablet by mouth Daily., Disp: 30 tablet, Rfl: 5    cyanocobalamin 1000 MCG/ML injection, Take 1 ml weekly for 4 weeks, then monthly, Disp: 10 mL, Rfl: 0    DULoxetine (CYMBALTA) 60 MG capsule, Take 1 capsule by mouth Daily., Disp: 90 capsule, Rfl: 1    fluticasone (FLONASE) 50 MCG/ACT nasal spray, into the nostril(s) as directed by provider As Needed., Disp: , Rfl:     Fluticasone Furoate-Vilanterol (BREO ELLIPTA) 200-25 MCG/ACT inhaler, Inhale 1 puff Daily for 90 days., Disp: 60 each, Rfl: 2    HYDROcodone-acetaminophen (NORCO)  MG per tablet, Take 1 tablet by mouth Every 6 (Six) Hours As Needed for Severe Pain. (Patient taking differently: Take 1 tablet by mouth Every 6 (Six) Hours As Needed for Severe Pain. PRN), Disp: 10 tablet, Rfl: 0    hydroxychloroquine (PLAQUENIL) 200 MG tablet, Take 1 tablet by mouth 2 (Two) " "Times a Day., Disp: , Rfl:     linaclotide (Linzess) 145 MCG capsule capsule, Take 1 capsule by mouth Every Morning Before Breakfast., Disp: 90 capsule, Rfl: 3    liothyronine (CYTOMEL) 5 MCG tablet, TAKE 2 TABLETS BY MOUTH DAILY, Disp: 180 tablet, Rfl: 1    losartan (COZAAR) 100 MG tablet, Take 1 tablet by mouth Daily., Disp: , Rfl:     Mepolizumab 100 MG/ML solution auto-injector, Inject 3 mL under the skin into the appropriate area as directed Every 28 (Twenty-Eight) Days., Disp: 3 mL, Rfl: 11    mycophenolate (CELLCEPT) 500 MG tablet, Take 2 tablets by mouth 2 (Two) Times a Day., Disp: , Rfl:     NIFEDIPINE PO, PRN, Disp: , Rfl:     NON FORMULARY, nifedipine 0.3% ointment - for use as needed for hemorrhoids, Disp: , Rfl:     rosuvastatin (CRESTOR) 5 MG tablet, Take 1 tablet by mouth Daily., Disp: , Rfl:     spironolactone (ALDACTONE) 25 MG tablet, Take 1 tablet by mouth Daily., Disp: , Rfl:     SURE COMFORT INS SYR 1CC/28G 28G X 1/2\" 1 ML misc, Use one per month for administration of vitamin B12, Disp: 12 each, Rfl: 0    Synthroid 112 MCG tablet, Take 1 tablet by mouth Daily., Disp: 90 tablet, Rfl: 1    tiZANidine (ZANAFLEX) 4 MG tablet, Take 1 tablet by mouth Every 8 (Eight) Hours As Needed for Muscle Spasms., Disp: 30 tablet, Rfl: 0    torsemide (DEMADEX) 20 MG tablet, Take 1 tablet by mouth Daily., Disp: , Rfl:     Tuberculin-Allergy Syringes 28G X 1/2\" 1 ML misc, 1 Units Every 30 (Thirty) Days., Disp: 20 each, Rfl: 1    vitamin D3 125 MCG (5000 UT) capsule capsule, Take 1 capsule by mouth Daily., Disp: , Rfl:     Physical Exam:   General Appearance:    Alert, cooperative, in no acute distress   Head:    Normocephalic, without obvious abnormality, atraumatic   Lungs:     Clear to auscultation,respirations regular, even and                  unlabored    Heart:    Regular rhythm and normal rate, normal S1 and S2, no            murmur, no gallop, no rub, no click   Abdomen:     Normal bowel sounds, no masses, no " organomegaly, soft        non-tender, non-distended, no guarding, no rebound                tenderness  Rectal: Well healing incisions, she still has some residual small hemorrhoids, but these are not causing her discomfort   Extremities:   Moves all extremities well, no edema, no cyanosis, no             redness   Pulses:   Pulses palpable and equal bilaterally   Skin:   No bleeding, bruising or rash     Results Review:   None     Review of Systems was reviewed and confirmed as accurate as documented by the MA.    ASSESSMENT/PLAN:    1. Status post hemorrhoidectomy       Patient is doing well from her hemorrhoidectomy. She has been diligent at home with her conservative measures. Her symptoms improve a little bit every day and this is an expected post operative course for her given her additional comorbidities. She does not feel comfortable going back to work just yet because she has discomfort sitting. We will provide her with documentation for an additional 2 weeks off of work. She will call back if she needs additional time. She should continue with her current regimen and can maybe back off of some of her Sitz baths. She expresses understanding and all questions were answered. I will see her on a PRN basis, if she has any concerns or questions she knows to call.      Electronically signed by Radha Haley DO  09/01/23

## 2023-09-01 ENCOUNTER — OFFICE VISIT (OUTPATIENT)
Dept: SURGERY | Facility: CLINIC | Age: 53
End: 2023-09-01
Payer: COMMERCIAL

## 2023-09-01 VITALS
HEIGHT: 65 IN | HEART RATE: 87 BPM | TEMPERATURE: 97.7 F | BODY MASS INDEX: 34.49 KG/M2 | WEIGHT: 207 LBS | OXYGEN SATURATION: 97 % | DIASTOLIC BLOOD PRESSURE: 82 MMHG | SYSTOLIC BLOOD PRESSURE: 128 MMHG

## 2023-09-01 DIAGNOSIS — Z87.19 STATUS POST HEMORRHOIDECTOMY: Primary | ICD-10-CM

## 2023-09-01 DIAGNOSIS — Z98.890 STATUS POST HEMORRHOIDECTOMY: Primary | ICD-10-CM

## 2023-09-19 ENCOUNTER — SPECIALTY PHARMACY (OUTPATIENT)
Dept: PHARMACY | Facility: HOSPITAL | Age: 53
End: 2023-09-19
Payer: COMMERCIAL

## 2023-10-04 ENCOUNTER — LAB (OUTPATIENT)
Dept: LAB | Facility: HOSPITAL | Age: 53
End: 2023-10-04
Payer: COMMERCIAL

## 2023-10-04 DIAGNOSIS — N02.2 MEMBRANOUS NEPHROPATHY DETERMINED BY BIOPSY: ICD-10-CM

## 2023-10-04 LAB
ALBUMIN SERPL-MCNC: 4.5 G/DL (ref 3.5–5.2)
ALBUMIN/GLOB SERPL: 2.3 G/DL
ALP SERPL-CCNC: 107 U/L (ref 39–117)
ALT SERPL W P-5'-P-CCNC: 7 U/L (ref 1–33)
ANION GAP SERPL CALCULATED.3IONS-SCNC: 9.4 MMOL/L (ref 5–15)
AST SERPL-CCNC: 10 U/L (ref 1–32)
BASOPHILS # BLD AUTO: 0.06 10*3/MM3 (ref 0–0.2)
BASOPHILS NFR BLD AUTO: 0.9 % (ref 0–1.5)
BILIRUB SERPL-MCNC: <0.2 MG/DL (ref 0–1.2)
BILIRUB UR QL STRIP: NEGATIVE
BUN SERPL-MCNC: 12 MG/DL (ref 6–20)
BUN/CREAT SERPL: 11.4 (ref 7–25)
CALCIUM SPEC-SCNC: 9.2 MG/DL (ref 8.6–10.5)
CHLORIDE SERPL-SCNC: 101 MMOL/L (ref 98–107)
CLARITY UR: CLEAR
CO2 SERPL-SCNC: 26.6 MMOL/L (ref 22–29)
COLOR UR: YELLOW
CREAT SERPL-MCNC: 1.05 MG/DL (ref 0.57–1)
CREAT UR-MCNC: 104.8 MG/DL
DEPRECATED RDW RBC AUTO: 38.3 FL (ref 37–54)
EGFRCR SERPLBLD CKD-EPI 2021: 63.7 ML/MIN/1.73
EOSINOPHIL # BLD AUTO: 0.02 10*3/MM3 (ref 0–0.4)
EOSINOPHIL NFR BLD AUTO: 0.3 % (ref 0.3–6.2)
ERYTHROCYTE [DISTWIDTH] IN BLOOD BY AUTOMATED COUNT: 12.3 % (ref 12.3–15.4)
GLOBULIN UR ELPH-MCNC: 2 GM/DL
GLUCOSE SERPL-MCNC: 87 MG/DL (ref 65–99)
GLUCOSE UR STRIP-MCNC: NEGATIVE MG/DL
HCT VFR BLD AUTO: 35.2 % (ref 34–46.6)
HGB BLD-MCNC: 12 G/DL (ref 12–15.9)
HGB UR QL STRIP.AUTO: ABNORMAL
IMM GRANULOCYTES # BLD AUTO: 0.02 10*3/MM3 (ref 0–0.05)
IMM GRANULOCYTES NFR BLD AUTO: 0.3 % (ref 0–0.5)
KETONES UR QL STRIP: NEGATIVE
LEUKOCYTE ESTERASE UR QL STRIP.AUTO: ABNORMAL
LYMPHOCYTES # BLD AUTO: 1.93 10*3/MM3 (ref 0.7–3.1)
LYMPHOCYTES NFR BLD AUTO: 28.9 % (ref 19.6–45.3)
MCH RBC QN AUTO: 29.3 PG (ref 26.6–33)
MCHC RBC AUTO-ENTMCNC: 34.1 G/DL (ref 31.5–35.7)
MCV RBC AUTO: 86.1 FL (ref 79–97)
MONOCYTES # BLD AUTO: 0.61 10*3/MM3 (ref 0.1–0.9)
MONOCYTES NFR BLD AUTO: 9.1 % (ref 5–12)
NEUTROPHILS NFR BLD AUTO: 4.04 10*3/MM3 (ref 1.7–7)
NEUTROPHILS NFR BLD AUTO: 60.5 % (ref 42.7–76)
NITRITE UR QL STRIP: NEGATIVE
NRBC BLD AUTO-RTO: 0 /100 WBC (ref 0–0.2)
PH UR STRIP.AUTO: 5.5 [PH] (ref 5–8)
PLATELET # BLD AUTO: 252 10*3/MM3 (ref 140–450)
PMV BLD AUTO: 10.5 FL (ref 6–12)
POTASSIUM SERPL-SCNC: 4.5 MMOL/L (ref 3.5–5.2)
PROT ?TM UR-MCNC: 8.6 MG/DL
PROT SERPL-MCNC: 6.5 G/DL (ref 6–8.5)
PROT UR QL STRIP: NEGATIVE
PROT/CREAT UR: 82.1 MG/G CREA (ref 0–200)
RBC # BLD AUTO: 4.09 10*6/MM3 (ref 3.77–5.28)
SODIUM SERPL-SCNC: 137 MMOL/L (ref 136–145)
SP GR UR STRIP: 1.02 (ref 1–1.03)
UROBILINOGEN UR QL STRIP: ABNORMAL
WBC NRBC COR # BLD: 6.68 10*3/MM3 (ref 3.4–10.8)

## 2023-10-04 PROCEDURE — 85025 COMPLETE CBC W/AUTO DIFF WBC: CPT

## 2023-10-04 PROCEDURE — 82570 ASSAY OF URINE CREATININE: CPT

## 2023-10-04 PROCEDURE — 81001 URINALYSIS AUTO W/SCOPE: CPT

## 2023-10-04 PROCEDURE — 84156 ASSAY OF PROTEIN URINE: CPT

## 2023-10-04 PROCEDURE — 36415 COLL VENOUS BLD VENIPUNCTURE: CPT

## 2023-10-04 PROCEDURE — 80053 COMPREHEN METABOLIC PANEL: CPT

## 2023-10-05 LAB
BACTERIA UR QL AUTO: ABNORMAL /HPF
HYALINE CASTS UR QL AUTO: ABNORMAL /LPF
RBC # UR STRIP: ABNORMAL /HPF
REF LAB TEST METHOD: ABNORMAL
SQUAMOUS #/AREA URNS HPF: ABNORMAL /HPF
WBC # UR STRIP: ABNORMAL /HPF

## 2023-10-06 ENCOUNTER — HOSPITAL ENCOUNTER (OUTPATIENT)
Dept: MAMMOGRAPHY | Facility: HOSPITAL | Age: 53
Discharge: HOME | End: 2023-10-06
Admitting: FAMILY MEDICINE
Payer: COMMERCIAL

## 2023-10-06 DIAGNOSIS — Z12.31 ENCOUNTER FOR SCREENING MAMMOGRAM FOR BREAST CANCER: ICD-10-CM

## 2023-10-06 PROCEDURE — 77067 SCR MAMMO BI INCL CAD: CPT

## 2023-10-06 PROCEDURE — 77063 BREAST TOMOSYNTHESIS BI: CPT

## 2023-10-09 ENCOUNTER — PATIENT MESSAGE (OUTPATIENT)
Dept: INTERNAL MEDICINE | Facility: CLINIC | Age: 53
End: 2023-10-09
Payer: COMMERCIAL

## 2023-10-09 DIAGNOSIS — R92.8 ABNORMAL MAMMOGRAM OF LEFT BREAST: Primary | ICD-10-CM

## 2023-10-09 NOTE — TELEPHONE ENCOUNTER
From: Kelsey Ross  To: Isha Giron  Sent: 10/9/2023 2:17 PM EDT  Subject: Mammogram     Is there anything to be worried about with the mammogram results please? Does it mean additional testing needed, too? Thanks

## 2023-10-10 ENCOUNTER — TRANSCRIBE ORDERS (OUTPATIENT)
Dept: INTERNAL MEDICINE | Facility: CLINIC | Age: 53
End: 2023-10-10
Payer: COMMERCIAL

## 2023-10-10 DIAGNOSIS — R92.8 ABNORMAL MAMMOGRAM OF BOTH BREASTS: Primary | ICD-10-CM

## 2023-10-10 DIAGNOSIS — R92.8 ABNORMAL MAMMOGRAM OF LEFT BREAST: Primary | ICD-10-CM

## 2023-10-10 NOTE — TELEPHONE ENCOUNTER
Called and discussed results with patient.  She is particularly concerned because her diagnostic mammogram and ultrasound cannot be scheduled at [facility] until December.  I am ordering a diagnostic mammogram and ultrasound of her left breast to be completed externally.

## 2023-10-11 ENCOUNTER — TRANSCRIBE ORDERS (OUTPATIENT)
Dept: LAB | Facility: HOSPITAL | Age: 53
End: 2023-10-11
Payer: COMMERCIAL

## 2023-10-11 DIAGNOSIS — Z87.39 H/O LUPUS NEPHRITIS: Primary | ICD-10-CM

## 2023-10-16 ENCOUNTER — TELEPHONE (OUTPATIENT)
Dept: INTERNAL MEDICINE | Facility: CLINIC | Age: 53
End: 2023-10-16
Payer: COMMERCIAL

## 2023-10-16 LAB
ALBUMIN UR-MCNC: <1.2 MG/DL
COLLECT DURATION TIME UR: 24 HRS
CREAT UR-MCNC: 40.9 MG/DL
CREATINE 24H UR-MRATE: 1.04 G/24 HR (ref 0.7–1.6)
MICROALBUMIN 24H UR-MRATE: NORMAL MG/(24.H)
PROT 24H UR-MRATE: 117.3 MG/24HOURS (ref 0–150)
SPECIMEN VOL 24H UR: 2550 ML

## 2023-10-16 PROCEDURE — 82043 UR ALBUMIN QUANTITATIVE: CPT | Performed by: INTERNAL MEDICINE

## 2023-10-16 PROCEDURE — 82570 ASSAY OF URINE CREATININE: CPT | Performed by: INTERNAL MEDICINE

## 2023-10-16 PROCEDURE — 81050 URINALYSIS VOLUME MEASURE: CPT | Performed by: INTERNAL MEDICINE

## 2023-10-16 PROCEDURE — 84156 ASSAY OF PROTEIN URINE: CPT | Performed by: INTERNAL MEDICINE

## 2023-10-16 NOTE — TELEPHONE ENCOUNTER
Provider: Isha Giron MD     Caller: Kelsey Ross     Relationship to Patient: SELF    Pharmacy: N/A    Phone Number: [phone]     Reason for Call: PATIENT HAS A APPOINTMENT WITH [facility] [address] ON 10/18/23. PATIENT NEEDS A PRIOR AUTHORIZATION FOR A DIAGNOSTIC MAMMOGRAM AND ULTRASOUND.      PATIENT ALSO NEEDS A COPY OF HER MAMMOGRAM  FROM 10/7/23 COMPLETED AT [facility] IN [place] SENT TO [facility].

## 2023-10-17 NOTE — TELEPHONE ENCOUNTER
Called Kelsey and verbally informed she stated her understanding and if she needs anything else she will let us know after speaking with [facility]

## 2023-10-17 NOTE — TELEPHONE ENCOUNTER
No authorization required for diagnostic mammo or US L breast. I have faxed this notification to [facility], along with another copy of the results from the screening mammogram. If they need the imaging from the mammo, the patient will likely need to  the images on a disc.

## 2023-11-03 DIAGNOSIS — E89.0 POSTABLATIVE HYPOTHYROIDISM: ICD-10-CM

## 2023-11-03 RX ORDER — LIOTHYRONINE SODIUM 5 UG/1
10 TABLET ORAL DAILY
Qty: 180 TABLET | Refills: 1 | Status: SHIPPED | OUTPATIENT
Start: 2023-11-03

## 2023-11-03 RX ORDER — LEVOTHYROXINE SODIUM 112 MCG
112 TABLET ORAL DAILY
Qty: 90 TABLET | Refills: 1 | Status: SHIPPED | OUTPATIENT
Start: 2023-11-03 | End: 2024-11-02

## 2023-11-03 NOTE — TELEPHONE ENCOUNTER
Rx Refill Note    Requested Prescriptions     Pending Prescriptions Disp Refills    Synthroid 112 MCG tablet 90 tablet 1     Sig: Take 1 tablet by mouth Daily.    liothyronine (CYTOMEL) 5 MCG tablet 180 tablet 1     Sig: Take 2 tablets by mouth Daily.        Last office visit with prescribing clinician: 5/16/2023     Next office visit with prescribing clinician: 12/26/2023   {

## 2023-11-10 ENCOUNTER — SPECIALTY PHARMACY (OUTPATIENT)
Dept: PHARMACY | Facility: HOSPITAL | Age: 53
End: 2023-11-10
Payer: COMMERCIAL

## 2023-12-04 ENCOUNTER — OFFICE VISIT (OUTPATIENT)
Dept: INTERNAL MEDICINE | Facility: CLINIC | Age: 53
End: 2023-12-04
Payer: COMMERCIAL

## 2023-12-04 VITALS
WEIGHT: 215 LBS | HEART RATE: 87 BPM | BODY MASS INDEX: 35.82 KG/M2 | SYSTOLIC BLOOD PRESSURE: 138 MMHG | TEMPERATURE: 98.7 F | OXYGEN SATURATION: 98 % | HEIGHT: 65 IN | DIASTOLIC BLOOD PRESSURE: 80 MMHG

## 2023-12-04 DIAGNOSIS — J01.40 ACUTE NON-RECURRENT PANSINUSITIS: ICD-10-CM

## 2023-12-04 DIAGNOSIS — G62.9 NEUROPATHY: Primary | ICD-10-CM

## 2023-12-04 DIAGNOSIS — Z51.81 ENCOUNTER FOR THERAPEUTIC DRUG MONITORING: ICD-10-CM

## 2023-12-04 RX ORDER — GABAPENTIN 300 MG/1
300 CAPSULE ORAL 3 TIMES DAILY
Qty: 90 CAPSULE | Refills: 2 | Status: SHIPPED | OUTPATIENT
Start: 2023-12-04 | End: 2023-12-12 | Stop reason: SDUPTHER

## 2023-12-04 RX ORDER — DOXYCYCLINE HYCLATE 100 MG/1
100 CAPSULE ORAL 2 TIMES DAILY
Qty: 20 CAPSULE | Refills: 0 | Status: SHIPPED | OUTPATIENT
Start: 2023-12-04 | End: 2023-12-15

## 2023-12-04 RX ORDER — GABAPENTIN 300 MG/1
300 CAPSULE ORAL
COMMUNITY
Start: 2023-11-20 | End: 2023-12-04 | Stop reason: SDUPTHER

## 2023-12-04 NOTE — PROGRESS NOTES
"     Office Visit      Patient Name: Kelsey Ross  : 1970   MRN: [id number]     Chief Complaint:    Chief Complaint   Patient presents with    Sinus Problem     Stuffy, nose bleeding, started a month ago    Lower Extremity Issue     neuropathy       History of Present Illness: Kelsey Ross is a 53 y.o. female who is here today with sinus issues, neuropathy.     Nose bleeds.  Hard time breathing. Losing her taste over the past few days.  No fever, chills, dizziness, earache, sore throat, palpitations, n/v/d, rash, myalgia.  She has not taken any medication for these symptoms. Pain in face when looking downward.      Neuropathy.  Gabapentin 300 mg TID works well for her.  Requesting refill.      Subjective      I have reviewed and the following portions of the patient's history were updated as appropriate: past family history, past medical history, past social history, past surgical history and problem list.      Current Outpatient Medications:     Benlysta 200 MG/ML solution prefilled syringe, Inject 200 mg under the skin into the appropriate area as directed Every 7 (Seven) Days., Disp: , Rfl:     cyanocobalamin 1000 MCG/ML injection, Take 1 ml weekly for 4 weeks, then monthly, Disp: 10 mL, Rfl: 0    hydroxychloroquine (PLAQUENIL) 200 MG tablet, Take 1 tablet by mouth 2 (Two) Times a Day., Disp: , Rfl:     liothyronine (CYTOMEL) 5 MCG tablet, Take 2 tablets by mouth Daily., Disp: 180 tablet, Rfl: 1    Mepolizumab 100 MG/ML solution auto-injector, Inject 3 mL under the skin into the appropriate area as directed Every 28 (Twenty-Eight) Days., Disp: 3 mL, Rfl: 11    mycophenolate (CELLCEPT) 500 MG tablet, Take 2 tablets by mouth 2 (Two) Times a Day., Disp: , Rfl:     SURE COMFORT INS SYR 1CC/28G 28G X 1/2\" 1 ML misc, Use one per month for administration of vitamin B12, Disp: 12 each, Rfl: 0    Synthroid 112 MCG tablet, Take 1 tablet by mouth Daily., Disp: 90 tablet, Rfl: 1    Tuberculin-Allergy Syringes 28G X " "1/2\" 1 ML misc, 1 Units Every 30 (Thirty) Days., Disp: 20 each, Rfl: 1    vitamin D3 125 MCG (5000 UT) capsule capsule, Take 1 capsule by mouth Daily., Disp: , Rfl:     albuterol sulfate  (90 Base) MCG/ACT inhaler, Inhale 1-2 puffs Every 6 (Six) Hours As Needed for Wheezing or Shortness of Air., Disp: 48 g, Rfl: 3    cetirizine (zyrTEC) 10 MG tablet, Take 1 tablet by mouth Daily., Disp: 90 tablet, Rfl: 1    DULoxetine (CYMBALTA) 60 MG capsule, Take 1 capsule by mouth Daily., Disp: 90 capsule, Rfl: 1    Fluticasone Furoate-Vilanterol (BREO ELLIPTA) 200-25 MCG/ACT inhaler, Inhale 1 puff Daily for 90 days., Disp: 60 each, Rfl: 2    gabapentin (NEURONTIN) 300 MG capsule, Take 1 capsule by mouth 3 (Three) Times a Day., Disp: 90 capsule, Rfl: 2    linaclotide (Linzess) 145 MCG capsule capsule, Take 1 capsule by mouth Every Morning Before Breakfast., Disp: 90 capsule, Rfl: 3    losartan (COZAAR) 100 MG tablet, Take 1 tablet by mouth Daily., Disp: 90 tablet, Rfl: 1    rosuvastatin (CRESTOR) 5 MG tablet, Take 1 tablet by mouth Every Night., Disp: 90 tablet, Rfl: 1    spironolactone (ALDACTONE) 25 MG tablet, Take 1 tablet by mouth Daily. (Patient taking differently: Take 1 tablet by mouth Daily. 12.5 mg twice a day), Disp: 90 tablet, Rfl: 1    tiZANidine (ZANAFLEX) 4 MG tablet, Take 1 tablet by mouth Every 8 (Eight) Hours As Needed for Muscle Spasms., Disp: 90 tablet, Rfl: 1    torsemide (DEMADEX) 20 MG tablet, Take 1 tablet by mouth Daily., Disp: 90 tablet, Rfl: 1    Allergies   Allergen Reactions    Penicillin G Unknown - High Severity    Penicillins Unknown - High Severity     Allergist informed patient she was allergic to Penicillins    Fexofenadine Headache     Only allegra D    Methotrexate GI Intolerance and Headache    Pseudoephedrine Hcl Headache       Objective     Physical Exam:  Vital Signs:   Vitals:    12/04/23 1736   BP: 138/80   Pulse: 87   Temp: 98.7 °F (37.1 °C)   SpO2: 98%   Weight: 97.5 kg (215 lb) " "  Height: 165.1 cm (65\")     Body mass index is 35.78 kg/m².         Physical Exam  Constitutional:       Appearance: She is not ill-appearing.   HENT:      Head: Normocephalic.      Right Ear: Tympanic membrane, ear canal and external ear normal.      Left Ear: Tympanic membrane, ear canal and external ear normal.      Nose:      Right Sinus: Maxillary sinus tenderness and frontal sinus tenderness present.      Left Sinus: Maxillary sinus tenderness and frontal sinus tenderness present.   Eyes:      Conjunctiva/sclera: Conjunctivae normal.      Pupils: Pupils are equal, round, and reactive to light.   Cardiovascular:      Rate and Rhythm: Normal rate and regular rhythm.      Pulses:           Radial pulses are 2+ on the right side and 2+ on the left side.        Dorsalis pedis pulses are 2+ on the right side and 2+ on the left side.      Heart sounds: Normal heart sounds.   Pulmonary:      Effort: Pulmonary effort is normal.      Breath sounds: Normal breath sounds.   Musculoskeletal:      Cervical back: Normal range of motion and neck supple.      Right lower leg: No edema.      Left lower leg: No edema.   Skin:     General: Skin is warm.      Capillary Refill: Capillary refill takes less than 2 seconds.   Neurological:      Mental Status: She is alert and oriented to person, place, and time.      Coordination: Coordination normal.      Gait: Gait normal.   Psychiatric:         Mood and Affect: Mood normal.         Behavior: Behavior normal.         Thought Content: Thought content normal.             Assessment / Plan      Assessment/Plan:   Diagnoses and all orders for this visit:    1. Neuropathy (Primary)  -     gabapentin (NEURONTIN) 300 MG capsule; Take 1 capsule by mouth 3 (Three) Times a Day.  Dispense: 90 capsule; Refill: 2    2. Encounter for therapeutic drug monitoring  -     ToxAssure Flex 23, Ur -; Future    3. Acute non-recurrent pansinusitis  -     doxycycline (VIBRAMYCIN) 100 MG capsule; Take 1 " capsule by mouth 2 (Two) Times a Day for 10 days.  Dispense: 20 capsule; Refill: 0             Follow Up:   Return in about 3 months (around 3/4/2024) for Next scheduled follow up.    Patient was given instructions and counseling regarding her condition or for health maintenance advice. Please see specific information pulled into the AVS if appropriate.       [facility] [address] [facility]     Please note that portions of this note may have been completed with a voice recognition program. Efforts were made to edit dictation, but occasionally words are mistranscribed.

## 2023-12-08 LAB
1OH-MIDAZOLAM UR QL SCN: NOT DETECTED NG/MG CREAT
6MAM UR QL SCN: NEGATIVE NG/ML
7AMINOCLONAZEPAM/CREAT UR: NOT DETECTED NG/MG CREAT
A-OH ALPRAZ/CREAT UR: NOT DETECTED NG/MG CREAT
A-OH-TRIAZOLAM/CREAT UR CFM: NOT DETECTED NG/MG CREAT
ACP UR QL CFM: NOT DETECTED
ALPRAZ/CREAT UR CFM: NOT DETECTED NG/MG CREAT
AMPHET UR CFM-MCNC: NOT DETECTED NG/MG CREAT
AMPHETAMINES UR QL SCN: NORMAL NG/ML
AMPHETAMINES UR QL: NEGATIVE
APAP UR QL SCN: NORMAL UG/ML
APAP UR QL: NORMAL
APAP UR-MCNC: PRESENT UG/ML
BARBITURATES UR QL SCN: NEGATIVE NG/ML
BENZODIAZ SCN METH UR: NEGATIVE
BUPRENORPHINE UR QL SCN: NEGATIVE
BUPRENORPHINE/CREAT UR: NOT DETECTED NG/MG CREAT
CANNABINOIDS UR QL SCN: NEGATIVE NG/ML
CARISOPRODOL UR QL: NEGATIVE NG/ML
CLONAZEPAM/CREAT UR CFM: NOT DETECTED NG/MG CREAT
COCAINE+BZE UR QL SCN: NEGATIVE NG/ML
CREAT UR-MCNC: 53 MG/DL
D-METHORPHAN UR-MCNC: NOT DETECTED NG/ML
D-METHORPHAN+LEVORPHANOL UR QL: NOT DETECTED
DESALKYLFLURAZ/CREAT UR: NOT DETECTED NG/MG CREAT
DIAZEPAM/CREAT UR: NOT DETECTED NG/MG CREAT
ETHANOL UR QL SCN: NEGATIVE G/DL
ETHANOL UR QL SCN: NEGATIVE NG/ML
FENTANYL CTO UR SCN-MCNC: NEGATIVE NG/ML
FENTANYL/CREAT UR: NOT DETECTED NG/MG CREAT
FLUNITRAZEPAM UR QL SCN: NOT DETECTED NG/MG CREAT
GABAPENTIN UR CFM-MCNC: PRESENT NG/ML
GABAPENTIN UR QL CFM: NORMAL
GABAPENTIN UR-MCNC: NORMAL UG/ML
HYPNOTICS UR QL SCN: NEGATIVE
KETAMINE UR QL: NOT DETECTED
LORAZEPAM/CREAT UR: NOT DETECTED NG/MG CREAT
MDA UR CFM-MCNC: NOT DETECTED NG/MG CREAT
MDMA UR CFM-MCNC: NOT DETECTED NG/MG CREAT
MEPERIDINE UR QL SCN: NEGATIVE NG/ML
METHADONE UR QL SCN: NEGATIVE NG/ML
METHADONE+METAB UR QL SCN: NEGATIVE NG/ML
METHAMPHET UR CFM-MCNC: NOT DETECTED NG/MG CREAT
MIDAZOLAM/CREAT UR CFM: NOT DETECTED NG/MG CREAT
MISCELLANEOUS, UR: NEGATIVE
NORBUPRENORPHINE/CREAT UR: NOT DETECTED NG/MG CREAT
NORDIAZEPAM/CREAT UR: NOT DETECTED NG/MG CREAT
NORFENTANYL/CREAT UR: NOT DETECTED NG/MG CREAT
NORFLUNITRAZEPAM UR-MCNC: NOT DETECTED NG/MG CREAT
NORKETAMINE UR-MCNC: NOT DETECTED UG/ML
OPIATES UR SCN-MCNC: NEGATIVE NG/ML
OTHER HALLUCINOGENS UR: NEGATIVE
OXAZEPAM/CREAT UR: NOT DETECTED NG/MG CREAT
OXYCODONE CTO UR SCN-MCNC: NEGATIVE NG/ML
PCP UR QL SCN: NEGATIVE NG/ML
PRESCRIBED MEDICATIONS: NORMAL
PRESCRIBED MEDICATIONS: NORMAL
PROPOXYPH UR QL SCN: NEGATIVE NG/ML
TAPENTADOL CTO UR SCN-MCNC: NEGATIVE NG/ML
TEMAZEPAM/CREAT UR: NOT DETECTED NG/MG CREAT
TRAMADOL UR QL SCN: NEGATIVE NG/ML
ZALEPLON UR-MCNC: NOT DETECTED NG/ML
ZOLPIDEM PHENYL-4-CARB UR QL SCN: NOT DETECTED
ZOLPIDEM UR QL SCN: NOT DETECTED
ZOPICLONE-N-OXIDE UR-MCNC: NOT DETECTED NG/ML

## 2023-12-11 ENCOUNTER — PATIENT MESSAGE (OUTPATIENT)
Dept: INTERNAL MEDICINE | Facility: CLINIC | Age: 53
End: 2023-12-11
Payer: COMMERCIAL

## 2023-12-11 DIAGNOSIS — M62.838 MUSCLE SPASM: ICD-10-CM

## 2023-12-11 DIAGNOSIS — J82.83 EOSINOPHILIC ASTHMA: ICD-10-CM

## 2023-12-11 DIAGNOSIS — K59.09 CHRONIC CONSTIPATION: ICD-10-CM

## 2023-12-11 DIAGNOSIS — G62.9 NEUROPATHY: ICD-10-CM

## 2023-12-11 DIAGNOSIS — J30.89 NON-SEASONAL ALLERGIC RHINITIS, UNSPECIFIED TRIGGER: ICD-10-CM

## 2023-12-11 NOTE — TELEPHONE ENCOUNTER
Called Kelsey she states this was done for her son before he went to [facility] for a year and now that they are visiting they are needing this done as well so she is requesting her medications as it was done for her son, I am unsure how to pend anything to you or what is needed for this request, please advise she is needing this completed by the first of January if possible.

## 2023-12-12 RX ORDER — SPIRONOLACTONE 25 MG/1
25 TABLET ORAL DAILY
Qty: 90 TABLET | Refills: 1 | Status: SHIPPED | OUTPATIENT
Start: 2023-12-12

## 2023-12-12 RX ORDER — GABAPENTIN 300 MG/1
300 CAPSULE ORAL 3 TIMES DAILY
Qty: 90 CAPSULE | Refills: 2 | Status: SHIPPED | OUTPATIENT
Start: 2023-12-12

## 2023-12-12 RX ORDER — ALBUTEROL SULFATE 90 UG/1
1-2 AEROSOL, METERED RESPIRATORY (INHALATION) EVERY 6 HOURS PRN
Qty: 48 G | Refills: 3 | Status: SHIPPED | OUTPATIENT
Start: 2023-12-12 | End: 2023-12-15 | Stop reason: SDUPTHER

## 2023-12-12 RX ORDER — ROSUVASTATIN CALCIUM 5 MG/1
5 TABLET, COATED ORAL NIGHTLY
Qty: 90 TABLET | Refills: 1 | Status: SHIPPED | OUTPATIENT
Start: 2023-12-12

## 2023-12-12 RX ORDER — TORSEMIDE 20 MG/1
20 TABLET ORAL DAILY
Qty: 90 TABLET | Refills: 1 | Status: SHIPPED | OUTPATIENT
Start: 2023-12-12

## 2023-12-12 RX ORDER — LOSARTAN POTASSIUM 100 MG/1
100 TABLET ORAL DAILY
Qty: 90 TABLET | Refills: 1 | Status: SHIPPED | OUTPATIENT
Start: 2023-12-12

## 2023-12-12 RX ORDER — DULOXETIN HYDROCHLORIDE 60 MG/1
60 CAPSULE, DELAYED RELEASE ORAL DAILY
Qty: 90 CAPSULE | Refills: 1 | Status: SHIPPED | OUTPATIENT
Start: 2023-12-12

## 2023-12-12 RX ORDER — TIZANIDINE 4 MG/1
4 TABLET ORAL EVERY 8 HOURS PRN
Qty: 90 TABLET | Refills: 1 | Status: SHIPPED | OUTPATIENT
Start: 2023-12-12

## 2023-12-12 RX ORDER — CETIRIZINE HYDROCHLORIDE 10 MG/1
10 TABLET ORAL DAILY
Qty: 90 TABLET | Refills: 1 | Status: SHIPPED | OUTPATIENT
Start: 2023-12-12

## 2023-12-15 ENCOUNTER — OFFICE VISIT (OUTPATIENT)
Dept: PULMONOLOGY | Facility: CLINIC | Age: 53
End: 2023-12-15
Payer: COMMERCIAL

## 2023-12-15 VITALS
SYSTOLIC BLOOD PRESSURE: 128 MMHG | RESPIRATION RATE: 18 BRPM | BODY MASS INDEX: 37.32 KG/M2 | OXYGEN SATURATION: 98 % | DIASTOLIC BLOOD PRESSURE: 76 MMHG | HEART RATE: 77 BPM | WEIGHT: 224 LBS | HEIGHT: 65 IN

## 2023-12-15 DIAGNOSIS — J82.83 EOSINOPHILIC ASTHMA: ICD-10-CM

## 2023-12-15 DIAGNOSIS — M32.14 OTHER SYSTEMIC LUPUS ERYTHEMATOSUS WITH GLOMERULAR DISEASE: ICD-10-CM

## 2023-12-15 DIAGNOSIS — D84.9 IMMUNOCOMPROMISED: ICD-10-CM

## 2023-12-15 DIAGNOSIS — J30.89 NON-SEASONAL ALLERGIC RHINITIS, UNSPECIFIED TRIGGER: ICD-10-CM

## 2023-12-15 DIAGNOSIS — J82.83 EOSINOPHILIC ASTHMA: Primary | ICD-10-CM

## 2023-12-15 RX ORDER — FLUTICASONE FUROATE AND VILANTEROL 200; 25 UG/1; UG/1
1 POWDER RESPIRATORY (INHALATION)
Qty: 60 EACH | Refills: 2 | Status: SHIPPED | OUTPATIENT
Start: 2023-12-15 | End: 2024-03-14

## 2023-12-15 RX ORDER — ALBUTEROL SULFATE 90 UG/1
1-2 AEROSOL, METERED RESPIRATORY (INHALATION) EVERY 6 HOURS PRN
Qty: 48 G | Refills: 3 | Status: SHIPPED | OUTPATIENT
Start: 2023-12-15

## 2023-12-15 NOTE — PROGRESS NOTES
Pulmonary follow-up office Visit      Chief Complaint:    Chief Complaint   Patient presents with    Breathing Problem    Follow-up         Subjective: Kelsey Ross is a 53 y.o. female who is here today for follow-up.    Past medical history:  Patient had chronic cough that started in October 2020 initially was seen in my clinic in April 2021.  At that point, her CT scan and PFTs were found to be normal other than some small airway disease and she was started on Advair and as needed albuterol.  However, her eosinophils were 16%.  She had a very complicated set of symptoms that include rash, chronic cough and lymphadenopathy.  In lab work-up her DIMAS and p-ANCA were both found to be positive.  She was referred to hematology and rheumatology.  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  Dr. Lane had started her on methotrexate for presumed GPA without tissue biopsy, but she had side effects from this and had to stop.  She has also been on chronic p.o. steroids of prednisone 10 mg daily and serum allergy testing was negative.  Due to her complicated course she was referred to [facility] and she saw them in November 2021.  [facility] repeated PFTs which showed FEV1 102%, FEV1/FVC ratio 98.  No significant bronchial hyperresponsiveness except for FEF 50% with 49% improvement.  Her [facility] pulmonologist, Dr. 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 [facility] then referred her to rheumatology and has now been diagnosed with lupus with renal involvement.    Since last visit, she has continued to follow with [facility] for lupus with renal involvement and is on Plaquenil, CellCept and Benlysta for her Lupus.  This has helped her with hair loss and skin changes since starting Benlysta.   Still on Nucala and doing well without side effects and has not having any exacerbations.   Doing well with Breo, but did  notice some increase of symptoms during the change of seasons in the fall. She requires rescue albuterol ~1x week.     She has developed neuropathy in her feet and is causing pain. She is now on gabapentin for this.     Subjective      Review of Systems:   Review of Systems   Constitutional:  Positive for fatigue.   Respiratory:  Positive for cough. Negative for shortness of breath and wheezing.    Cardiovascular:  Negative for leg swelling.   Allergic/Immunologic: Positive for environmental allergies. Negative for food allergies.   Neurological:  Positive for numbness.         Social History:   Social History     Socioeconomic History    Marital status: Single   Tobacco Use    Smoking status: Former     Packs/day: 2.00     Years: 8.00     Additional pack years: 0.00     Total pack years: 16.00     Types: Cigarettes     Quit date: 1995     Years since quittin.6    Smokeless tobacco: Never   Vaping Use    Vaping Use: Never used   Substance and Sexual Activity    Alcohol use: Never    Drug use: Never    Sexual activity: Defer     Birth control/protection: None       Medications:     Current Outpatient Medications:     albuterol sulfate  (90 Base) MCG/ACT inhaler, Inhale 1-2 puffs Every 6 (Six) Hours As Needed for Wheezing or Shortness of Air., Disp: 48 g, Rfl: 3    Benlysta 200 MG/ML solution prefilled syringe, Inject 200 mg under the skin into the appropriate area as directed Every 7 (Seven) Days., Disp: , Rfl:     cetirizine (zyrTEC) 10 MG tablet, Take 1 tablet by mouth Daily., Disp: 90 tablet, Rfl: 1    cyanocobalamin 1000 MCG/ML injection, Take 1 ml weekly for 4 weeks, then monthly, Disp: 10 mL, Rfl: 0    DULoxetine (CYMBALTA) 60 MG capsule, Take 1 capsule by mouth Daily., Disp: 90 capsule, Rfl: 1    Fluticasone Furoate-Vilanterol (BREO ELLIPTA) 200-25 MCG/ACT inhaler, Inhale 1 puff Daily for 90 days., Disp: 60 each, Rfl: 2    gabapentin (NEURONTIN) 300 MG capsule, Take 1 capsule by mouth 3 (Three)  "Times a Day., Disp: 90 capsule, Rfl: 2    hydroxychloroquine (PLAQUENIL) 200 MG tablet, Take 1 tablet by mouth 2 (Two) Times a Day., Disp: , Rfl:     linaclotide (Linzess) 145 MCG capsule capsule, Take 1 capsule by mouth Every Morning Before Breakfast., Disp: 90 capsule, Rfl: 3    liothyronine (CYTOMEL) 5 MCG tablet, Take 2 tablets by mouth Daily., Disp: 180 tablet, Rfl: 1    losartan (COZAAR) 100 MG tablet, Take 1 tablet by mouth Daily., Disp: 90 tablet, Rfl: 1    Mepolizumab 100 MG/ML solution auto-injector, Inject 3 mL under the skin into the appropriate area as directed Every 28 (Twenty-Eight) Days., Disp: 3 mL, Rfl: 11    mycophenolate (CELLCEPT) 500 MG tablet, Take 2 tablets by mouth 2 (Two) Times a Day., Disp: , Rfl:     rosuvastatin (CRESTOR) 5 MG tablet, Take 1 tablet by mouth Every Night., Disp: 90 tablet, Rfl: 1    spironolactone (ALDACTONE) 25 MG tablet, Take 1 tablet by mouth Daily. (Patient taking differently: Take 1 tablet by mouth Daily. 12.5 mg twice a day), Disp: 90 tablet, Rfl: 1    SURE COMFORT INS SYR 1CC/28G 28G X 1/2\" 1 ML misc, Use one per month for administration of vitamin B12, Disp: 12 each, Rfl: 0    Synthroid 112 MCG tablet, Take 1 tablet by mouth Daily., Disp: 90 tablet, Rfl: 1    tiZANidine (ZANAFLEX) 4 MG tablet, Take 1 tablet by mouth Every 8 (Eight) Hours As Needed for Muscle Spasms., Disp: 90 tablet, Rfl: 1    torsemide (DEMADEX) 20 MG tablet, Take 1 tablet by mouth Daily., Disp: 90 tablet, Rfl: 1    Tuberculin-Allergy Syringes 28G X 1/2\" 1 ML misc, 1 Units Every 30 (Thirty) Days., Disp: 20 each, Rfl: 1    vitamin D3 125 MCG (5000 UT) capsule capsule, Take 1 capsule by mouth Daily., Disp: , Rfl:     Allergies:   Allergies   Allergen Reactions    Penicillin G Unknown - High Severity    Penicillins Unknown - High Severity     Allergist informed patient she was allergic to Penicillins    Fexofenadine Headache     Only allegra D    Methotrexate GI Intolerance and Headache    " "Pseudoephedrine Hcl Headache       Objective     Physical Exam:  Vital Signs:   Vitals:    12/15/23 0910   BP: 128/76   Pulse: 77   Resp: 18   SpO2: 98%   Weight: 102 kg (224 lb)   Height: 165.1 cm (65\")       Physical Exam  Constitutional:       General: She is not in acute distress.     Appearance: Normal appearance. She is not toxic-appearing.   HENT:      Head: Normocephalic and atraumatic.      Right Ear: External ear normal.      Left Ear: External ear normal.      Ears:      Comments: + hearing aids in place     Nose: No congestion or rhinorrhea.      Mouth/Throat:      Mouth: Mucous membranes are moist.      Pharynx: Oropharynx is clear. No oropharyngeal exudate.   Eyes:      General:         Right eye: No discharge.         Left eye: No discharge.      Extraocular Movements: Extraocular movements intact.      Conjunctiva/sclera: Conjunctivae normal.   Cardiovascular:      Rate and Rhythm: Regular rhythm.      Heart sounds: No murmur heard.  Pulmonary:      Effort: Pulmonary effort is normal.      Breath sounds: Normal breath sounds. No wheezing or rhonchi.   Musculoskeletal:         General: No deformity.      Cervical back: Normal range of motion and neck supple.      Right lower leg: No edema.      Left lower leg: No edema.   Skin:     Findings: No rash.   Neurological:      Mental Status: She is alert and oriented to person, place, and time. Mental status is at baseline.      Motor: No weakness.      Gait: Gait normal.   Psychiatric:         Mood and Affect: Mood normal.         Behavior: Behavior normal.         Thought Content: Thought content normal.         Judgment: Judgment normal.           Results Review:   Labs: Reviewed.  Lab Results   Component Value Date    WBC 6.68 10/04/2023    HGB 12.0 10/04/2023    HCT 35.2 10/04/2023    MCV 86.1 10/04/2023     10/04/2023         Lab Results   Component Value Date    GLUCOSE 87 10/04/2023    BUN 12 10/04/2023    CREATININE 1.05 (H) 10/04/2023    " "EGFRIFNONA 86 02/23/2022    EGFRIFAFRI 103 05/05/2021    BCR 11.4 10/04/2023    K 4.5 10/04/2023    CO2 26.6 10/04/2023    CALCIUM 9.2 10/04/2023    PROTENTOTREF 6.2 11/15/2022    ALBUMIN 4.5 10/04/2023    LABIL2 2.9 11/15/2022    AST 10 10/04/2023    ALT 7 10/04/2023       Micro: As of December 15, 2023   No results found for: \"RESPCX\"  No results found for: \"BLOODCX\"  No results found for: \"URINECX\"  No results found for: \"MRSACX\"  No results found for: \"MRSAPCR\"  No results found for: \"URCX\"  No components found for: \"LOWRESPCF\"  No results found for: \"THROATCX\"  No results found for: \"CULTURES\"  No components found for: \"STREPBCX\"  No results found for: \"STREPPNEUAG\"  No results found for: \"LEGIONELLA\"  No results found for: \"MYCOPLASCX\"  No results found for: \"GCCX\"  No results found for: \"WOUNDCX\"  No results found for: \"BODYFLDCX\"    ABG: No results found for: \"PHART\", \"ADH4BZP\", \"PO2ART\", \"HGBBG\", \"G7IVRGGL\", \"CFIO2\", \"FCOHB\", \"CARBOXYHGB\", \"FMETHB\"    Echo:     Radiology Scans:    Images reviewed personally.   December 2021 CT scan chest  PROCEDURE: CT CHEST WO CONTRAST DIAGNOSTIC-     HISTORY: SHORTNESS OF BREATH; R06.02-Shortness of breath     COMPARISON: 05/05/2021.     PROCEDURE:  Multiple axial CT images were obtained from the thoracic  inlet through the upper abdomen without the use of contrast.      FINDINGS:   Soft tissue windows reveal no axillary, hilar or mediastinal adenopathy.  Heart size is normal. The aorta is normal in caliber. There are no  pleural or pericardial effusions. There are three small nodules in the  right upper lobe. A nodule in image #27 measures 2 mm. Two nodules on  image #30 measures 4 mm and 2 mm. These lesions are stable from the  prior exam. There is no new pulmonary lesion. The visualized upper  abdomen is unremarkable.      IMPRESSION:  1. Stable tiny pulmonary nodules are likely granulomas.  2. No acute lung disease.     621.34 mGy.cm        This study was performed " with techniques to keep radiation doses as low  as reasonably achievable (ALARA). Individualized dose reduction  techniques using automated exposure control or adjustment of mA and/or  kV according to the patient size were employed.               Images were reviewed, interpreted, and dictated by Dr. Karel Hernandez MD  Transcribed by Kristine Palm PA-C.     This report was finalized on 12/9/2021 10:24 AM by Karel Hernandez MD.    PFT IMPRESSION:   December 2023 PFT showed FEV1 98%, FEV1/FVC ratio 78.  No significant bronchodilator responsiveness.  %.  No air trapping.  DL/.     January 2021 PFT showed FEV1 97%, FEV1/FVC ratio 81.  No significant bronchodilator responsiveness.  Total lung capacity 101%.  No air trapping.  Normal diffusing capacity.    November 2021 PFT showed FEV1 102%, FEV1/FVC ratio 98.    Assessment / Plan      Assessment/Plan:    1. Eosinophilic asthma  Appears to be well-controlled with Breo and mepolizumab monthly  No side effects from current treatment.  If she continues to do well, would try to decrease Breo to lower dose at next visit given her increased risk of infections due to her other immunocompromising medications.    - albuterol sulfate  (90 Base) MCG/ACT inhaler; Inhale 1-2 puffs Every 6 (Six) Hours As Needed for Wheezing or Shortness of Air.  Dispense: 48 g; Refill: 3  - Fluticasone Furoate-Vilanterol (BREO ELLIPTA) 200-25 MCG/ACT inhaler; Inhale 1 puff Daily for 90 days.  Dispense: 60 each; Refill: 2    2. Non-seasonal allergic rhinitis, unspecified trigger  Continue current regimen.    3. Other systemic lupus erythematosus with glomerular disease  Follows with [facility]    4. Immunocompromised  Secondary to treatment for lupus          Follow Up:   Return in about 6 months (around 6/15/2024).    Meaghan Benitez MD  Pulmonary/Critical Care Physician   [place]      Please note that portions of this note may have been completed with a voice recognition  program. Efforts were made to edit the dictations, but occasionally words are mistranscribed.

## 2023-12-26 ENCOUNTER — LAB (OUTPATIENT)
Dept: LAB | Facility: HOSPITAL | Age: 53
End: 2023-12-26
Payer: COMMERCIAL

## 2023-12-26 ENCOUNTER — OFFICE VISIT (OUTPATIENT)
Dept: ENDOCRINOLOGY | Facility: CLINIC | Age: 53
End: 2023-12-26
Payer: COMMERCIAL

## 2023-12-26 VITALS
OXYGEN SATURATION: 98 % | DIASTOLIC BLOOD PRESSURE: 82 MMHG | SYSTOLIC BLOOD PRESSURE: 148 MMHG | WEIGHT: 225.2 LBS | HEIGHT: 65 IN | BODY MASS INDEX: 37.52 KG/M2 | HEART RATE: 94 BPM

## 2023-12-26 DIAGNOSIS — E03.9 ACQUIRED HYPOTHYROIDISM: ICD-10-CM

## 2023-12-26 DIAGNOSIS — R20.0 NUMBNESS: ICD-10-CM

## 2023-12-26 DIAGNOSIS — E55.9 VITAMIN D DEFICIENCY: ICD-10-CM

## 2023-12-26 DIAGNOSIS — E55.9 VITAMIN D DEFICIENCY: Primary | ICD-10-CM

## 2023-12-26 DIAGNOSIS — E89.0 POSTABLATIVE HYPOTHYROIDISM: ICD-10-CM

## 2023-12-26 DIAGNOSIS — I10 ESSENTIAL HYPERTENSION: ICD-10-CM

## 2023-12-26 DIAGNOSIS — D51.0 PERNICIOUS ANEMIA: ICD-10-CM

## 2023-12-26 LAB
ALBUMIN SERPL-MCNC: 4.4 G/DL (ref 3.5–5.2)
ALBUMIN/GLOB SERPL: 2.1 G/DL
ALP SERPL-CCNC: 103 U/L (ref 39–117)
ALT SERPL W P-5'-P-CCNC: 12 U/L (ref 1–33)
ANION GAP SERPL CALCULATED.3IONS-SCNC: 11 MMOL/L (ref 5–15)
AST SERPL-CCNC: 14 U/L (ref 1–32)
BILIRUB SERPL-MCNC: <0.2 MG/DL (ref 0–1.2)
BUN SERPL-MCNC: 12 MG/DL (ref 6–20)
BUN/CREAT SERPL: 13.3 (ref 7–25)
CALCIUM SPEC-SCNC: 9.3 MG/DL (ref 8.6–10.5)
CHLORIDE SERPL-SCNC: 104 MMOL/L (ref 98–107)
CHOLEST SERPL-MCNC: 188 MG/DL (ref 0–200)
CO2 SERPL-SCNC: 25 MMOL/L (ref 22–29)
CREAT SERPL-MCNC: 0.9 MG/DL (ref 0.57–1)
EGFRCR SERPLBLD CKD-EPI 2021: 76.1 ML/MIN/1.73
GLOBULIN UR ELPH-MCNC: 2.1 GM/DL
GLUCOSE SERPL-MCNC: 87 MG/DL (ref 65–99)
HBA1C MFR BLD: 5 % (ref 4.8–5.6)
HDLC SERPL-MCNC: 86 MG/DL (ref 40–60)
LDLC SERPL CALC-MCNC: 91 MG/DL (ref 0–100)
LDLC/HDLC SERPL: 1.05 {RATIO}
POTASSIUM SERPL-SCNC: 4.5 MMOL/L (ref 3.5–5.2)
PROT SERPL-MCNC: 6.5 G/DL (ref 6–8.5)
SODIUM SERPL-SCNC: 140 MMOL/L (ref 136–145)
T4 FREE SERPL-MCNC: 0.95 NG/DL (ref 0.93–1.7)
TRIGL SERPL-MCNC: 59 MG/DL (ref 0–150)
TSH SERPL DL<=0.05 MIU/L-ACNC: 2.76 UIU/ML (ref 0.27–4.2)
VLDLC SERPL-MCNC: 11 MG/DL (ref 5–40)

## 2023-12-26 PROCEDURE — 84443 ASSAY THYROID STIM HORMONE: CPT

## 2023-12-26 PROCEDURE — 80053 COMPREHEN METABOLIC PANEL: CPT

## 2023-12-26 PROCEDURE — 80061 LIPID PANEL: CPT

## 2023-12-26 PROCEDURE — 99214 OFFICE O/P EST MOD 30 MIN: CPT | Performed by: INTERNAL MEDICINE

## 2023-12-26 PROCEDURE — 84439 ASSAY OF FREE THYROXINE: CPT

## 2023-12-26 PROCEDURE — 83036 HEMOGLOBIN GLYCOSYLATED A1C: CPT

## 2023-12-26 RX ORDER — OLMESARTAN MEDOXOMIL 40 MG/1
40 TABLET ORAL DAILY
Qty: 90 TABLET | Refills: 1 | Status: SHIPPED | OUTPATIENT
Start: 2023-12-26 | End: 2024-12-25

## 2023-12-26 RX ORDER — LIOTHYRONINE SODIUM 5 UG/1
10 TABLET ORAL DAILY
Qty: 180 TABLET | Refills: 1 | Status: SHIPPED | OUTPATIENT
Start: 2023-12-26

## 2023-12-26 RX ORDER — OLMESARTAN MEDOXOMIL 40 MG/1
40 TABLET ORAL DAILY
Qty: 90 TABLET | Refills: 1 | Status: SHIPPED | OUTPATIENT
Start: 2023-12-26 | End: 2023-12-26 | Stop reason: SDUPTHER

## 2023-12-26 RX ORDER — CYANOCOBALAMIN 1000 UG/ML
INJECTION, SOLUTION INTRAMUSCULAR; SUBCUTANEOUS
Qty: 10 ML | Refills: 0 | Status: SHIPPED | OUTPATIENT
Start: 2023-12-26

## 2023-12-26 RX ORDER — LEVOTHYROXINE SODIUM 112 MCG
112 TABLET ORAL DAILY
Qty: 90 TABLET | Refills: 1 | Status: SHIPPED | OUTPATIENT
Start: 2023-12-26 | End: 2024-12-25

## 2023-12-26 NOTE — PROGRESS NOTES
Kelsey Ross 53 y.o.  CC:Follow-up, Hypothyroidism, Hypertension, Vitamin D Deficiency, B12 Injection, and vitamin B12 Def    [place]: Follow-up, Hypothyroidism, Hypertension, Vitamin D Deficiency, B12 Injection, and vitamin B12 Def    Doing well overall  Planning trip to [facility] to visit son  Is taking synthroid 112 mcg daily   Is taking cytomel 10 mcg daily   Is taking cymbalta and gabapentin for neuropathy   Saw weight management in [place]- started phentermine- told her to message him for refills and she has tried to message but no reply   Planning to change to metformin   Bp higher today   Would like to try wegovy and we discussed  Will be changing after new year to new insurer  Discussed indigent care form as well     Allergies   Allergen Reactions    Penicillin G Unknown - High Severity    Penicillins Unknown - High Severity     Allergist informed patient she was allergic to Penicillins    Fexofenadine Headache     Only allegra D    Methotrexate GI Intolerance and Headache    Pseudoephedrine Hcl Headache       Current Outpatient Medications:     cyanocobalamin 1000 MCG/ML injection, Take 1 ml weekly for 4 weeks, then monthly, Disp: 10 mL, Rfl: 0    liothyronine (CYTOMEL) 5 MCG tablet, Take 2 tablets by mouth Daily., Disp: 180 tablet, Rfl: 1    olmesartan (BENICAR) 40 MG tablet, Take 1 tablet by mouth Daily., Disp: 90 tablet, Rfl: 1    Synthroid 112 MCG tablet, Take 1 tablet by mouth Daily., Disp: 90 tablet, Rfl: 1    albuterol sulfate  (90 Base) MCG/ACT inhaler, Inhale 1-2 puffs Every 6 (Six) Hours As Needed for Wheezing or Shortness of Air., Disp: 48 g, Rfl: 3    Benlysta 200 MG/ML solution prefilled syringe, Inject 200 mg under the skin into the appropriate area as directed Every 7 (Seven) Days., Disp: , Rfl:     cetirizine (zyrTEC) 10 MG tablet, Take 1 tablet by mouth Daily., Disp: 90 tablet, Rfl: 1    DULoxetine (CYMBALTA) 60 MG capsule, Take 1 capsule by mouth Daily., Disp: 90 capsule, Rfl: 1     "Fluticasone Furoate-Vilanterol (BREO ELLIPTA) 200-25 MCG/ACT inhaler, Inhale 1 puff Daily for 90 days., Disp: 60 each, Rfl: 2    gabapentin (NEURONTIN) 300 MG capsule, Take 1 capsule by mouth 3 (Three) Times a Day., Disp: 90 capsule, Rfl: 2    hydroxychloroquine (PLAQUENIL) 200 MG tablet, Take 1 tablet by mouth 2 (Two) Times a Day., Disp: , Rfl:     linaclotide (Linzess) 145 MCG capsule capsule, Take 1 capsule by mouth Every Morning Before Breakfast., Disp: 90 capsule, Rfl: 3    Mepolizumab 100 MG/ML solution auto-injector, Inject 3 mL under the skin into the appropriate area as directed Every 28 (Twenty-Eight) Days., Disp: 3 mL, Rfl: 11    mycophenolate (CELLCEPT) 500 MG tablet, Take 2 tablets by mouth 2 (Two) Times a Day., Disp: , Rfl:     rosuvastatin (CRESTOR) 5 MG tablet, Take 1 tablet by mouth Every Night., Disp: 90 tablet, Rfl: 1    spironolactone (ALDACTONE) 25 MG tablet, Take 1 tablet by mouth Daily. (Patient taking differently: Take 1 tablet by mouth Daily. 12.5 mg twice a day), Disp: 90 tablet, Rfl: 1    SURE COMFORT INS SYR 1CC/28G 28G X 1/2\" 1 ML misc, Use one per month for administration of vitamin B12, Disp: 12 each, Rfl: 0    tiZANidine (ZANAFLEX) 4 MG tablet, Take 1 tablet by mouth Every 8 (Eight) Hours As Needed for Muscle Spasms., Disp: 90 tablet, Rfl: 1    torsemide (DEMADEX) 20 MG tablet, Take 1 tablet by mouth Daily., Disp: 90 tablet, Rfl: 1    Tuberculin-Allergy Syringes 28G X 1/2\" 1 ML misc, 1 Units Every 30 (Thirty) Days., Disp: 20 each, Rfl: 1    vitamin D3 125 MCG (5000 UT) capsule capsule, Take 1 capsule by mouth Daily., Disp: , Rfl:   Patient Active Problem List    Diagnosis     External hemorrhoid [K64.4]     Numbness [R20.0]     Anemia [D64.9]     Hypotension due to drugs [I95.2]     Gastroesophageal reflux disease with esophagitis [K21.00]     Lower extremity edema [R60.0]     Persistent proteinuria [R80.1]     Systemic lupus erythematosus with glomerular disease [M32.14]     Asthma, " extrinsic [J45.909]     Postablative hypothyroidism [E89.0]     Vitamin D deficiency [E55.9]     Nasal polyposis [J33.9]     Mild persistent asthma [J45.30]     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   HENT:  Negative for congestion and rhinorrhea.    Eyes:  Negative for visual disturbance.   Respiratory:  Negative for cough and shortness of breath.    Cardiovascular:  Negative for palpitations and leg swelling.   Gastrointestinal:  Negative for constipation, diarrhea and nausea.   Genitourinary:  Negative for hematuria.   Musculoskeletal:  Negative for arthralgias, back pain, gait problem, joint swelling and myalgias.   Skin:  Negative for color change, rash and wound.   Allergic/Immunologic: Negative for environmental allergies, food allergies and immunocompromised state.   Neurological:  Negative for dizziness, weakness and light-headedness.   Psychiatric/Behavioral:  Negative for confusion, decreased concentration, dysphoric mood and sleep disturbance. The patient is not nervous/anxious.      Social History     Socioeconomic History    Marital status: Single   Tobacco Use    Smoking status: Former     Packs/day: 2.00     Years: 8.00     Additional pack years: 0.00     Total pack years: 16.00     Types: Cigarettes     Quit date: 1995     Years since quittin.6    Smokeless tobacco: Never   Vaping Use    Vaping Use: Never used   Substance and Sexual Activity    Alcohol use: Never    Drug use: Never    Sexual activity: Defer     Birth control/protection: None     Family History   Problem Relation Age of  "Onset    Arthritis Mother     Hypertension Mother     Hyperlipidemia Mother     Migraines Mother     Stroke Mother     Diabetes Father     Pulmonary embolism Father     Diabetes Daughter     Arthritis Maternal Grandmother     Hypertension Maternal Grandmother     Hyperlipidemia Maternal Grandmother     Stroke Maternal Grandmother     Thyroid disease Maternal Grandmother     Diabetes Paternal Grandmother     Lung cancer Maternal Aunt     Cancer Maternal Aunt     Heart attack Maternal Grandfather     Heart disease Maternal Grandfather     Hearing loss Paternal Uncle     Breast cancer Neg Hx      /82   Pulse 94   Ht 165.1 cm (65\")   Wt 102 kg (225 lb 3.2 oz)   SpO2 98%   BMI 37.48 kg/m²   Physical Exam  Vitals and nursing note reviewed.   Constitutional:       Appearance: Normal appearance. She is well-developed.   HENT:      Head: Normocephalic and atraumatic.   Eyes:      General: Lids are normal.      Extraocular Movements: Extraocular movements intact.      Conjunctiva/sclera: Conjunctivae normal.      Pupils: Pupils are equal, round, and reactive to light.   Neck:      Thyroid: No thyroid mass or thyromegaly.      Vascular: No carotid bruit.      Trachea: Trachea normal. No tracheal deviation.   Cardiovascular:      Rate and Rhythm: Normal rate and regular rhythm.      Pulses: Normal pulses.      Heart sounds: Normal heart sounds. No murmur heard.     No friction rub. No gallop.   Pulmonary:      Effort: Pulmonary effort is normal. No respiratory distress.      Breath sounds: Normal breath sounds. No wheezing.   Musculoskeletal:         General: No deformity. Normal range of motion.      Cervical back: Normal range of motion and neck supple.   Lymphadenopathy:      Cervical: No cervical adenopathy.   Skin:     General: Skin is warm and dry.      Findings: No erythema or rash.      Nails: There is no clubbing.   Neurological:      General: No focal deficit present.      Mental Status: She is alert and " oriented to person, place, and time.      Cranial Nerves: No cranial nerve deficit.      Deep Tendon Reflexes: Reflexes are normal and symmetric. Reflexes normal.   Psychiatric:         Mood and Affect: Mood normal.         Speech: Speech normal.         Behavior: Behavior normal.         Thought Content: Thought content normal.         Judgment: Judgment normal.       Results for orders placed or performed in visit on 12/05/23   ToxAssure Flex 23, Ur -   Result Value Ref Range    Declared Medications: Comment     Report Summary FINAL     CREATININE 53 mg/dL    Amphetamines, IA Comment CUTOFF:300 ng/mL    Benzodiazepines Negative     Diazepam Urine, Qualitative Not Detected ng/mg creat    Desmethyldiazepam Not Detected ng/mg creat    Oxazepam, urine Not Detected ng/mg creat    Temazepam Not Detected ng/mg creat    Alprazolam Urine, Conf Not Detected ng/mg creat    Alpha-hydroxyalprazolam, Urine Not Detected ng/mg creat    Desalkylflurazepam, Urine Not Detected ng/mg creat    Lorazepam, Urine Not Detected ng/mg creat    Alpha-hydroxytriazolam, Urine Not Detected ng/mg creat    Clonazepam Not Detected ng/mg creat    7- AMINOCLONAZEPAM Not Detected ng/mg creat    Midazolam, Urine Not Detected ng/mg creat    Alpha-hydroxymidazolam, Urine Not Detected ng/mg creat    Flunitrazepam Not Detected ng/mg creat    DESMETHYLFLUNITRAZEPAM Not Detected ng/mg creat    COCAINE / METABOLITE, IA Negative CUTOFF:150 ng/mL    Ethyl Alcohol, Enz Negative CUTOFF:0.020 g/dL    Ethanol and Ethanol Biomarkers Negative CUTOFF:500 ng/mL    Cannavinoids IA Negative CUTOFF:20 ng/mL    6-Acetylmorphine IA Negative CUTOFF:10 ng/mL    Opiate Class IA Negative CUTOFF:100 ng/mL    Oxycodone Class IA Negative CUTOFF:100 ng/mL    METHADONE, IA Negative CUTOFF:100 ng/mL    Methadone MTB IA Negative CUTOFF:100 ng/mL    Buprenorphine, Urine Negative     Buprenorphine, Urine Not Detected ng/mg creat    Norbuprenorphine Not Detected ng/mg creat    Fentanyl  Negative     Fentanyl, Urine Not Detected ng/mg creat    Norfentanyl Urine Not Detected ng/mg creat    Tapentadol, IA Negative CUTOFF:200 ng/mL    MEPERIDINE, IA Negative CUTOFF:200 ng/mL    PROPOXYPHENE, IA Negative CUTOFF:300 ng/mL    TRAMADOL, IA Negative CUTOFF:200 ng/mL    Barbiturates, IA Negative CUTOFF:200 ng/mL    PHENCYCLIDINE, IA Negative CUTOFF:25 ng/mL    Other Hallucinogens Ur Negative     Ketamine Not Detected     Norketamine Not Detected     Gabapentin, IA Comment CUTOFF:1.0 ug/mL    Carisoprodol, IA Negative CUTOFF:100 ng/mL    SEDATIVES/HYPNOTICS Negative     Zolpidem(Ambien) Urine Not Detected     Zolpidem Acid Not Detected     Eszopiclone/Zopiclone Not Detected     Amino Chloropyridine Not Detected     Zaleplon, Ur Not Detected     Acetaminophen, IA Comment CUTOFF:5.0 ug/mL    Miscellaneous, Ur Negative     DEXTROMETHORPHAN Not Detected     Dextrorphan/Levorphanol Not Detected    Amphetamines, MS, Ur RFX -   Result Value Ref Range    Amphetamines Confirmation Negative     METHAMPHETAMINE Not Detected ng/mg creat    AMPHETAMINE Not Detected ng/mg creat    MDMA-Ecstasy Not Detected ng/mg creat    MDA Not Detected ng/mg creat   Gabapentin, MS, Ur RFX -   Result Value Ref Range    Gabapentin Confirmation +POSITIVE+     Gabapentin PRESENT    Acetaminophen, MS, Ur RFX -   Result Value Ref Range    Acetaminophen Confirmation +POSITIVE+     Acetaminophen, Ur PRESENT      Diagnoses and all orders for this visit:    1. Vitamin D deficiency (Primary)  -     Hemoglobin A1c; Future  -     Lipid Panel; Future    2. Postablative hypothyroidism  -     liothyronine (CYTOMEL) 5 MCG tablet; Take 2 tablets by mouth Daily.  Dispense: 180 tablet; Refill: 1  -     Comprehensive Metabolic Panel; Future  -     T4, Free; Future  -     TSH; Future    3. Pernicious anemia  -     cyanocobalamin 1000 MCG/ML injection; Take 1 ml weekly for 4 weeks, then monthly  Dispense: 10 mL; Refill: 0    4. Essential  hypertension  Assessment & Plan:  Higher bp - change losartan to olmesartan   Rx provided       5. Acquired hypothyroidism  Assessment & Plan:  Taking synthroid 112 mcg daily+ 10 mcg cytomel daily   Update tfts       6. Numbness  Assessment & Plan:  Stable with b supplement and gabapentin      Other orders  -     Synthroid 112 MCG tablet; Take 1 tablet by mouth Daily.  Dispense: 90 tablet; Refill: 1  -     Discontinue: olmesartan (BENICAR) 40 MG tablet; Take 1 tablet by mouth Daily.  Dispense: 90 tablet; Refill: 1  -     olmesartan (BENICAR) 40 MG tablet; Take 1 tablet by mouth Daily.  Dispense: 90 tablet; Refill: 1    Return in about 6 months (around 6/26/2024) for Recheck.    Taylor Holliday MD  Signed Taylor Holliday MD

## 2024-01-02 RX ORDER — SEMAGLUTIDE 0.25 MG/.5ML
0.25 INJECTION, SOLUTION SUBCUTANEOUS WEEKLY
Qty: 2 ML | Refills: 3 | Status: SHIPPED | OUTPATIENT
Start: 2024-01-02

## 2024-01-12 ENCOUNTER — TRANSCRIBE ORDERS (OUTPATIENT)
Dept: LAB | Facility: HOSPITAL | Age: 54
End: 2024-01-12
Payer: COMMERCIAL

## 2024-01-12 ENCOUNTER — TELEPHONE (OUTPATIENT)
Dept: ENDOCRINOLOGY | Facility: CLINIC | Age: 54
End: 2024-01-12
Payer: COMMERCIAL

## 2024-01-12 ENCOUNTER — LAB (OUTPATIENT)
Dept: LAB | Facility: HOSPITAL | Age: 54
End: 2024-01-12
Payer: COMMERCIAL

## 2024-01-12 DIAGNOSIS — N02.2 MEMBRANOUS NEPHROPATHY DETERMINED BY BIOPSY: ICD-10-CM

## 2024-01-12 DIAGNOSIS — N02.2 MEMBRANOUS NEPHROPATHY DETERMINED BY BIOPSY: Primary | ICD-10-CM

## 2024-01-12 LAB
BACTERIA UR QL AUTO: ABNORMAL /HPF
BASOPHILS # BLD AUTO: 0.08 10*3/MM3 (ref 0–0.2)
BASOPHILS NFR BLD AUTO: 1.1 % (ref 0–1.5)
BILIRUB UR QL STRIP: NEGATIVE
CLARITY UR: CLEAR
COLOR UR: YELLOW
DEPRECATED RDW RBC AUTO: 38 FL (ref 37–54)
EOSINOPHIL # BLD AUTO: 0.04 10*3/MM3 (ref 0–0.4)
EOSINOPHIL NFR BLD AUTO: 0.5 % (ref 0.3–6.2)
ERYTHROCYTE [DISTWIDTH] IN BLOOD BY AUTOMATED COUNT: 11.8 % (ref 12.3–15.4)
GLUCOSE UR STRIP-MCNC: NEGATIVE MG/DL
HCT VFR BLD AUTO: 36.8 % (ref 34–46.6)
HGB BLD-MCNC: 12.3 G/DL (ref 12–15.9)
HGB UR QL STRIP.AUTO: ABNORMAL
HYALINE CASTS UR QL AUTO: ABNORMAL /LPF
IMM GRANULOCYTES # BLD AUTO: 0.03 10*3/MM3 (ref 0–0.05)
IMM GRANULOCYTES NFR BLD AUTO: 0.4 % (ref 0–0.5)
KETONES UR QL STRIP: NEGATIVE
LEUKOCYTE ESTERASE UR QL STRIP.AUTO: ABNORMAL
LYMPHOCYTES # BLD AUTO: 2.07 10*3/MM3 (ref 0.7–3.1)
LYMPHOCYTES NFR BLD AUTO: 27.8 % (ref 19.6–45.3)
MCH RBC QN AUTO: 29.1 PG (ref 26.6–33)
MCHC RBC AUTO-ENTMCNC: 33.4 G/DL (ref 31.5–35.7)
MCV RBC AUTO: 87.2 FL (ref 79–97)
MONOCYTES # BLD AUTO: 0.78 10*3/MM3 (ref 0.1–0.9)
MONOCYTES NFR BLD AUTO: 10.5 % (ref 5–12)
NEUTROPHILS NFR BLD AUTO: 4.45 10*3/MM3 (ref 1.7–7)
NEUTROPHILS NFR BLD AUTO: 59.7 % (ref 42.7–76)
NITRITE UR QL STRIP: NEGATIVE
NRBC BLD AUTO-RTO: 0 /100 WBC (ref 0–0.2)
PH UR STRIP.AUTO: 6.5 [PH] (ref 5–8)
PLATELET # BLD AUTO: 258 10*3/MM3 (ref 140–450)
PMV BLD AUTO: 10.4 FL (ref 6–12)
PROT UR QL STRIP: NEGATIVE
RBC # BLD AUTO: 4.22 10*6/MM3 (ref 3.77–5.28)
RBC # UR STRIP: ABNORMAL /HPF
REF LAB TEST METHOD: ABNORMAL
SP GR UR STRIP: 1.01 (ref 1–1.03)
SQUAMOUS #/AREA URNS HPF: ABNORMAL /HPF
UROBILINOGEN UR QL STRIP: ABNORMAL
WBC # UR STRIP: ABNORMAL /HPF
WBC NRBC COR # BLD AUTO: 7.45 10*3/MM3 (ref 3.4–10.8)

## 2024-01-12 PROCEDURE — 82570 ASSAY OF URINE CREATININE: CPT

## 2024-01-12 PROCEDURE — 82043 UR ALBUMIN QUANTITATIVE: CPT

## 2024-01-12 PROCEDURE — 80069 RENAL FUNCTION PANEL: CPT

## 2024-01-12 PROCEDURE — 86160 COMPLEMENT ANTIGEN: CPT

## 2024-01-12 PROCEDURE — 85025 COMPLETE CBC W/AUTO DIFF WBC: CPT

## 2024-01-12 PROCEDURE — 36415 COLL VENOUS BLD VENIPUNCTURE: CPT

## 2024-01-12 PROCEDURE — 81001 URINALYSIS AUTO W/SCOPE: CPT

## 2024-01-12 PROCEDURE — 84156 ASSAY OF PROTEIN URINE: CPT

## 2024-01-12 PROCEDURE — 86225 DNA ANTIBODY NATIVE: CPT

## 2024-01-12 NOTE — TELEPHONE ENCOUNTER
Patient sent my chart message with leg swelling- please let her know that I recommend she get that evaluated via pcp or urgent care urgently- could be blood clot if swelling is one leg only and it is not likely to be due to medication (olmasartan).  Her kidney function was normal with last check   This is no something that should wait until Monday  ThanksNigel

## 2024-01-13 LAB
ALBUMIN SERPL-MCNC: 4.4 G/DL (ref 3.5–5.2)
ALBUMIN UR-MCNC: <1.2 MG/DL
ANION GAP SERPL CALCULATED.3IONS-SCNC: 11 MMOL/L (ref 5–15)
BUN SERPL-MCNC: 12 MG/DL (ref 6–20)
BUN/CREAT SERPL: 12.8 (ref 7–25)
C3 SERPL-MCNC: 136 MG/DL (ref 82–167)
C4 SERPL-MCNC: 29 MG/DL (ref 14–44)
CALCIUM SPEC-SCNC: 9.3 MG/DL (ref 8.6–10.5)
CHLORIDE SERPL-SCNC: 100 MMOL/L (ref 98–107)
CO2 SERPL-SCNC: 25 MMOL/L (ref 22–29)
CREAT SERPL-MCNC: 0.94 MG/DL (ref 0.57–1)
CREAT UR-MCNC: 71.5 MG/DL
CREAT UR-MCNC: 71.5 MG/DL
EGFRCR SERPLBLD CKD-EPI 2021: 72.3 ML/MIN/1.73
GLUCOSE SERPL-MCNC: 90 MG/DL (ref 65–99)
MICROALBUMIN/CREAT UR: NORMAL MG/G{CREAT}
PHOSPHATE SERPL-MCNC: 3.7 MG/DL (ref 2.5–4.5)
POTASSIUM SERPL-SCNC: 4.5 MMOL/L (ref 3.5–5.2)
PROT ?TM UR-MCNC: 10.6 MG/DL
PROT/CREAT UR: 148.3 MG/G CREA (ref 0–200)
SODIUM SERPL-SCNC: 136 MMOL/L (ref 136–145)

## 2024-01-15 LAB — DSDNA AB SER-ACNC: <1 IU/ML (ref 0–9)

## 2024-01-16 ENCOUNTER — OFFICE VISIT (OUTPATIENT)
Dept: INTERNAL MEDICINE | Facility: CLINIC | Age: 54
End: 2024-01-16
Payer: COMMERCIAL

## 2024-01-16 VITALS
HEIGHT: 65 IN | OXYGEN SATURATION: 98 % | DIASTOLIC BLOOD PRESSURE: 86 MMHG | WEIGHT: 229.4 LBS | SYSTOLIC BLOOD PRESSURE: 130 MMHG | HEART RATE: 78 BPM | TEMPERATURE: 97.1 F | BODY MASS INDEX: 38.22 KG/M2

## 2024-01-16 DIAGNOSIS — M25.562 CHRONIC PAIN OF LEFT KNEE: Primary | ICD-10-CM

## 2024-01-16 DIAGNOSIS — G89.29 CHRONIC PAIN OF LEFT KNEE: Primary | ICD-10-CM

## 2024-01-16 PROCEDURE — 99213 OFFICE O/P EST LOW 20 MIN: CPT | Performed by: STUDENT IN AN ORGANIZED HEALTH CARE EDUCATION/TRAINING PROGRAM

## 2024-01-16 NOTE — PROGRESS NOTES
Subjective   Kelsey Ross is a 53 y.o. female.     History of Present Illness  She presents with a few months of left knee pain.  States that it has been getting worse after she has been on it all day at work.  States she has noticed swelling in the medial portion of her knee.  Pain in the medial portion of her knee.  Denies any redness or warmth.      The following portions of the patient's history were reviewed and updated as appropriate: allergies, current medications, past family history, past medical history, past social history, past surgical history, and problem list.    Review of Systems   All other systems reviewed and are negative.      Objective   Physical Exam  Vitals and nursing note reviewed.   Constitutional:       Appearance: Normal appearance.   HENT:      Head: Normocephalic and atraumatic.      Right Ear: External ear normal.      Left Ear: External ear normal.      Nose: Nose normal.      Mouth/Throat:      Mouth: Mucous membranes are moist.      Pharynx: Oropharynx is clear. No oropharyngeal exudate or posterior oropharyngeal erythema.   Eyes:      Extraocular Movements: Extraocular movements intact.      Conjunctiva/sclera: Conjunctivae normal.      Pupils: Pupils are equal, round, and reactive to light.   Cardiovascular:      Rate and Rhythm: Normal rate and regular rhythm.      Pulses: Normal pulses.      Heart sounds: Normal heart sounds.   Pulmonary:      Effort: Pulmonary effort is normal.      Breath sounds: Normal breath sounds.   Abdominal:      General: Abdomen is flat. Bowel sounds are normal.      Palpations: Abdomen is soft.   Musculoskeletal:         General: Normal range of motion.      Cervical back: Normal range of motion.      Left knee: Swelling and effusion present. Tenderness present over the medial joint line.   Skin:     General: Skin is warm.      Capillary Refill: Capillary refill takes less than 2 seconds.   Neurological:      General: No focal deficit present.       Mental Status: She is alert and oriented to person, place, and time. Mental status is at baseline.   Psychiatric:         Mood and Affect: Mood normal.         Behavior: Behavior normal.         Thought Content: Thought content normal.         Judgment: Judgment normal.         Assessment & Plan   Diagnoses and all orders for this visit:    1. Chronic pain of left knee (Primary)  -     XR Knee 1 or 2 View Left; Future  -     Diclofenac Sodium (VOLTAREN) 1 % gel gel; Apply 4 g topically to the appropriate area as directed 4 (Four) Times a Day As Needed (knee pain).  Dispense: 350 g; Refill: 0         Voltaren gel to the knee for pain and swelling  X-ray of the knee.  If arthritis and Voltaren gel is not helping then can proceed with joint injections if needed

## 2024-01-17 ENCOUNTER — HOSPITAL ENCOUNTER (OUTPATIENT)
Dept: GENERAL RADIOLOGY | Facility: HOSPITAL | Age: 54
Discharge: HOME | End: 2024-01-17
Admitting: STUDENT IN AN ORGANIZED HEALTH CARE EDUCATION/TRAINING PROGRAM
Payer: COMMERCIAL

## 2024-01-17 DIAGNOSIS — J30.89 NON-SEASONAL ALLERGIC RHINITIS, UNSPECIFIED TRIGGER: ICD-10-CM

## 2024-01-17 DIAGNOSIS — M25.562 CHRONIC PAIN OF LEFT KNEE: ICD-10-CM

## 2024-01-17 DIAGNOSIS — G89.29 CHRONIC PAIN OF LEFT KNEE: ICD-10-CM

## 2024-01-17 PROCEDURE — 73560 X-RAY EXAM OF KNEE 1 OR 2: CPT

## 2024-01-17 RX ORDER — CETIRIZINE HYDROCHLORIDE 10 MG/1
10 TABLET ORAL DAILY
Qty: 30 TABLET | Refills: 5 | OUTPATIENT
Start: 2024-01-17

## 2024-01-18 DIAGNOSIS — J82.83 EOSINOPHILIC ASTHMA: ICD-10-CM

## 2024-01-19 DIAGNOSIS — J30.89 NON-SEASONAL ALLERGIC RHINITIS, UNSPECIFIED TRIGGER: ICD-10-CM

## 2024-01-19 RX ORDER — CETIRIZINE HYDROCHLORIDE 10 MG/1
10 TABLET ORAL DAILY
Qty: 90 TABLET | Refills: 1 | Status: SHIPPED | OUTPATIENT
Start: 2024-01-19

## 2024-01-19 NOTE — TELEPHONE ENCOUNTER
Caller: [facility] [address] - [phone] [facility] [phone]    Relationship: Pharmacy    Best call back number: [phone]     Requested Prescriptions:   Requested Prescriptions     Pending Prescriptions Disp Refills    cetirizine (zyrTEC) 10 MG tablet 90 tablet 1     Sig: Take 1 tablet by mouth Daily.        Pharmacy where request should be sent: [facility] [address] - [phone] [facility] [phone] FX     Last office visit with prescribing clinician: 6/19/2023   Last telemedicine visit with prescribing clinician: Visit date not found   Next office visit with prescribing clinician: 3/4/2024     Additional details provided by patient: PATIENT OUT OF MEDICATION.     Does the patient have less than a 3 day supply:  [x] Yes  [] No    Would you like a call back once the refill request has been completed: [] Yes [x] No    If the office needs to give you a call back, can they leave a voicemail: [] Yes [x] No    Idalia Cruz   01/19/24 10:38 EST     
Oriented - self; Oriented - place; Oriented - time

## 2024-01-22 ENCOUNTER — TRANSCRIBE ORDERS (OUTPATIENT)
Dept: LAB | Facility: HOSPITAL | Age: 54
End: 2024-01-22
Payer: COMMERCIAL

## 2024-01-22 DIAGNOSIS — N02.2 MEMBRANOUS NEPHROPATHY DETERMINED BY BIOPSY: Primary | ICD-10-CM

## 2024-01-22 LAB
COLLECT DURATION TIME UR: 24 HRS
PROT 24H UR-MRATE: 105.6 MG/24HOURS (ref 0–150)
SPECIMEN VOL 24H UR: 1650 ML

## 2024-01-22 PROCEDURE — 81050 URINALYSIS VOLUME MEASURE: CPT | Performed by: INTERNAL MEDICINE

## 2024-01-22 PROCEDURE — 84156 ASSAY OF PROTEIN URINE: CPT | Performed by: INTERNAL MEDICINE

## 2024-01-30 ENCOUNTER — PATIENT MESSAGE (OUTPATIENT)
Dept: ENDOCRINOLOGY | Facility: CLINIC | Age: 54
End: 2024-01-30
Payer: COMMERCIAL

## 2024-02-03 ENCOUNTER — OFFICE VISIT (OUTPATIENT)
Dept: INTERNAL MEDICINE | Facility: CLINIC | Age: 54
End: 2024-02-03
Payer: COMMERCIAL

## 2024-02-03 VITALS
HEIGHT: 65 IN | WEIGHT: 227 LBS | SYSTOLIC BLOOD PRESSURE: 110 MMHG | BODY MASS INDEX: 37.82 KG/M2 | TEMPERATURE: 97 F | OXYGEN SATURATION: 98 % | HEART RATE: 78 BPM | DIASTOLIC BLOOD PRESSURE: 78 MMHG

## 2024-02-03 DIAGNOSIS — R51.9 ACUTE NONINTRACTABLE HEADACHE, UNSPECIFIED HEADACHE TYPE: Primary | ICD-10-CM

## 2024-02-03 DIAGNOSIS — J01.10 ACUTE NON-RECURRENT FRONTAL SINUSITIS: ICD-10-CM

## 2024-02-03 LAB
EXPIRATION DATE: NORMAL
EXPIRATION DATE: NORMAL
FLUAV AG UPPER RESP QL IA.RAPID: NOT DETECTED
FLUBV AG UPPER RESP QL IA.RAPID: NOT DETECTED
INTERNAL CONTROL: NORMAL
INTERNAL CONTROL: NORMAL
Lab: NORMAL
Lab: NORMAL
S PYO AG THROAT QL: NORMAL
SARS-COV-2 AG UPPER RESP QL IA.RAPID: NOT DETECTED

## 2024-02-03 RX ORDER — IPRATROPIUM BROMIDE 21 UG/1
SPRAY, METERED NASAL EVERY 12 HOURS SCHEDULED
COMMUNITY

## 2024-02-03 RX ORDER — KETOROLAC TROMETHAMINE 30 MG/ML
60 INJECTION, SOLUTION INTRAMUSCULAR; INTRAVENOUS ONCE
Status: COMPLETED | OUTPATIENT
Start: 2024-02-03 | End: 2024-02-03

## 2024-02-03 RX ORDER — DOXYCYCLINE HYCLATE 100 MG/1
100 CAPSULE ORAL 2 TIMES DAILY
Qty: 20 CAPSULE | Refills: 0 | Status: SHIPPED | OUTPATIENT
Start: 2024-02-03

## 2024-02-03 RX ADMIN — KETOROLAC TROMETHAMINE 60 MG: 30 INJECTION, SOLUTION INTRAMUSCULAR; INTRAVENOUS at 09:56

## 2024-02-03 NOTE — PROGRESS NOTES
Subjective   Kelsey Ross is a 53 y.o. female.     History of Present Illness  Pt presents with frontal headache for 5 days. Feels like pressure in her head. Ear pressure. Sinus pain. Sinus drainage. Dry cough.       The following portions of the patient's history were reviewed and updated as appropriate: allergies, current medications, past family history, past medical history, past social history, past surgical history, and problem list.    Review of Systems   All other systems reviewed and are negative.      Objective   Physical Exam  Vitals and nursing note reviewed.   Constitutional:       Appearance: Normal appearance.   HENT:      Head: Normocephalic and atraumatic.      Right Ear: External ear normal.      Left Ear: External ear normal.      Nose: Nose normal.      Mouth/Throat:      Mouth: Mucous membranes are moist.      Pharynx: Oropharynx is clear. No oropharyngeal exudate or posterior oropharyngeal erythema.   Eyes:      Extraocular Movements: Extraocular movements intact.      Conjunctiva/sclera: Conjunctivae normal.      Pupils: Pupils are equal, round, and reactive to light.   Cardiovascular:      Rate and Rhythm: Normal rate and regular rhythm.      Pulses: Normal pulses.      Heart sounds: Normal heart sounds.   Pulmonary:      Effort: Pulmonary effort is normal.   Abdominal:      General: Abdomen is flat. Bowel sounds are normal.      Palpations: Abdomen is soft.   Musculoskeletal:         General: Normal range of motion.      Cervical back: Normal range of motion.   Skin:     General: Skin is warm.      Capillary Refill: Capillary refill takes less than 2 seconds.   Neurological:      General: No focal deficit present.      Mental Status: She is alert and oriented to person, place, and time. Mental status is at baseline.   Psychiatric:         Mood and Affect: Mood normal.         Behavior: Behavior normal.         Thought Content: Thought content normal.         Judgment: Judgment normal.          Assessment & Plan   Diagnoses and all orders for this visit:    1. Acute nonintractable headache, unspecified headache type (Primary)  -     ketorolac (TORADOL) injection 60 mg    2. Acute non-recurrent frontal sinusitis  -     POCT rapid strep A  -     POCT SARS-CoV-2 Antigen SHAHRIAR + Flu  -     doxycycline (VIBRAMYCIN) 100 MG capsule; Take 1 capsule by mouth 2 (Two) Times a Day.  Dispense: 20 capsule; Refill: 0         Toradol for headache  Flu, strep, covid neg  Doxy for sinusitis

## 2024-02-05 DIAGNOSIS — E03.9 ACQUIRED HYPOTHYROIDISM: Primary | ICD-10-CM

## 2024-02-05 RX ORDER — LEVOTHYROXINE SODIUM 112 UG/1
112 TABLET ORAL DAILY
Qty: 90 TABLET | Refills: 1 | Status: SHIPPED | OUTPATIENT
Start: 2024-02-05 | End: 2025-02-04

## 2024-02-05 RX ORDER — LEVOTHYROXINE SODIUM 112 UG/1
112 TABLET ORAL DAILY
Qty: 90 TABLET | Refills: 1 | Status: SHIPPED | OUTPATIENT
Start: 2024-02-05 | End: 2024-02-05 | Stop reason: SDUPTHER

## 2024-02-12 ENCOUNTER — PATIENT MESSAGE (OUTPATIENT)
Dept: INTERNAL MEDICINE | Facility: CLINIC | Age: 54
End: 2024-02-12
Payer: COMMERCIAL

## 2024-02-12 DIAGNOSIS — R11.0 NAUSEA: Primary | ICD-10-CM

## 2024-02-12 RX ORDER — PROMETHAZINE HYDROCHLORIDE 25 MG/1
25 TABLET ORAL EVERY 6 HOURS PRN
Qty: 30 TABLET | Refills: 1 | Status: SHIPPED | OUTPATIENT
Start: 2024-02-12

## 2024-02-12 RX ORDER — ONDANSETRON 4 MG/1
4-8 TABLET, ORALLY DISINTEGRATING ORAL EVERY 8 HOURS PRN
Qty: 30 TABLET | Refills: 1 | Status: SHIPPED | OUTPATIENT
Start: 2024-02-12

## 2024-03-04 ENCOUNTER — TELEPHONE (OUTPATIENT)
Dept: INTERNAL MEDICINE | Facility: CLINIC | Age: 54
End: 2024-03-04

## 2024-03-04 NOTE — TELEPHONE ENCOUNTER
Caller: Kelsey Ross    Relationship to patient: Self    Best call back number: [phone]     Chief complaint: FOLLOW UP APPOINTMENT AND FMLA PAPERWORK NEEDED     Type of visit: OFFICE     Requested date: ASAP BUT LATE APPOINTMENT      If rescheduling, when is the original appointment: MARCH 26TH      Additional notes:WAS SCHEDULED FOR MARCH 3 BUT OFFICE CALLED TO RESCHEDULE. THE MARCH 26TH WILL NOT WORK DUE TO TIME OF DAY. NEEDS LATE AFTERNOON APPOINTMENT. NEEDS FMLA PAPERWORK FILLED OUT. PLEASE CALL TO SCHEDULE SOONEST APPOINTMENT LATE AFTERNOON.

## 2024-03-04 NOTE — TELEPHONE ENCOUNTER
Called Kelsey, rescheduled for 4pm on 03/25/2024, was the only availability she was able to come in on this month. Needing [place] paperwork, typically sees specialists for her issues however they are refusing to fill out forms, I informed her I could not promise the forms would be filled out by our office without Dr. Giron giving the okay however she is scheduled for her follow up so we can discuss in office.

## 2024-03-05 RX ORDER — DULOXETIN HYDROCHLORIDE 60 MG/1
60 CAPSULE, DELAYED RELEASE ORAL DAILY
Qty: 90 CAPSULE | Refills: 1 | Status: SHIPPED | OUTPATIENT
Start: 2024-03-05

## 2024-03-09 ENCOUNTER — TRANSCRIBE ORDERS (OUTPATIENT)
Dept: LAB | Facility: HOSPITAL | Age: 54
End: 2024-03-09
Payer: COMMERCIAL

## 2024-03-09 ENCOUNTER — LAB (OUTPATIENT)
Dept: LAB | Facility: HOSPITAL | Age: 54
End: 2024-03-09
Payer: COMMERCIAL

## 2024-03-09 DIAGNOSIS — G62.9 PERIPHERAL NERVE DISORDER: Primary | ICD-10-CM

## 2024-03-09 DIAGNOSIS — G62.9 PERIPHERAL NERVE DISORDER: ICD-10-CM

## 2024-03-09 LAB — CERULOPLASMIN SERPL-MCNC: 23 MG/DL (ref 19–39)

## 2024-03-09 PROCEDURE — 84425 ASSAY OF VITAMIN B-1: CPT

## 2024-03-09 PROCEDURE — 82525 ASSAY OF COPPER: CPT

## 2024-03-09 PROCEDURE — 36415 COLL VENOUS BLD VENIPUNCTURE: CPT

## 2024-03-09 PROCEDURE — 82390 ASSAY OF CERULOPLASMIN: CPT

## 2024-03-14 LAB — VIT B1 BLD-SCNC: 126.7 NMOL/L (ref 66.5–200)

## 2024-03-20 ENCOUNTER — LAB (OUTPATIENT)
Dept: LAB | Facility: HOSPITAL | Age: 54
End: 2024-03-20
Payer: COMMERCIAL

## 2024-03-20 DIAGNOSIS — E03.9 ACQUIRED HYPOTHYROIDISM: ICD-10-CM

## 2024-03-20 DIAGNOSIS — N02.2 MEMBRANOUS NEPHROPATHY DETERMINED BY BIOPSY: ICD-10-CM

## 2024-03-20 LAB
BACTERIA UR QL AUTO: NORMAL /HPF
BASOPHILS # BLD AUTO: 0.07 10*3/MM3 (ref 0–0.2)
BASOPHILS NFR BLD AUTO: 1.4 % (ref 0–1.5)
BILIRUB UR QL STRIP: NEGATIVE
CLARITY UR: CLEAR
COLOR UR: YELLOW
DEPRECATED RDW RBC AUTO: 37.7 FL (ref 37–54)
EOSINOPHIL # BLD AUTO: 0.04 10*3/MM3 (ref 0–0.4)
EOSINOPHIL NFR BLD AUTO: 0.8 % (ref 0.3–6.2)
ERYTHROCYTE [DISTWIDTH] IN BLOOD BY AUTOMATED COUNT: 12.2 % (ref 12.3–15.4)
GLUCOSE UR STRIP-MCNC: NEGATIVE MG/DL
HCT VFR BLD AUTO: 35.8 % (ref 34–46.6)
HGB BLD-MCNC: 12 G/DL (ref 12–15.9)
HGB UR QL STRIP.AUTO: NEGATIVE
HYALINE CASTS UR QL AUTO: NORMAL /LPF
IMM GRANULOCYTES # BLD AUTO: 0.01 10*3/MM3 (ref 0–0.05)
IMM GRANULOCYTES NFR BLD AUTO: 0.2 % (ref 0–0.5)
KETONES UR QL STRIP: NEGATIVE
LEUKOCYTE ESTERASE UR QL STRIP.AUTO: NEGATIVE
LYMPHOCYTES # BLD AUTO: 1.85 10*3/MM3 (ref 0.7–3.1)
LYMPHOCYTES NFR BLD AUTO: 35.9 % (ref 19.6–45.3)
MCH RBC QN AUTO: 28.8 PG (ref 26.6–33)
MCHC RBC AUTO-ENTMCNC: 33.5 G/DL (ref 31.5–35.7)
MCV RBC AUTO: 86.1 FL (ref 79–97)
MONOCYTES # BLD AUTO: 0.48 10*3/MM3 (ref 0.1–0.9)
MONOCYTES NFR BLD AUTO: 9.3 % (ref 5–12)
NEUTROPHILS NFR BLD AUTO: 2.7 10*3/MM3 (ref 1.7–7)
NEUTROPHILS NFR BLD AUTO: 52.4 % (ref 42.7–76)
NITRITE UR QL STRIP: NEGATIVE
NRBC BLD AUTO-RTO: 0 /100 WBC (ref 0–0.2)
PH UR STRIP.AUTO: 6 [PH] (ref 5–8)
PLATELET # BLD AUTO: 265 10*3/MM3 (ref 140–450)
PMV BLD AUTO: 10.8 FL (ref 6–12)
PROT UR QL STRIP: NEGATIVE
RBC # BLD AUTO: 4.16 10*6/MM3 (ref 3.77–5.28)
RBC # UR STRIP: NORMAL /HPF
REF LAB TEST METHOD: NORMAL
SP GR UR STRIP: 1.01 (ref 1–1.03)
SQUAMOUS #/AREA URNS HPF: NORMAL /HPF
T4 FREE SERPL-MCNC: 0.86 NG/DL (ref 0.93–1.7)
TSH SERPL DL<=0.05 MIU/L-ACNC: 2.14 UIU/ML (ref 0.27–4.2)
UROBILINOGEN UR QL STRIP: NORMAL
WBC # UR STRIP: NORMAL /HPF
WBC NRBC COR # BLD AUTO: 5.15 10*3/MM3 (ref 3.4–10.8)

## 2024-03-20 PROCEDURE — 84439 ASSAY OF FREE THYROXINE: CPT

## 2024-03-20 PROCEDURE — 84443 ASSAY THYROID STIM HORMONE: CPT

## 2024-03-20 PROCEDURE — 85025 COMPLETE CBC W/AUTO DIFF WBC: CPT

## 2024-03-20 PROCEDURE — 84207 ASSAY OF VITAMIN B-6: CPT

## 2024-03-20 PROCEDURE — 36415 COLL VENOUS BLD VENIPUNCTURE: CPT

## 2024-03-20 PROCEDURE — 81001 URINALYSIS AUTO W/SCOPE: CPT

## 2024-03-20 PROCEDURE — 82525 ASSAY OF COPPER: CPT

## 2024-03-21 DIAGNOSIS — E03.9 ACQUIRED HYPOTHYROIDISM: Primary | ICD-10-CM

## 2024-03-21 LAB — T4 SERPL-MCNC: 5.78 MCG/DL (ref 4.5–11.7)

## 2024-03-23 LAB — PYRIDOXAL PHOS SERPL-MCNC: 13.2 UG/L (ref 3.4–65.2)

## 2024-03-25 ENCOUNTER — OFFICE VISIT (OUTPATIENT)
Dept: INTERNAL MEDICINE | Facility: CLINIC | Age: 54
End: 2024-03-25
Payer: COMMERCIAL

## 2024-03-25 VITALS
BODY MASS INDEX: 37.05 KG/M2 | OXYGEN SATURATION: 99 % | TEMPERATURE: 97 F | HEART RATE: 78 BPM | HEIGHT: 65 IN | SYSTOLIC BLOOD PRESSURE: 122 MMHG | DIASTOLIC BLOOD PRESSURE: 80 MMHG | WEIGHT: 222.4 LBS | RESPIRATION RATE: 16 BRPM

## 2024-03-25 DIAGNOSIS — G25.81 RESTLESS LEGS: Primary | ICD-10-CM

## 2024-03-25 DIAGNOSIS — R53.82 CHRONIC FATIGUE: ICD-10-CM

## 2024-03-25 DIAGNOSIS — R51.9 FREQUENT HEADACHES: ICD-10-CM

## 2024-03-25 DIAGNOSIS — D51.0 PERNICIOUS ANEMIA: ICD-10-CM

## 2024-03-25 DIAGNOSIS — R06.83 SNORING: ICD-10-CM

## 2024-03-25 RX ORDER — CYANOCOBALAMIN 1000 UG/ML
INJECTION, SOLUTION INTRAMUSCULAR; SUBCUTANEOUS
Qty: 10 ML | Refills: 0 | Status: SHIPPED | OUTPATIENT
Start: 2024-03-25

## 2024-03-25 RX ORDER — ROPINIROLE 0.25 MG/1
.25-.5 TABLET, FILM COATED ORAL NIGHTLY
Qty: 60 TABLET | Refills: 2 | Status: SHIPPED | OUTPATIENT
Start: 2024-03-25

## 2024-03-25 NOTE — PROGRESS NOTES
"Chief Complaint  Peripheral Neuropathy (Discuss FMLA paperwork )  Answers submitted by the patient for this visit:  Other (Submitted on 3/18/2024)  Have you had these symptoms before?: Yes  How long have you been having these symptoms?: Greater than 2 weeks  Primary Reason for Visit (Submitted on 3/18/2024)  What is the primary reason for your visit?: Other      Subjective        Kelsey Ross presents to [facility]  History of Present Illness  On Ozempic where she can't get Wegovy. Has had some reflux today.     Gabapentin helps with getting her to sleep but doesn't keep her asleep. Legs more restless. Has not tried Requip nor Mirapex. Gabapentin managed by specialist. Snores, has woken herself up with this. Always tired. Had sleep study approx April 2023 that was inconclusive.     She has had some dizziness described as some lightheadedness and some vertigo. Has ENT appointment tomorrow to follow up, history of sinus polyps. Hard of hearing, has hearing aids in placed. Has had some \"drum beating\" sounds with her dizziness along with headaches back of head and some on front forehead area. Eye exam is up to date. Blood pressure controlled. Pt reports having MRI brain in last year that was okay.    Insurance has stopped covering one of her meds for lupus, increased pain since not having that.    Needs forms for fmla completed, pain management would not complete.    She has follow up with [facility] scheduled.      Objective   Vital Signs:  /80 (BP Location: Left arm, Patient Position: Sitting, Cuff Size: Adult)   Pulse 78   Temp 97 °F (36.1 °C) (Temporal)   Resp 16   Ht 165.1 cm (65\")   Wt 101 kg (222 lb 6.4 oz)   SpO2 99%   BMI 37.01 kg/m²   Estimated body mass index is 37.01 kg/m² as calculated from the following:    Height as of this encounter: 165.1 cm (65\").    Weight as of this encounter: 101 kg (222 lb 6.4 oz).               Physical Exam  Vitals and nursing " note reviewed.   Constitutional:       General: She is not in acute distress.     Appearance: Normal appearance. She is well-developed and well-groomed. She is obese. She is not ill-appearing, toxic-appearing or diaphoretic.   HENT:      Head: Normocephalic and atraumatic.      Right Ear: Tympanic membrane, ear canal and external ear normal. Decreased hearing noted.      Left Ear: Tympanic membrane, ear canal and external ear normal. Decreased hearing noted.   Eyes:      General: Lids are normal. No scleral icterus.     Extraocular Movements: Extraocular movements intact.   Neck:      Trachea: Phonation normal.   Pulmonary:      Effort: Pulmonary effort is normal.   Musculoskeletal:      Cervical back: Neck supple.   Skin:     Coloration: Skin is not jaundiced or pale.   Neurological:      General: No focal deficit present.      Mental Status: She is alert and oriented to person, place, and time.      Motor: Motor function is intact.   Psychiatric:         Attention and Perception: Attention and perception normal.         Mood and Affect: Mood and affect normal.         Speech: Speech normal.         Behavior: Behavior normal. Behavior is cooperative.         Thought Content: Thought content normal.         Cognition and Memory: Cognition and memory normal.         Judgment: Judgment normal.        Result Review :    The following data was reviewed by: Isha Giron MD on 03/25/2024:  Reviewed via care everywhere:     Home sleep study 4/7/23:  RESPIRATORY DATA:   The study started at 23:58:33 and ended at 07:05:03 and the total recording   time was 426 minutes. By convention, sleep is assumed for the whole   recording. Snoring was noted. There was a total of 25 respiratory events. Of   these events, the total number of apneas was 4 (4 obstructive, 0 mixed, and 0   central (0.0%)) and 21 hypopneas. The respiratory event index (SHAMEKA) was 3.5   events per hour of study time. The mean oxygen saturation during the  study   was 97.0%, with a minimum oxygen saturation of 86.0%. The patient spent 0.5   minutes at oxygen saturation measured less than 90% (0.1% of recording time)   and 0.4 minutes at oxygen saturation measured at or less than 88% (0.1% of   recording time).                   Time         SHAMEKA/AHI   Supine        92.0 min     13.7   Off-Supine    334.5 min    0.7   Total         426.5 min    3.5     ECG DATA:   The average heart rate was 61 bpm with a range of 52 bpm to 98 bpm.     ICSD DIAGNOSIS:   Primary Snoring [R06.83]   Sleep Disorder, Unspecified [G47.9]     IMPRESSION/RECOMMENDATIONS:   1. This study neither confirms nor refutes a diagnosis of obstructive sleep   apnea as HSAT does not measure certain types of respiratory events that can   only be measured on an in-laboratory polysomnogram where sleep architecture   and sleep efficiency are recorded.    2. Recommend an in-laboratory polysomnogram if sleep apnea remains highly   suspected.     INTERPRETING PHYSICIAN:     Elva Benton MD, FAASM, DABSM, DABPN             Assessment and Plan     Diagnoses and all orders for this visit:    1. Restless legs (Primary)  -     rOPINIRole (REQUIP) 0.25 MG tablet; Take 1-2 tablets by mouth Every Night. Take 1 hour before bedtime.  Dispense: 60 tablet; Refill: 2  -     Cancel: Ambulatory Referral to Sleep Medicine  -     Ambulatory Referral to Sleep Medicine    2. Snoring  -     Cancel: Ambulatory Referral to Sleep Medicine  -     Ambulatory Referral to Sleep Medicine    3. Frequent headaches  -     Cancel: Ambulatory Referral to Sleep Medicine  -     Ambulatory Referral to Sleep Medicine    4. Chronic fatigue  -     Cancel: Ambulatory Referral to Sleep Medicine  -     Ambulatory Referral to Sleep Medicine      See all specialists as scheduled (ENT tomorrow mention spinning sensation, tinnitus), neurology, rheumatology, physical therapy, pain management, etc.  Cannot make any adjustments on gabapentin as prescribed by  specialist.  Suspect FAVIAN needs in lab study as home study 4/2023 inconclusive.  Trial of Requip for restless leg syndrome. Can adjust dose, rx between visits if needed. Last CBC showed no evidence of iron deficiency. Followed by endocrinology for thyroid.  Mri brain appears to have been completed November 2023 but I cannot find the report in her chart (searched care everywhere), per pt was normal.  Fmla forms completed, copied for chart, originals provided back to patient.     I spent 40 minutes caring for Kelsey on this date of service. This time includes time spent by me in the following activities:reviewing tests, performing a medically appropriate examination and/or evaluation , counseling and educating the patient/family/caregiver, ordering medications, tests, or procedures, referring and communicating with other health care professionals , and documenting information in the medical record  Follow Up     Return in about 3 months (around 6/25/2024) for Annual physical, sooner if needed.  Patient was given instructions and counseling regarding her condition or for health maintenance advice. Please see specific information pulled into the AVS if appropriate.

## 2024-03-25 NOTE — TELEPHONE ENCOUNTER
Rx Refill Note  Requested Prescriptions     Pending Prescriptions Disp Refills    cyanocobalamin 1000 MCG/ML injection [Pharmacy Med Name: CYANOCOBALAMIN 1000MCG SOLUTION]  0     Sig: INJECT 1ML ONCE MONTHLY          Last office visit with prescribing clinician: 12/26/2023     Next office visit with prescribing clinician: 4/29/2024         Cherise Kan MA  03/25/24, 09:11 EDT

## 2024-03-26 RX ORDER — SYRINGE AND NEEDLE,INSULIN,1ML 28GX1/2"
SYRINGE, EMPTY DISPOSABLE MISCELLANEOUS
Qty: 12 EACH | Refills: 0 | Status: SHIPPED | OUTPATIENT
Start: 2024-03-26

## 2024-03-28 LAB — COPPER, FREE: 280 MCG/L

## 2024-04-19 ENCOUNTER — TRANSCRIBE ORDERS (OUTPATIENT)
Dept: LAB | Facility: HOSPITAL | Age: 54
End: 2024-04-19
Payer: COMMERCIAL

## 2024-04-19 DIAGNOSIS — N02.2 MEMBRANOUS NEPHROPATHY DETERMINED BY BIOPSY: Primary | ICD-10-CM

## 2024-04-29 ENCOUNTER — OFFICE VISIT (OUTPATIENT)
Dept: ENDOCRINOLOGY | Facility: CLINIC | Age: 54
End: 2024-04-29
Payer: COMMERCIAL

## 2024-04-29 VITALS
HEIGHT: 65 IN | BODY MASS INDEX: 36.12 KG/M2 | DIASTOLIC BLOOD PRESSURE: 70 MMHG | OXYGEN SATURATION: 99 % | SYSTOLIC BLOOD PRESSURE: 118 MMHG | HEART RATE: 76 BPM | WEIGHT: 216.8 LBS

## 2024-04-29 DIAGNOSIS — E89.0 POSTABLATIVE HYPOTHYROIDISM: Primary | ICD-10-CM

## 2024-04-29 DIAGNOSIS — I10 ESSENTIAL HYPERTENSION: ICD-10-CM

## 2024-04-29 PROCEDURE — 84443 ASSAY THYROID STIM HORMONE: CPT | Performed by: INTERNAL MEDICINE

## 2024-04-29 PROCEDURE — 84481 FREE ASSAY (FT-3): CPT | Performed by: INTERNAL MEDICINE

## 2024-04-29 PROCEDURE — 99214 OFFICE O/P EST MOD 30 MIN: CPT | Performed by: INTERNAL MEDICINE

## 2024-04-29 PROCEDURE — 84439 ASSAY OF FREE THYROXINE: CPT | Performed by: INTERNAL MEDICINE

## 2024-04-29 RX ORDER — SEMAGLUTIDE 1 MG/.5ML
1 INJECTION, SOLUTION SUBCUTANEOUS WEEKLY
Qty: 2 ML | Refills: 3 | Status: SHIPPED | OUTPATIENT
Start: 2024-04-29

## 2024-04-29 NOTE — PROGRESS NOTES
Kelsey Ross 53 y.o.  [facility]:Follow-up, Hypothyroidism, Hypertension, Vitamin D Deficiency, and Vitamin B12 deficiency    [place]: Follow-up, Hypothyroidism, Hypertension, Vitamin D Deficiency, and Vitamin B12 deficiency    Is taking levothyroxine 112 mcg daily and cytomel 5 mcg daily   Bp is controlled taking benicar 40 mg daily   Is taking vitamin D and B12 supplements    Allergies   Allergen Reactions    Penicillin G Unknown - High Severity    Penicillins Unknown - High Severity     Allergist informed patient she was allergic to Penicillins    Fexofenadine Headache     Only allegra D    Methotrexate GI Intolerance and Headache    Pseudoephedrine Hcl Headache       Current Outpatient Medications:     albuterol sulfate  (90 Base) MCG/ACT inhaler, Inhale 1-2 puffs Every 6 (Six) Hours As Needed for Wheezing or Shortness of Air., Disp: 48 g, Rfl: 3    Benlysta 200 MG/ML solution prefilled syringe, Inject 200 mg under the skin into the appropriate area as directed Every 7 (Seven) Days. (Patient not taking: Reported on 3/25/2024), Disp: , Rfl:     cetirizine (zyrTEC) 10 MG tablet, Take 1 tablet by mouth Daily., Disp: 90 tablet, Rfl: 1    cyanocobalamin 1000 MCG/ML injection, INJECT 1ML ONCE MONTHLY, Disp: 10 mL, Rfl: 0    Diclofenac Sodium (VOLTAREN) 1 % gel gel, Apply 4 g topically to the appropriate area as directed 4 (Four) Times a Day As Needed (knee pain)., Disp: 350 g, Rfl: 0    DULoxetine (CYMBALTA) 60 MG capsule, TAKE 1 CAPSULE BY MOUTH EVERY DAY, Disp: 90 capsule, Rfl: 1    Fluticasone Furoate-Vilanterol (BREO ELLIPTA) 200-25 MCG/ACT inhaler, Inhale 1 puff Daily for 90 days., Disp: 60 each, Rfl: 2    gabapentin (NEURONTIN) 300 MG capsule, Take 1 capsule by mouth 3 (Three) Times a Day. (Patient taking differently: Take 400 mg by mouth 4 (Four) Times a Day.), Disp: 90 capsule, Rfl: 2    hydroxychloroquine (PLAQUENIL) 200 MG tablet, Take 1 tablet by mouth 2 (Two) Times a Day., Disp: , Rfl:     ipratropium  "(ATROVENT) 0.03 % nasal spray, Every 12 (Twelve) Hours., Disp: , Rfl:     levothyroxine (Synthroid) 112 MCG tablet, Take 1 tablet by mouth Daily., Disp: 90 tablet, Rfl: 1    linaclotide (Linzess) 145 MCG capsule capsule, Take 1 capsule by mouth Every Morning Before Breakfast., Disp: 90 capsule, Rfl: 3    liothyronine (CYTOMEL) 5 MCG tablet, Take 2 tablets by mouth Daily., Disp: 180 tablet, Rfl: 1    Mepolizumab 100 MG/ML solution auto-injector, Inject 3 mL under the skin into the appropriate area as directed Every 28 (Twenty-Eight) Days., Disp: 3 mL, Rfl: 11    mycophenolate (CELLCEPT) 500 MG tablet, Take 2 tablets by mouth 2 (Two) Times a Day., Disp: , Rfl:     olmesartan (BENICAR) 40 MG tablet, Take 1 tablet by mouth Daily., Disp: 90 tablet, Rfl: 1    ondansetron ODT (ZOFRAN-ODT) 4 MG disintegrating tablet, Place 1-2 tablets on the tongue Every 8 (Eight) Hours As Needed for Nausea or Vomiting., Disp: 30 tablet, Rfl: 1    promethazine (PHENERGAN) 25 MG tablet, Take 1 tablet by mouth Every 6 (Six) Hours As Needed for Nausea or Vomiting., Disp: 30 tablet, Rfl: 1    rOPINIRole (REQUIP) 0.25 MG tablet, Take 1-2 tablets by mouth Every Night. Take 1 hour before bedtime., Disp: 60 tablet, Rfl: 2    rosuvastatin (CRESTOR) 5 MG tablet, Take 1 tablet by mouth Every Night., Disp: 90 tablet, Rfl: 1    Semaglutide-Weight Management (Wegovy) 1 MG/0.5ML solution auto-injector, Inject 0.5 mL under the skin into the appropriate area as directed 1 (One) Time Per Week., Disp: 2 mL, Rfl: 3    spironolactone (ALDACTONE) 25 MG tablet, Take 1 tablet by mouth Daily. (Patient taking differently: Take 1 tablet by mouth Daily. 12.5 mg twice a day), Disp: 90 tablet, Rfl: 1    SURE COMFORT INS SYR 1CC/28G 28G X 1/2\" 1 ML misc, Use one per month for administration of vitamin B12, Disp: 12 each, Rfl: 0    tiZANidine (ZANAFLEX) 4 MG tablet, Take 1 tablet by mouth Every 8 (Eight) Hours As Needed for Muscle Spasms., Disp: 90 tablet, Rfl: 1    " "torsemide (DEMADEX) 20 MG tablet, Take 1 tablet by mouth Daily., Disp: 90 tablet, Rfl: 1    Tuberculin-Allergy Syringes 28G X 1/2\" 1 ML misc, 1 Units Every 30 (Thirty) Days., Disp: 20 each, Rfl: 1    vitamin D3 125 MCG (5000 UT) capsule capsule, Take 1 capsule by mouth Daily., Disp: , Rfl:   Patient Active Problem List    Diagnosis     External hemorrhoid [K64.4]     Numbness [R20.0]     Anemia [D64.9]     Hypotension due to drugs [I95.2]     Gastroesophageal reflux disease with esophagitis [K21.00]     Lower extremity edema [R60.0]     Persistent proteinuria [R80.1]     Systemic lupus erythematosus with glomerular disease [M32.14]     Asthma, extrinsic [J45.909]     Postablative hypothyroidism [E89.0]     Vitamin D deficiency [E55.9]     Nasal polyposis [J33.9]     Mild persistent asthma [J45.30]     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   HENT:  Negative for congestion and rhinorrhea.    Eyes:  Negative for visual disturbance.   Respiratory:  Negative for cough and shortness of breath.    Cardiovascular:  Negative for palpitations and leg swelling.   Gastrointestinal:  Negative for constipation, diarrhea and nausea.   Genitourinary:  Negative for hematuria.   Musculoskeletal:  Negative for arthralgias, back pain, gait problem, joint swelling and myalgias.   Skin:  Negative for color change, rash and wound.   Allergic/Immunologic: Negative for environmental allergies, food allergies and immunocompromised state.   Neurological:  Negative for dizziness, weakness and light-headedness.   Psychiatric/Behavioral:  " "Negative for confusion, decreased concentration, dysphoric mood and sleep disturbance. The patient is not nervous/anxious.      Social History     Socioeconomic History    Marital status:    Tobacco Use    Smoking status: Former     Current packs/day: 0.00     Average packs/day: 2.0 packs/day for 8.0 years (16.0 ttl pk-yrs)     Types: Cigarettes     Start date: 1987     Quit date: 1995     Years since quittin.0    Smokeless tobacco: Never   Vaping Use    Vaping status: Never Used   Substance and Sexual Activity    Alcohol use: Never    Drug use: Never    Sexual activity: Not Currently     Partners: Male     Birth control/protection: None     Family History   Problem Relation Age of Onset    Arthritis Mother     Hypertension Mother     Hyperlipidemia Mother     Migraines Mother     Stroke Mother     Diabetes Father     Pulmonary embolism Father     Diabetes Daughter     Arthritis Maternal Grandmother     Hypertension Maternal Grandmother     Hyperlipidemia Maternal Grandmother     Stroke Maternal Grandmother     Thyroid disease Maternal Grandmother     Diabetes Paternal Grandmother     Lung cancer Maternal Aunt     Cancer Maternal Aunt     Heart attack Maternal Grandfather     Heart disease Maternal Grandfather     Hearing loss Paternal Uncle     Asthma Son     Breast cancer Neg Hx      /70   Pulse 76   Ht 165.1 cm (65\")   Wt 98.3 kg (216 lb 12.8 oz)   SpO2 99%   BMI 36.08 kg/m²     Physical Exam  Vitals and nursing note reviewed.   Constitutional:       Appearance: Normal appearance. She is well-developed.   HENT:      Head: Normocephalic and atraumatic.   Eyes:      General: Lids are normal.      Extraocular Movements: Extraocular movements intact.      Conjunctiva/sclera: Conjunctivae normal.      Pupils: Pupils are equal, round, and reactive to light.   Neck:      Thyroid: No thyroid mass or thyromegaly.      Vascular: No carotid bruit.      Trachea: Trachea normal. No tracheal " deviation.   Cardiovascular:      Rate and Rhythm: Normal rate and regular rhythm.      Heart sounds: Normal heart sounds. No murmur heard.     No friction rub. No gallop.   Pulmonary:      Effort: Pulmonary effort is normal. No respiratory distress.      Breath sounds: Normal breath sounds. No wheezing.   Musculoskeletal:         General: No deformity. Normal range of motion.      Cervical back: Normal range of motion and neck supple.   Lymphadenopathy:      Cervical: No cervical adenopathy.   Skin:     General: Skin is warm and dry.      Findings: No erythema or rash.      Nails: There is no clubbing.   Neurological:      Mental Status: She is alert and oriented to person, place, and time.      Cranial Nerves: No cranial nerve deficit.      Deep Tendon Reflexes: Reflexes are normal and symmetric. Reflexes normal.   Psychiatric:         Speech: Speech normal.         Behavior: Behavior normal.         Thought Content: Thought content normal.         Judgment: Judgment normal.       Results for orders placed or performed in visit on 03/21/24   T4    Specimen: Blood   Result Value Ref Range    T4, Total 5.78 4.50 - 11.70 mcg/dL     Diagnoses and all orders for this visit:    1. Postablative hypothyroidism (Primary)  Assessment & Plan:  See above    Orders:  -     T4, Free; Future  -     T3, Free; Future  -     T4; Future  -     TSH; Future  -     T4, Free  -     T3, Free  -     T4  -     TSH    2. Essential hypertension  Assessment & Plan:  Bp is well controlled- continue benicar 40 mg daily       Other orders  -     Semaglutide-Weight Management (Wegovy) 1 MG/0.5ML solution auto-injector; Inject 0.5 mL under the skin into the appropriate area as directed 1 (One) Time Per Week.  Dispense: 2 mL; Refill: 3    Return in about 6 months (around 10/29/2024) for Recheck.    Electronically signed by: Taylor Holliday MD

## 2024-04-30 LAB
T3FREE SERPL-MCNC: 3.07 PG/ML (ref 2–4.4)
T4 FREE SERPL-MCNC: 1.18 NG/DL (ref 0.93–1.7)
T4 SERPL-MCNC: 6.96 MCG/DL (ref 4.5–11.7)
TSH SERPL DL<=0.05 MIU/L-ACNC: 0.84 UIU/ML (ref 0.27–4.2)

## 2024-05-02 ENCOUNTER — TRANSCRIBE ORDERS (OUTPATIENT)
Dept: LAB | Facility: HOSPITAL | Age: 54
End: 2024-05-02
Payer: COMMERCIAL

## 2024-05-02 ENCOUNTER — LAB (OUTPATIENT)
Dept: LAB | Facility: HOSPITAL | Age: 54
End: 2024-05-02
Payer: COMMERCIAL

## 2024-05-02 DIAGNOSIS — E66.01 CLASS 2 SEVERE OBESITY WITH SERIOUS COMORBIDITY AND BODY MASS INDEX (BMI) OF 35.0 TO 35.9 IN ADULT, UNSPECIFIED OBESITY TYPE: Primary | ICD-10-CM

## 2024-05-02 DIAGNOSIS — M79.672 LEFT FOOT PAIN: ICD-10-CM

## 2024-05-02 DIAGNOSIS — L60.3 BRITTLE NAILS: Primary | ICD-10-CM

## 2024-05-02 DIAGNOSIS — L60.3 BRITTLE NAILS: ICD-10-CM

## 2024-05-02 DIAGNOSIS — N02.2 MEMBRANOUS NEPHROPATHY DETERMINED BY BIOPSY: ICD-10-CM

## 2024-05-02 LAB
25(OH)D3 SERPL-MCNC: 56.8 NG/ML (ref 30–100)
ALBUMIN SERPL-MCNC: 4.7 G/DL (ref 3.5–5.2)
ALBUMIN SERPL-MCNC: 4.8 G/DL (ref 3.5–5.2)
ALBUMIN UR-MCNC: 3.3 MG/DL
ALBUMIN/GLOB SERPL: 2.5 G/DL
ALP SERPL-CCNC: 104 U/L (ref 39–117)
ALP SERPL-CCNC: 98 U/L (ref 39–117)
ALT SERPL W P-5'-P-CCNC: 7 U/L (ref 1–33)
ALT SERPL W P-5'-P-CCNC: 7 U/L (ref 1–33)
ANION GAP SERPL CALCULATED.3IONS-SCNC: 10 MMOL/L (ref 5–15)
AST SERPL-CCNC: 12 U/L (ref 1–32)
AST SERPL-CCNC: 7 U/L (ref 1–32)
BACTERIA UR QL AUTO: ABNORMAL /HPF
BASOPHILS # BLD AUTO: 0.06 10*3/MM3 (ref 0–0.2)
BASOPHILS NFR BLD AUTO: 1.1 % (ref 0–1.5)
BILIRUB CONJ SERPL-MCNC: <0.2 MG/DL (ref 0–0.3)
BILIRUB INDIRECT SERPL-MCNC: NORMAL MG/DL
BILIRUB SERPL-MCNC: 0.2 MG/DL (ref 0–1.2)
BILIRUB SERPL-MCNC: 0.3 MG/DL (ref 0–1.2)
BILIRUB UR QL STRIP: NEGATIVE
BUN SERPL-MCNC: 13 MG/DL (ref 6–20)
BUN/CREAT SERPL: 13.7 (ref 7–25)
CALCIUM SPEC-SCNC: 9.2 MG/DL (ref 8.6–10.5)
CHLORIDE SERPL-SCNC: 99 MMOL/L (ref 98–107)
CLARITY UR: CLEAR
CO2 SERPL-SCNC: 29 MMOL/L (ref 22–29)
COLOR UR: YELLOW
CREAT SERPL-MCNC: 0.95 MG/DL (ref 0.57–1)
CREAT UR-MCNC: 130 MG/DL
CREAT UR-MCNC: 130 MG/DL
DEPRECATED RDW RBC AUTO: 38.3 FL (ref 37–54)
EGFRCR SERPLBLD CKD-EPI 2021: 71.3 ML/MIN/1.73
EOSINOPHIL # BLD AUTO: 0.04 10*3/MM3 (ref 0–0.4)
EOSINOPHIL NFR BLD AUTO: 0.8 % (ref 0.3–6.2)
ERYTHROCYTE [DISTWIDTH] IN BLOOD BY AUTOMATED COUNT: 12.1 % (ref 12.3–15.4)
GLOBULIN UR ELPH-MCNC: 1.9 GM/DL
GLUCOSE SERPL-MCNC: 91 MG/DL (ref 65–99)
GLUCOSE UR STRIP-MCNC: NEGATIVE MG/DL
HCT VFR BLD AUTO: 36.8 % (ref 34–46.6)
HGB BLD-MCNC: 12.1 G/DL (ref 12–15.9)
HGB UR QL STRIP.AUTO: ABNORMAL
HYALINE CASTS UR QL AUTO: ABNORMAL /LPF
IMM GRANULOCYTES # BLD AUTO: 0.01 10*3/MM3 (ref 0–0.05)
IMM GRANULOCYTES NFR BLD AUTO: 0.2 % (ref 0–0.5)
KETONES UR QL STRIP: ABNORMAL
LEUKOCYTE ESTERASE UR QL STRIP.AUTO: ABNORMAL
LYMPHOCYTES # BLD AUTO: 2.07 10*3/MM3 (ref 0.7–3.1)
LYMPHOCYTES NFR BLD AUTO: 39.2 % (ref 19.6–45.3)
MCH RBC QN AUTO: 28.5 PG (ref 26.6–33)
MCHC RBC AUTO-ENTMCNC: 32.9 G/DL (ref 31.5–35.7)
MCV RBC AUTO: 86.8 FL (ref 79–97)
MICROALBUMIN/CREAT UR: 25.4 MG/G (ref 0–29)
MONOCYTES # BLD AUTO: 0.57 10*3/MM3 (ref 0.1–0.9)
MONOCYTES NFR BLD AUTO: 10.8 % (ref 5–12)
NEUTROPHILS NFR BLD AUTO: 2.53 10*3/MM3 (ref 1.7–7)
NEUTROPHILS NFR BLD AUTO: 47.9 % (ref 42.7–76)
NITRITE UR QL STRIP: NEGATIVE
NRBC BLD AUTO-RTO: 0 /100 WBC (ref 0–0.2)
PH UR STRIP.AUTO: 6 [PH] (ref 5–8)
PLATELET # BLD AUTO: 238 10*3/MM3 (ref 140–450)
PMV BLD AUTO: 11.1 FL (ref 6–12)
POTASSIUM SERPL-SCNC: 4.2 MMOL/L (ref 3.5–5.2)
PROT ?TM UR-MCNC: 12.2 MG/DL
PROT SERPL-MCNC: 6.7 G/DL (ref 6–8.5)
PROT SERPL-MCNC: 6.7 G/DL (ref 6–8.5)
PROT UR QL STRIP: ABNORMAL
PROT/CREAT UR: 93.8 MG/G CREA (ref 0–200)
RBC # BLD AUTO: 4.24 10*6/MM3 (ref 3.77–5.28)
RBC # UR STRIP: ABNORMAL /HPF
REF LAB TEST METHOD: ABNORMAL
SODIUM SERPL-SCNC: 138 MMOL/L (ref 136–145)
SP GR UR STRIP: 1.02 (ref 1–1.03)
SQUAMOUS #/AREA URNS HPF: ABNORMAL /HPF
UROBILINOGEN UR QL STRIP: ABNORMAL
WBC # UR STRIP: ABNORMAL /HPF
WBC NRBC COR # BLD AUTO: 5.28 10*3/MM3 (ref 3.4–10.8)

## 2024-05-02 PROCEDURE — 81001 URINALYSIS AUTO W/SCOPE: CPT

## 2024-05-02 PROCEDURE — 82248 BILIRUBIN DIRECT: CPT

## 2024-05-02 PROCEDURE — 36415 COLL VENOUS BLD VENIPUNCTURE: CPT

## 2024-05-02 PROCEDURE — 80053 COMPREHEN METABOLIC PANEL: CPT

## 2024-05-02 PROCEDURE — 82570 ASSAY OF URINE CREATININE: CPT

## 2024-05-02 PROCEDURE — 85025 COMPLETE CBC W/AUTO DIFF WBC: CPT

## 2024-05-02 PROCEDURE — 82306 VITAMIN D 25 HYDROXY: CPT

## 2024-05-02 PROCEDURE — 82043 UR ALBUMIN QUANTITATIVE: CPT

## 2024-05-02 PROCEDURE — 84156 ASSAY OF PROTEIN URINE: CPT

## 2024-05-06 LAB
ALBUMIN UR-MCNC: <1.2 MG/DL
BACTERIA UR QL AUTO: NORMAL /HPF
BILIRUB UR QL STRIP: NEGATIVE
CLARITY UR: CLEAR
COLLECT DURATION TIME UR: 24 HRS
COLLECT DURATION TIME UR: 24 HRS
COLOR UR: YELLOW
CREAT UR-MCNC: 64.2 MG/DL
CREAT UR-MCNC: 64.7 MG/DL
CREAT UR-MCNC: 64.7 MG/DL
CREATINE 24H UR-MRATE: 0.9 G/24 HR (ref 0.7–1.6)
GLUCOSE UR STRIP-MCNC: NEGATIVE MG/DL
HGB UR QL STRIP.AUTO: NEGATIVE
HYALINE CASTS UR QL AUTO: NORMAL /LPF
KETONES UR QL STRIP: NEGATIVE
LEUKOCYTE ESTERASE UR QL STRIP.AUTO: ABNORMAL
MICROALBUMIN/CREAT UR: NORMAL MG/G{CREAT}
NITRITE UR QL STRIP: NEGATIVE
PH UR STRIP.AUTO: 6 [PH] (ref 5–8)
PROT 24H UR-MRATE: 77 MG/24HOURS (ref 0–150)
PROT ?TM UR-MCNC: 5.8 MG/DL
PROT UR QL STRIP: NEGATIVE
PROT/CREAT UR: 89.6 MG/G CREA (ref 0–200)
RBC # UR STRIP: NORMAL /HPF
REF LAB TEST METHOD: NORMAL
SP GR UR STRIP: 1.01 (ref 1–1.03)
SPECIMEN VOL 24H UR: 1400 ML
SPECIMEN VOL 24H UR: 1400 ML
SQUAMOUS #/AREA URNS HPF: NORMAL /HPF
UROBILINOGEN UR QL STRIP: ABNORMAL
WBC # UR STRIP: NORMAL /HPF

## 2024-05-06 PROCEDURE — 84156 ASSAY OF PROTEIN URINE: CPT

## 2024-05-06 PROCEDURE — 81050 URINALYSIS VOLUME MEASURE: CPT

## 2024-05-06 PROCEDURE — 81001 URINALYSIS AUTO W/SCOPE: CPT

## 2024-05-06 PROCEDURE — 82570 ASSAY OF URINE CREATININE: CPT

## 2024-05-06 PROCEDURE — 82043 UR ALBUMIN QUANTITATIVE: CPT

## 2024-05-08 ENCOUNTER — PATIENT MESSAGE (OUTPATIENT)
Dept: INTERNAL MEDICINE | Facility: CLINIC | Age: 54
End: 2024-05-08
Payer: COMMERCIAL

## 2024-05-08 DIAGNOSIS — G25.81 RESTLESS LEGS: ICD-10-CM

## 2024-05-08 DIAGNOSIS — G25.81 RESTLESS LEGS: Primary | ICD-10-CM

## 2024-05-09 RX ORDER — PRAMIPEXOLE DIHYDROCHLORIDE 0.12 MG/1
.125-.25 TABLET ORAL NIGHTLY PRN
Qty: 60 TABLET | Refills: 0 | Status: SHIPPED | OUTPATIENT
Start: 2024-05-09

## 2024-06-14 ENCOUNTER — OFFICE VISIT (OUTPATIENT)
Dept: PULMONOLOGY | Facility: CLINIC | Age: 54
End: 2024-06-14
Payer: COMMERCIAL

## 2024-06-14 VITALS
OXYGEN SATURATION: 99 % | WEIGHT: 220.6 LBS | SYSTOLIC BLOOD PRESSURE: 122 MMHG | DIASTOLIC BLOOD PRESSURE: 76 MMHG | HEIGHT: 65 IN | HEART RATE: 80 BPM | BODY MASS INDEX: 36.75 KG/M2

## 2024-06-14 DIAGNOSIS — G47.19 DAYTIME HYPERSOMNOLENCE: ICD-10-CM

## 2024-06-14 DIAGNOSIS — J30.89 NON-SEASONAL ALLERGIC RHINITIS, UNSPECIFIED TRIGGER: ICD-10-CM

## 2024-06-14 DIAGNOSIS — J82.83 EOSINOPHILIC ASTHMA: Primary | ICD-10-CM

## 2024-06-14 DIAGNOSIS — M32.14 OTHER SYSTEMIC LUPUS ERYTHEMATOSUS WITH GLOMERULAR DISEASE: ICD-10-CM

## 2024-06-14 PROCEDURE — 99214 OFFICE O/P EST MOD 30 MIN: CPT | Performed by: INTERNAL MEDICINE

## 2024-06-14 RX ORDER — ALBUTEROL SULFATE 90 UG/1
1-2 AEROSOL, METERED RESPIRATORY (INHALATION) EVERY 6 HOURS PRN
Qty: 48 G | Refills: 3 | Status: SHIPPED | OUTPATIENT
Start: 2024-06-14

## 2024-06-14 RX ORDER — GUAIFENESIN AND PSEUDOEPHEDRINE HYDROCHLORIDE 600; 60 MG/1; MG/1
TABLET, EXTENDED RELEASE ORAL
COMMUNITY
Start: 2024-04-15

## 2024-06-14 RX ORDER — KETAMINE HCL 100 %
POWDER (GRAM) MISCELLANEOUS
COMMUNITY
Start: 2024-03-06

## 2024-06-14 RX ORDER — ACETAMINOPHEN AND CODEINE PHOSPHATE 300; 30 MG/1; MG/1
1 TABLET ORAL EVERY 4 HOURS PRN
COMMUNITY
End: 2024-06-14

## 2024-06-14 RX ORDER — TERBINAFINE HYDROCHLORIDE 250 MG/1
250 TABLET ORAL DAILY
COMMUNITY
Start: 2024-04-26

## 2024-06-14 RX ORDER — PHENTERMINE HYDROCHLORIDE 37.5 MG/1
37.5 TABLET ORAL DAILY
COMMUNITY
Start: 2024-05-14 | End: 2024-08-12

## 2024-06-14 NOTE — PROGRESS NOTES
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       Subjective: Kelsey Ross is a 53 y.o. female who is here today for follow-up.    Past medical history:  Patient had chronic cough that started in October 2020 initially was seen in my clinic in April 2021.  At that point, her CT scan and PFTs were found to be normal other than some small airway disease and she was started on Advair and as needed albuterol.  However, her eosinophils were 16%.  She had a very complicated set of symptoms that include rash, chronic cough and lymphadenopathy.  In lab work-up her DIMAS and p-ANCA were both found to be positive.  She was referred to hematology and rheumatology.  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  Dr. Lane had started her on methotrexate for presumed GPA without tissue biopsy, but she had side effects from this and had to stop.  She has also been on chronic p.o. steroids of prednisone 10 mg daily and serum allergy testing was negative.  Due to her complicated course she was referred to [facility] and she saw them in November 2021.  [facility] repeated PFTs which showed FEV1 102%, FEV1/FVC ratio 98.  No significant bronchial hyperresponsiveness except for FEF 50% with 49% improvement.  Her [facility] pulmonologist, Dr. 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 [facility] then referred her to rheumatology and has now been diagnosed with lupus with renal involvement.    Since last visit, she has noticed increased allergy symptoms this Spring and has noticed increased dry cough.  She had been told to stop anti-histamines by ENT due to dryness, but nasal steroids are not controlling her symptoms.     Still on Nucala and doing well without side effects.   Still on Breo, but tries to avoid her rescue  albuterol because of side effects, but having symptoms multiple days per week.     She has noticed loud snoring, nonrestorative sleep and awakens herself at night from her snoring.  She has had witnessed apneic episodes and had been referred for sleep study, but it has not been done yet.  She has daytime hypersomnolence.    She has continued to follow with [facility] for lupus with renal involvement and is on Plaquenil, CellCept and Benlysta for her Lupus.  She was off Benlysta earlier this year due to insurance and noted a significant worsening of her symptoms.  They have improved with restarting.       Subjective      Review of Systems:   Review of Systems   Constitutional:  Positive for fatigue.   Respiratory:  Positive for cough. Negative for shortness of breath and wheezing.    Allergic/Immunologic: Positive for environmental allergies. Negative for food allergies.   Psychiatric/Behavioral:  Positive for sleep disturbance.          Social History:   Social History     Socioeconomic History    Marital status:    Tobacco Use    Smoking status: Former     Current packs/day: 0.00     Average packs/day: 2.0 packs/day for 8.0 years (16.0 ttl pk-yrs)     Types: Cigarettes     Start date: 1987     Quit date: 1995     Years since quittin.1     Passive exposure: Past    Smokeless tobacco: Never   Vaping Use    Vaping status: Never Used   Substance and Sexual Activity    Alcohol use: Never    Drug use: Never    Sexual activity: Not Currently     Partners: Male     Birth control/protection: None       Medications:     Current Outpatient Medications:     albuterol sulfate  (90 Base) MCG/ACT inhaler, Inhale 1-2 puffs Every 6 (Six) Hours As Needed for Wheezing or Shortness of Air., Disp: 48 g, Rfl: 3    Benlysta 200 MG/ML solution prefilled syringe, Inject 200 mg under the skin into the appropriate area as directed Every 7 (Seven) Days., Disp: , Rfl:     cyanocobalamin 1000 MCG/ML injection,  "INJECT 1ML ONCE MONTHLY, Disp: 10 mL, Rfl: 0    DULoxetine (CYMBALTA) 60 MG capsule, TAKE 1 CAPSULE BY MOUTH EVERY DAY, Disp: 90 capsule, Rfl: 1    hydroxychloroquine (PLAQUENIL) 200 MG tablet, Take 1 tablet by mouth 2 (Two) Times a Day., Disp: , Rfl:     ipratropium (ATROVENT) 0.03 % nasal spray, Every 12 (Twelve) Hours., Disp: , Rfl:     Ketamine HCl powder, Take  by mouth., Disp: , Rfl:     levothyroxine (Synthroid) 112 MCG tablet, Take 1 tablet by mouth Daily., Disp: 90 tablet, Rfl: 1    linaclotide (Linzess) 145 MCG capsule capsule, Take 1 capsule by mouth Every Morning Before Breakfast., Disp: 90 capsule, Rfl: 3    liothyronine (CYTOMEL) 5 MCG tablet, Take 2 tablets by mouth Daily., Disp: 180 tablet, Rfl: 1    Mepolizumab 100 MG/ML solution auto-injector, Inject 3 mL under the skin into the appropriate area as directed Every 28 (Twenty-Eight) Days., Disp: 3 mL, Rfl: 11    Mucinex D  MG per 12 hr tablet, , Disp: , Rfl:     mycophenolate (CELLCEPT) 500 MG tablet, Take 2 tablets by mouth 2 (Two) Times a Day., Disp: , Rfl:     olmesartan (BENICAR) 40 MG tablet, Take 1 tablet by mouth Daily., Disp: 90 tablet, Rfl: 1    ondansetron ODT (ZOFRAN-ODT) 4 MG disintegrating tablet, Place 1-2 tablets on the tongue Every 8 (Eight) Hours As Needed for Nausea or Vomiting., Disp: 30 tablet, Rfl: 1    phentermine (ADIPEX-P) 37.5 MG tablet, Take 1 tablet by mouth Daily., Disp: , Rfl:     promethazine (PHENERGAN) 25 MG tablet, Take 1 tablet by mouth Every 6 (Six) Hours As Needed for Nausea or Vomiting., Disp: 30 tablet, Rfl: 1    rosuvastatin (CRESTOR) 5 MG tablet, Take 1 tablet by mouth Every Night., Disp: 90 tablet, Rfl: 1    spironolactone (ALDACTONE) 25 MG tablet, Take 1 tablet by mouth Daily. (Patient taking differently: Take 1 tablet by mouth Daily. 12.5 mg twice a day), Disp: 90 tablet, Rfl: 1    SURE COMFORT INS SYR 1CC/28G 28G X 1/2\" 1 ML misc, Use one per month for administration of vitamin B12, Disp: 12 each, " "Rfl: 0    terbinafine (lamiSIL) 250 MG tablet, Take 1 tablet by mouth Daily., Disp: , Rfl:     tiZANidine (ZANAFLEX) 4 MG tablet, Take 1 tablet by mouth Every 8 (Eight) Hours As Needed for Muscle Spasms., Disp: 90 tablet, Rfl: 1    torsemide (DEMADEX) 20 MG tablet, Take 1 tablet by mouth Daily., Disp: 90 tablet, Rfl: 1    Tuberculin-Allergy Syringes 28G X 1/2\" 1 ML misc, 1 Units Every 30 (Thirty) Days., Disp: 20 each, Rfl: 1    vitamin D3 125 MCG (5000 UT) capsule capsule, Take 1 capsule by mouth Daily., Disp: , Rfl:     Fluticasone-Umeclidin-Vilant (TRELEGY ELLIPTA) 200-62.5-25 MCG/ACT inhaler, Inhale 1 puff Daily for 180 days., Disp: 30 each, Rfl: 5    Allergies:   Allergies   Allergen Reactions    Penicillin G Unknown - High Severity    Penicillins Unknown - High Severity     Allergist informed patient she was allergic to Penicillins    Fexofenadine Headache     Only allegra D    Methotrexate GI Intolerance and Headache    Pseudoephedrine Hcl Headache       Objective     Physical Exam:  Vital Signs:   Vitals:    06/14/24 0914   BP: 122/76   Pulse: 80   SpO2: 99%   Weight: 100 kg (220 lb 9.6 oz)   Height: 165.1 cm (65\")         Physical Exam  Constitutional:       General: She is not in acute distress.     Appearance: Normal appearance.   HENT:      Head: Normocephalic and atraumatic.      Right Ear: External ear normal.      Left Ear: External ear normal.      Ears:      Comments: + hearing aid in place     Nose: No congestion or rhinorrhea.      Mouth/Throat:      Mouth: Mucous membranes are moist.      Pharynx: Oropharynx is clear. No oropharyngeal exudate.   Eyes:      General:         Right eye: No discharge.         Left eye: No discharge.      Extraocular Movements: Extraocular movements intact.      Conjunctiva/sclera: Conjunctivae normal.   Cardiovascular:      Rate and Rhythm: Regular rhythm.      Heart sounds: No murmur heard.  Pulmonary:      Effort: Pulmonary effort is normal.      Breath sounds: " "Normal breath sounds. No wheezing or rhonchi.   Musculoskeletal:         General: No deformity.      Cervical back: Normal range of motion and neck supple.      Right lower leg: No edema.      Left lower leg: No edema.   Skin:     Findings: No rash.   Neurological:      Mental Status: She is alert and oriented to person, place, and time. Mental status is at baseline.      Motor: No weakness.      Coordination: Coordination normal.   Psychiatric:         Mood and Affect: Mood normal.         Behavior: Behavior normal.         Thought Content: Thought content normal.           Results Review:   Labs: Reviewed.  Lab Results   Component Value Date    WBC 5.28 05/02/2024    HGB 12.1 05/02/2024    HCT 36.8 05/02/2024    MCV 86.8 05/02/2024     05/02/2024         Lab Results   Component Value Date    GLUCOSE 91 05/02/2024    BUN 13 05/02/2024    CREATININE 0.95 05/02/2024    EGFRIFNONA 86 02/23/2022    EGFRIFAFRI 103 05/05/2021    BCR 13.7 05/02/2024    K 4.2 05/02/2024    CO2 29.0 05/02/2024    CALCIUM 9.2 05/02/2024    PROTENTOTREF 6.2 11/15/2022    ALBUMIN 4.8 05/02/2024    LABIL2 2.9 11/15/2022    AST 7 05/02/2024    ALT 7 05/02/2024       Micro: As of June 14, 2024   No results found for: \"RESPCX\"  No results found for: \"BLOODCX\"  No results found for: \"URINECX\"  No results found for: \"MRSACX\"  No results found for: \"MRSAPCR\"  No results found for: \"URCX\"  No components found for: \"LOWRESPCF\"  No results found for: \"THROATCX\"  No results found for: \"CULTURES\"  No components found for: \"STREPBCX\"  No results found for: \"STREPPNEUAG\"  No results found for: \"LEGIONELLA\"  No results found for: \"MYCOPLASCX\"  No results found for: \"GCCX\"  No results found for: \"WOUNDCX\"  No results found for: \"BODYFLDCX\"    ABG: No results found for: \"PHART\", \"YII5KOP\", \"PO2ART\", \"HGBBG\", \"L2NTAKST\", \"CFIO2\", \"FCOHB\", \"CARBOXYHGB\", \"FMETHB\"    Echo:     Radiology Scans:    Images reviewed personally.   December 2021 CT scan " chest  PROCEDURE: CT CHEST WO CONTRAST DIAGNOSTIC-     HISTORY: SHORTNESS OF BREATH; R06.02-Shortness of breath     COMPARISON: 05/05/2021.     PROCEDURE:  Multiple axial CT images were obtained from the thoracic  inlet through the upper abdomen without the use of contrast.      FINDINGS:   Soft tissue windows reveal no axillary, hilar or mediastinal adenopathy.  Heart size is normal. The aorta is normal in caliber. There are no  pleural or pericardial effusions. There are three small nodules in the  right upper lobe. A nodule in image #27 measures 2 mm. Two nodules on  image #30 measures 4 mm and 2 mm. These lesions are stable from the  prior exam. There is no new pulmonary lesion. The visualized upper  abdomen is unremarkable.      IMPRESSION:  1. Stable tiny pulmonary nodules are likely granulomas.  2. No acute lung disease.     621.34 mGy.cm        This study was performed with techniques to keep radiation doses as low  as reasonably achievable (ALARA). Individualized dose reduction  techniques using automated exposure control or adjustment of mA and/or  kV according to the patient size were employed.               Images were reviewed, interpreted, and dictated by Dr. Karel Hernandez MD  Transcribed by Kristine Palm PA-C.     This report was finalized on 12/9/2021 10:24 AM by Karel Hernandez MD.    PFT IMPRESSION:   December 2023 PFT showed FEV1 98%, FEV1/FVC ratio 78.  No significant bronchodilator responsiveness.  %.  No air trapping.  DL/.     January 2021 PFT showed FEV1 97%, FEV1/FVC ratio 81.  No significant bronchodilator responsiveness.  Total lung capacity 101%.  No air trapping.  Normal diffusing capacity.    November 2021 PFT showed FEV1 102%, FEV1/FVC ratio 98.    Assessment / Plan      Assessment/Plan:    1. Eosinophilic asthma  Appears to be doing well with Trelegy and Nucala.  Discussed risk, benefits and possible side effects of medication.  Advised to rinse her mouth out after each  use of inhaler.      - Fluticasone-Umeclidin-Vilant (TRELEGY ELLIPTA) 200-62.5-25 MCG/ACT inhaler; Inhale 1 puff Daily for 180 days.  Dispense: 30 each; Refill: 5  - albuterol sulfate  (90 Base) MCG/ACT inhaler; Inhale 1-2 puffs Every 6 (Six) Hours As Needed for Wheezing or Shortness of Air.  Dispense: 48 g; Refill: 3    2. Other systemic lupus erythematosus with glomerular disease  Follows with [facility].    3. Non-seasonal allergic rhinitis, unspecified trigger  Appears controlled with current regimen    4. Daytime hypersomnolence  Patient with multiple symptoms suggestive of sleep apnea.  Check sleep study.  She previously had an inconclusive home sleep study at [facility].  Will order in lab study.    - Polysomnography 4 or More Parameters; Future    Follow Up:   Return in about 4 months (around 10/14/2024).    This document was electronically signed by Meaghan Benitez MD on 06/14/24 at 14:32 EDT    Meaghan Benitez MD  Pulmonary/Critical Care Physician   [place]      Please note that portions of this note may have been completed with a voice recognition program. Efforts were made to edit the dictations, but occasionally words are mistranscribed.

## 2024-06-17 DIAGNOSIS — K59.09 CHRONIC CONSTIPATION: ICD-10-CM

## 2024-06-17 DIAGNOSIS — M62.838 MUSCLE SPASM: ICD-10-CM

## 2024-06-17 RX ORDER — TIZANIDINE 4 MG/1
TABLET ORAL
Qty: 270 TABLET | Refills: 3 | Status: SHIPPED | OUTPATIENT
Start: 2024-06-17

## 2024-06-17 RX ORDER — LINACLOTIDE 145 UG/1
CAPSULE, GELATIN COATED ORAL
Qty: 90 CAPSULE | Refills: 3 | Status: SHIPPED | OUTPATIENT
Start: 2024-06-17

## 2024-06-25 ENCOUNTER — OFFICE VISIT (OUTPATIENT)
Dept: INTERNAL MEDICINE | Facility: CLINIC | Age: 54
End: 2024-06-25
Payer: COMMERCIAL

## 2024-06-25 VITALS
RESPIRATION RATE: 16 BRPM | SYSTOLIC BLOOD PRESSURE: 116 MMHG | HEIGHT: 65 IN | TEMPERATURE: 97 F | DIASTOLIC BLOOD PRESSURE: 85 MMHG | OXYGEN SATURATION: 98 % | HEART RATE: 74 BPM | WEIGHT: 216 LBS | BODY MASS INDEX: 35.99 KG/M2

## 2024-06-25 DIAGNOSIS — I10 ESSENTIAL HYPERTENSION: ICD-10-CM

## 2024-06-25 DIAGNOSIS — M32.14 SYSTEMIC LUPUS ERYTHEMATOSUS WITH GLOMERULAR DISEASE, UNSPECIFIED SLE TYPE: ICD-10-CM

## 2024-06-25 DIAGNOSIS — Z00.00 ANNUAL PHYSICAL EXAM: Primary | ICD-10-CM

## 2024-06-25 DIAGNOSIS — J02.9 SORE THROAT: ICD-10-CM

## 2024-06-25 DIAGNOSIS — E66.01 CLASS 2 SEVERE OBESITY DUE TO EXCESS CALORIES WITH SERIOUS COMORBIDITY AND BODY MASS INDEX (BMI) OF 35.0 TO 35.9 IN ADULT: ICD-10-CM

## 2024-06-25 PROBLEM — G62.9 NEUROPATHY: Status: ACTIVE | Noted: 2024-06-25

## 2024-06-25 PROBLEM — I15.1 SECONDARY HYPERTENSION DUE TO RENAL DISEASE: Status: ACTIVE | Noted: 2024-05-22

## 2024-06-25 LAB
EXPIRATION DATE: NORMAL
EXPIRATION DATE: NORMAL
FLUAV AG UPPER RESP QL IA.RAPID: NOT DETECTED
FLUBV AG UPPER RESP QL IA.RAPID: NOT DETECTED
INTERNAL CONTROL: NORMAL
INTERNAL CONTROL: NORMAL
Lab: NORMAL
Lab: NORMAL
S PYO AG THROAT QL: NEGATIVE
SARS-COV-2 AG UPPER RESP QL IA.RAPID: NOT DETECTED

## 2024-06-25 PROCEDURE — 99396 PREV VISIT EST AGE 40-64: CPT | Performed by: FAMILY MEDICINE

## 2024-06-25 PROCEDURE — 87880 STREP A ASSAY W/OPTIC: CPT | Performed by: FAMILY MEDICINE

## 2024-06-25 PROCEDURE — 87428 SARSCOV & INF VIR A&B AG IA: CPT | Performed by: FAMILY MEDICINE

## 2024-06-25 RX ORDER — OLMESARTAN MEDOXOMIL 20 MG/1
20 TABLET ORAL
Qty: 90 TABLET | Refills: 3 | Status: SHIPPED | OUTPATIENT
Start: 2024-06-25

## 2024-06-25 RX ORDER — GABAPENTIN 600 MG/1
TABLET ORAL
COMMUNITY
Start: 2024-06-17

## 2024-06-25 NOTE — PROGRESS NOTES
"06/25/2024  Chief Complaint   Patient presents with    Annual Exam    Hypertension     Pt states after taking her bp meds her pressure is dropping pretty low, review bp log    Sore Throat     Started Friday night, sore throat with blisters in the mouth       Patient Care Team:  Isha Giron MD as PCP - General (Family Medicine)  Taylor Holliday MD as Consulting Physician (Endocrinology)  Kandy Ramirez MD (Internal Medicine)  Meaghan Benitez MD as Consulting Physician (Pulmonary Disease)  Verito Flowers MD (Rheumatology)]       Kelsey Ross is here for her annual preventive exam. History per MA reviewed.     Blisters in mouth but sometimes gets those with lupus. Dusted Friday and later that night starting having sore throat, breathing issues. Historically has trouble when she dusts.    Low blood pressures at times. Off spironolactone per nephrology. Torsemide daily per nephrology. Olmeartan 40 mg nightly, maybe dose needs lowered?    Weight management doctor at [facility]. Cannot afford Ozempic. Has to lose 11 lb or they'll \"drop her\". On phentermine.        Health Maintenance   Topic Date Due    Pneumococcal Vaccine 0-64 (1 of 2 - PCV) Never done    ANNUAL PHYSICAL  03/31/2024    COVID-19 Vaccine (3 - Moderna risk series) 09/14/2024 (Originally 12/10/2021)    INFLUENZA VACCINE  08/01/2024    LIPID PANEL  12/26/2024    BMI FOLLOWUP  05/14/2025    MAMMOGRAM  10/18/2025    TDAP/TD VACCINES (3 - Td or Tdap) 09/16/2027    COLORECTAL CANCER SCREENING  03/31/2032    HEPATITIS C SCREENING  Completed    ZOSTER VACCINE  Completed       Immunization History   Administered Date(s) Administered    COVID-19 (MODERNA) 1st,2nd,3rd Dose Monovalent 10/15/2021, 11/12/2021    Flu Vaccine Quad PF >36MO 09/16/2017    Fluzone (or Fluarix & Flulaval for VFC) >6mos 09/28/2020, 09/30/2022    Influenza Injectable Mdck Pf Quad 09/30/2022    Shingrix 05/17/2022, 11/22/2022    Tdap 09/16/2017, " "09/16/2017         The following portions of the patient's history were reviewed and updated as appropriate: allergies, current medications, past family history, past medical history, past social history, past surgical history and problem list.    Objective   Visit Vitals  /85 Comment: automatic cuff   Pulse 74   Temp 97 °F (36.1 °C) (Temporal)   Resp 16   Ht 165.1 cm (65\")   Wt 98 kg (216 lb)   SpO2 98%   BMI 35.94 kg/m²              Physical Exam  Vitals and nursing note reviewed.   Constitutional:       General: She is not in acute distress.     Appearance: Normal appearance. She is well-developed and well-groomed. She is obese. She is not ill-appearing, toxic-appearing or diaphoretic.      Interventions: Face mask in place.   HENT:      Head: Normocephalic and atraumatic.      Right Ear: Hearing, tympanic membrane, ear canal and external ear normal.      Left Ear: Hearing, tympanic membrane, ear canal and external ear normal.   Eyes:      General: Lids are normal. No scleral icterus.     Extraocular Movements: Extraocular movements intact.   Neck:      Trachea: Phonation normal.   Cardiovascular:      Rate and Rhythm: Normal rate and regular rhythm.   Pulmonary:      Effort: Pulmonary effort is normal.      Breath sounds: Normal breath sounds.   Musculoskeletal:      Cervical back: Neck supple.   Skin:     Coloration: Skin is not jaundiced or pale.   Neurological:      General: No focal deficit present.      Mental Status: She is alert and oriented to person, place, and time.      Motor: Motor function is intact.   Psychiatric:         Attention and Perception: Attention and perception normal.         Mood and Affect: Mood and affect normal.         Speech: Speech normal.         Behavior: Behavior normal. Behavior is cooperative.         Thought Content: Thought content normal.         Cognition and Memory: Cognition and memory normal.         Judgment: Judgment normal.       Office Visit on 06/25/2024 "   Component Date Value Ref Range Status    Rapid Strep A Screen 06/25/2024 Negative  Negative, VALID, INVALID, Not Performed Final    Internal Control 06/25/2024 Passed  Passed Final    Lot Number 06/25/2024 [id number]   Final    Expiration Date 06/25/2024 11/02/2026   Final    SARS Antigen 06/25/2024 Not Detected  Not Detected, Presumptive Negative Final    Influenza A Antigen SHAHRIAR 06/25/2024 Not Detected  Not Detected Final    Influenza B Antigen SHAHRIAR 06/25/2024 Not Detected  Not Detected Final    Internal Control 06/25/2024 Passed  Passed Final    Lot Number 06/25/2024 [id number]   Final    Expiration Date 06/25/2024 11/02/2026   Final        Lab Results   Component Value Date    CHLPL 213 (H) 11/15/2022    TRIG 59 12/26/2023    HDL 86 (H) 12/26/2023    LDL 91 12/26/2023     Lab Results   Component Value Date    TSH 0.844 04/29/2024     Lab Results   Component Value Date    FREET4 1.18 04/29/2024     Lab Results   Component Value Date    HGBA1C 5.00 12/26/2023    HGBA1C 5.00 11/15/2022    HGBA1C 5.07 09/07/2021     Progress Notes by Meaghan Benitez MD (06/14/2024 09:00)     Reviewed 6/8/24 rheumatology note Dr. Verito Gallegos    Reviewed 5/22/24 nephrology note Dr. Kandy Ramirez:       Reviewed 5/9/24 neurology note:       Assessment   Diagnoses and all orders for this visit:    1. Annual physical exam (Primary)    2. Essential hypertension  -     olmesartan (Benicar) 20 MG tablet; Take 1 tablet by mouth every night at bedtime.  Dispense: 90 tablet; Refill: 3    3. Sore throat  Comments:  poc testing negative. possibly viral  Orders:  -     POCT rapid strep A  -     POCT SARS-CoV-2 Antigen SHAHRIAR + Flu    4. Class 2 severe obesity due to excess calories with serious comorbidity and body mass index (BMI) of 35.0 to 35.9 in adult    5. Systemic lupus erythematosus with glomerular disease, unspecified SLE type  Assessment & Plan:  Follow up with nephrology, rheumatology.           Health  maintenance information provided via patient plan (after visit summary).   Counseled on age appropriate health screenings and immunizations.declines pneumonia vaccine today.  Encouraged exercise and healthy diet.    Class 2 Severe Obesity (BMI >=35 and <=39.9). Obesity-related health conditions include the following: hypertension and GERD. Obesity is unchanged. BMI is is above average; BMI management plan is completed. We discussed pharmacologic options including Semaglutide compound and Information on healthy weight added to patient's after visit summary.      Lowered olmesartan. Monitor blood pressure goal <130/80  Follow up with all specialists  [facility] contract signed. Discussed phentermine--most weight loss will occur first four weeks. Not ideal med for long term. If she's not lost weight with first month no reason really to stay on it. If she decides she wishes to pursue compound semaglutide from [facility] she'll send a [email or web address] message. No contraindications to GLP-1 treatment.    No follow-ups on file.  Discussed follow up between 2-3 months if we do the glp-1 med    Isha Giron MD

## 2024-06-25 NOTE — PATIENT INSTRUCTIONS
Health Maintenance for Postmenopausal Women    Menopause is a normal process in which your ability to get pregnant comes to an end. This process happens slowly over many months or years, usually between the ages of 48 and 55. Menopause is complete when you have missed your menstrual period for 12 months.    It is important to talk with your health care provider about some of the most common conditions that affect women after menopause (postmenopausal women). These include heart disease, cancer, and bone loss (osteoporosis). Adopting a healthy lifestyle and getting preventive care can help to promote your health and wellness. The actions you take can also lower your chances of developing some of these common conditions.    What are the signs and symptoms of menopause?  During menopause, you may have the following symptoms:  Hot flashes. These can be moderate or severe.  Night sweats.  Decrease in sex drive.  Mood swings.  Headaches.  Tiredness (fatigue).  Irritability.  Memory problems.  Problems falling asleep or staying asleep.    Talk with your health care provider about treatment options for your symptoms.    Do I need hormone replacement therapy?  Hormone replacement therapy is effective in treating symptoms that are caused by menopause, such as hot flashes and night sweats.  Hormone replacement carries certain risks, especially as you become older. If you are thinking about using estrogen or estrogen with progestin, discuss the benefits and risks with your health care provider.    How can I reduce my risk for heart disease and stroke?  The risk of heart disease, heart attack, and stroke increases as you age. One of the causes may be a change in the body's hormones during menopause. This can affect how your body uses dietary fats, triglycerides, and cholesterol. Heart attack and stroke are medical emergencies. There are many things that you can do to help prevent heart disease and stroke.  Watch your blood  pressure  High blood pressure causes heart disease and increases the risk of stroke. This is more likely to develop in people who have high blood pressure readings or are overweight.  Have your blood pressure checked:  Every 3-5 years if you are 18-39 years of age.  Every year if you are 40 years old or older.    Eat a healthy diet    Eat a diet that includes plenty of vegetables, fruits, low-fat dairy products, and lean protein.  Do not eat a lot of foods that are high in solid fats, added sugars, or sodium.    Get regular exercise  Get regular exercise. This is one of the most important things you can do for your health. Most adults should:  Try to exercise for at least 150 minutes each week. The exercise should increase your heart rate and make you sweat (moderate-intensity exercise).  Try to do strengthening exercises at least twice each week. Do these in addition to the moderate-intensity exercise.  Spend less time sitting. Even light physical activity can be beneficial.    Other tips  Work with your health care provider to achieve or maintain a healthy weight.  Do not use any products that contain nicotine or tobacco. These products include cigarettes, chewing tobacco, and vaping devices, such as e-cigarettes. If you need help quitting, ask your health care provider.  Know your numbers. Ask your health care provider to check your cholesterol and your blood sugar (glucose). Continue to have your blood tested as directed by your health care provider.    Do I need screening for cancer?  Depending on your health history and family history, you may need to have cancer screenings at different stages of your life. This may include screening for:  Breast cancer.  Cervical cancer.  Lung cancer.  Colorectal cancer.    What is my risk for osteoporosis?  After menopause, you may be at increased risk for osteoporosis. Osteoporosis is a condition in which bone destruction happens more quickly than new bone creation. To help  prevent osteoporosis or the bone fractures that can happen because of osteoporosis, you may take the following actions:  If you are 19-50 years old, get at least 1,000 mg of calcium and at least 600 international units (IU) of vitamin D per day.  If you are older than age 50 but younger than age 70, get at least 1,200 mg of calcium and at least 600 international units (IU) of vitamin D per day.  If you are older than age 70, get at least 1,200 mg of calcium and at least 800 international units (IU) of vitamin D per day.    Smoking and drinking excessive alcohol increase the risk of osteoporosis. Eat foods that are rich in calcium and vitamin D, and do weight-bearing exercises several times each week as directed by your health care provider.    How does menopause affect my mental health?  Depression may occur at any age, but it is more common as you become older. Common symptoms of depression include:  Feeling depressed.  Changes in sleep patterns.  Changes in appetite or eating patterns.  Feeling an overall lack of motivation or enjoyment of activities that you previously enjoyed.  Frequent crying spells.    Talk with your health care provider if you think that you are experiencing any of these symptoms.    General instructions  See your health care provider for regular wellness exams and vaccines. This may include:  Scheduling regular health, dental, and eye exams.  Getting and maintaining your vaccines. These include:  Influenza vaccine. Get this vaccine each year before the flu season begins.  Pneumonia vaccine (Prevnar 20)  Shingles vaccine (Shingrix, ages 50 and older)  Tetanus, diphtheria, and pertussis (Tdap) booster vaccine (every 10 years)  Respiratory Syncytial Virus (RSV) vaccine (ages 60 and older)    Your health care provider may also recommend other immunizations.    Tell your health care provider if you have ever been abused or do not feel safe at home.    Summary  Menopause is a normal process in  which your ability to get pregnant comes to an end.  This condition causes hot flashes, night sweats, decreased interest in sex, mood swings, headaches, or lack of sleep.  Treatment for this condition may include hormone replacement therapy.  Take actions to keep yourself healthy, including exercising regularly, eating a healthy diet, watching your weight, and checking your blood pressure and blood sugar levels.  Get screened for cancer and depression. Make sure that you are up to date with all your vaccines.    This information is not intended to replace advice given to you by your health care provider. Make sure you discuss any questions you have with your health care provider.  Document Revised: 05/09/2022 Document Reviewed: 05/09/2022  [employer] Patient Education © 2023 [employer]  Updated 2/29/24 TC

## 2024-07-12 ENCOUNTER — PATIENT MESSAGE (OUTPATIENT)
Dept: INTERNAL MEDICINE | Facility: CLINIC | Age: 54
End: 2024-07-12
Payer: COMMERCIAL

## 2024-07-12 RX ORDER — OLMESARTAN MEDOXOMIL 5 MG/1
5 TABLET ORAL NIGHTLY
Qty: 30 TABLET | Refills: 2 | Status: SHIPPED | OUTPATIENT
Start: 2024-07-12

## 2024-07-12 NOTE — TELEPHONE ENCOUNTER
From: Kelsey Ross  To: Isha Giron  Sent: 7/12/2024 2:08 PM EDT  Subject: Blood pressure dropping at night     I am still having bouts if BP dropping at night after I take BP meds. Anything I need to do different? Ty

## 2024-07-26 ENCOUNTER — HOSPITAL ENCOUNTER (OUTPATIENT)
Dept: SLEEP MEDICINE | Facility: HOSPITAL | Age: 54
End: 2024-07-26
Payer: COMMERCIAL

## 2024-07-26 DIAGNOSIS — G47.19 DAYTIME HYPERSOMNOLENCE: ICD-10-CM

## 2024-07-26 PROCEDURE — 95810 POLYSOM 6/> YRS 4/> PARAM: CPT

## 2024-07-30 ENCOUNTER — OFFICE VISIT (OUTPATIENT)
Dept: CARDIOLOGY | Facility: CLINIC | Age: 54
End: 2024-07-30
Payer: COMMERCIAL

## 2024-07-30 VITALS
WEIGHT: 214.6 LBS | HEART RATE: 83 BPM | BODY MASS INDEX: 35.75 KG/M2 | HEIGHT: 65 IN | OXYGEN SATURATION: 98 % | SYSTOLIC BLOOD PRESSURE: 156 MMHG | DIASTOLIC BLOOD PRESSURE: 88 MMHG

## 2024-07-30 DIAGNOSIS — I10 ESSENTIAL HYPERTENSION: Primary | ICD-10-CM

## 2024-07-30 DIAGNOSIS — R42 DIZZINESS: ICD-10-CM

## 2024-07-30 RX ORDER — TOPIRAMATE 25 MG/1
TABLET ORAL
COMMUNITY
Start: 2024-07-09

## 2024-07-30 RX ORDER — PANTOPRAZOLE SODIUM 40 MG/1
TABLET, DELAYED RELEASE ORAL
COMMUNITY

## 2024-07-30 RX ORDER — OLMESARTAN MEDOXOMIL 5 MG/1
10 TABLET ORAL DAILY
Qty: 30 TABLET | Refills: 2 | Status: SHIPPED | OUTPATIENT
Start: 2024-07-30

## 2024-07-30 NOTE — PROGRESS NOTES
[facility] Cardiology     Outpatient New Patient / Consult Note    Kelsey Ross  [id number]  2024    Referred By: Isha Giron MD    Chief Complaint   Patient presents with    Fatigue    Consult       Subjective     History of Present Illness:   Kelsey Ross is a 53 y.o. female with lupus nephritis, hypertension, hyperlipidemia, and hypothyroidism presents to the Cardiology Clinic for evaluation of weakness and dizziness.  Patient is accompanied by her daughter today.  Patient says that recently, she started to have significant hypotensive episodes, mostly in the evenings.  Her primary care started decreasing the dose of her olmesartan.  Says she started to work on losing weight and exercising on a treadmill.  Says that she feels okay while exercising and has no exertional chest pain or dyspnea but her symptoms of dizziness and weakness usually occur after she finishes exercising.  Says she has checked her blood pressure and has noted systolics in the 70s to 80s and feels like she is on the verge of passing out.  Has not lost consciousness.  She does report having dizziness when she stands up and changes positions.  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  Denies any baseline orthopnea, PND, or leg swelling.    Past Cardiac Testin. Last Coronary Angio: none  2. Last Echo: none   3. Prior Stress Testing: none  4. Prior Holter Monitor: none    Review of Systems:  Review of Systems   Constitutional: Negative.    Respiratory: Negative.     Cardiovascular: Negative.    Gastrointestinal: Negative.    Musculoskeletal: Negative.    Neurological:  Positive for dizziness and light-headedness. Negative for seizures and syncope.       Past Medical History:   Diagnosis Date    Allergic rhinitis     Anemia     Arthritis 2023    Podiatrist said arthritis in foot    Bilateral ovarian cysts     Cholelithiasis     Chronic kidney disease     proteinuria and  trying to diagnose egpa    Constipation     COVID      and    has received vaccines    Deaf, left     high fever as a child    Delayed emergence from anesthesia 2021    Ear piercing     Elevated cholesterol     Eosinophilic Asthma     takes mepolumizab    Fibromyalgia, primary     Goiter     Graves disease     H/O echocardiogram 2022    Hyperlipidemia     Hypertension     Hyperthyroidism     Hypothyroidism     Irritable bowel syndrome     Low back pain     Lupus nephritis     followed by [facility] - rheumatologist & nephrologist - virtual visit 2023    Migraines     Neuropathy     Pain     chronic pain all over body - on no pain meds    Rectal bleeding     Renal insufficiency     Rheumatoid arthritis     Thyroid disease     Visual acuity reduced     right eye r/t detached retina    Visual impairment     Vitamin D deficiency        Past Surgical History:   Procedure Laterality Date     SECTION      COLONOSCOPY N/A 2022    Procedure: COLONOSCOPY;  Surgeon: Long Vernon MD;  Location: [facility] ENDOSCOPY;  Service: Gastroenterology;  Laterality: N/A;    ENDOSCOPY N/A 2023    Procedure: ESOPHAGOGASTRODUODENOSCOPY with biopsy;  Surgeon: Long Vernon MD;  Location: [facility] ENDOSCOPY;  Service: Gastroenterology;  Laterality: N/A;    EYE SURGERY Right     cataract    HEMORRHOIDECTOMY N/A 2023    Procedure: HEMORRHOIDECTOMY;  Surgeon: Radha Haley DO;  Location: [facility] OR;  Service: General;  Laterality: N/A;    HYSTERECTOMY      complete    LAPAROSCOPIC CHOLECYSTECTOMY      OOPHORECTOMY      RETINAL DETACHMENT SURGERY Right     RHINOPLASTY      SINUS SURGERY  2021    polypectomy       Family History   Problem Relation Age of Onset    Arthritis Mother     Hypertension Mother     Hyperlipidemia Mother     Migraines Mother     Stroke Mother     Diabetes Father     Pulmonary embolism Father     Diabetes Daughter     Arthritis Maternal  Grandmother     Hypertension Maternal Grandmother     Hyperlipidemia Maternal Grandmother     Stroke Maternal Grandmother     Thyroid disease Maternal Grandmother     Diabetes Paternal Grandmother     Lung cancer Maternal Aunt     Cancer Maternal Aunt     Heart attack Maternal Grandfather     Heart disease Maternal Grandfather     Heart failure Maternal Grandfather     Hearing loss Paternal Uncle     Asthma Son     Breast cancer Neg Hx        Social History     Socioeconomic History    Marital status:    Tobacco Use    Smoking status: Former     Current packs/day: 0.00     Average packs/day: 2.0 packs/day for 8.0 years (16.0 ttl pk-yrs)     Types: Cigarettes     Start date: 1987     Quit date: 1995     Years since quittin.2     Passive exposure: Past    Smokeless tobacco: Never   Vaping Use    Vaping status: Never Used   Substance and Sexual Activity    Alcohol use: Never    Drug use: Never    Sexual activity: Not Currently     Partners: Male     Birth control/protection: None         Current Outpatient Medications:     albuterol sulfate  (90 Base) MCG/ACT inhaler, Inhale 1-2 puffs Every 6 (Six) Hours As Needed for Wheezing or Shortness of Air., Disp: 48 g, Rfl: 3    Benlysta 200 MG/ML solution prefilled syringe, Inject 200 mg under the skin into the appropriate area as directed Every 7 (Seven) Days., Disp: , Rfl:     cyanocobalamin 1000 MCG/ML injection, INJECT 1ML ONCE MONTHLY, Disp: 10 mL, Rfl: 0    DULoxetine (CYMBALTA) 60 MG capsule, TAKE 1 CAPSULE BY MOUTH EVERY DAY, Disp: 90 capsule, Rfl: 1    Fluticasone-Umeclidin-Vilant (TRELEGY ELLIPTA) 200-62.5-25 MCG/ACT inhaler, Inhale 1 puff Daily for 180 days., Disp: 30 each, Rfl: 5    gabapentin (NEURONTIN) 600 MG tablet, , Disp: , Rfl:     hydroxychloroquine (PLAQUENIL) 200 MG tablet, Take 1 tablet by mouth 2 (Two) Times a Day., Disp: , Rfl:     ipratropium (ATROVENT) 0.03 % nasal spray, Every 12 (Twelve) Hours., Disp: , Rfl:     Ketamine  "HCl powder, Take  by mouth., Disp: , Rfl:     levothyroxine (Synthroid) 112 MCG tablet, Take 1 tablet by mouth Daily., Disp: 90 tablet, Rfl: 1    Linzess 145 MCG capsule capsule, TAKE ONE (1) CAPSULE BY MOUTH EVERY MORNING BEFORE BREAKFAST., Disp: 90 capsule, Rfl: 3    liothyronine (CYTOMEL) 5 MCG tablet, Take 2 tablets by mouth Daily., Disp: 180 tablet, Rfl: 1    Mepolizumab 100 MG/ML solution auto-injector, Inject 3 mL under the skin into the appropriate area as directed Every 28 (Twenty-Eight) Days., Disp: 3 mL, Rfl: 11    Mucinex D  MG per 12 hr tablet, , Disp: , Rfl:     mycophenolate (CELLCEPT) 500 MG tablet, Take 2 tablets by mouth 2 (Two) Times a Day., Disp: , Rfl:     olmesartan (Benicar) 5 MG tablet, Take 2 tablets by mouth Daily., Disp: 30 tablet, Rfl: 2    ondansetron ODT (ZOFRAN-ODT) 4 MG disintegrating tablet, Place 1-2 tablets on the tongue Every 8 (Eight) Hours As Needed for Nausea or Vomiting., Disp: 30 tablet, Rfl: 1    pantoprazole (PROTONIX) 40 MG EC tablet, take 1 tablet PO QD 1 hour before evening meal, Disp: , Rfl:     phentermine (ADIPEX-P) 37.5 MG tablet, Take 1 tablet by mouth Daily., Disp: , Rfl:     promethazine (PHENERGAN) 25 MG tablet, Take 1 tablet by mouth Every 6 (Six) Hours As Needed for Nausea or Vomiting., Disp: 30 tablet, Rfl: 1    rosuvastatin (CRESTOR) 5 MG tablet, Take 1 tablet by mouth Every Night., Disp: 90 tablet, Rfl: 1    Semaglutide,0.25 or 0.5MG/DOS, (OZEMPIC) 2 MG/3ML solution pen-injector, Inject 0.25 mg under the skin into the appropriate area as directed., Disp: , Rfl:     SURE COMFORT INS SYR 1CC/28G 28G X 1/2\" 1 ML misc, Use one per month for administration of vitamin B12, Disp: 12 each, Rfl: 0    terbinafine (lamiSIL) 250 MG tablet, Take 1 tablet by mouth Daily., Disp: , Rfl:     tiZANidine (ZANAFLEX) 4 MG tablet, TAKE ONE (1) TABLET BY MOUTH EVERY 8 (EIGHT) HOURS AS NEEDED FOR MUSCLE SPASMS., Disp: 270 tablet, Rfl: 3    topiramate (TOPAMAX) 25 MG tablet, , " "Disp: , Rfl:     torsemide (DEMADEX) 20 MG tablet, Take 1 tablet by mouth Daily., Disp: 90 tablet, Rfl: 1    Tuberculin-Allergy Syringes 28G X 1/2\" 1 ML misc, 1 Units Every 30 (Thirty) Days., Disp: 20 each, Rfl: 1    vitamin D3 125 MCG (5000 UT) capsule capsule, Take 1 capsule by mouth Daily., Disp: , Rfl:     Allergies   Allergen Reactions    Penicillin G Unknown - High Severity    Penicillins Unknown - High Severity     Allergist informed patient she was allergic to Penicillins    Fexofenadine Headache     Only allegra D    Methotrexate GI Intolerance and Headache    Pseudoephedrine Hcl Headache          Objective     Vitals:  Vitals:    07/30/24 1532 07/30/24 1625 07/30/24 1626 07/30/24 1627   BP: (!) 170/104 168/100 160/94 156/88   BP Location:  Right arm Right arm Right arm   Patient Position:  Lying Sitting Standing   Pulse: 83 75 75 83   SpO2: 98%      Weight: 97.3 kg (214 lb 9.6 oz)      Height: 165.1 cm (65\")          Physical Exam:  Vitals reviewed.   Constitutional:       Appearance: Healthy appearance. Not in distress.   Neck:      Vascular: No JVD.   Pulmonary:      Effort: Pulmonary effort is normal.      Breath sounds: Normal breath sounds.   Cardiovascular:      Normal rate. Regular rhythm. Normal S1. Normal S2.       Murmurs: There is no murmur.      No gallop.  No click. No rub.   Pulses:     Intact distal pulses.   Edema:     Peripheral edema absent.   Abdominal:      General: There is no distension.      Palpations: Abdomen is soft.      Tenderness: There is no abdominal tenderness.   Skin:     General: Skin is warm and dry.         Results Review:   I reviewed the patient's new clinical results.  I reviewed the patient's new imaging results and agree with the interpretation.  I reviewed the patient's other test results and agree with the interpretation  I personally viewed and interpreted the patient's EKG/Telemetry data    T4, Free (04/29/2024 15:42)  T3, Free (04/29/2024 15:42)  T4 (04/29/2024 " 15:42)  TSH (04/29/2024 15:42)  Hepatic Function Panel (05/02/2024 17:41)  Comprehensive Metabolic Panel (05/02/2024 17:43)  CBC & Differential (05/02/2024 17:43)    Lipid Panel (12/26/2023 12:00)  Hemoglobin A1c (12/26/2023 12:00)      ECG 12 Lead    Date/Time: 7/30/2024 4:33 PM  Performed by: Benito Muniz MD    Authorized by: Benito Muniz MD  Comparison: not compared with previous ECG   Previous ECG: no previous ECG available  Rhythm: sinus rhythm  Rate: normal  BPM: 81  Conduction: conduction normal  ST Segments: ST segments normal  T Waves: T waves normal  QRS axis: normal  Other: no other findings    Clinical impression: normal ECG             Assessment & Plan   Diagnoses and all orders for this visit:    1. Essential hypertension (Primary)  Patient is hypertensive on multiple checks.  Goal for her would be less than 130/80.  Patient is actually taking her ARB in the evening, right before she starts exercising.  Exercise-induced vasodilation likely exacerbates and potentiates the vasodilatory effect of the ARB leading to her symptoms.  -- Advised that she start taking her olmesartan in the morning and at higher dose 10 mg daily.  -- Return for BP check with log in 2 weeks  -- Advised that she decrease torsemide to as needed.  Says she is on this for lupus nephritis.  Last creatinine was normal.    2. Dizziness  Clinically most consistent with orthostasis.  Orthostatics today were not technically positive but blood pressure did drop upon standing.  I do think that patient is probably dehydrated.  Clinical exam is unremarkable.  No signs or symptoms of heart failure.  -- Would decrease diuretic as above  -- Will get baseline echo to evaluate cardiac structure and function.  Also ordering GXT to exclude any exercise-induced arrhythmias or hemodynamic abnormalities poststress          Plan   Orders Placed This Encounter   Procedures    Treadmill Stress Test     Standing Status:   Future     Standing  Expiration Date:   7/30/2025     Order Specific Question:   Reason for exam?     Answer:   Other     Order Specific Question:   Reason for Exam:     Answer:   dizziness, presyncope     Order Specific Question:   Release to patient     Answer:   Routine Release [[id number]]    ECG 12 Lead     This order was created via procedure documentation     Order Specific Question:   Release to patient     Answer:   Routine Release [1400000002]    Adult Transthoracic Echo Complete W/ Cont if Necessary Per Protocol     Standing Status:   Future     Standing Expiration Date:   7/30/2025     Order Specific Question:   Reason for exam?     Answer:   Other Reasons     Order Specific Question:   Other reason(s)?     Answer:   Lightheadedness, Presyncope, or Syncope     Order Specific Question:   Release to patient     Answer:   Routine Release [[id number]]     Medications:  -     olmesartan (Benicar) 5 MG tablet; Take 2 tablets by mouth Daily.  Dispense: 30 tablet; Refill: 2    Preventative Cardiology:   Tobacco Cessation: N/A  Obstructive Sleep Apnea Screening: Deffered  AAA Screening: Deffered  Peripheral Arterial Disease Screening: Deffered        Follow Up:   Return in about 6 weeks (around 9/10/2024).    Thank you for allowing me to participate in the care of your patient. Please to not hesitate to contact me with additional questions or concerns.    Benito Muniz MD  07/30/2024   07:46 EDT

## 2024-07-31 ENCOUNTER — TELEPHONE (OUTPATIENT)
Dept: CARDIOLOGY | Facility: CLINIC | Age: 54
End: 2024-07-31
Payer: COMMERCIAL

## 2024-07-31 NOTE — TELEPHONE ENCOUNTER
Rosy from [facility] called needing verification of her Olmesartan dose.   She was originally taking 5mg 2 tablets by mouth daily. In her last office visit it said that it was being increased to 10mg qday. Please advise.

## 2024-07-31 NOTE — TELEPHONE ENCOUNTER
I verified with the pt that she was only taking 5mg daily, I spoke with the pharmacy and they stated that the insurance didn't want to cover the 5mg bid because she has 20mg that had been previously filled. The pt states she had only been taking 5mg daily and that she was going to take 2 of the 5mg to make the increased dose of 10mg. When pt runs out of the 5mg we will need to send in a script for 10mg tablets as an adjusted dose so that insurance will cover, per pharmacist.

## 2024-08-07 ENCOUNTER — LAB (OUTPATIENT)
Dept: LAB | Facility: HOSPITAL | Age: 54
End: 2024-08-07
Payer: COMMERCIAL

## 2024-08-07 DIAGNOSIS — N02.2 MEMBRANOUS NEPHROPATHY DETERMINED BY BIOPSY: ICD-10-CM

## 2024-08-07 LAB
25(OH)D3 SERPL-MCNC: 42.1 NG/ML (ref 30–100)
BACTERIA UR QL AUTO: NORMAL /HPF
BASOPHILS # BLD AUTO: 0.06 10*3/MM3 (ref 0–0.2)
BASOPHILS NFR BLD AUTO: 0.9 % (ref 0–1.5)
BILIRUB UR QL STRIP: NEGATIVE
CLARITY UR: CLEAR
COLOR UR: YELLOW
DEPRECATED RDW RBC AUTO: 38.8 FL (ref 37–54)
EOSINOPHIL # BLD AUTO: 0.04 10*3/MM3 (ref 0–0.4)
EOSINOPHIL NFR BLD AUTO: 0.6 % (ref 0.3–6.2)
ERYTHROCYTE [DISTWIDTH] IN BLOOD BY AUTOMATED COUNT: 12.3 % (ref 12.3–15.4)
GLUCOSE UR STRIP-MCNC: NEGATIVE MG/DL
HCT VFR BLD AUTO: 37.3 % (ref 34–46.6)
HGB BLD-MCNC: 12.2 G/DL (ref 12–15.9)
HGB UR QL STRIP.AUTO: NEGATIVE
HYALINE CASTS UR QL AUTO: NORMAL /LPF
IMM GRANULOCYTES # BLD AUTO: 0.02 10*3/MM3 (ref 0–0.05)
IMM GRANULOCYTES NFR BLD AUTO: 0.3 % (ref 0–0.5)
KETONES UR QL STRIP: NEGATIVE
LEUKOCYTE ESTERASE UR QL STRIP.AUTO: NEGATIVE
LYMPHOCYTES # BLD AUTO: 1.95 10*3/MM3 (ref 0.7–3.1)
LYMPHOCYTES NFR BLD AUTO: 30.6 % (ref 19.6–45.3)
MCH RBC QN AUTO: 28.5 PG (ref 26.6–33)
MCHC RBC AUTO-ENTMCNC: 32.7 G/DL (ref 31.5–35.7)
MCV RBC AUTO: 87.1 FL (ref 79–97)
MONOCYTES # BLD AUTO: 0.58 10*3/MM3 (ref 0.1–0.9)
MONOCYTES NFR BLD AUTO: 9.1 % (ref 5–12)
NEUTROPHILS NFR BLD AUTO: 3.72 10*3/MM3 (ref 1.7–7)
NEUTROPHILS NFR BLD AUTO: 58.5 % (ref 42.7–76)
NITRITE UR QL STRIP: NEGATIVE
NRBC BLD AUTO-RTO: 0 /100 WBC (ref 0–0.2)
PH UR STRIP.AUTO: 6 [PH] (ref 5–8)
PLATELET # BLD AUTO: 260 10*3/MM3 (ref 140–450)
PMV BLD AUTO: 11.2 FL (ref 6–12)
PROT UR QL STRIP: NEGATIVE
RBC # BLD AUTO: 4.28 10*6/MM3 (ref 3.77–5.28)
RBC # UR STRIP: NORMAL /HPF
REF LAB TEST METHOD: NORMAL
SP GR UR STRIP: 1.01 (ref 1–1.03)
SQUAMOUS #/AREA URNS HPF: NORMAL /HPF
UROBILINOGEN UR QL STRIP: NORMAL
WBC # UR STRIP: NORMAL /HPF
WBC NRBC COR # BLD AUTO: 6.37 10*3/MM3 (ref 3.4–10.8)

## 2024-08-07 PROCEDURE — 82570 ASSAY OF URINE CREATININE: CPT

## 2024-08-07 PROCEDURE — 82043 UR ALBUMIN QUANTITATIVE: CPT

## 2024-08-07 PROCEDURE — 85025 COMPLETE CBC W/AUTO DIFF WBC: CPT

## 2024-08-07 PROCEDURE — 81001 URINALYSIS AUTO W/SCOPE: CPT

## 2024-08-07 PROCEDURE — 80053 COMPREHEN METABOLIC PANEL: CPT

## 2024-08-07 PROCEDURE — 84156 ASSAY OF PROTEIN URINE: CPT

## 2024-08-07 PROCEDURE — 82306 VITAMIN D 25 HYDROXY: CPT

## 2024-08-07 PROCEDURE — 36415 COLL VENOUS BLD VENIPUNCTURE: CPT

## 2024-08-08 LAB
ALBUMIN SERPL-MCNC: 4.5 G/DL (ref 3.5–5.2)
ALBUMIN UR-MCNC: 1.2 MG/DL
ALBUMIN/GLOB SERPL: 2 G/DL
ALP SERPL-CCNC: 103 U/L (ref 39–117)
ALT SERPL W P-5'-P-CCNC: 10 U/L (ref 1–33)
ANION GAP SERPL CALCULATED.3IONS-SCNC: 12.7 MMOL/L (ref 5–15)
AST SERPL-CCNC: 16 U/L (ref 1–32)
BILIRUB SERPL-MCNC: <0.2 MG/DL (ref 0–1.2)
BUN SERPL-MCNC: 15 MG/DL (ref 6–20)
BUN/CREAT SERPL: 13.5 (ref 7–25)
CALCIUM SPEC-SCNC: 9.4 MG/DL (ref 8.6–10.5)
CHLORIDE SERPL-SCNC: 104 MMOL/L (ref 98–107)
CO2 SERPL-SCNC: 21.3 MMOL/L (ref 22–29)
CREAT SERPL-MCNC: 1.11 MG/DL (ref 0.57–1)
CREAT UR-MCNC: 42.1 MG/DL
CREAT UR-MCNC: 42.9 MG/DL
CREAT UR-MCNC: 42.9 MG/DL
EGFRCR SERPLBLD CKD-EPI 2021: 59.2 ML/MIN/1.73
GLOBULIN UR ELPH-MCNC: 2.3 GM/DL
GLUCOSE SERPL-MCNC: 89 MG/DL (ref 65–99)
MICROALBUMIN/CREAT UR: 28 MG/G (ref 0–29)
POTASSIUM SERPL-SCNC: 3.8 MMOL/L (ref 3.5–5.2)
PROT ?TM UR-MCNC: 5.7 MG/DL
PROT ?TM UR-MCNC: 5.7 MG/DL
PROT SERPL-MCNC: 6.8 G/DL (ref 6–8.5)
PROT/CREAT UR: 132.9 MG/G CREA (ref 0–200)
PROT/CREAT UR: 135.4 MG/G CREA (ref 0–200)
SODIUM SERPL-SCNC: 138 MMOL/L (ref 136–145)

## 2024-08-12 LAB
COLLECT DURATION TIME UR: 24 HRS
COLLECT DURATION TIME UR: 24 HRS
CREAT UR-MCNC: 57.8 MG/DL
CREATINE 24H UR-MRATE: 0.92 G/24 HR (ref 0.7–1.6)
PROT 24H UR-MRATE: 84.8 MG/24HOURS (ref 0–150)
SPECIMEN VOL 24H UR: 1600 ML
SPECIMEN VOL 24H UR: 1600 ML

## 2024-08-12 PROCEDURE — 82570 ASSAY OF URINE CREATININE: CPT

## 2024-08-12 PROCEDURE — 81050 URINALYSIS VOLUME MEASURE: CPT

## 2024-08-12 PROCEDURE — 84156 ASSAY OF PROTEIN URINE: CPT

## 2024-08-13 ENCOUNTER — CLINICAL SUPPORT (OUTPATIENT)
Dept: CARDIOLOGY | Facility: CLINIC | Age: 54
End: 2024-08-13
Payer: COMMERCIAL

## 2024-08-13 ENCOUNTER — SPECIALTY PHARMACY (OUTPATIENT)
Dept: PHARMACY | Facility: HOSPITAL | Age: 54
End: 2024-08-13
Payer: COMMERCIAL

## 2024-08-13 VITALS — SYSTOLIC BLOOD PRESSURE: 122 MMHG | DIASTOLIC BLOOD PRESSURE: 82 MMHG

## 2024-08-13 NOTE — PROGRESS NOTES
Patient came to office today to have BP checked. BP noted in vitals. She provided log and states she has been having low readings in the evening.

## 2024-08-15 ENCOUNTER — TELEPHONE (OUTPATIENT)
Dept: CARDIOLOGY | Facility: CLINIC | Age: 54
End: 2024-08-15
Payer: COMMERCIAL

## 2024-08-15 NOTE — TELEPHONE ENCOUNTER
I attempted to reach pt via phone lmom and also sent a My Chart message with the instructions and asking her to please contact us to verify the dose of the Olmesartan.

## 2024-08-15 NOTE — TELEPHONE ENCOUNTER
----- Message from Benito Muniz sent at 8/15/2024 11:59 AM EDT -----  Regarding: BP log  Reviewed her BP log.  Blood pressures in the evening are a bit low.  Hopefully, she is taking the olmesartan in the morning only.  Please confirm what dose she is taking in the morning.  If she feels okay, I think we can continue current medications, if she does feel a bit dizzy with the low readings, I would cut the dose in half.

## 2024-08-15 NOTE — TELEPHONE ENCOUNTER
Ms. Ross returned our call she states she has been taking 10mg in the morning and has had some dizziness, she will try the 5mg and keep us updated on symptoms and bp.

## 2024-09-25 RX ORDER — DULOXETIN HYDROCHLORIDE 60 MG/1
CAPSULE, DELAYED RELEASE ORAL
Qty: 90 CAPSULE | Refills: 1 | Status: SHIPPED | OUTPATIENT
Start: 2024-09-25

## 2024-09-27 ENCOUNTER — TELEPHONE (OUTPATIENT)
Dept: CARDIOLOGY | Facility: CLINIC | Age: 54
End: 2024-09-27
Payer: COMMERCIAL

## 2024-09-27 NOTE — TELEPHONE ENCOUNTER
Patient has called stating she is still have low blood pressure. She had D/C all her blood pressure medications. She is only taking torsemide every other day.  In the PM diastolic 50's-60's Systolic 80's, 90's. This is only occurring in the evening. Can you please advise.

## 2024-10-18 ENCOUNTER — OFFICE VISIT (OUTPATIENT)
Dept: PULMONOLOGY | Facility: CLINIC | Age: 54
End: 2024-10-18
Payer: COMMERCIAL

## 2024-10-18 VITALS
RESPIRATION RATE: 18 BRPM | HEIGHT: 65 IN | HEART RATE: 86 BPM | DIASTOLIC BLOOD PRESSURE: 78 MMHG | WEIGHT: 214 LBS | SYSTOLIC BLOOD PRESSURE: 130 MMHG | OXYGEN SATURATION: 96 % | BODY MASS INDEX: 35.65 KG/M2

## 2024-10-18 DIAGNOSIS — J82.83 EOSINOPHILIC ASTHMA: Primary | ICD-10-CM

## 2024-10-18 DIAGNOSIS — J30.89 NON-SEASONAL ALLERGIC RHINITIS, UNSPECIFIED TRIGGER: ICD-10-CM

## 2024-10-18 DIAGNOSIS — M32.14 OTHER SYSTEMIC LUPUS ERYTHEMATOSUS WITH GLOMERULAR DISEASE: ICD-10-CM

## 2024-10-18 DIAGNOSIS — D84.9 IMMUNOCOMPROMISED: ICD-10-CM

## 2024-10-18 RX ORDER — CEVIMELINE HYDROCHLORIDE 30 MG/1
30 CAPSULE ORAL 3 TIMES DAILY
COMMUNITY
Start: 2024-09-30

## 2024-10-18 RX ORDER — PHENTERMINE HYDROCHLORIDE 37.5 MG/1
CAPSULE ORAL
COMMUNITY
Start: 2024-09-11 | End: 2024-10-18

## 2024-10-18 RX ORDER — ALBUTEROL SULFATE 90 UG/1
1-2 INHALANT RESPIRATORY (INHALATION) EVERY 6 HOURS PRN
Qty: 48 G | Refills: 3 | Status: SHIPPED | OUTPATIENT
Start: 2024-10-18

## 2024-10-18 NOTE — PROGRESS NOTES
Pulmonary follow-up office Visit      Chief Complaint:    Chief Complaint   Patient presents with    Follow-up    Breathing Problem       Subjective: Kelsey Ross is a 53 y.o. female who is here today for follow-up.    Past medical history:  Patient had chronic cough that started in October 2020 initially was seen in my clinic in April 2021.  At that point, her CT scan and PFTs were found to be normal other than some small airway disease and she was started on Advair and as needed albuterol.  However, her eosinophils were 16%.  She had a very complicated set of symptoms that include rash, chronic cough and lymphadenopathy.  In lab work-up her DIMAS and p-ANCA were both found to be positive.  She was referred to hematology and rheumatology.  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  Dr. Lane had started her on methotrexate for presumed GPA without tissue biopsy, but she had side effects from this and had to stop.  She has also been on chronic p.o. steroids of prednisone 10 mg daily and serum allergy testing was negative.  Due to her complicated course she was referred to [facility] and she saw them in November 2021.  [facility] repeated PFTs which showed FEV1 102%, FEV1/FVC ratio 98.  No significant bronchial hyperresponsiveness except for FEF 50% with 49% improvement.  Her [facility] pulmonologist, Dr. 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 [facility] then referred her to rheumatology and has now been diagnosed with lupus with renal involvement.    Since last visit, she has noticed increased allergy symptoms this fall with recent weather changes. Saw ENT this week and no plans for nasal/sinus surgery.      Still on Nucala and doing well without side effects. Feels like it has really helped overall.   Still on Trelegy and feels like it controls her symptoms better. Uses albuterol prn at work especially with  weather changes.  ~2x per day    Sleep study normal in 2024    She continues to follow with [facility] for lupus with renal involvement and is on Plaquenil, CellCept and Benlysta for her Lupus. She has had a few flare ups that is felt to be related to stress from her work environment.     Subjective      Review of Systems:   Review of Systems   Constitutional:  Positive for fatigue.   HENT:  Positive for postnasal drip.    Respiratory:  Positive for cough. Negative for shortness of breath and wheezing.    Cardiovascular:  Negative for chest pain.   Allergic/Immunologic: Positive for environmental allergies. Negative for food allergies.   Psychiatric/Behavioral:  Positive for sleep disturbance.          Social History:   Social History     Socioeconomic History    Marital status:    Tobacco Use    Smoking status: Former     Current packs/day: 0.00     Average packs/day: 2.0 packs/day for 8.0 years (16.0 ttl pk-yrs)     Types: Cigarettes     Start date: 1987     Quit date: 1995     Years since quittin.4     Passive exposure: Past    Smokeless tobacco: Never   Vaping Use    Vaping status: Never Used   Substance and Sexual Activity    Alcohol use: Never    Drug use: Never    Sexual activity: Not Currently     Partners: Male     Birth control/protection: None       Medications:     Current Outpatient Medications:     albuterol sulfate  (90 Base) MCG/ACT inhaler, Inhale 1-2 puffs Every 6 (Six) Hours As Needed for Wheezing or Shortness of Air., Disp: 48 g, Rfl: 3    Benlysta 200 MG/ML solution prefilled syringe, Inject 200 mg under the skin into the appropriate area as directed Every 7 (Seven) Days., Disp: , Rfl:     cevimeline (EVOXAC) 30 MG capsule, Take 1 capsule by mouth 3 (Three) Times a Day., Disp: , Rfl:     cyanocobalamin 1000 MCG/ML injection, INJECT 1ML ONCE MONTHLY, Disp: 10 mL, Rfl: 0    DULoxetine (CYMBALTA) 60 MG capsule, TAKE ONE (1) CAPSULE BY MOUTH EVERY DAY, Disp: 90  "capsule, Rfl: 1    Fluticasone-Umeclidin-Vilant (TRELEGY ELLIPTA) 200-62.5-25 MCG/ACT inhaler, Inhale 1 puff Daily for 180 days., Disp: 30 each, Rfl: 5    gabapentin (NEURONTIN) 600 MG tablet, , Disp: , Rfl:     hydroxychloroquine (PLAQUENIL) 200 MG tablet, Take 1 tablet by mouth 2 (Two) Times a Day., Disp: , Rfl:     ipratropium (ATROVENT) 0.03 % nasal spray, Every 12 (Twelve) Hours., Disp: , Rfl:     levothyroxine (Synthroid) 112 MCG tablet, Take 1 tablet by mouth Daily., Disp: 90 tablet, Rfl: 1    Linzess 145 MCG capsule capsule, TAKE ONE (1) CAPSULE BY MOUTH EVERY MORNING BEFORE BREAKFAST., Disp: 90 capsule, Rfl: 3    liothyronine (CYTOMEL) 5 MCG tablet, Take 2 tablets by mouth Daily., Disp: 180 tablet, Rfl: 1    Mepolizumab 100 MG/ML solution auto-injector, Inject 3 mL under the skin into the appropriate area as directed Every 28 (Twenty-Eight) Days., Disp: 3 mL, Rfl: 11    Mucinex D  MG per 12 hr tablet, , Disp: , Rfl:     mycophenolate (CELLCEPT) 500 MG tablet, Take 2 tablets by mouth 2 (Two) Times a Day., Disp: , Rfl:     ondansetron ODT (ZOFRAN-ODT) 4 MG disintegrating tablet, Place 1-2 tablets on the tongue Every 8 (Eight) Hours As Needed for Nausea or Vomiting., Disp: 30 tablet, Rfl: 1    pantoprazole (PROTONIX) 40 MG EC tablet, take 1 tablet PO QD 1 hour before evening meal, Disp: , Rfl:     promethazine (PHENERGAN) 25 MG tablet, Take 1 tablet by mouth Every 6 (Six) Hours As Needed for Nausea or Vomiting., Disp: 30 tablet, Rfl: 1    rosuvastatin (CRESTOR) 5 MG tablet, Take 1 tablet by mouth Every Night., Disp: 90 tablet, Rfl: 1    SURE COMFORT INS SYR 1CC/28G 28G X 1/2\" 1 ML misc, Use one per month for administration of vitamin B12, Disp: 12 each, Rfl: 0    tiZANidine (ZANAFLEX) 4 MG tablet, TAKE ONE (1) TABLET BY MOUTH EVERY 8 (EIGHT) HOURS AS NEEDED FOR MUSCLE SPASMS., Disp: 270 tablet, Rfl: 3    topiramate (TOPAMAX) 25 MG tablet, , Disp: , Rfl:     torsemide (DEMADEX) 20 MG tablet, Take 1 tablet " "by mouth Daily. (Patient taking differently: Take 1 tablet by mouth As Needed.), Disp: 90 tablet, Rfl: 1    Tuberculin-Allergy Syringes 28G X 1/2\" 1 ML misc, 1 Units Every 30 (Thirty) Days., Disp: 20 each, Rfl: 1    vitamin D3 125 MCG (5000 UT) capsule capsule, Take 1 capsule by mouth Daily., Disp: , Rfl:     Allergies:   Allergies   Allergen Reactions    Penicillin G Unknown - High Severity    Penicillins Unknown - High Severity     Allergist informed patient she was allergic to Penicillins    Fexofenadine Headache     Only allegra D    Methotrexate GI Intolerance and Headache    Pseudoephedrine Hcl Headache       Objective     Physical Exam:  Vital Signs:   Vitals:    10/18/24 0840   BP: 130/78   Pulse: 86   Resp: 18   SpO2: 96%   Weight: 97.1 kg (214 lb)   Height: 165.1 cm (65\")           Physical Exam  Constitutional:       General: She is not in acute distress.     Appearance: Normal appearance.   HENT:      Head: Normocephalic and atraumatic.      Right Ear: External ear normal.      Left Ear: External ear normal.      Ears:      Comments: + hearing aids in place     Nose: Congestion present. No rhinorrhea.      Comments: +breathe rite strip in place     Mouth/Throat:      Mouth: Mucous membranes are moist.      Pharynx: Oropharynx is clear. No oropharyngeal exudate or posterior oropharyngeal erythema.   Eyes:      General:         Right eye: No discharge.         Left eye: No discharge.      Extraocular Movements: Extraocular movements intact.      Conjunctiva/sclera: Conjunctivae normal.   Cardiovascular:      Rate and Rhythm: Regular rhythm.      Heart sounds: No murmur heard.  Pulmonary:      Effort: Pulmonary effort is normal.      Breath sounds: Normal breath sounds. No wheezing or rhonchi.   Musculoskeletal:         General: No deformity.      Cervical back: Normal range of motion and neck supple.      Right lower leg: No edema.      Left lower leg: No edema.   Skin:     Findings: No rash. " "  Neurological:      Mental Status: She is alert and oriented to person, place, and time. Mental status is at baseline.      Motor: No weakness.      Coordination: Coordination normal.   Psychiatric:         Mood and Affect: Mood normal.         Behavior: Behavior normal.         Thought Content: Thought content normal.           Results Review:   Labs: Reviewed.  Lab Results   Component Value Date    WBC 6.37 08/07/2024    HGB 12.2 08/07/2024    HCT 37.3 08/07/2024    MCV 87.1 08/07/2024     08/07/2024         Lab Results   Component Value Date    GLUCOSE 89 08/07/2024    BUN 15 08/07/2024    CREATININE 1.11 (H) 08/07/2024    EGFRIFNONA 86 02/23/2022    EGFRIFAFRI 103 05/05/2021    BCR 13.5 08/07/2024    K 3.8 08/07/2024    CO2 21.3 (L) 08/07/2024    CALCIUM 9.4 08/07/2024    PROTENTOTREF 6.2 11/15/2022    ALBUMIN 4.5 08/07/2024    LABIL2 2.9 11/15/2022    AST 16 08/07/2024    ALT 10 08/07/2024       Micro: As of October 18, 2024   No results found for: \"RESPCX\"  No results found for: \"BLOODCX\"  No results found for: \"URINECX\"  No results found for: \"MRSACX\"  No results found for: \"MRSAPCR\"  No results found for: \"URCX\"  No components found for: \"LOWRESPCF\"  No results found for: \"THROATCX\"  No results found for: \"CULTURES\"  No components found for: \"STREPBCX\"  No results found for: \"STREPPNEUAG\"  No results found for: \"LEGIONELLA\"  No results found for: \"MYCOPLASCX\"  No results found for: \"GCCX\"  No results found for: \"WOUNDCX\"  No results found for: \"BODYFLDCX\"    ABG: No results found for: \"PHART\", \"KGW3ZME\", \"PO2ART\", \"HGBBG\", \"V3FEEYJU\", \"CFIO2\", \"FCOHB\", \"CARBOXYHGB\", \"FMETHB\"    Echo:     Radiology Scans:    Images reviewed personally.   December 2021 CT scan chest  PROCEDURE: CT CHEST WO CONTRAST DIAGNOSTIC-     HISTORY: SHORTNESS OF BREATH; R06.02-Shortness of breath     COMPARISON: 05/05/2021.     PROCEDURE:  Multiple axial CT images were obtained from the thoracic  inlet through the upper abdomen " without the use of contrast.      FINDINGS:   Soft tissue windows reveal no axillary, hilar or mediastinal adenopathy.  Heart size is normal. The aorta is normal in caliber. There are no  pleural or pericardial effusions. There are three small nodules in the  right upper lobe. A nodule in image #27 measures 2 mm. Two nodules on  image #30 measures 4 mm and 2 mm. These lesions are stable from the  prior exam. There is no new pulmonary lesion. The visualized upper  abdomen is unremarkable.      IMPRESSION:  1. Stable tiny pulmonary nodules are likely granulomas.  2. No acute lung disease.     621.34 mGy.cm        This study was performed with techniques to keep radiation doses as low  as reasonably achievable (ALARA). Individualized dose reduction  techniques using automated exposure control or adjustment of mA and/or  kV according to the patient size were employed.               Images were reviewed, interpreted, and dictated by Dr. Karel Hernandez MD  Transcribed by Kristine Palm PA-C.     This report was finalized on 12/9/2021 10:24 AM by Karel Hernandez MD.    PFT IMPRESSION:   December 2023 PFT showed FEV1 98%, FEV1/FVC ratio 78.  No significant bronchodilator responsiveness.  %.  No air trapping.  DL/.    January 2021 PFT showed FEV1 97%, FEV1/FVC ratio 81.  No significant bronchodilator responsiveness.  Total lung capacity 101%.  No air trapping.  Normal diffusing capacity.    November 2021 PFT showed FEV1 102%, FEV1/FVC ratio 98.    Assessment / Plan      Assessment/Plan:    1. Eosinophilic asthma  Doing well until recent weather changes.  On maximal medical treatment with Trelegy and Nucala.  However, some of her symptoms may be related to lupus flares that have been related back to very stressful work environment.  She is considering filing for disability given her lupus and asthma.    - Fluticasone-Umeclidin-Vilant (TRELEGY ELLIPTA) 200-62.5-25 MCG/ACT inhaler; Inhale 1 puff Daily for 180 days.   Dispense: 30 each; Refill: 5  - albuterol sulfate  (90 Base) MCG/ACT inhaler; Inhale 1-2 puffs Every 6 (Six) Hours As Needed for Wheezing or Shortness of Air.  Dispense: 48 g; Refill: 3  - Complete PFT - Pre & Post Bronchodilator; Future    2. Other systemic lupus erythematosus with glomerular disease  Follows with rheumatology.  Has had a few flares recently, but no medication changes at this time.    3. Non-seasonal allergic rhinitis, unspecified trigger  Has difficulty in spring and fall.  On maximal treatment.    4. Immunocompromised  Secondary to biologic treatment.  No frequent infections.        Follow Up:   Return in about 6 months (around 4/18/2025) for PFT day of clinic appointment.    This document was electronically signed by Meaghan Benitez MD on 10/18/24 at 13:24 EDT    Meaghan Benitez MD  Pulmonary/Critical Care Physician   [facility]      Please note that portions of this note may have been completed with a voice recognition program. Efforts were made to edit the dictations, but occasionally words are mistranscribed.

## 2024-11-05 NOTE — PROGRESS NOTES
[facility] Cardiology Office Follow Up Note    Kelsey Ross  [id number]  10/24/2024    Primary Care Provider: Isha Giron MD    Chief Complaint   Patient presents with    Follow-up    Hypertension       Subjective     History of Present Illness:   Kelsey Ross is a 54 y.o. female with  lupus nephritis, hypertension, hyperlipidemia, and hypothyroidism  who presents to the Cardiology Clinic for follow up of  weakness and dizziness .  During her last visit, we increased her olmesartan to 10 because she was getting higher readings in the morning.  Lower, symptomatic readings in the evening, after taking her medications and exercising.  Patient says that she has been out of her olmesartan for maybe a week and was taking it every other day for the past few months.  She shows her blood pressure readings and has still noted some systolics in the 80s and 90s.  Evening readings have crept up a little bit into the 130s however.  Dizziness is usually worse after she exercises but seems to have somewhat abated now that her olmesartan ran out and she has been off it for a while.  Denies any exertional chest pain or dyspnea.  Still takes torsemide every other day.  Recently had a spinal stimulator placed and still sore from that and had a lot of swelling after that procedure, but it is getting better    Past Cardiac Testin. Last Coronary Angio: NA    2. Last Echo: 10/22/24    Left ventricular systolic function is normal. Left ventricular ejection fraction appears to be 61 - 65%. Left ventricular diastolic function was normal.    Normal right ventricular size and function.    No hemodynamically significant valvular dysfunction.       3. Prior Stress Testing: 10/22/24    No ECG or clinical evidence of myocardial ischemia.  No exercise-induced arrhythmias    Findings consistent with a low risk/normal ECG stress test.    4. Prior Holter Monitor: NA    Review of Systems:  Review of Systems    Constitutional: Negative.    Respiratory: Negative.     Cardiovascular:  Positive for leg swelling. Negative for chest pain.   Gastrointestinal: Negative.    Musculoskeletal: Negative.    Neurological: Negative.        I have reviewed and/or updated the patient's past medical, past surgical, family, social history, problem list and allergies as appropriate.       Current Outpatient Medications:     albuterol sulfate  (90 Base) MCG/ACT inhaler, Inhale 1-2 puffs Every 6 (Six) Hours As Needed for Wheezing or Shortness of Air., Disp: 48 g, Rfl: 3    Benlysta 200 MG/ML solution prefilled syringe, Inject 200 mg under the skin into the appropriate area as directed Every 7 (Seven) Days., Disp: , Rfl:     cevimeline (EVOXAC) 30 MG capsule, Take 1 capsule by mouth 3 (Three) Times a Day., Disp: , Rfl:     cyanocobalamin 1000 MCG/ML injection, INJECT 1ML ONCE MONTHLY, Disp: 10 mL, Rfl: 0    DULoxetine (CYMBALTA) 60 MG capsule, TAKE ONE (1) CAPSULE BY MOUTH EVERY DAY, Disp: 90 capsule, Rfl: 1    Fluticasone-Umeclidin-Vilant (TRELEGY ELLIPTA) 200-62.5-25 MCG/ACT inhaler, Inhale 1 puff Daily for 180 days., Disp: 30 each, Rfl: 5    gabapentin (NEURONTIN) 600 MG tablet, , Disp: , Rfl:     hydroxychloroquine (PLAQUENIL) 200 MG tablet, Take 1 tablet by mouth 2 (Two) Times a Day., Disp: , Rfl:     ipratropium (ATROVENT) 0.03 % nasal spray, Every 12 (Twelve) Hours., Disp: , Rfl:     levothyroxine (Synthroid) 112 MCG tablet, Take 1 tablet by mouth Daily., Disp: 90 tablet, Rfl: 1    Linzess 145 MCG capsule capsule, TAKE ONE (1) CAPSULE BY MOUTH EVERY MORNING BEFORE BREAKFAST., Disp: 90 capsule, Rfl: 3    liothyronine (CYTOMEL) 5 MCG tablet, Take 2 tablets by mouth Daily., Disp: 180 tablet, Rfl: 1    Mepolizumab 100 MG/ML solution auto-injector, Inject 3 mL under the skin into the appropriate area as directed Every 28 (Twenty-Eight) Days., Disp: 3 mL, Rfl: 11    Mucinex D  MG per 12 hr tablet, , Disp: , Rfl:      "mycophenolate (CELLCEPT) 500 MG tablet, Take 2 tablets by mouth 2 (Two) Times a Day., Disp: , Rfl:     ondansetron ODT (ZOFRAN-ODT) 4 MG disintegrating tablet, Place 1-2 tablets on the tongue Every 8 (Eight) Hours As Needed for Nausea or Vomiting., Disp: 30 tablet, Rfl: 1    pantoprazole (PROTONIX) 40 MG EC tablet, take 1 tablet PO QD 1 hour before evening meal, Disp: , Rfl:     promethazine (PHENERGAN) 25 MG tablet, Take 1 tablet by mouth Every 6 (Six) Hours As Needed for Nausea or Vomiting., Disp: 30 tablet, Rfl: 1    rosuvastatin (CRESTOR) 5 MG tablet, Take 1 tablet by mouth Every Night., Disp: 90 tablet, Rfl: 1    SURE COMFORT INS SYR 1CC/28G 28G X 1/2\" 1 ML misc, Use one per month for administration of vitamin B12, Disp: 12 each, Rfl: 0    tiZANidine (ZANAFLEX) 4 MG tablet, TAKE ONE (1) TABLET BY MOUTH EVERY 8 (EIGHT) HOURS AS NEEDED FOR MUSCLE SPASMS., Disp: 270 tablet, Rfl: 3    topiramate (TOPAMAX) 25 MG tablet, , Disp: , Rfl:     torsemide (DEMADEX) 20 MG tablet, Take 1 tablet by mouth Daily. (Patient taking differently: Take 1 tablet by mouth As Needed.), Disp: 90 tablet, Rfl: 1    Tuberculin-Allergy Syringes 28G X 1/2\" 1 ML misc, 1 Units Every 30 (Thirty) Days., Disp: 20 each, Rfl: 1    vitamin D3 125 MCG (5000 UT) capsule capsule, Take 1 capsule by mouth Daily., Disp: , Rfl:     olmesartan (BENICAR) 5 MG tablet, Take 1 tablet by mouth Daily., Disp: 30 tablet, Rfl: 3       Objective     Vitals:  Vitals:    11/06/24 1040   BP: 136/80   BP Location: Right arm   Patient Position: Sitting   Pulse: 89   SpO2: 100%   Weight: 97.1 kg (214 lb)   Height: 165.1 cm (65\")       Physical Exam:  Vitals reviewed.   Constitutional:       Appearance: Healthy appearance. Not in distress.   Cardiovascular:      Normal rate. Regular rhythm. Normal S1. Normal S2.       Murmurs: There is no murmur.      No gallop.  No click. No rub.   Pulses:     Intact distal pulses.   Edema:     Pretibial: bilateral trace pitting edema of " the pretibial area.     Ankle: bilateral trace pitting edema of the ankle.  Skin:     General: Skin is warm and dry.         Results Review:   I reviewed the patient's new clinical results.  I reviewed the patient's new imaging results and agree with the interpretation.  I reviewed the patient's other test results and agree with the interpretation    Procedures        Assessment & Plan   Diagnoses and all orders for this visit:    1. Essential hypertension (Primary)  Historically, very difficult to control her blood pressure due to mistiming of medication administration as well as diuretic use.  Her blood pressure does appear to be creeping up now that she has been off of ARB for a bit.  Goal <130/80.  I think she would benefit from ARB in the setting of her lupus nephritis and mild CKD.  -- Start olmesartan 5 daily, preferably in the morning.  BMP in 2 weeks  -- Continue regular follow-up with nephrology    2. Dizziness  Clinically consistent with orthostasis.  Symptoms are better off of higher dose of olmesartan. Still getting relatively low blood pressures.  Suspect this is a combination of mistiming of her blood pressure medication, almost immediately prior to exercising which leads to a vasodilatory state in recovery correlating to peak intensity of her symptoms.  Previously advised that she take her blood pressure medication early in the morning  Noninvasive ischemic workup low risk with a normal echo and stress test.  -- No further cardiac workup indicated.  Blood pressure management as above.  -- Advise using torsemide only as needed for her leg swelling in the setting of lupus nephritis              Plan   Orders Placed This Encounter   Procedures    Basic Metabolic Panel     Standing Status:   Future     Standing Expiration Date:   11/6/2025     Order Specific Question:   Release to patient     Answer:   Routine Release [[id number]]     Medications:  -     olmesartan (BENICAR) 5 MG tablet; Take 1 tablet by  mouth Daily.  Dispense: 30 tablet; Refill: 3    Preventative Cardiology:   Tobacco Cessation: N/A  Obstructive Sleep Apnea Screening: Deffered  AAA Screening: Deffered  Peripheral Arterial Disease Screening: Deffered       Follow Up:   Return in about 6 months (around 5/6/2025).    Thank you for allowing me to participate in the care of your patient. Please to not hesitate to contact me with additional questions or concerns.     Benito Muniz MD  10/24/2024  08:34 EDT

## 2024-11-06 ENCOUNTER — OFFICE VISIT (OUTPATIENT)
Dept: CARDIOLOGY | Facility: CLINIC | Age: 54
End: 2024-11-06
Payer: COMMERCIAL

## 2024-11-06 VITALS
OXYGEN SATURATION: 100 % | BODY MASS INDEX: 35.65 KG/M2 | HEIGHT: 65 IN | SYSTOLIC BLOOD PRESSURE: 136 MMHG | DIASTOLIC BLOOD PRESSURE: 80 MMHG | WEIGHT: 214 LBS | HEART RATE: 89 BPM

## 2024-11-06 DIAGNOSIS — R42 DIZZINESS: ICD-10-CM

## 2024-11-06 DIAGNOSIS — I10 ESSENTIAL HYPERTENSION: Primary | ICD-10-CM

## 2024-11-06 PROCEDURE — 99214 OFFICE O/P EST MOD 30 MIN: CPT | Performed by: INTERNAL MEDICINE

## 2024-11-06 RX ORDER — OLMESARTAN MEDOXOMIL 5 MG/1
5 TABLET ORAL DAILY
Qty: 30 TABLET | Refills: 3 | Status: SHIPPED | OUTPATIENT
Start: 2024-11-06

## 2024-11-07 ENCOUNTER — OFFICE VISIT (OUTPATIENT)
Dept: ENDOCRINOLOGY | Facility: CLINIC | Age: 54
End: 2024-11-07
Payer: COMMERCIAL

## 2024-11-07 VITALS
HEIGHT: 65 IN | OXYGEN SATURATION: 98 % | DIASTOLIC BLOOD PRESSURE: 78 MMHG | BODY MASS INDEX: 36.82 KG/M2 | WEIGHT: 221 LBS | SYSTOLIC BLOOD PRESSURE: 138 MMHG | TEMPERATURE: 96.6 F | HEART RATE: 87 BPM

## 2024-11-07 DIAGNOSIS — I10 ESSENTIAL HYPERTENSION: ICD-10-CM

## 2024-11-07 DIAGNOSIS — E89.0 POSTABLATIVE HYPOTHYROIDISM: Primary | ICD-10-CM

## 2024-11-07 LAB
ALBUMIN SERPL-MCNC: 4.3 G/DL (ref 3.5–5.2)
ALBUMIN/GLOB SERPL: 1.9 G/DL
ALP SERPL-CCNC: 109 U/L (ref 39–117)
ALT SERPL W P-5'-P-CCNC: 15 U/L (ref 1–33)
ANION GAP SERPL CALCULATED.3IONS-SCNC: 12.6 MMOL/L (ref 5–15)
AST SERPL-CCNC: 18 U/L (ref 1–32)
BILIRUB SERPL-MCNC: <0.2 MG/DL (ref 0–1.2)
BUN SERPL-MCNC: 9 MG/DL (ref 6–20)
BUN/CREAT SERPL: 8 (ref 7–25)
CALCIUM SPEC-SCNC: 9 MG/DL (ref 8.6–10.5)
CHLORIDE SERPL-SCNC: 105 MMOL/L (ref 98–107)
CHOLEST SERPL-MCNC: 203 MG/DL (ref 0–200)
CO2 SERPL-SCNC: 23.4 MMOL/L (ref 22–29)
CREAT SERPL-MCNC: 1.12 MG/DL (ref 0.57–1)
EGFRCR SERPLBLD CKD-EPI 2021: 58.2 ML/MIN/1.73
GLOBULIN UR ELPH-MCNC: 2.3 GM/DL
GLUCOSE SERPL-MCNC: 78 MG/DL (ref 65–99)
HDLC SERPL-MCNC: 84 MG/DL (ref 40–60)
LDLC SERPL CALC-MCNC: 91 MG/DL (ref 0–100)
LDLC/HDLC SERPL: 1.01 {RATIO}
POTASSIUM SERPL-SCNC: 3.9 MMOL/L (ref 3.5–5.2)
PROT SERPL-MCNC: 6.6 G/DL (ref 6–8.5)
SODIUM SERPL-SCNC: 141 MMOL/L (ref 136–145)
T4 FREE SERPL-MCNC: 0.76 NG/DL (ref 0.92–1.68)
TRIGL SERPL-MCNC: 170 MG/DL (ref 0–150)
TSH SERPL DL<=0.05 MIU/L-ACNC: 10.8 UIU/ML (ref 0.27–4.2)
VLDLC SERPL-MCNC: 28 MG/DL (ref 5–40)

## 2024-11-07 PROCEDURE — 84439 ASSAY OF FREE THYROXINE: CPT | Performed by: INTERNAL MEDICINE

## 2024-11-07 PROCEDURE — 80053 COMPREHEN METABOLIC PANEL: CPT | Performed by: INTERNAL MEDICINE

## 2024-11-07 PROCEDURE — 99214 OFFICE O/P EST MOD 30 MIN: CPT | Performed by: INTERNAL MEDICINE

## 2024-11-07 PROCEDURE — 82306 VITAMIN D 25 HYDROXY: CPT | Performed by: INTERNAL MEDICINE

## 2024-11-07 PROCEDURE — 84443 ASSAY THYROID STIM HORMONE: CPT | Performed by: INTERNAL MEDICINE

## 2024-11-07 PROCEDURE — 80061 LIPID PANEL: CPT | Performed by: INTERNAL MEDICINE

## 2024-11-07 NOTE — PROGRESS NOTES
Kelsey Ross 54 y.o.  CC: follow up hypothyroid, hyperlipidemia    [place]: follow up hypothyroid, hyperlipidemia    BP is good - planning to restart benicar  Low bp at night- systolic in 80s  Is taking synthroid + cytomel   Is eating low fat diet, taking crestor 5 mg daily   Has binder- still stitches and staples from spinal cord stimulator - working well (sleeping much better)  Having some leg swelling - is following with kidney specialist     Allergies   Allergen Reactions    Penicillin G Unknown - High Severity    Penicillins Unknown - High Severity     Allergist informed patient she was allergic to Penicillins    Fexofenadine Headache     Only allegra D    Methotrexate GI Intolerance and Headache    Pseudoephedrine Hcl Headache       Current Outpatient Medications:     albuterol sulfate  (90 Base) MCG/ACT inhaler, Inhale 1-2 puffs Every 6 (Six) Hours As Needed for Wheezing or Shortness of Air., Disp: 48 g, Rfl: 3    Benlysta 200 MG/ML solution prefilled syringe, Inject 200 mg under the skin into the appropriate area as directed Every 7 (Seven) Days., Disp: , Rfl:     cevimeline (EVOXAC) 30 MG capsule, Take 1 capsule by mouth 3 (Three) Times a Day., Disp: , Rfl:     cyanocobalamin 1000 MCG/ML injection, INJECT 1ML ONCE MONTHLY, Disp: 10 mL, Rfl: 0    DULoxetine (CYMBALTA) 60 MG capsule, TAKE ONE (1) CAPSULE BY MOUTH EVERY DAY, Disp: 90 capsule, Rfl: 1    Fluticasone-Umeclidin-Vilant (TRELEGY ELLIPTA) 200-62.5-25 MCG/ACT inhaler, Inhale 1 puff Daily for 180 days., Disp: 30 each, Rfl: 5    gabapentin (NEURONTIN) 600 MG tablet, , Disp: , Rfl:     hydroxychloroquine (PLAQUENIL) 200 MG tablet, Take 1 tablet by mouth 2 (Two) Times a Day., Disp: , Rfl:     ipratropium (ATROVENT) 0.03 % nasal spray, Every 12 (Twelve) Hours., Disp: , Rfl:     levothyroxine (Synthroid) 112 MCG tablet, Take 1 tablet by mouth Daily., Disp: 90 tablet, Rfl: 1    Linzess 145 MCG capsule capsule, TAKE ONE (1) CAPSULE BY MOUTH EVERY  "MORNING BEFORE BREAKFAST., Disp: 90 capsule, Rfl: 3    liothyronine (CYTOMEL) 5 MCG tablet, Take 2 tablets by mouth Daily., Disp: 180 tablet, Rfl: 1    Mepolizumab 100 MG/ML solution auto-injector, Inject 3 mL under the skin into the appropriate area as directed Every 28 (Twenty-Eight) Days., Disp: 3 mL, Rfl: 11    Mucinex D  MG per 12 hr tablet, , Disp: , Rfl:     mycophenolate (CELLCEPT) 500 MG tablet, Take 2 tablets by mouth 2 (Two) Times a Day., Disp: , Rfl:     olmesartan (BENICAR) 5 MG tablet, Take 1 tablet by mouth Daily., Disp: 30 tablet, Rfl: 3    ondansetron ODT (ZOFRAN-ODT) 4 MG disintegrating tablet, Place 1-2 tablets on the tongue Every 8 (Eight) Hours As Needed for Nausea or Vomiting., Disp: 30 tablet, Rfl: 1    pantoprazole (PROTONIX) 40 MG EC tablet, take 1 tablet PO QD 1 hour before evening meal, Disp: , Rfl:     promethazine (PHENERGAN) 25 MG tablet, Take 1 tablet by mouth Every 6 (Six) Hours As Needed for Nausea or Vomiting., Disp: 30 tablet, Rfl: 1    rosuvastatin (CRESTOR) 5 MG tablet, Take 1 tablet by mouth Every Night., Disp: 90 tablet, Rfl: 1    SURE COMFORT INS SYR 1CC/28G 28G X 1/2\" 1 ML misc, Use one per month for administration of vitamin B12, Disp: 12 each, Rfl: 0    tiZANidine (ZANAFLEX) 4 MG tablet, TAKE ONE (1) TABLET BY MOUTH EVERY 8 (EIGHT) HOURS AS NEEDED FOR MUSCLE SPASMS., Disp: 270 tablet, Rfl: 3    topiramate (TOPAMAX) 25 MG tablet, , Disp: , Rfl:     torsemide (DEMADEX) 20 MG tablet, Take 1 tablet by mouth Daily. 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]     External hemorrhoid [K64.4]     Numbness " [R20.0]     Anemia [D64.9]     Hypotension due to drugs [I95.2]     Gastroesophageal reflux disease with esophagitis [K21.00]     Lower extremity edema [R60.0]     Persistent proteinuria [R80.1]     Systemic lupus erythematosus with glomerular disease [M32.14]     Asthma, extrinsic [J45.909]     Postablative hypothyroidism [E89.0]     Vitamin D deficiency [E55.9]     Nasal polyposis [J33.9]     Mild persistent asthma [J45.30]     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   Cardiovascular:  Positive for leg swelling.   Musculoskeletal:  Positive for arthralgias, back pain and gait problem.     Social History     Socioeconomic History    Marital status:    Tobacco Use    Smoking status: Former     Current packs/day: 0.00     Average packs/day: 2.0 packs/day for 8.0 years (16.0 ttl pk-yrs)     Types: Cigarettes     Start date: 1987     Quit date: 1995     Years since quittin.5     Passive exposure: Past    Smokeless tobacco: Never   Vaping Use    Vaping status: Never Used   Substance and Sexual Activity    Alcohol use: Never    Drug use: Never    Sexual activity: Not Currently     Partners: Male     Birth control/protection: None     Family History   Problem Relation Age of Onset    Arthritis Mother     Hypertension Mother     Hyperlipidemia Mother     Migraines Mother     Stroke Mother     Diabetes Father     Pulmonary embolism Father     Diabetes Daughter     Arthritis Maternal Grandmother     Hypertension Maternal Grandmother     Hyperlipidemia Maternal Grandmother     Stroke Maternal Grandmother     Thyroid disease Maternal Grandmother      "Diabetes Paternal Grandmother     Lung cancer Maternal Aunt     Cancer Maternal Aunt     Heart attack Maternal Grandfather     Heart disease Maternal Grandfather     Heart failure Maternal Grandfather     Hearing loss Paternal Uncle     Asthma Son     Breast cancer Neg Hx      /78 (BP Location: Right arm, Patient Position: Sitting, Cuff Size: Adult)   Pulse 87   Temp 96.6 °F (35.9 °C) (Infrared)   Ht 165.1 cm (65\")   Wt 100 kg (221 lb)   SpO2 98%   BMI 36.78 kg/m²   Physical Exam  Vitals and nursing note reviewed.   Constitutional:       Appearance: Normal appearance. She is well-developed.   HENT:      Head: Normocephalic and atraumatic.   Eyes:      General: Lids are normal.      Conjunctiva/sclera: Conjunctivae normal.      Pupils: Pupils are equal, round, and reactive to light.   Neck:      Thyroid: No thyroid mass or thyromegaly.      Vascular: No carotid bruit.      Trachea: Trachea normal. No tracheal deviation.   Cardiovascular:      Rate and Rhythm: Normal rate and regular rhythm.      Heart sounds: Normal heart sounds. No murmur heard.     No friction rub. No gallop.   Pulmonary:      Effort: Pulmonary effort is normal. No respiratory distress.      Breath sounds: Normal breath sounds. No wheezing.   Abdominal:      Comments: Binder in place    Musculoskeletal:         General: No deformity. Normal range of motion.      Cervical back: Normal range of motion and neck supple.      Right lower leg: Edema present.      Left lower leg: Edema present.   Lymphadenopathy:      Cervical: No cervical adenopathy.   Skin:     General: Skin is warm and dry.      Findings: No erythema or rash.      Nails: There is no clubbing.   Neurological:      Mental Status: She is alert and oriented to person, place, and time.      Cranial Nerves: No cranial nerve deficit.      Deep Tendon Reflexes: Reflexes are normal and symmetric. Reflexes normal.   Psychiatric:         Speech: Speech normal.         Behavior: " Behavior normal.         Thought Content: Thought content normal.         Judgment: Judgment normal.       Results for orders placed or performed in visit on 10/22/24   Treadmill Stress Test    Collection Time: 10/22/24  3:51 PM   Result Value Ref Range    Target HR (85%) 140 bpm    Max. Pred. HR (100%) 165 bpm    BH CV STRESS PROTOCOL 1 Fran     Stage 1 1.0     HR Stage 1 109     BP Stage 1 158/100     O2 Stage 1 93     Duration Min Stage 1 3     Duration Sec Stage 1 0     Grade Stage 1 10     Speed Stage 1 1.7     BH CV STRESS METS STAGE 1 5.0     Stage 2 2.0     HR Stage 2 124     BP Stage 2 160/102     O2 Stage 2 95     Duration Min Stage 2 3     Duration Sec Stage 2 0     Grade Stage 2 12     Speed Stage 2 2.5     BH CV STRESS METS STAGE 2 7.5     Stage 3 3.0     HR Stage 3 144     O2 Stage 3 95     Duration Min Stage 3 3     Duration Sec Stage 3 0     Grade Stage 3 14     Speed Stage 3 3.4     BH CV STRESS METS STAGE 3 10.0     Baseline HR 81 bpm    Baseline /104 mmHg    O2 sat rest 98 %    Peak  bpm    Peak /102 mmHg    O2 sat peak 95 %    Recovery HR 81 bpm    Recovery /98 mmHg    Recovery O2 98 %    Percent Max Pred HR 87.27 %    Exercise duration (min) 5 min    Exercise duration (sec) 46 sec    Estimated workload 7.0 METS    Percent Target  %     Diagnoses and all orders for this visit:    1. Postablative hypothyroidism (Primary)  Assessment & Plan:  Update tfts  Continue synthroid 112 mcg daily along with cytomel 10 mcg daily     Orders:  -     T4, Free; Future  -     TSH; Future  -     T4, Free  -     TSH    2. Essential hypertension  Assessment & Plan:  BP is good   Continue monitoring   Has not restarted benicar yet     Orders:  -     Comprehensive Metabolic Panel; Future  -     Lipid Panel; Future  -     Vitamin D,25-Hydroxy; Future  -     Comprehensive Metabolic Panel  -     Lipid Panel  -     Vitamin D,25-Hydroxy    Return in about 1 year (around 11/7/2025) for  Recheck.    Electronically signed by: Taylor Holliday MD

## 2024-11-08 LAB — 25(OH)D3 SERPL-MCNC: 38.1 NG/ML (ref 30–100)

## 2024-11-11 ENCOUNTER — OFFICE VISIT (OUTPATIENT)
Dept: INTERNAL MEDICINE | Facility: CLINIC | Age: 54
End: 2024-11-11
Payer: COMMERCIAL

## 2024-11-11 VITALS
DIASTOLIC BLOOD PRESSURE: 84 MMHG | HEIGHT: 65 IN | HEART RATE: 68 BPM | OXYGEN SATURATION: 98 % | SYSTOLIC BLOOD PRESSURE: 124 MMHG | TEMPERATURE: 97.4 F | BODY MASS INDEX: 36.92 KG/M2 | WEIGHT: 221.6 LBS

## 2024-11-11 DIAGNOSIS — E03.9 ACQUIRED HYPOTHYROIDISM: ICD-10-CM

## 2024-11-11 DIAGNOSIS — E89.0 POSTABLATIVE HYPOTHYROIDISM: Primary | ICD-10-CM

## 2024-11-11 DIAGNOSIS — R60.9 EDEMA, UNSPECIFIED TYPE: Primary | ICD-10-CM

## 2024-11-11 DIAGNOSIS — I10 ESSENTIAL HYPERTENSION: ICD-10-CM

## 2024-11-11 PROCEDURE — 99214 OFFICE O/P EST MOD 30 MIN: CPT | Performed by: FAMILY MEDICINE

## 2024-11-11 RX ORDER — LEVOTHYROXINE SODIUM 125 UG/1
125 TABLET ORAL DAILY
Qty: 90 TABLET | Refills: 1 | Status: SHIPPED | OUTPATIENT
Start: 2024-11-11

## 2024-11-11 NOTE — ASSESSMENT & PLAN NOTE
Patient aware thyroid labs were abnormal. At time of visit she'd not heard from her endocrinologist and I discussed sending my note to her specialist. However, at time of finishing her note her endocrinologist has put in a result note and adjusted up her levothyroxine as I discussed with patient I expected she would. Her thyroid level being off may be etiology of edema.

## 2024-11-11 NOTE — ASSESSMENT & PLAN NOTE
Hypertension is stable and controlled  Ambulatory blood pressure monitoring.  Agree with cardiology resuming low dose ARB for renal protection.  Blood pressure will be reassessed  at next visit .

## 2024-11-11 NOTE — PROGRESS NOTES
"Chief Complaint  Leg Swelling (Face and abdomen swelling post spinal cord stimulator surgery. )  Answers submitted by the patient for this visit:  Other (Submitted on 11/9/2024)  Please describe your symptoms.: Swelling  Have you had these symptoms before?: Yes  How long have you been having these symptoms?: Greater than 2 weeks  Please list any medications you are currently taking for this condition.: Torsemide  Please describe any probable cause for these symptoms. : Not sure  Primary Reason for Visit (Submitted on 11/9/2024)  What is the primary reason for your visit?: Problem Not Listed    Subjective        Kelsey Ross presents to [facility]  History of Present Illness  Spinal stimulator 10/25 Dr. Vaca  11/4 post op. Was very swollen, face, abdomen, legs, feet.  Bux said to follow up with PCP.  Goes back 11/20 to have stitches/staples out    Swelling not as bad as it was.        Objective   Vital Signs:  /84   Pulse 68   Temp 97.4 °F (36.3 °C)   Ht 165.1 cm (65\")   Wt 101 kg (221 lb 9.6 oz)   SpO2 98%   BMI 36.88 kg/m²   Estimated body mass index is 36.88 kg/m² as calculated from the following:    Height as of this encounter: 165.1 cm (65\").    Weight as of this encounter: 101 kg (221 lb 9.6 oz).            Physical Exam  Vitals and nursing note reviewed.   Constitutional:       General: She is not in acute distress.     Appearance: Normal appearance. She is well-developed and well-groomed. She is obese. She is not ill-appearing, toxic-appearing or diaphoretic.      Comments: Wearing back brace   HENT:      Head: Normocephalic and atraumatic.      Right Ear: Hearing normal.      Left Ear: Hearing normal.   Eyes:      General: Lids are normal. No scleral icterus.     Extraocular Movements: Extraocular movements intact.   Neck:      Trachea: Phonation normal.   Cardiovascular:      Rate and Rhythm: Normal rate and regular rhythm.   Pulmonary:      Effort: Pulmonary " effort is normal.      Breath sounds: Normal breath sounds. No rhonchi or rales.   Musculoskeletal:      Cervical back: Neck supple.   Skin:     Coloration: Skin is not jaundiced or pale.   Neurological:      General: No focal deficit present.      Mental Status: She is alert and oriented to person, place, and time.      Motor: Motor function is intact.   Psychiatric:         Attention and Perception: Attention and perception normal.         Mood and Affect: Mood and affect normal.         Speech: Speech normal.         Behavior: Behavior normal. Behavior is cooperative.         Thought Content: Thought content normal.         Cognition and Memory: Cognition and memory normal.         Judgment: Judgment normal.        Result Review :  The following data was reviewed by: Isha Giron MD on 11/11/2024:  Comprehensive Metabolic Panel (11/07/2024 15:12)  albumin 4.3, creatinine 1.12  Lab Results   Component Value Date    TSH 10.800 (H) 11/07/2024     Lab Results   Component Value Date    FREET4 0.76 (L) 11/07/2024      Progress Notes by Taylor Holliday MD (11/07/2024 14:45)   Letter from Taylor Holliday MD (11/11/2024)   Progress Notes by Benito Muniz MD (11/06/2024 10:30)          Assessment and Plan   Diagnoses and all orders for this visit:    1. Edema, unspecified type (Primary)    2. Acquired hypothyroidism  Assessment & Plan:  Patient aware thyroid labs were abnormal. At time of visit she'd not heard from her endocrinologist and I discussed sending my note to her specialist. However, at time of finishing her note her endocrinologist has put in a result note and adjusted up her levothyroxine as I discussed with patient I expected she would. Her thyroid level being off may be etiology of edema.      3. Essential hypertension  Assessment & Plan:  Hypertension is stable and controlled  Ambulatory blood pressure monitoring.  Agree with cardiology resuming low dose ARB for renal  protection.  Blood pressure will be reassessed  at next visit .      Edema: follow up with nephrology. Normal protein levels most recent labs. Possibly due to abnormal thyroid levels. Discouraged frequent use of torsemide and explained that diuretics will worsen her renal function. Safe torsemide for severe edema, almost emergency use. Agree with cardiology resuming low dose ARB for renal protection.          Follow Up   Return for Annual physical.  Patient was given instructions and counseling regarding her condition or for health maintenance advice. Please see specific information pulled into the AVS if appropriate.

## 2024-11-15 DIAGNOSIS — E89.0 POSTABLATIVE HYPOTHYROIDISM: ICD-10-CM

## 2024-11-15 RX ORDER — LIOTHYRONINE SODIUM 5 UG/1
10 TABLET ORAL DAILY
Qty: 180 TABLET | Refills: 3 | Status: SHIPPED | OUTPATIENT
Start: 2024-11-15

## 2024-11-15 NOTE — TELEPHONE ENCOUNTER
I need refills on liothyronine please to be sent to [facility] in [place] [facility]       Rx Refill Note  Requested Prescriptions      No prescriptions requested or ordered in this encounter        Last office visit with prescribing clinician: 11/7/2024      Next office visit with prescribing clinician: 11/6/2025       Dariana Norris (Jodi)  11/15/24, 10:37 EST

## 2024-11-21 ENCOUNTER — TRANSCRIBE ORDERS (OUTPATIENT)
Dept: LAB | Facility: HOSPITAL | Age: 54
End: 2024-11-21
Payer: COMMERCIAL

## 2024-11-21 DIAGNOSIS — N02.2 MEMBRANOUS NEPHROPATHY DETERMINED BY BIOPSY: Primary | ICD-10-CM

## 2024-11-22 ENCOUNTER — LAB (OUTPATIENT)
Dept: LAB | Facility: HOSPITAL | Age: 54
End: 2024-11-22
Payer: COMMERCIAL

## 2024-11-22 DIAGNOSIS — N02.2 MEMBRANOUS NEPHROPATHY DETERMINED BY BIOPSY: ICD-10-CM

## 2024-11-22 LAB
ALBUMIN UR-MCNC: <1.2 MG/DL
BACTERIA UR QL AUTO: NORMAL /HPF
BILIRUB UR QL STRIP: NEGATIVE
CLARITY UR: CLEAR
COLOR UR: YELLOW
CREAT UR-MCNC: 31.2 MG/DL
CREAT UR-MCNC: 31.2 MG/DL
GLUCOSE UR STRIP-MCNC: NEGATIVE MG/DL
HGB UR QL STRIP.AUTO: NEGATIVE
HYALINE CASTS UR QL AUTO: NORMAL /LPF
KETONES UR QL STRIP: NEGATIVE
LEUKOCYTE ESTERASE UR QL STRIP.AUTO: NEGATIVE
MICROALBUMIN/CREAT UR: NORMAL MG/G{CREAT}
NITRITE UR QL STRIP: NEGATIVE
PH UR STRIP.AUTO: 6.5 [PH] (ref 5–8)
PROT ?TM UR-MCNC: 8.3 MG/DL
PROT UR QL STRIP: NEGATIVE
PROT/CREAT UR: 266 MG/G CREA (ref 0–200)
RBC # UR STRIP: NORMAL /HPF
REF LAB TEST METHOD: NORMAL
SP GR UR STRIP: 1.01 (ref 1–1.03)
SQUAMOUS #/AREA URNS HPF: NORMAL /HPF
UROBILINOGEN UR QL STRIP: NORMAL
WBC # UR STRIP: NORMAL /HPF

## 2024-11-22 PROCEDURE — 81001 URINALYSIS AUTO W/SCOPE: CPT

## 2024-11-22 PROCEDURE — 82570 ASSAY OF URINE CREATININE: CPT

## 2024-11-22 PROCEDURE — 82043 UR ALBUMIN QUANTITATIVE: CPT

## 2024-11-22 PROCEDURE — 84156 ASSAY OF PROTEIN URINE: CPT

## 2024-12-10 DIAGNOSIS — J82.83 EOSINOPHILIC ASTHMA: ICD-10-CM

## 2024-12-10 RX ORDER — MEPOLIZUMAB 100 MG/ML
INJECTION, SOLUTION SUBCUTANEOUS
Qty: 3 EACH | Refills: 11 | Status: SHIPPED | OUTPATIENT
Start: 2024-12-10

## 2025-01-18 ENCOUNTER — LAB (OUTPATIENT)
Dept: LAB | Facility: HOSPITAL | Age: 55
End: 2025-01-18
Payer: COMMERCIAL

## 2025-01-18 ENCOUNTER — TRANSCRIBE ORDERS (OUTPATIENT)
Dept: LAB | Facility: HOSPITAL | Age: 55
End: 2025-01-18
Payer: COMMERCIAL

## 2025-01-18 DIAGNOSIS — N02.2 MEMBRANOUS NEPHROPATHY DETERMINED BY BIOPSY: ICD-10-CM

## 2025-01-18 DIAGNOSIS — M32.14 OTHER SYSTEMIC LUPUS ERYTHEMATOSUS WITH GLOMERULAR DISEASE: ICD-10-CM

## 2025-01-18 DIAGNOSIS — E89.0 POSTABLATIVE HYPOTHYROIDISM: ICD-10-CM

## 2025-01-18 DIAGNOSIS — N02.2 MEMBRANOUS NEPHROPATHY DETERMINED BY BIOPSY: Primary | ICD-10-CM

## 2025-01-18 LAB
ALBUMIN SERPL-MCNC: 3.9 G/DL (ref 3.5–5.2)
ALBUMIN UR-MCNC: 1.6 MG/DL
ALBUMIN/GLOB SERPL: 1.4 G/DL
ALP SERPL-CCNC: 104 U/L (ref 39–117)
ALT SERPL W P-5'-P-CCNC: 14 U/L (ref 1–33)
ANION GAP SERPL CALCULATED.3IONS-SCNC: 13.4 MMOL/L (ref 5–15)
AST SERPL-CCNC: 15 U/L (ref 1–32)
BACTERIA UR QL AUTO: NORMAL /HPF
BASOPHILS # BLD AUTO: 0.06 10*3/MM3 (ref 0–0.2)
BASOPHILS NFR BLD AUTO: 0.9 % (ref 0–1.5)
BILIRUB SERPL-MCNC: 0.3 MG/DL (ref 0–1.2)
BILIRUB UR QL STRIP: NEGATIVE
BUN SERPL-MCNC: 12 MG/DL (ref 6–20)
BUN/CREAT SERPL: 9.3 (ref 7–25)
C3 SERPL-MCNC: 146 MG/DL (ref 82–167)
C4 SERPL-MCNC: 32 MG/DL (ref 14–44)
CALCIUM SPEC-SCNC: 9 MG/DL (ref 8.6–10.5)
CHLORIDE SERPL-SCNC: 104 MMOL/L (ref 98–107)
CLARITY UR: CLEAR
CO2 SERPL-SCNC: 21.6 MMOL/L (ref 22–29)
COLOR UR: YELLOW
CREAT SERPL-MCNC: 1.29 MG/DL (ref 0.57–1)
CREAT UR-MCNC: 153.7 MG/DL
CREAT UR-MCNC: 156.7 MG/DL
DEPRECATED RDW RBC AUTO: 38.8 FL (ref 37–54)
EGFRCR SERPLBLD CKD-EPI 2021: 49.1 ML/MIN/1.73
EOSINOPHIL # BLD AUTO: 0.03 10*3/MM3 (ref 0–0.4)
EOSINOPHIL NFR BLD AUTO: 0.5 % (ref 0.3–6.2)
ERYTHROCYTE [DISTWIDTH] IN BLOOD BY AUTOMATED COUNT: 12.3 % (ref 12.3–15.4)
GLOBULIN UR ELPH-MCNC: 2.7 GM/DL
GLUCOSE SERPL-MCNC: 87 MG/DL (ref 65–99)
GLUCOSE UR STRIP-MCNC: NEGATIVE MG/DL
HCT VFR BLD AUTO: 40.7 % (ref 34–46.6)
HGB BLD-MCNC: 13.5 G/DL (ref 12–15.9)
HGB UR QL STRIP.AUTO: NEGATIVE
HYALINE CASTS UR QL AUTO: NORMAL /LPF
IMM GRANULOCYTES # BLD AUTO: 0.03 10*3/MM3 (ref 0–0.05)
IMM GRANULOCYTES NFR BLD AUTO: 0.5 % (ref 0–0.5)
KETONES UR QL STRIP: NEGATIVE
LEUKOCYTE ESTERASE UR QL STRIP.AUTO: ABNORMAL
LYMPHOCYTES # BLD AUTO: 1.55 10*3/MM3 (ref 0.7–3.1)
LYMPHOCYTES NFR BLD AUTO: 23.8 % (ref 19.6–45.3)
MAGNESIUM SERPL-MCNC: 2 MG/DL (ref 1.6–2.6)
MCH RBC QN AUTO: 28.5 PG (ref 26.6–33)
MCHC RBC AUTO-ENTMCNC: 33.2 G/DL (ref 31.5–35.7)
MCV RBC AUTO: 85.9 FL (ref 79–97)
MICROALBUMIN/CREAT UR: 10.2 MG/G (ref 0–29)
MONOCYTES # BLD AUTO: 0.49 10*3/MM3 (ref 0.1–0.9)
MONOCYTES NFR BLD AUTO: 7.5 % (ref 5–12)
NEUTROPHILS NFR BLD AUTO: 4.34 10*3/MM3 (ref 1.7–7)
NEUTROPHILS NFR BLD AUTO: 66.8 % (ref 42.7–76)
NITRITE UR QL STRIP: NEGATIVE
NRBC BLD AUTO-RTO: 0 /100 WBC (ref 0–0.2)
PH UR STRIP.AUTO: 8 [PH] (ref 5–8)
PLATELET # BLD AUTO: 286 10*3/MM3 (ref 140–450)
PMV BLD AUTO: 10.7 FL (ref 6–12)
POTASSIUM SERPL-SCNC: 3.8 MMOL/L (ref 3.5–5.2)
PROT ?TM UR-MCNC: 17.6 MG/DL
PROT SERPL-MCNC: 6.6 G/DL (ref 6–8.5)
PROT UR QL STRIP: ABNORMAL
PROT/CREAT UR: 114.5 MG/G CREA (ref 0–200)
RBC # BLD AUTO: 4.74 10*6/MM3 (ref 3.77–5.28)
RBC # UR STRIP: NORMAL /HPF
REF LAB TEST METHOD: NORMAL
SODIUM SERPL-SCNC: 139 MMOL/L (ref 136–145)
SP GR UR STRIP: 1.02 (ref 1–1.03)
SQUAMOUS #/AREA URNS HPF: NORMAL /HPF
T4 FREE SERPL-MCNC: 1.08 NG/DL (ref 0.92–1.68)
TSH SERPL DL<=0.05 MIU/L-ACNC: 6.3 UIU/ML (ref 0.27–4.2)
UROBILINOGEN UR QL STRIP: ABNORMAL
WBC # UR STRIP: NORMAL /HPF
WBC NRBC COR # BLD AUTO: 6.5 10*3/MM3 (ref 3.4–10.8)

## 2025-01-18 PROCEDURE — 36415 COLL VENOUS BLD VENIPUNCTURE: CPT

## 2025-01-18 PROCEDURE — 84439 ASSAY OF FREE THYROXINE: CPT

## 2025-01-18 PROCEDURE — 83735 ASSAY OF MAGNESIUM: CPT

## 2025-01-18 PROCEDURE — 80050 GENERAL HEALTH PANEL: CPT

## 2025-01-18 PROCEDURE — 84156 ASSAY OF PROTEIN URINE: CPT

## 2025-01-18 PROCEDURE — 81001 URINALYSIS AUTO W/SCOPE: CPT

## 2025-01-18 PROCEDURE — 86160 COMPLEMENT ANTIGEN: CPT

## 2025-01-18 PROCEDURE — 82043 UR ALBUMIN QUANTITATIVE: CPT

## 2025-01-18 PROCEDURE — 82570 ASSAY OF URINE CREATININE: CPT

## 2025-01-18 PROCEDURE — 86225 DNA ANTIBODY NATIVE: CPT

## 2025-01-19 DIAGNOSIS — E03.9 ACQUIRED HYPOTHYROIDISM: Primary | ICD-10-CM

## 2025-01-19 RX ORDER — LEVOTHYROXINE SODIUM 137 UG/1
137 TABLET ORAL
Qty: 90 TABLET | Refills: 1 | Status: SHIPPED | OUTPATIENT
Start: 2025-01-19

## 2025-01-20 LAB — DSDNA AB SER-ACNC: <1 IU/ML (ref 0–9)

## 2025-01-24 ENCOUNTER — TELEPHONE (OUTPATIENT)
Dept: INTERNAL MEDICINE | Facility: CLINIC | Age: 55
End: 2025-01-24
Payer: COMMERCIAL

## 2025-01-24 NOTE — TELEPHONE ENCOUNTER
Caller: Kelsey Ross    Relationship: Self    Best call back number: [phone]     What is the best time to reach you: ANY    Who are you requesting to speak with (clinical staff, provider,  specific staff member): NURSE    Do you know the name of the person who called: PATIENT    What was the call regarding: PATIENT IS READY TO START SEMAGLUTIDE.      PHARMACY:  [facility]    Is it okay if the provider responds through [facility]: CALL IF NEEDED

## 2025-01-28 ENCOUNTER — PATIENT MESSAGE (OUTPATIENT)
Dept: INTERNAL MEDICINE | Facility: CLINIC | Age: 55
End: 2025-01-28
Payer: COMMERCIAL

## 2025-02-04 ENCOUNTER — PATIENT MESSAGE (OUTPATIENT)
Dept: INTERNAL MEDICINE | Facility: CLINIC | Age: 55
End: 2025-02-04
Payer: COMMERCIAL

## 2025-03-03 ENCOUNTER — OFFICE VISIT (OUTPATIENT)
Dept: INTERNAL MEDICINE | Facility: CLINIC | Age: 55
End: 2025-03-03
Payer: COMMERCIAL

## 2025-03-03 ENCOUNTER — TRANSCRIBE ORDERS (OUTPATIENT)
Dept: GENERAL RADIOLOGY | Facility: HOSPITAL | Age: 55
End: 2025-03-03
Payer: COMMERCIAL

## 2025-03-03 ENCOUNTER — HOSPITAL ENCOUNTER (OUTPATIENT)
Dept: GENERAL RADIOLOGY | Facility: HOSPITAL | Age: 55
Discharge: HOME | End: 2025-03-03
Payer: COMMERCIAL

## 2025-03-03 VITALS
BODY MASS INDEX: 37.32 KG/M2 | HEIGHT: 65 IN | SYSTOLIC BLOOD PRESSURE: 150 MMHG | TEMPERATURE: 99.5 F | DIASTOLIC BLOOD PRESSURE: 98 MMHG | WEIGHT: 224 LBS | OXYGEN SATURATION: 98 % | HEART RATE: 90 BPM

## 2025-03-03 DIAGNOSIS — B34.9 ACUTE VIRAL DISEASE: Primary | ICD-10-CM

## 2025-03-03 DIAGNOSIS — Z20.822 CLOSE EXPOSURE TO COVID-19 VIRUS: ICD-10-CM

## 2025-03-03 DIAGNOSIS — R50.9 LOW GRADE FEVER: ICD-10-CM

## 2025-03-03 DIAGNOSIS — R11.2 NAUSEA AND VOMITING, UNSPECIFIED VOMITING TYPE: ICD-10-CM

## 2025-03-03 DIAGNOSIS — J34.89 NASAL SORE: ICD-10-CM

## 2025-03-03 DIAGNOSIS — Z86.69 HISTORY OF MIGRAINE: ICD-10-CM

## 2025-03-03 DIAGNOSIS — I10 ELEVATED BLOOD PRESSURE READING WITH DIAGNOSIS OF HYPERTENSION: ICD-10-CM

## 2025-03-03 DIAGNOSIS — M50.30 OTHER CERVICAL DISC DEGENERATION, UNSPECIFIED CERVICAL REGION: Primary | ICD-10-CM

## 2025-03-03 DIAGNOSIS — R51.9 FRONTAL HEADACHE: ICD-10-CM

## 2025-03-03 LAB
EXPIRATION DATE: NORMAL
FLUAV AG UPPER RESP QL IA.RAPID: NOT DETECTED
FLUBV AG UPPER RESP QL IA.RAPID: NOT DETECTED
INTERNAL CONTROL: NORMAL
Lab: NORMAL
SARS-COV-2 AG UPPER RESP QL IA.RAPID: NOT DETECTED

## 2025-03-03 PROCEDURE — 99214 OFFICE O/P EST MOD 30 MIN: CPT | Performed by: NURSE PRACTITIONER

## 2025-03-03 PROCEDURE — 87428 SARSCOV & INF VIR A&B AG IA: CPT | Performed by: NURSE PRACTITIONER

## 2025-03-03 PROCEDURE — 72040 X-RAY EXAM NECK SPINE 2-3 VW: CPT

## 2025-03-03 RX ORDER — BUDESONIDE 1 MG/2ML
INHALANT ORAL
COMMUNITY
Start: 2025-02-26

## 2025-03-03 NOTE — LETTER
March 3, 2025     Patient: Kelsey Ross   YOB: 1970   Date of Visit: 3/3/2025       To Whom It May Concern:    It is my medical opinion that Kelsey Ross may return to work 3/5/25.      Sincerely,      Vanessa Ruiz, APRN

## 2025-03-03 NOTE — PROGRESS NOTES
Office Visit      Patient Name: Kelsey Ross  : 1970   MRN: [id number]   Care Team: Patient Care Team:  Isha Giron MD as PCP - General (Family Medicine)  Taylor Holliday MD as Consulting Physician (Endocrinology)  Kandy Ramirez MD (Internal Medicine)  Meaghan Benitez MD as Consulting Physician (Pulmonary Disease)  Verito Flowers MD (Rheumatology)    Chief Complaint  Vomiting (Friday), Headache (Since Saturday), and Fever (99 with Tylenol )    Subjective     Subjective      History of Present Illness  The patient is a 54-year-old female with a history of lupus, asthma, hypothyroidism, and hypertension who presents for nausea, vomiting, headache, cough and low-grade fever, T-max 99.5. Symptoms started 2 days ago on Friday evening.  She went to work today, and her boss made her leave work and come to the doctor because she had a low-grade fever and her face looked flushed. She states her face often looks flushed due to lupus. The vomiting has resolved since Friday but she still feels nauseous. Not eating or drinking much.   Cough is dry, hacking. No significant shortness of air, wheezing. She does have underlying asthma, compliant with inhalers.   The headache has been since Saturday. It is in the frontal part of her head and relieved some with Tylenol, and once the Tylenol wears off, it comes back. She does get some sensitivity to light and sounds, and she had to turn off the TV last night due to the headache. She does have a history of migraines.  Her blood pressure is elevated today at 150/98, and she is compliant with her blood pressure medication, olmesartan 5 mg daily prescribed by cardiologist. No chest pain, soa.   Reports sores in her nose x 5 months   Of note, semaglutide is not a new medication and dose has not been adjusted lately but she is suppose to go up to 0.5 mg soon. Pcp is not managing this medication.     Objective     Objective   Vital Signs:  "  /98   Pulse 90   Temp 99.5 °F (37.5 °C)   Ht 165.1 cm (65\")   Wt 102 kg (224 lb)   SpO2 98%   BMI 37.28 kg/m²         Physical Exam  Vitals and nursing note reviewed.   Constitutional:       General: She is not in acute distress.     Appearance: Normal appearance. She is not ill-appearing, toxic-appearing or diaphoretic.   HENT:      Head: Normocephalic and atraumatic.      Right Ear: Tympanic membrane, ear canal and external ear normal.      Left Ear: Tympanic membrane, ear canal and external ear normal.      Nose: No congestion or rhinorrhea.      Comments: Nasal sore noted right nare, no epistaxis     Mouth/Throat:      Lips: Pink.      Mouth: Mucous membranes are moist.      Pharynx: Oropharynx is clear. Uvula midline. Postnasal drip present.   Eyes:      General: No scleral icterus.        Right eye: No discharge.         Left eye: No discharge.      Extraocular Movements: Extraocular movements intact.      Conjunctiva/sclera: Conjunctivae normal.      Pupils: Pupils are equal, round, and reactive to light.   Cardiovascular:      Rate and Rhythm: Normal rate and regular rhythm.      Heart sounds: Normal heart sounds.   Pulmonary:      Effort: Pulmonary effort is normal. No respiratory distress.      Breath sounds: Normal breath sounds. No wheezing or rhonchi.      Comments: Dry hacking cough  Abdominal:      General: Bowel sounds are normal.      Palpations: Abdomen is soft.      Tenderness: There is no abdominal tenderness.   Musculoskeletal:         General: Normal range of motion.      Cervical back: Normal range of motion and neck supple. No tenderness.      Right lower leg: No edema.      Left lower leg: No edema.   Lymphadenopathy:      Cervical: No cervical adenopathy.   Skin:     General: Skin is warm and dry.   Neurological:      General: No focal deficit present.      Mental Status: She is alert and oriented to person, place, and time.   Psychiatric:         Mood and Affect: Mood normal. "         Behavior: Behavior normal.         Thought Content: Thought content normal.          Labs:   Results for orders placed or performed in visit on 03/03/25   POCT SARS-CoV-2 Antigen SHAHRIAR + Flu    Collection Time: 03/03/25  2:14 PM    Specimen: Swab   Result Value Ref Range    SARS Antigen Not Detected Not Detected, Presumptive Negative    Influenza A Antigen SHAHRIAR Not Detected Not Detected    Influenza B Antigen SHAHRIAR Not Detected Not Detected    Internal Control Passed Passed    Lot Number [id number]     Expiration Date 5/10/25      Assessment / Plan      Assessment & Plan   Diagnoses and all orders for this visit:    1. Acute viral disease (Primary)  -     POCT SARS-CoV-2 Antigen SHAHRIAR + Flu    2. Close exposure to COVID-19 virus  -     POCT SARS-CoV-2 Antigen SHAHRIAR + Flu    3. Nausea and vomiting, unspecified vomiting type  -     POCT SARS-CoV-2 Antigen SHAHRIAR + Flu    4. History of migraine  -     rimegepant sulfate ODT (NURTEC-ODT) 75 MG disintegrating tablet; Place 1 tablet under the tongue Daily As Needed (migraine).  Dispense: 2 tablet; Refill: 0    5. Frontal headache  -     POCT SARS-CoV-2 Antigen SHAHRIAR + Flu  -     rimegepant sulfate ODT (NURTEC-ODT) 75 MG disintegrating tablet; Place 1 tablet under the tongue Daily As Needed (migraine).  Dispense: 2 tablet; Refill: 0    6. Low grade fever  -     POCT SARS-CoV-2 Antigen SHAHRIAR + Flu    7. Nasal sore  -     mupirocin (BACTROBAN) 2 % nasal ointment; Administer 1 Application into the nostril(s) as directed by provider 2 (Two) Times a Day.  Dispense: 22 g; Refill: 0    8. Elevated blood pressure reading with diagnosis of hypertension       Assessment & Plan  Overall examination is normal other than some nasal sores noted to the right. Bactroban ointment will be applied twice daily using a Q-tip. Suspect acute viral illness. She will continue with anti-nausea medication, adhere to a bland diet, and increase fluid intake with electrolytes. POC covid and flu testing negative  today. At home COVID-19 test has been provided for retesting before returning to work if necessary. An excuse for work has been provided for today and tomorrow, if fever free can go back to work on Wednesday.  Sample of Nurtec will be provided to see if helps relieve intractable headache, has a history of migraines. Tylenol temporarily helps but does not relieve it. I believe the headache is due to acute viral illness, dehydration, and blood pressure elevation.   Her blood pressure is elevated today at 150/98, despite being compliant with her medication, olmesartan 5 mg daily. This elevation is likely due to her acute illness and should hopefully resolve. She will monitor her blood pressure, push fluids, and follow up with her cardiologist/PCP if consistently high.  Would not recommend going up on semaglutide dose at this time until symptoms resolve; pcp is not prescribing this elsewhere is managing       Follow Up   PRN  Patient was given instructions and counseling regarding her condition or for health maintenance advice. Please see specific information pulled into the AVS if appropriate.     EFRA Kruger  [facility] [place]    JOSE spent 32 minutes caring for Kelsey Ross on this date of service. This time includes time spent by me in the following activities: preparing for the visit, reviewing tests, performing a medically appropriate examination and/or evaluation, counseling and educating the patient/family/caregiver, ordering medications, tests, or procedures and documenting information in the medical record.

## 2025-03-03 NOTE — LETTER
March 4, 2025     Patient: Kelsey Ross   YOB: 1970   Date of Visit: 3/3/2025       To Whom It May Concern:    It is my medical opinion that Kelsey Ross may return to work on 3/10/25 . She tested positive for COVID-19.           Sincerely,        Vanessa Ruiz, EFRA

## 2025-03-04 DIAGNOSIS — Z86.69 HISTORY OF MIGRAINE: Primary | ICD-10-CM

## 2025-03-04 RX ORDER — SUMATRIPTAN SUCCINATE 25 MG/1
TABLET ORAL
Qty: 9 TABLET | Refills: 0 | Status: SHIPPED | OUTPATIENT
Start: 2025-03-04

## 2025-03-04 RX ORDER — TOPIRAMATE 25 MG/1
TABLET, FILM COATED ORAL
Qty: 90 TABLET | Refills: 0 | Status: SHIPPED | OUTPATIENT
Start: 2025-03-04

## 2025-03-14 DIAGNOSIS — J82.83 EOSINOPHILIC ASTHMA: ICD-10-CM

## 2025-03-14 RX ORDER — ALBUTEROL SULFATE 90 UG/1
1-2 INHALANT RESPIRATORY (INHALATION) EVERY 6 HOURS PRN
Qty: 48 G | Refills: 3 | Status: SHIPPED | OUTPATIENT
Start: 2025-03-14

## 2025-03-15 ENCOUNTER — LAB (OUTPATIENT)
Dept: LAB | Facility: HOSPITAL | Age: 55
End: 2025-03-15
Payer: COMMERCIAL

## 2025-03-15 DIAGNOSIS — E03.9 ACQUIRED HYPOTHYROIDISM: ICD-10-CM

## 2025-03-15 DIAGNOSIS — N02.2 MEMBRANOUS NEPHROPATHY DETERMINED BY BIOPSY: ICD-10-CM

## 2025-03-15 DIAGNOSIS — I10 ESSENTIAL HYPERTENSION: ICD-10-CM

## 2025-03-15 LAB
ALBUMIN UR-MCNC: 4.9 MG/DL
ANION GAP SERPL CALCULATED.3IONS-SCNC: 8.6 MMOL/L (ref 5–15)
BACTERIA UR QL AUTO: ABNORMAL /HPF
BILIRUB UR QL STRIP: NEGATIVE
BUN SERPL-MCNC: 10 MG/DL (ref 6–20)
BUN/CREAT SERPL: 9.4 (ref 7–25)
CALCIUM SPEC-SCNC: 8.9 MG/DL (ref 8.6–10.5)
CHLORIDE SERPL-SCNC: 104 MMOL/L (ref 98–107)
CLARITY UR: CLEAR
CO2 SERPL-SCNC: 23.4 MMOL/L (ref 22–29)
COLOR UR: ABNORMAL
CREAT SERPL-MCNC: 1.06 MG/DL (ref 0.57–1)
CREAT UR-MCNC: 372 MG/DL
CREAT UR-MCNC: 372 MG/DL
EGFRCR SERPLBLD CKD-EPI 2021: 62.2 ML/MIN/1.73
GLUCOSE SERPL-MCNC: 86 MG/DL (ref 65–99)
GLUCOSE UR STRIP-MCNC: NEGATIVE MG/DL
HGB UR QL STRIP.AUTO: NEGATIVE
HYALINE CASTS UR QL AUTO: ABNORMAL /LPF
KETONES UR QL STRIP: ABNORMAL
LEUKOCYTE ESTERASE UR QL STRIP.AUTO: NEGATIVE
MICROALBUMIN/CREAT UR: 13.2 MG/G (ref 0–29)
NITRITE UR QL STRIP: NEGATIVE
PH UR STRIP.AUTO: 5.5 [PH] (ref 5–8)
POTASSIUM SERPL-SCNC: 3.5 MMOL/L (ref 3.5–5.2)
PROT ?TM UR-MCNC: 29.4 MG/DL
PROT UR QL STRIP: ABNORMAL
PROT/CREAT UR: 79 MG/G CREA (ref 0–200)
RBC # UR STRIP: ABNORMAL /HPF
REF LAB TEST METHOD: ABNORMAL
SODIUM SERPL-SCNC: 136 MMOL/L (ref 136–145)
SP GR UR STRIP: 1.03 (ref 1–1.03)
SQUAMOUS #/AREA URNS HPF: ABNORMAL /HPF
T4 FREE SERPL-MCNC: 1.28 NG/DL (ref 0.92–1.68)
TSH SERPL DL<=0.05 MIU/L-ACNC: 0.56 UIU/ML (ref 0.27–4.2)
UROBILINOGEN UR QL STRIP: ABNORMAL
WBC # UR STRIP: ABNORMAL /HPF

## 2025-03-15 PROCEDURE — 82043 UR ALBUMIN QUANTITATIVE: CPT

## 2025-03-15 PROCEDURE — 84156 ASSAY OF PROTEIN URINE: CPT

## 2025-03-15 PROCEDURE — 82570 ASSAY OF URINE CREATININE: CPT

## 2025-03-15 PROCEDURE — 81001 URINALYSIS AUTO W/SCOPE: CPT

## 2025-03-15 PROCEDURE — 80048 BASIC METABOLIC PNL TOTAL CA: CPT

## 2025-03-15 PROCEDURE — 36415 COLL VENOUS BLD VENIPUNCTURE: CPT

## 2025-03-15 PROCEDURE — 84439 ASSAY OF FREE THYROXINE: CPT

## 2025-03-15 PROCEDURE — 84443 ASSAY THYROID STIM HORMONE: CPT

## 2025-03-16 DIAGNOSIS — J45.40 MODERATE PERSISTENT ASTHMA WITHOUT COMPLICATION: Primary | ICD-10-CM

## 2025-03-17 ENCOUNTER — RESULTS FOLLOW-UP (OUTPATIENT)
Dept: ENDOCRINOLOGY | Facility: CLINIC | Age: 55
End: 2025-03-17
Payer: COMMERCIAL

## 2025-03-17 NOTE — LETTER
Kelsey Ross  [address] [place] [id number]    March 17, 2025     Dear MsMargaret Cody:    Below are the results from your recent visit:    Resulted Orders   TSH+Free T4   Result Value Ref Range    TSH 0.555 0.270 - 4.200 uIU/mL    Free T4 1.28 0.92 - 1.68 ng/dL       Repeat thyroid tests are normal- no changes recommended.      If you have any questions or concerns, please don't hesitate to call.         Sincerely,        Taylor Holliday MD

## 2025-03-21 RX ORDER — DULOXETIN HYDROCHLORIDE 60 MG/1
60 CAPSULE, DELAYED RELEASE ORAL DAILY
Qty: 90 CAPSULE | Refills: 1 | Status: SHIPPED | OUTPATIENT
Start: 2025-03-21

## 2025-03-30 DIAGNOSIS — D51.0 PERNICIOUS ANEMIA: ICD-10-CM

## 2025-03-31 RX ORDER — CYANOCOBALAMIN 1000 UG/ML
INJECTION, SOLUTION INTRAMUSCULAR; SUBCUTANEOUS
Qty: 10 ML | Refills: 0 | Status: SHIPPED | OUTPATIENT
Start: 2025-03-31

## 2025-03-31 RX ORDER — SYRINGE AND NEEDLE,INSULIN,1ML 28GX1/2"
SYRINGE, EMPTY DISPOSABLE MISCELLANEOUS
Qty: 12 EACH | Refills: 0 | Status: SHIPPED | OUTPATIENT
Start: 2025-03-31

## 2025-03-31 NOTE — TELEPHONE ENCOUNTER
"Rx Refill Note  Requested Prescriptions     Pending Prescriptions Disp Refills    cyanocobalamin 1000 MCG/ML injection 10 mL 0     Sig: INJECT 1ML ONCE MONTHLY    SURE COMFORT INS SYR 1CC/28G 28G X 1/2\" 1 ML misc 12 each 0     Sig: Use one per month for administration of vitamin B12      Last office visit with prescribing clinician: 11/7/2024     Next office visit with prescribing clinician: 11/6/2025     Brigitte Elliott MA  03/31/25, 14:43 EDT   "

## 2025-04-01 ENCOUNTER — OFFICE VISIT (OUTPATIENT)
Dept: INTERNAL MEDICINE | Facility: CLINIC | Age: 55
End: 2025-04-01
Payer: COMMERCIAL

## 2025-04-01 VITALS
WEIGHT: 218 LBS | OXYGEN SATURATION: 98 % | HEART RATE: 84 BPM | HEIGHT: 65 IN | TEMPERATURE: 98.5 F | RESPIRATION RATE: 18 BRPM | SYSTOLIC BLOOD PRESSURE: 138 MMHG | DIASTOLIC BLOOD PRESSURE: 77 MMHG | BODY MASS INDEX: 36.32 KG/M2

## 2025-04-01 DIAGNOSIS — Z76.89 ENCOUNTER FOR WEIGHT MANAGEMENT: ICD-10-CM

## 2025-04-01 DIAGNOSIS — E66.01 CLASS 2 SEVERE OBESITY DUE TO EXCESS CALORIES WITH SERIOUS COMORBIDITY AND BODY MASS INDEX (BMI) OF 36.0 TO 36.9 IN ADULT: Primary | ICD-10-CM

## 2025-04-01 DIAGNOSIS — E66.812 CLASS 2 SEVERE OBESITY DUE TO EXCESS CALORIES WITH SERIOUS COMORBIDITY AND BODY MASS INDEX (BMI) OF 36.0 TO 36.9 IN ADULT: Primary | ICD-10-CM

## 2025-04-01 DIAGNOSIS — Z78.9 MEDICALLY COMPLEX PATIENT: ICD-10-CM

## 2025-04-01 DIAGNOSIS — R11.0 NAUSEA: ICD-10-CM

## 2025-04-01 PROBLEM — I95.2 HYPOTENSION DUE TO DRUGS: Status: RESOLVED | Noted: 2023-03-13 | Resolved: 2025-04-01

## 2025-04-01 PROCEDURE — 99214 OFFICE O/P EST MOD 30 MIN: CPT | Performed by: FAMILY MEDICINE

## 2025-04-01 RX ORDER — ONDANSETRON 4 MG/1
4-8 TABLET, ORALLY DISINTEGRATING ORAL EVERY 8 HOURS PRN
Qty: 30 TABLET | Refills: 5 | Status: SHIPPED | OUTPATIENT
Start: 2025-04-01

## 2025-04-01 NOTE — ASSESSMENT & PLAN NOTE
Patient's (Body mass index is 36.28 kg/m².) indicates that they are morbidly/severely obese (BMI > 40 or > 35 with obesity - related health condition) with health conditions that include hypertension, dyslipidemias, and osteoarthritis . Weight is improving with treatment. BMI  is above average; BMI management plan is completed. We discussed portion control, pharmacologic options including compounded Semaglutide (due to insurance coverage), and protein goals in diet (vegan--work on more protein minimal 60 grams a day) .

## 2025-04-18 ENCOUNTER — OFFICE VISIT (OUTPATIENT)
Dept: PULMONOLOGY | Facility: CLINIC | Age: 55
End: 2025-04-18
Payer: COMMERCIAL

## 2025-04-18 VITALS
HEIGHT: 65 IN | OXYGEN SATURATION: 99 % | SYSTOLIC BLOOD PRESSURE: 144 MMHG | RESPIRATION RATE: 18 BRPM | BODY MASS INDEX: 35.99 KG/M2 | WEIGHT: 216 LBS | HEART RATE: 51 BPM | DIASTOLIC BLOOD PRESSURE: 82 MMHG

## 2025-04-18 DIAGNOSIS — J82.83 EOSINOPHILIC ASTHMA: ICD-10-CM

## 2025-04-18 DIAGNOSIS — M32.14 OTHER SYSTEMIC LUPUS ERYTHEMATOSUS WITH GLOMERULAR DISEASE: ICD-10-CM

## 2025-04-18 DIAGNOSIS — J45.40 MODERATE PERSISTENT ASTHMA WITHOUT COMPLICATION: Primary | ICD-10-CM

## 2025-04-18 DIAGNOSIS — J30.89 NON-SEASONAL ALLERGIC RHINITIS, UNSPECIFIED TRIGGER: ICD-10-CM

## 2025-04-18 RX ORDER — ALBUTEROL SULFATE 90 UG/1
1-2 INHALANT RESPIRATORY (INHALATION) EVERY 6 HOURS PRN
Qty: 48 G | Refills: 3 | Status: SHIPPED | OUTPATIENT
Start: 2025-04-18

## 2025-04-18 NOTE — PROGRESS NOTES
Pulmonary follow-up office Visit      Chief Complaint:    Chief Complaint   Patient presents with    Breathing Problem    Follow-up       Subjective: Kelsey Ross is a 54 y.o. female who is here today for follow-up.    Past medical history:  Patient had chronic cough that started in October 2020 initially was seen in my clinic in April 2021.  At that point, her CT scan and PFTs were found to be normal other than some small airway disease and she was started on Advair and as needed albuterol.  However, her eosinophils were 16%.  She had a very complicated set of symptoms that include rash, chronic cough and lymphadenopathy.  In lab work-up her DIMAS and p-ANCA were both found to be positive.  She was referred to hematology and rheumatology.  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  Dr. Lane had started her on methotrexate for presumed GPA without tissue biopsy, but she had side effects from this and had to stop.  She has also been on chronic p.o. steroids of prednisone 10 mg daily and serum allergy testing was negative.  Due to her complicated course she was referred to [facility] and she saw them in November 2021.  [facility] repeated PFTs which showed FEV1 102%, FEV1/FVC ratio 98.  No significant bronchial hyperresponsiveness except for FEF 50% with 49% improvement.  Her [facility] pulmonologist, Dr. 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 [facility] then referred her to rheumatology and has now been diagnosed with lupus with renal involvement.    Since last visit, she has been referred to [facility] and most likely will need surgery.  Has been doing well from a respiratory standpoint and feels like Breztri really helped her symptoms.  Now requiring albuterol less than 1 time per week.  No recent exacerbations, but did get sick with COVID in February which lasted several weeks with the symptoms.  Had respiratory  as well as GI symptoms.      Still on Nucala and doing well without side effects.  She gives home injections.    She continues to follow with [facility] for lupus with renal involvement and is on Plaquenil, CellCept and Benlysta for her Lupus.  No recent issues    Subjective      Review of Systems:   Review of Systems   HENT:  Positive for postnasal drip and rhinorrhea.    Respiratory:  Negative for cough, shortness of breath and wheezing.    Cardiovascular:  Negative for chest pain.   Allergic/Immunologic: Positive for environmental allergies. Negative for food allergies.         Social History:   Social History     Socioeconomic History    Marital status:    Tobacco Use    Smoking status: Former     Current packs/day: 0.00     Average packs/day: 2.0 packs/day for 8.0 years (16.0 ttl pk-yrs)     Types: Cigarettes     Start date: 1987     Quit date: 1995     Years since quittin.9     Passive exposure: Past    Smokeless tobacco: Never   Vaping Use    Vaping status: Never Used   Substance and Sexual Activity    Alcohol use: Never    Drug use: Never    Sexual activity: Not Currently     Partners: Male     Birth control/protection: None       Medications:     Current Outpatient Medications:     albuterol sulfate  (90 Base) MCG/ACT inhaler, Inhale 1-2 puffs Every 6 (Six) Hours As Needed for Wheezing or Shortness of Air., Disp: 48 g, Rfl: 3    Benlysta 200 MG/ML solution prefilled syringe, Inject 200 mg under the skin into the appropriate area as directed Every 7 (Seven) Days., Disp: , Rfl:     Budeson-Glycopyrrol-Formoterol (BREZTRI) 160-9-4.8 MCG/ACT aerosol inhaler, Inhale 2 puffs 2 (Two) Times a Day., Disp: 1 each, Rfl: 5    budesonide (PULMICORT) 1 MG/2ML nebulizer solution, Create sinus rinse with distilled water and saline packet according to bottle directions. Empty respule into sinus rinse bottle. Shake well. Then use half of bottle to irrigate each side twice daily., Disp: ,  Rfl:     cevimeline (EVOXAC) 30 MG capsule, Take 1 capsule by mouth 3 (Three) Times a Day., Disp: , Rfl:     cyanocobalamin 1000 MCG/ML injection, INJECT 1ML ONCE MONTHLY, Disp: 10 mL, Rfl: 0    DULoxetine (CYMBALTA) 60 MG capsule, TAKE ONE (1) CAPSULE BY MOUTH EVERY DAY, Disp: 90 capsule, Rfl: 1    gabapentin (NEURONTIN) 600 MG tablet, , Disp: , Rfl:     hydroxychloroquine (PLAQUENIL) 200 MG tablet, Take 1 tablet by mouth 2 (Two) Times a Day., Disp: , Rfl:     ipratropium (ATROVENT) 0.03 % nasal spray, Every 12 (Twelve) Hours., Disp: , Rfl:     levothyroxine (SYNTHROID, LEVOTHROID) 137 MCG tablet, Take 1 tablet by mouth Every Morning., Disp: 90 tablet, Rfl: 1    Linzess 145 MCG capsule capsule, TAKE ONE (1) CAPSULE BY MOUTH EVERY MORNING BEFORE BREAKFAST., Disp: 90 capsule, Rfl: 3    liothyronine (CYTOMEL) 5 MCG tablet, Take 2 tablets by mouth Daily., Disp: 180 tablet, Rfl: 3    Mucinex D  MG per 12 hr tablet, , Disp: , Rfl:     mupirocin (BACTROBAN) 2 % nasal ointment, Administer 1 Application into the nostril(s) as directed by provider 2 (Two) Times a Day., Disp: 22 g, Rfl: 0    mycophenolate (CELLCEPT) 500 MG tablet, Take 2 tablets by mouth 2 (Two) Times a Day., Disp: , Rfl:     Nucala 100 MG/ML solution auto-injector, INJECT 3 PENS UNDER THE SKIN EVERY 28 DAYS, Disp: 3 each, Rfl: 11    olmesartan (BENICAR) 5 MG tablet, Take 1 tablet by mouth Daily., Disp: 30 tablet, Rfl: 3    ondansetron ODT (ZOFRAN-ODT) 4 MG disintegrating tablet, Place 1-2 tablets on the tongue Every 8 (Eight) Hours As Needed for Nausea or Vomiting., Disp: 30 tablet, Rfl: 5    pantoprazole (PROTONIX) 40 MG EC tablet, take 1 tablet PO QD 1 hour before evening meal, Disp: , Rfl:     promethazine (PHENERGAN) 25 MG tablet, Take 1 tablet by mouth Every 6 (Six) Hours As Needed for Nausea or Vomiting., Disp: 30 tablet, Rfl: 1    rosuvastatin (CRESTOR) 5 MG tablet, Take 1 tablet by mouth Every Night., Disp: 90 tablet, Rfl: 1     "Semaglutide-Weight Management 0.5 MG/0.5ML solution auto-injector, Inject 0.5mL (0.5mg=50 units) subcutaneously once weekly for four (4) weeks., Disp: , Rfl:     SEMAGLUTIDE-WEIGHT MANAGEMENT SC, Inject 0.5mL (0.5mg=50 units) subcutaneously once weekly for four (4) weeks., Disp: , Rfl:     SUMAtriptan (IMITREX) 25 MG tablet, Take one tablet at onset of headache. May repeat dose one time in 2 hours if headache not relieved.  Indications: Migraine Headache, Disp: 9 tablet, Rfl: 0    SURE COMFORT INS SYR 1CC/28G 28G X 1/2\" 1 ML misc, Use one per month for administration of vitamin B12, Disp: 12 each, Rfl: 0    tiZANidine (ZANAFLEX) 4 MG tablet, TAKE ONE (1) TABLET BY MOUTH EVERY 8 (EIGHT) HOURS AS NEEDED FOR MUSCLE SPASMS., Disp: 270 tablet, Rfl: 3    topiramate (TOPAMAX) 25 MG tablet, Take 1/2 tablet by mouth at night x 7 days, then take 1 tablet nightly  Indications: Migraine Headache, Disp: 90 tablet, Rfl: 0    Tuberculin-Allergy Syringes 28G X 1/2\" 1 ML misc, 1 Units Every 30 (Thirty) Days., Disp: 20 each, Rfl: 1    vitamin D3 125 MCG (5000 UT) capsule capsule, Take 1 capsule by mouth Daily., Disp: , Rfl:     Allergies:   Allergies   Allergen Reactions    Penicillin G Unknown - High Severity    Penicillins Unknown - High Severity     Allergist informed patient she was allergic to Penicillins    Fexofenadine Headache     Only allegra D    Methotrexate GI Intolerance and Headache    Pseudoephedrine Hcl Headache       Objective     Physical Exam:  Vital Signs:   Vitals:    04/18/25 0937   BP: 144/82   Pulse: 51   Resp: 18   SpO2: 99%   Weight: 98 kg (216 lb)   Height: 165.1 cm (65\")           Physical Exam  Constitutional:       General: She is not in acute distress.     Appearance: Normal appearance.   HENT:      Head: Normocephalic and atraumatic.      Right Ear: External ear normal.      Left Ear: External ear normal.      Nose: Congestion and rhinorrhea present.      Mouth/Throat:      Mouth: Mucous membranes are " "moist.      Pharynx: Oropharynx is clear. No oropharyngeal exudate or posterior oropharyngeal erythema.   Eyes:      General: No scleral icterus.        Right eye: No discharge.         Left eye: No discharge.      Extraocular Movements: Extraocular movements intact.   Cardiovascular:      Rate and Rhythm: Regular rhythm.      Heart sounds: No murmur heard.  Pulmonary:      Effort: Pulmonary effort is normal.      Breath sounds: Normal breath sounds. No wheezing or rhonchi.   Musculoskeletal:         General: No deformity.      Cervical back: Normal range of motion and neck supple.      Right lower leg: No edema.      Left lower leg: No edema.   Skin:     Findings: No rash.   Neurological:      Mental Status: She is alert and oriented to person, place, and time. Mental status is at baseline.      Motor: No weakness.      Coordination: Coordination normal.   Psychiatric:         Mood and Affect: Mood normal.         Behavior: Behavior normal.         Thought Content: Thought content normal.           Results Review:   Labs: Reviewed.  Lab Results   Component Value Date    WBC 6.50 01/18/2025    HGB 13.5 01/18/2025    HCT 40.7 01/18/2025    MCV 85.9 01/18/2025     01/18/2025         Lab Results   Component Value Date    GLUCOSE 86 03/15/2025    BUN 10 03/15/2025    CREATININE 1.06 (H) 03/15/2025    EGFRIFNONA 86 02/23/2022    EGFRIFAFRI 103 05/05/2021    BCR 9.4 03/15/2025    K 3.5 03/15/2025    CO2 23.4 03/15/2025    CALCIUM 8.9 03/15/2025    ALBUMIN 3.9 01/18/2025    AST 15 01/18/2025    ALT 14 01/18/2025       Micro: As of April 18, 2025   No results found for: \"RESPCX\"  No results found for: \"BLOODCX\"  No results found for: \"URINECX\"  No results found for: \"MRSACX\"  No results found for: \"MRSAPCR\"  No results found for: \"URCX\"  No components found for: \"LOWRESPCF\"  No results found for: \"THROATCX\"  No results found for: \"CULTURES\"  No components found for: \"STREPBCX\"  No results found for: \"STREPPNEUAG\"  No " "results found for: \"LEGIONELLA\"  No results found for: \"MYCOPLASCX\"  No results found for: \"GCCX\"  No results found for: \"WOUNDCX\"  No results found for: \"BODYFLDCX\"    ABG: No results found for: \"PHART\", \"ENF8CZS\", \"PO2ART\", \"HGBBG\", \"C0PDVXFO\", \"CFIO2\", \"FCOHB\", \"CARBOXYHGB\", \"FMETHB\"    Echo: Results for orders placed in visit on 10/22/24    Adult Transthoracic Echo Complete W/ Cont if Necessary Per Protocol    Interpretation Summary    Left ventricular systolic function is normal. Left ventricular ejection fraction appears to be 61 - 65%. Left ventricular diastolic function was normal.    Normal right ventricular size and function.    No hemodynamically significant valvular dysfunction.      Radiology Scans:    Images reviewed personally.   December 2021 CT scan chest  PROCEDURE: CT CHEST WO CONTRAST DIAGNOSTIC-     HISTORY: SHORTNESS OF BREATH; R06.02-Shortness of breath     COMPARISON: 05/05/2021.     PROCEDURE:  Multiple axial CT images were obtained from the thoracic  inlet through the upper abdomen without the use of contrast.      FINDINGS:   Soft tissue windows reveal no axillary, hilar or mediastinal adenopathy.  Heart size is normal. The aorta is normal in caliber. There are no  pleural or pericardial effusions. There are three small nodules in the  right upper lobe. A nodule in image #27 measures 2 mm. Two nodules on  image #30 measures 4 mm and 2 mm. These lesions are stable from the  prior exam. There is no new pulmonary lesion. The visualized upper  abdomen is unremarkable.      IMPRESSION:  1. Stable tiny pulmonary nodules are likely granulomas.  2. No acute lung disease.     621.34 mGy.cm        This study was performed with techniques to keep radiation doses as low  as reasonably achievable (ALARA). Individualized dose reduction  techniques using automated exposure control or adjustment of mA and/or  kV according to the patient size were employed.               Images were reviewed, interpreted, " and dictated by Dr. Karel Hernandez MD  Transcribed by Kristine Palm PA-C.     This report was finalized on 12/9/2021 10:24 AM by Karel Hernandez MD.    PFT IMPRESSION:   April 2025 PFT showed FEV1 98%, FEV1/FVC ratio 83.  No significant bronchodilator responsiveness.  No hyperinflation or air trapping.  DL/%.      December 2023 PFT showed FEV1 98%, FEV1/FVC ratio 78.  No significant bronchodilator responsiveness.  %.  No air trapping.  DL/.    January 2021 PFT showed FEV1 97%, FEV1/FVC ratio 81.  No significant bronchodilator responsiveness.  Total lung capacity 101%.  No air trapping.  Normal diffusing capacity.      Assessment / Plan      Assessment/Plan:    1. Moderate persistent asthma without complication  Symptoms better controlled on Breztri.  No recent exacerbations.  Discussed risk, benefits and possible side effects of medication.  Advised to take medication as prescribed and to rinse her mouth out after each use.  Continues on Nucala with great results.  Reviewed PFTs with patient    - Budeson-Glycopyrrol-Formoterol (BREZTRI) 160-9-4.8 MCG/ACT aerosol inhaler; Inhale 2 puffs 2 (Two) Times a Day.  Dispense: 1 each; Refill: 5  - albuterol sulfate  (90 Base) MCG/ACT inhaler; Inhale 1-2 puffs Every 6 (Six) Hours As Needed for Wheezing or Shortness of Air.  Dispense: 48 g; Refill: 3    2. Eosinophilic asthma  Well-controlled with Nucala  - albuterol sulfate  (90 Base) MCG/ACT inhaler; Inhale 1-2 puffs Every 6 (Six) Hours As Needed for Wheezing or Shortness of Air.  Dispense: 48 g; Refill: 3    3. Other systemic lupus erythematosus with glomerular disease  Follow[facility].  No recent issues    4. Non-seasonal allergic rhinitis, unspecified trigger  Some increased symptoms with recent allergy season.  Continue on current regimen      Follow Up:   Return in about 6 months (around 10/18/2025).    This document was electronically signed by Meaghan Benitez MD on  04/18/25 at 10:08 EDT    Meaghan Benitez MD  Pulmonary/Critical Care Physician   [facility]      Please note that portions of this note may have been completed with a voice recognition program. Efforts were made to edit the dictations, but occasionally words are mistranscribed.

## 2025-04-28 ENCOUNTER — PATIENT MESSAGE (OUTPATIENT)
Dept: INTERNAL MEDICINE | Facility: CLINIC | Age: 55
End: 2025-04-28
Payer: COMMERCIAL

## 2025-05-01 ENCOUNTER — TRANSCRIBE ORDERS (OUTPATIENT)
Dept: ADMINISTRATIVE | Facility: HOSPITAL | Age: 55
End: 2025-05-01
Payer: COMMERCIAL

## 2025-05-01 DIAGNOSIS — M50.30 OTHER CERVICAL DISC DEGENERATION, UNSPECIFIED CERVICAL REGION: Primary | ICD-10-CM

## 2025-06-09 ENCOUNTER — HOSPITAL ENCOUNTER (OUTPATIENT)
Facility: HOSPITAL | Age: 55
Discharge: HOME | End: 2025-06-09
Payer: COMMERCIAL

## 2025-06-09 DIAGNOSIS — M50.30 OTHER CERVICAL DISC DEGENERATION, UNSPECIFIED CERVICAL REGION: ICD-10-CM

## 2025-06-09 PROCEDURE — 72141 MRI NECK SPINE W/O DYE: CPT

## 2025-06-13 ENCOUNTER — TRANSCRIBE ORDERS (OUTPATIENT)
Dept: LAB | Facility: HOSPITAL | Age: 55
End: 2025-06-13
Payer: COMMERCIAL

## 2025-06-13 ENCOUNTER — LAB (OUTPATIENT)
Dept: LAB | Facility: HOSPITAL | Age: 55
End: 2025-06-13
Payer: COMMERCIAL

## 2025-06-13 DIAGNOSIS — E55.9 VITAMIN D INSUFFICIENCY: ICD-10-CM

## 2025-06-13 DIAGNOSIS — G60.9 IDIOPATHIC PERIPHERAL NEUROPATHY: ICD-10-CM

## 2025-06-13 DIAGNOSIS — N02.2 MEMBRANOUS NEPHROPATHY DETERMINED BY BIOPSY: ICD-10-CM

## 2025-06-13 DIAGNOSIS — R53.83 OTHER FATIGUE: ICD-10-CM

## 2025-06-13 DIAGNOSIS — M32.14 OTHER SYSTEMIC LUPUS ERYTHEMATOSUS WITH GLOMERULAR DISEASE: ICD-10-CM

## 2025-06-13 DIAGNOSIS — E53.8 FOLATE DEFICIENCY: ICD-10-CM

## 2025-06-13 DIAGNOSIS — N02.2 MEMBRANOUS NEPHROPATHY DETERMINED BY BIOPSY: Primary | ICD-10-CM

## 2025-06-13 DIAGNOSIS — R53.83 OTHER FATIGUE: Primary | ICD-10-CM

## 2025-06-13 LAB
25(OH)D3 SERPL-MCNC: 62 NG/ML (ref 30–100)
ALBUMIN UR-MCNC: 6.2 MG/DL
BACTERIA UR QL AUTO: ABNORMAL /HPF
BASOPHILS # BLD AUTO: 0.04 10*3/MM3 (ref 0–0.2)
BASOPHILS NFR BLD AUTO: 0.8 % (ref 0–1.5)
BILIRUB UR QL STRIP: NEGATIVE
CLARITY UR: CLEAR
COLOR UR: ABNORMAL
CREAT UR-MCNC: 293.6 MG/DL
CREAT UR-MCNC: 293.6 MG/DL
DEPRECATED RDW RBC AUTO: 38.3 FL (ref 37–54)
EOSINOPHIL # BLD AUTO: 0.02 10*3/MM3 (ref 0–0.4)
EOSINOPHIL NFR BLD AUTO: 0.4 % (ref 0.3–6.2)
ERYTHROCYTE [DISTWIDTH] IN BLOOD BY AUTOMATED COUNT: 12.3 % (ref 12.3–15.4)
FOLATE SERPL-MCNC: 14.6 NG/ML (ref 4.78–24.2)
GLUCOSE UR STRIP-MCNC: NEGATIVE MG/DL
HCT VFR BLD AUTO: 38.5 % (ref 34–46.6)
HGB BLD-MCNC: 12.6 G/DL (ref 12–15.9)
HGB UR QL STRIP.AUTO: NEGATIVE
HYALINE CASTS UR QL AUTO: ABNORMAL /LPF
IMM GRANULOCYTES # BLD AUTO: 0.02 10*3/MM3 (ref 0–0.05)
IMM GRANULOCYTES NFR BLD AUTO: 0.4 % (ref 0–0.5)
KETONES UR QL STRIP: ABNORMAL
LEUKOCYTE ESTERASE UR QL STRIP.AUTO: NEGATIVE
LYMPHOCYTES # BLD AUTO: 1.53 10*3/MM3 (ref 0.7–3.1)
LYMPHOCYTES NFR BLD AUTO: 29 % (ref 19.6–45.3)
MCH RBC QN AUTO: 28.1 PG (ref 26.6–33)
MCHC RBC AUTO-ENTMCNC: 32.7 G/DL (ref 31.5–35.7)
MCV RBC AUTO: 85.9 FL (ref 79–97)
MICROALBUMIN/CREAT UR: 21.1 MG/G (ref 0–29)
MONOCYTES # BLD AUTO: 0.58 10*3/MM3 (ref 0.1–0.9)
MONOCYTES NFR BLD AUTO: 11 % (ref 5–12)
NEUTROPHILS NFR BLD AUTO: 3.08 10*3/MM3 (ref 1.7–7)
NEUTROPHILS NFR BLD AUTO: 58.4 % (ref 42.7–76)
NITRITE UR QL STRIP: NEGATIVE
NRBC BLD AUTO-RTO: 0 /100 WBC (ref 0–0.2)
PH UR STRIP.AUTO: <=5 [PH] (ref 5–8)
PLATELET # BLD AUTO: 246 10*3/MM3 (ref 140–450)
PMV BLD AUTO: 11.5 FL (ref 6–12)
PROT ?TM UR-MCNC: 28.9 MG/DL
PROT UR QL STRIP: ABNORMAL
PROT/CREAT UR: 98.4 MG/G CREA (ref 0–200)
RBC # BLD AUTO: 4.48 10*6/MM3 (ref 3.77–5.28)
RBC # UR STRIP: ABNORMAL /HPF
REF LAB TEST METHOD: ABNORMAL
SP GR UR STRIP: >1.03 (ref 1–1.03)
SQUAMOUS #/AREA URNS HPF: ABNORMAL /HPF
UROBILINOGEN UR QL STRIP: ABNORMAL
VIT B12 BLD-MCNC: 1673 PG/ML (ref 211–946)
WBC # UR STRIP: ABNORMAL /HPF
WBC NRBC COR # BLD AUTO: 5.27 10*3/MM3 (ref 3.4–10.8)

## 2025-06-13 PROCEDURE — 84156 ASSAY OF PROTEIN URINE: CPT

## 2025-06-13 PROCEDURE — 86225 DNA ANTIBODY NATIVE: CPT

## 2025-06-13 PROCEDURE — 85025 COMPLETE CBC W/AUTO DIFF WBC: CPT

## 2025-06-13 PROCEDURE — 82607 VITAMIN B-12: CPT

## 2025-06-13 PROCEDURE — 82746 ASSAY OF FOLIC ACID SERUM: CPT

## 2025-06-13 PROCEDURE — 81001 URINALYSIS AUTO W/SCOPE: CPT

## 2025-06-13 PROCEDURE — 86160 COMPLEMENT ANTIGEN: CPT

## 2025-06-13 PROCEDURE — 36415 COLL VENOUS BLD VENIPUNCTURE: CPT

## 2025-06-13 PROCEDURE — 80053 COMPREHEN METABOLIC PANEL: CPT

## 2025-06-13 PROCEDURE — 83735 ASSAY OF MAGNESIUM: CPT

## 2025-06-13 PROCEDURE — 82306 VITAMIN D 25 HYDROXY: CPT

## 2025-06-13 PROCEDURE — 82043 UR ALBUMIN QUANTITATIVE: CPT

## 2025-06-13 PROCEDURE — 82570 ASSAY OF URINE CREATININE: CPT

## 2025-06-14 LAB
ALBUMIN SERPL-MCNC: 4.4 G/DL (ref 3.5–5.2)
ALBUMIN/GLOB SERPL: 1.8 G/DL
ALP SERPL-CCNC: 84 U/L (ref 39–117)
ALT SERPL W P-5'-P-CCNC: 8 U/L (ref 1–33)
ANION GAP SERPL CALCULATED.3IONS-SCNC: 10.7 MMOL/L (ref 5–15)
AST SERPL-CCNC: 15 U/L (ref 1–32)
BILIRUB SERPL-MCNC: 0.3 MG/DL (ref 0–1.2)
BUN SERPL-MCNC: 11 MG/DL (ref 6–20)
BUN/CREAT SERPL: 10.6 (ref 7–25)
C3 SERPL-MCNC: 133 MG/DL (ref 82–167)
C4 SERPL-MCNC: 26 MG/DL (ref 14–44)
CALCIUM SPEC-SCNC: 9.3 MG/DL (ref 8.6–10.5)
CHLORIDE SERPL-SCNC: 109 MMOL/L (ref 98–107)
CO2 SERPL-SCNC: 21.3 MMOL/L (ref 22–29)
CREAT SERPL-MCNC: 1.04 MG/DL (ref 0.57–1)
EGFRCR SERPLBLD CKD-EPI 2021: 63.6 ML/MIN/1.73
GLOBULIN UR ELPH-MCNC: 2.4 GM/DL
GLUCOSE SERPL-MCNC: 81 MG/DL (ref 65–99)
MAGNESIUM SERPL-MCNC: 2.1 MG/DL (ref 1.6–2.6)
POTASSIUM SERPL-SCNC: 3.8 MMOL/L (ref 3.5–5.2)
PROT SERPL-MCNC: 6.8 G/DL (ref 6–8.5)
SODIUM SERPL-SCNC: 141 MMOL/L (ref 136–145)

## 2025-06-16 LAB — DSDNA AB SER-ACNC: <1 IU/ML (ref 0–9)

## 2025-07-11 RX ORDER — LEVOTHYROXINE SODIUM 137 UG/1
TABLET ORAL
Qty: 90 TABLET | Refills: 1 | Status: SHIPPED | OUTPATIENT
Start: 2025-07-11

## 2025-07-11 NOTE — TELEPHONE ENCOUNTER
Rx Refill Note  Requested Prescriptions     Pending Prescriptions Disp Refills    levothyroxine (SYNTHROID, LEVOTHROID) 137 MCG tablet [Pharmacy Med Name: LEVOTHYROXINE SODIUM 137MCG TABLET] 90 tablet 1     Sig: TAKE ONE (1) TABLET BY MOUTH EVERY MORNING.      Last office visit with prescribing clinician: 11/7/2024     Next office visit with prescribing clinician: 11/6/2025     Brigitte Elliott MA  07/11/25, 09:37 EDT

## 2025-07-15 ENCOUNTER — LAB (OUTPATIENT)
Dept: LAB | Facility: HOSPITAL | Age: 55
End: 2025-07-15
Payer: COMMERCIAL

## 2025-07-15 PROCEDURE — 84207 ASSAY OF VITAMIN B-6: CPT

## 2025-07-20 LAB — PYRIDOXAL PHOS SERPL-MCNC: 2.9 UG/L (ref 3.4–65.2)

## 2025-08-01 ENCOUNTER — OFFICE VISIT (OUTPATIENT)
Dept: INTERNAL MEDICINE | Facility: CLINIC | Age: 55
End: 2025-08-01
Payer: COMMERCIAL

## 2025-08-01 VITALS
DIASTOLIC BLOOD PRESSURE: 78 MMHG | HEIGHT: 65 IN | TEMPERATURE: 96.8 F | HEART RATE: 102 BPM | WEIGHT: 186.8 LBS | SYSTOLIC BLOOD PRESSURE: 122 MMHG | BODY MASS INDEX: 31.12 KG/M2 | OXYGEN SATURATION: 99 %

## 2025-08-01 DIAGNOSIS — I10 ESSENTIAL HYPERTENSION: ICD-10-CM

## 2025-08-01 DIAGNOSIS — Z12.31 ENCOUNTER FOR SCREENING MAMMOGRAM FOR BREAST CANCER: ICD-10-CM

## 2025-08-01 DIAGNOSIS — E66.811 CLASS 1 OBESITY DUE TO EXCESS CALORIES WITH SERIOUS COMORBIDITY AND BODY MASS INDEX (BMI) OF 31.0 TO 31.9 IN ADULT: ICD-10-CM

## 2025-08-01 DIAGNOSIS — R11.0 NAUSEA: ICD-10-CM

## 2025-08-01 DIAGNOSIS — E66.09 CLASS 1 OBESITY DUE TO EXCESS CALORIES WITH SERIOUS COMORBIDITY AND BODY MASS INDEX (BMI) OF 31.0 TO 31.9 IN ADULT: ICD-10-CM

## 2025-08-01 DIAGNOSIS — Z28.21 PNEUMOCOCCAL VACCINATION DECLINED: ICD-10-CM

## 2025-08-01 DIAGNOSIS — G43.709 CHRONIC MIGRAINE WITHOUT AURA WITHOUT STATUS MIGRAINOSUS, NOT INTRACTABLE: ICD-10-CM

## 2025-08-01 DIAGNOSIS — Z00.00 ANNUAL PHYSICAL EXAM: Primary | ICD-10-CM

## 2025-08-01 PROBLEM — M32.14 MEMBRANOUS LUPUS NEPHRITIS SYNDROME: Status: ACTIVE | Noted: 2022-08-15

## 2025-08-01 RX ORDER — LANOLIN ALCOHOL/MO/W.PET/CERES
50 CREAM (GRAM) TOPICAL DAILY
COMMUNITY
Start: 2025-07-23 | End: 2026-07-23

## 2025-08-01 RX ORDER — ONDANSETRON 4 MG/1
4-8 TABLET, ORALLY DISINTEGRATING ORAL EVERY 8 HOURS PRN
Qty: 30 TABLET | Refills: 5 | Status: SHIPPED | OUTPATIENT
Start: 2025-08-01

## 2025-08-01 NOTE — LETTER
August 1, 2025     Patient: Kelsey Ross   YOB: 1970   Date of Visit: 8/1/2025       To Whom It May Concern:    It is my medical opinion that Kelsey Ross would benefit from a stand up desk. By having a stand up desk she will have less pain which may improve her productivity and this would decrease her risk of absence from work.           Sincerely,        Isha Giron MD    CC: No Recipients

## 2025-08-01 NOTE — PROGRESS NOTES
08/01/2025  Chief Complaint   Patient presents with    Annual Exam       Patient Care Team:  Isha Giron MD as PCP - General (Family Medicine)  Taylor Holliday MD as Consulting Physician (Endocrinology)  Kandy Ramirez MD (Internal Medicine)  Meaghan Benitez MD as Consulting Physician (Pulmonary Disease)  Verito Flowers MD (Rheumatology)  Collett, Megan, APRN as Nurse Practitioner (Neurology)]       Kelsey Ross is here for her annual preventive exam. History per MA reviewed.     Mammogram up to date, scheduled for next June at [place] but painful experience this time, would prefer to do in [place] with [employer] next year    Feels better overall on compound semaglutide  Weight down 40 lb  Nephrology and rheumatology pleased with this  Would like refill on Zofran prn but overall no side effects  Linzess long term manages chronic constipation           Health Maintenance   Topic Date Due    ANNUAL PHYSICAL  06/25/2025    Pneumococcal Vaccine 50+ (1 of 2 - PCV) 01/28/2026 (Originally 10/20/1989)    COVID-19 Vaccine (3 - Moderna risk series) 03/03/2026 (Originally 12/10/2021)    INFLUENZA VACCINE  10/01/2025    MAMMOGRAM  06/04/2027    TDAP/TD VACCINES (2 - Td or Tdap) 09/16/2027    COLORECTAL CANCER SCREENING  03/31/2032    HEPATITIS C SCREENING  Completed    ZOSTER VACCINE  Completed       Immunization History   Administered Date(s) Administered    COVID-19 (MODERNA) 1st,2nd,3rd Dose Monovalent 10/15/2021, 11/12/2021    Flu Vaccine Quad PF >36MO 09/16/2017    Fluzone (or Fluarix & Flulaval for VFC) >6mos 09/16/2017, 09/28/2020, 09/30/2022    Influenza Injectable Mdck Pf Quad 09/30/2022    Shingrix 05/17/2022, 11/22/2022    Tdap 09/16/2017         The following portions of the patient's history were reviewed and updated as appropriate: allergies, current medications, past family history, past medical history, past social history, past surgical history and problem  "list.    Objective   Visit Vitals  /78   Pulse 102   Temp 96.8 °F (36 °C)   Ht 165.1 cm (65\")   Wt 84.7 kg (186 lb 12.8 oz)   SpO2 99%   BMI 31.09 kg/m²              Physical Exam  Vitals and nursing note reviewed.   Constitutional:       General: She is not in acute distress.     Appearance: Normal appearance. She is well-developed and well-groomed. She is not ill-appearing, toxic-appearing or diaphoretic.   HENT:      Head: Normocephalic and atraumatic. Hair is normal.      Right Ear: Hearing, tympanic membrane, ear canal and external ear normal.      Left Ear: Hearing, tympanic membrane, ear canal and external ear normal.      Nose: Nose normal.      Mouth/Throat:      Mouth: Mucous membranes are moist.   Eyes:      General: Lids are normal. Gaze aligned appropriately. No scleral icterus.        Right eye: No discharge.         Left eye: No discharge.      Extraocular Movements: Extraocular movements intact.      Conjunctiva/sclera: Conjunctivae normal.      Pupils: Pupils are equal, round, and reactive to light.   Neck:      Thyroid: No thyromegaly.      Trachea: Trachea and phonation normal. No tracheal deviation.   Cardiovascular:      Rate and Rhythm: Normal rate and regular rhythm.      Heart sounds: Normal heart sounds. No murmur heard.     No friction rub. No gallop.   Pulmonary:      Effort: Pulmonary effort is normal.      Breath sounds: Normal breath sounds and air entry.   Abdominal:      General: Bowel sounds are normal. There is no distension.      Palpations: Abdomen is soft. Abdomen is not rigid. There is no mass.      Tenderness: There is no abdominal tenderness. There is no guarding or rebound.   Musculoskeletal:         General: No tenderness or deformity.      Cervical back: Neck supple.      Right lower leg: No edema.      Left lower leg: No edema.   Skin:     General: Skin is warm.      Capillary Refill: Capillary refill takes less than 2 seconds.      Coloration: Skin is not cyanotic, " jaundiced or pale.      Findings: No erythema or rash.      Nails: There is no clubbing.   Neurological:      General: No focal deficit present.      Mental Status: She is alert and oriented to person, place, and time.      Cranial Nerves: No cranial nerve deficit.      Motor: No tremor, atrophy, abnormal muscle tone or seizure activity.      Gait: Gait is intact. Gait normal.   Psychiatric:         Attention and Perception: Attention and perception normal.         Mood and Affect: Mood and affect normal.         Speech: Speech normal.         Behavior: Behavior normal. Behavior is cooperative.         Thought Content: Thought content normal.         Cognition and Memory: Cognition and memory normal.         Judgment: Judgment normal.         Lab Results   Component Value Date    CHLPL 213 (H) 11/15/2022    TRIG 170 (H) 11/07/2024    HDL 84 (H) 11/07/2024    LDL 91 11/07/2024     Lab Results   Component Value Date    TSH 0.555 03/15/2025     Lab Results   Component Value Date    FREET4 1.28 03/15/2025     Lab Results   Component Value Date    HGBA1C 5.00 12/26/2023    HGBA1C 5.00 11/15/2022    HGBA1C 5.07 09/07/2021     Mammo Screening Digital Tomosynthesis Bilateral With CAD (06/04/2025 16:40)     Reviewed neurology note, a/p:  1. Bilateral hand numbness      Plan:    Diagnoses and all orders for this visit:  Bilateral hand numbness  - EMG W/ NCS    Return in about 3 months (around 10/31/2025).    Continues to follow with Dr. Vaca who placed a spinal cord stimulator October 2024.  Gabapentin helps with her neuropathy and plantar fascitis pain. Re-fill due at next visit.    NCV/EMG 7/31/25 normal.  Numbness of hands may be due to vitamin def (B6, already known to have Vit D and B12 deficiency).  Continue Vit B6 supplement.    Continue Topiramate 100mg daily.  Continue Rizatriptan PRN for migraine.    F/u 3 months.      Electronically signed by Megan E Collett, APRN at 07/31/2025 2:10 PM CDT        Assessment    Diagnoses and all orders for this visit:    1. Annual physical exam (Primary)    2. Class 1 obesity due to excess calories with serious comorbidity and body mass index (BMI) of 31.0 to 31.9 in adult  Assessment & Plan:  Patient's (Body mass index is 31.09 kg/m².) indicates that they are obese (BMI >30) with health conditions that include hypertension and osteoarthritis . Weight is improving with treatment. BMI  is above average; BMI management plan is completed. We discussed Information on healthy weight added to patient's after visit summary and continuation of compound glp-1 along with diet and exercise.     Doing very well with compound semaglutide per [facility]  More cost effective than sending to [facility] (<$300 compared to $500)  May have refill and dose adjustment requests sent to us from [facility] and I'll continue rx  Encouraged protein in diet (moderation, work with nephrology due to chronic kidney disease)  Encouraged weight-bearing exercise      3. Nausea  -     ondansetron ODT (ZOFRAN-ODT) 4 MG disintegrating tablet; Place 1-2 tablets on the tongue Every 8 (Eight) Hours As Needed for Nausea or Vomiting.  Dispense: 30 tablet; Refill: 5    4. Essential hypertension  Assessment & Plan:  Hypertension is stable and controlled  Continue current treatment regimen.  Weight loss.  Regular aerobic exercise.  Ambulatory blood pressure monitoring.  Blood pressure will be reassessed in 1 year.      5. Encounter for screening mammogram for breast cancer  -     Mammo Screening Digital Tomosynthesis Bilateral With CAD; Future    6. Pneumococcal vaccination declined    7. Chronic migraine without aura without status migrainosus, not intractable  Assessment & Plan:  Follow up with neurology. Consider medicine such as Aimovig, Emgality, etc.                   Health maintenance information provided via patient plan (after visit summary).   Counseled on age appropriate health  screenings and immunizations. Consider shingles vaccine at pharmacy. Declines pneumonia shot today, info on pcv21 provided to patient.  Encouraged exercise and healthy diet.      Return in about 1 year (around 8/2/2026) for Annual physical, sooner if needed.     Isha Giron MD

## 2025-08-01 NOTE — ASSESSMENT & PLAN NOTE
Hypertension is stable and controlled  Continue current treatment regimen.  Weight loss.  Regular aerobic exercise.  Ambulatory blood pressure monitoring.  Blood pressure will be reassessed in 1 year.

## 2025-08-01 NOTE — ASSESSMENT & PLAN NOTE
Patient's (Body mass index is 31.09 kg/m².) indicates that they are obese (BMI >30) with health conditions that include hypertension and osteoarthritis . Weight is improving with treatment. BMI  is above average; BMI management plan is completed. We discussed Information on healthy weight added to patient's after visit summary and continuation of compound glp-1 along with diet and exercise.     Doing very well with compound semaglutide per [facility]  More cost effective than sending to [facility] (<$300 compared to $500)  May have refill and dose adjustment requests sent to us from [facility] and I'll continue rx  Encouraged protein in diet (moderation, work with nephrology due to chronic kidney disease)  Encouraged weight-bearing exercise

## 2025-08-11 DIAGNOSIS — K59.09 CHRONIC CONSTIPATION: ICD-10-CM

## 2025-08-11 DIAGNOSIS — M62.838 MUSCLE SPASM: ICD-10-CM

## (undated) DEVICE — RICH MINOR LITHOTOMY: Brand: MEDLINE INDUSTRIES, INC.

## (undated) DEVICE — LUBE JELLY PK/2.75GM STRL BX/144

## (undated) DEVICE — GLV SURG TRIUMPH MICRO PF LTX 7.5 STRL

## (undated) DEVICE — ENDOSCOPY PORT CONNECTOR FOR OLYMPUS® SCOPES: Brand: ERBE

## (undated) DEVICE — HARMONIC 1100 SHEARS, 36CM SHAFT LENGTH: Brand: HARMONIC

## (undated) DEVICE — HYBRID TUBING/CAP SET FOR OLYMPUS® SCOPES: Brand: ERBE

## (undated) DEVICE — PATIENT RETURN ELECTRODE, SINGLE-USE, CONTACT QUALITY MONITORING, ADULT, WITH 9FT CORD, FOR PATIENTS WEIGING OVER 33LBS. (15KG): Brand: MEGADYNE

## (undated) DEVICE — SYR LUERLOK 30CC

## (undated) DEVICE — SKIN PREP TRAY 4 COMPARTM TRAY: Brand: MEDLINE INDUSTRIES, INC.

## (undated) DEVICE — INTENDED FOR TISSUE SEPARATION, AND OTHER PROCEDURES THAT REQUIRE A SHARP SURGICAL BLADE TO PUNCTURE OR CUT.: Brand: BARD-PARKER ® CARBON RIB-BACK BLADES

## (undated) DEVICE — SUT GUT CHRM 4/0 SH 27IN G121H

## (undated) DEVICE — Device

## (undated) DEVICE — VLV SXN AIR/H2O ORCAPOD3 1P/U STRL

## (undated) DEVICE — COUNT NDL FOAM STRIP W/MAG 60CT

## (undated) DEVICE — TUBING, SUCTION, 1/4" X 12', STRAIGHT: Brand: MEDLINE

## (undated) DEVICE — NDL HYPO ECLPS SFTY 22G 1 1/2IN

## (undated) DEVICE — FRCP BX RADJAW4 NDL 2.8 240 STD OG

## (undated) DEVICE — TP SXN YANKR BULB STRL

## (undated) DEVICE — MEDI-VAC NON-CONDUCTIVE SUCTION TUBING: Brand: CARDINAL HEALTH

## (undated) DEVICE — BLD CLIP UNIV SURG GRY

## (undated) DEVICE — SUT VIC 3/0 SH 27IN J416H

## (undated) DEVICE — GLV SURG SENSICARE W/ALOE PF LF 7.5 STRL

## (undated) DEVICE — HARMONIC FOCUS SHEARS 9CM LENGTH + ADAPTIVE TISSUE TECHNOLOGY FOR USE WITH BLUE HAND PIECE ONLY: Brand: HARMONIC FOCUS

## (undated) DEVICE — CONMED SCOPE SAVER BITE BLOCK, 20X27 MM: Brand: SCOPE SAVER

## (undated) DEVICE — GOWN,PREVENTION PLUS,XLARGE,STERILE: Brand: MEDLINE

## (undated) DEVICE — TBG PENCL TELESCP MEGADYNE SMOKE EVAC 10FT